# Patient Record
Sex: FEMALE | Race: WHITE
[De-identification: names, ages, dates, MRNs, and addresses within clinical notes are randomized per-mention and may not be internally consistent; named-entity substitution may affect disease eponyms.]

---

## 2018-04-26 ENCOUNTER — HOSPITAL ENCOUNTER (INPATIENT)
Dept: HOSPITAL 92 - ERS | Age: 63
LOS: 76 days | Discharge: HOME | DRG: 4 | End: 2018-07-11
Attending: FAMILY MEDICINE | Admitting: FAMILY MEDICINE
Payer: SELF-PAY

## 2018-04-26 VITALS — BODY MASS INDEX: 57.7 KG/M2

## 2018-04-26 DIAGNOSIS — I27.29: ICD-10-CM

## 2018-04-26 DIAGNOSIS — E87.6: ICD-10-CM

## 2018-04-26 DIAGNOSIS — I50.33: ICD-10-CM

## 2018-04-26 DIAGNOSIS — J96.21: Primary | ICD-10-CM

## 2018-04-26 DIAGNOSIS — I26.99: ICD-10-CM

## 2018-04-26 DIAGNOSIS — E87.0: ICD-10-CM

## 2018-04-26 DIAGNOSIS — R39.2: ICD-10-CM

## 2018-04-26 DIAGNOSIS — I47.2: ICD-10-CM

## 2018-04-26 DIAGNOSIS — N36.8: ICD-10-CM

## 2018-04-26 DIAGNOSIS — B95.5: ICD-10-CM

## 2018-04-26 DIAGNOSIS — N39.0: ICD-10-CM

## 2018-04-26 DIAGNOSIS — I87.2: ICD-10-CM

## 2018-04-26 DIAGNOSIS — I48.0: ICD-10-CM

## 2018-04-26 DIAGNOSIS — B95.2: ICD-10-CM

## 2018-04-26 DIAGNOSIS — I24.8: ICD-10-CM

## 2018-04-26 DIAGNOSIS — I50.813: ICD-10-CM

## 2018-04-26 DIAGNOSIS — N18.3: ICD-10-CM

## 2018-04-26 DIAGNOSIS — M17.11: ICD-10-CM
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
## 2018-04-26 DIAGNOSIS — R23.3: ICD-10-CM

## 2018-04-26 DIAGNOSIS — T45.515A: ICD-10-CM

## 2018-04-26 DIAGNOSIS — D69.3: ICD-10-CM
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
## 2018-04-26 DIAGNOSIS — A41.9: ICD-10-CM

## 2018-04-26 DIAGNOSIS — D68.32: ICD-10-CM

## 2018-04-26 DIAGNOSIS — E66.2: ICD-10-CM

## 2018-04-26 DIAGNOSIS — N17.9: ICD-10-CM

## 2018-04-26 DIAGNOSIS — Z91.19: ICD-10-CM

## 2018-04-26 DIAGNOSIS — F32.9: ICD-10-CM

## 2018-04-26 DIAGNOSIS — Z99.81: ICD-10-CM

## 2018-04-26 DIAGNOSIS — J96.22: ICD-10-CM

## 2018-04-26 LAB
ALBUMIN SERPL BCG-MCNC: 3.8 G/DL (ref 3.4–4.8)
ALP SERPL-CCNC: 50 U/L (ref 40–150)
ALT SERPL W P-5'-P-CCNC: (no result) U/L (ref 8–55)
ANALYZER IN CARDIO: (no result)
ANION GAP SERPL CALC-SCNC: 14 MMOL/L (ref 10–20)
APTT PPP: 242.5 SEC (ref 22.9–36.1)
AST SERPL-CCNC: 22 U/L (ref 5–34)
BACTERIA UR QL AUTO: (no result) HPF
BASE EXCESS STD BLDA CALC-SCNC: 0.6 MEQ/L
BASE EXCESS STD BLDA CALC-SCNC: 1.6 MEQ/L
BASE EXCESS STD BLDA CALC-SCNC: 5.1 MEQ/L
BILIRUB SERPL-MCNC: 0.7 MG/DL (ref 0.2–1.2)
BUN SERPL-MCNC: 26 MG/DL (ref 9.8–20.1)
CA-I BLDA-SCNC: 1.2 MMOL/L (ref 1.12–1.3)
CA-I BLDA-SCNC: 1.2 MMOL/L (ref 1.12–1.3)
CA-I BLDA-SCNC: 1.3 MMOL/L (ref 1.12–1.3)
CALCIUM SERPL-MCNC: 9.3 MG/DL (ref 7.8–10.44)
CASTS #/AREA URNS LPF: (no result) LPF
CHLORIDE SERPL-SCNC: 101 MMOL/L (ref 98–107)
CK MB SERPL-MCNC: 2.9 NG/ML (ref 0–6.6)
CK SERPL-CCNC: 45 U/L (ref 29–168)
CO2 SERPL-SCNC: 31 MMOL/L (ref 23–31)
CREAT CL PREDICTED SERPL C-G-VRATE: 0 ML/MIN (ref 70–130)
CRYSTAL-AUWI FLAG: 0.4 (ref 0–15)
GLOBULIN SER CALC-MCNC: 4 G/DL (ref 2.4–3.5)
GLUCOSE SERPL-MCNC: 130 MG/DL (ref 80–115)
HCO3 BLDA-SCNC: 28 MEQ/L (ref 22–26)
HCO3 BLDA-SCNC: 28.9 MEQ/L (ref 22–26)
HCO3 BLDA-SCNC: 33 MEQ/L (ref 22–26)
HCT VFR BLDA CALC: 46.2 % (ref 36–47)
HCT VFR BLDA CALC: 46.7 % (ref 36–47)
HCT VFR BLDA CALC: 54.3 % (ref 36–47)
HEV IGM SER QL: 1.1 (ref 0–7.99)
HGB BLD-MCNC: 13 G/DL (ref 12–16)
HGB BLD-MCNC: 15 G/DL (ref 12–16)
HGB BLDA-MCNC: 13.1 G/DL (ref 12–16)
HGB BLDA-MCNC: 13.6 G/DL (ref 12–16)
HGB BLDA-MCNC: 14.5 G/DL (ref 12–16)
LIPASE SERPL-CCNC: 37 U/L (ref 8–78)
MCH RBC QN AUTO: 24.3 PG (ref 27–31)
MCV RBC AUTO: 84.8 FL (ref 81–99)
MDIFF COMPLETE?: YES
O2 A-A PPRESDIFF RESPIRATORY: 279.73 MM[HG] (ref 0–20)
O2 A-A PPRESDIFF RESPIRATORY: 439.02 MM[HG] (ref 0–20)
PATHC CAST-AUWI FLAG: 10.61 (ref 0–2.49)
PCO2 BLDA: 113.5 MMHG (ref 35–45)
PCO2 BLDA: 39.6 MMHG (ref 35–45)
PCO2 BLDA: 51.5 MMHG (ref 35–45)
PH BLDA: 7.08 [PH] (ref 7.35–7.45)
PH BLDA: 7.35 [PH] (ref 7.35–7.45)
PH BLDA: 7.48 [PH] (ref 7.35–7.45)
PLATELET # BLD AUTO: 143 THOU/UL (ref 130–400)
PLATELET # BLD AUTO: 194 THOU/UL (ref 130–400)
PO2 BLDA: 123.1 MMHG (ref 80–100)
PO2 BLDA: 70.9 MMHG (ref 80–100)
PO2 BLDA: 80.7 MMHG (ref 80–100)
POTASSIUM SERPL-SCNC: 3.9 MMOL/L (ref 3.5–5.1)
PROT UR STRIP.AUTO-MCNC: 100 MG/DL
RBC # BLD AUTO: 6.16 MILL/UL (ref 4.2–5.4)
RBC UR QL AUTO: (no result) HPF (ref 0–3)
SODIUM SERPL-SCNC: 142 MMOL/L (ref 136–145)
SP GR UR STRIP: 1.02 (ref 1–1.04)
SPECIMEN DRAWN FROM PATIENT: (no result)
SPERM-AUWI FLAG: 0 (ref 0–9.9)
TROPONIN I SERPL DL<=0.01 NG/ML-MCNC: 0.17 NG/ML (ref ?–0.03)
TROPONIN I SERPL DL<=0.01 NG/ML-MCNC: 0.18 NG/ML (ref ?–0.03)
TROPONIN I SERPL DL<=0.01 NG/ML-MCNC: 0.18 NG/ML (ref ?–0.03)
WBC # BLD AUTO: 8.7 THOU/UL (ref 4.8–10.8)
WBC UR QL AUTO: (no result) HPF (ref 0–3)
YEAST-AUWI FLAG: 0 (ref 0–25)

## 2018-04-26 PROCEDURE — 96368 THER/DIAG CONCURRENT INF: CPT

## 2018-04-26 PROCEDURE — 85610 PROTHROMBIN TIME: CPT

## 2018-04-26 PROCEDURE — 80053 COMPREHEN METABOLIC PANEL: CPT

## 2018-04-26 PROCEDURE — 85520 HEPARIN ASSAY: CPT

## 2018-04-26 PROCEDURE — 82542 COL CHROMOTOGRAPHY QUAL/QUAN: CPT

## 2018-04-26 PROCEDURE — A4216 STERILE WATER/SALINE, 10 ML: HCPCS

## 2018-04-26 PROCEDURE — 93970 EXTREMITY STUDY: CPT

## 2018-04-26 PROCEDURE — 0BH17EZ INSERTION OF ENDOTRACHEAL AIRWAY INTO TRACHEA, VIA NATURAL OR ARTIFICIAL OPENING: ICD-10-PCS | Performed by: EMERGENCY MEDICINE

## 2018-04-26 PROCEDURE — 96361 HYDRATE IV INFUSION ADD-ON: CPT

## 2018-04-26 PROCEDURE — 93005 ELECTROCARDIOGRAM TRACING: CPT

## 2018-04-26 PROCEDURE — 94644 CONT INHLJ TX 1ST HOUR: CPT

## 2018-04-26 PROCEDURE — 87186 SC STD MICRODIL/AGAR DIL: CPT

## 2018-04-26 PROCEDURE — 80069 RENAL FUNCTION PANEL: CPT

## 2018-04-26 PROCEDURE — 87205 SMEAR GRAM STAIN: CPT

## 2018-04-26 PROCEDURE — 82565 ASSAY OF CREATININE: CPT

## 2018-04-26 PROCEDURE — 94002 VENT MGMT INPAT INIT DAY: CPT

## 2018-04-26 PROCEDURE — 85018 HEMOGLOBIN: CPT

## 2018-04-26 PROCEDURE — 82805 BLOOD GASES W/O2 SATURATION: CPT

## 2018-04-26 PROCEDURE — 93306 TTE W/DOPPLER COMPLETE: CPT

## 2018-04-26 PROCEDURE — 83605 ASSAY OF LACTIC ACID: CPT

## 2018-04-26 PROCEDURE — 83690 ASSAY OF LIPASE: CPT

## 2018-04-26 PROCEDURE — 84100 ASSAY OF PHOSPHORUS: CPT

## 2018-04-26 PROCEDURE — 84132 ASSAY OF SERUM POTASSIUM: CPT

## 2018-04-26 PROCEDURE — 85014 HEMATOCRIT: CPT

## 2018-04-26 PROCEDURE — 82553 CREATINE MB FRACTION: CPT

## 2018-04-26 PROCEDURE — 5A1955Z RESPIRATORY VENTILATION, GREATER THAN 96 CONSECUTIVE HOURS: ICD-10-PCS | Performed by: INTERNAL MEDICINE

## 2018-04-26 PROCEDURE — 87070 CULTURE OTHR SPECIMN AEROBIC: CPT

## 2018-04-26 PROCEDURE — 85379 FIBRIN DEGRADATION QUANT: CPT

## 2018-04-26 PROCEDURE — 94003 VENT MGMT INPAT SUBQ DAY: CPT

## 2018-04-26 PROCEDURE — 83735 ASSAY OF MAGNESIUM: CPT

## 2018-04-26 PROCEDURE — 96365 THER/PROPH/DIAG IV INF INIT: CPT

## 2018-04-26 PROCEDURE — 36416 COLLJ CAPILLARY BLOOD SPEC: CPT

## 2018-04-26 PROCEDURE — 94660 CPAP INITIATION&MGMT: CPT

## 2018-04-26 PROCEDURE — 83880 ASSAY OF NATRIURETIC PEPTIDE: CPT

## 2018-04-26 PROCEDURE — C9113 INJ PANTOPRAZOLE SODIUM, VIA: HCPCS

## 2018-04-26 PROCEDURE — 80202 ASSAY OF VANCOMYCIN: CPT

## 2018-04-26 PROCEDURE — 93010 ELECTROCARDIOGRAM REPORT: CPT

## 2018-04-26 PROCEDURE — 36415 COLL VENOUS BLD VENIPUNCTURE: CPT

## 2018-04-26 PROCEDURE — 85007 BL SMEAR W/DIFF WBC COUNT: CPT

## 2018-04-26 PROCEDURE — 85027 COMPLETE CBC AUTOMATED: CPT

## 2018-04-26 PROCEDURE — 81015 MICROSCOPIC EXAM OF URINE: CPT

## 2018-04-26 PROCEDURE — 87040 BLOOD CULTURE FOR BACTERIA: CPT

## 2018-04-26 PROCEDURE — 81003 URINALYSIS AUTO W/O SCOPE: CPT

## 2018-04-26 PROCEDURE — 74230 X-RAY XM SWLNG FUNCJ C+: CPT

## 2018-04-26 PROCEDURE — 87086 URINE CULTURE/COLONY COUNT: CPT

## 2018-04-26 PROCEDURE — C1713 ANCHOR/SCREW BN/BN,TIS/BN: HCPCS

## 2018-04-26 PROCEDURE — S0028 INJECTION, FAMOTIDINE, 20 MG: HCPCS

## 2018-04-26 PROCEDURE — 85025 COMPLETE CBC W/AUTO DIFF WBC: CPT

## 2018-04-26 PROCEDURE — 85730 THROMBOPLASTIN TIME PARTIAL: CPT

## 2018-04-26 PROCEDURE — 31500 INSERT EMERGENCY AIRWAY: CPT

## 2018-04-26 PROCEDURE — 87077 CULTURE AEROBIC IDENTIFY: CPT

## 2018-04-26 PROCEDURE — 80048 BASIC METABOLIC PNL TOTAL CA: CPT

## 2018-04-26 PROCEDURE — 51702 INSERT TEMP BLADDER CATH: CPT

## 2018-04-26 PROCEDURE — 85049 AUTOMATED PLATELET COUNT: CPT

## 2018-04-26 PROCEDURE — 96375 TX/PRO/DX INJ NEW DRUG ADDON: CPT

## 2018-04-26 PROCEDURE — 5A09357 ASSISTANCE WITH RESPIRATORY VENTILATION, LESS THAN 24 CONSECUTIVE HOURS, CONTINUOUS POSITIVE AIRWAY PRESSURE: ICD-10-PCS | Performed by: EMERGENCY MEDICINE

## 2018-04-26 PROCEDURE — 94640 AIRWAY INHALATION TREATMENT: CPT

## 2018-04-26 PROCEDURE — 71045 X-RAY EXAM CHEST 1 VIEW: CPT

## 2018-04-26 PROCEDURE — 84484 ASSAY OF TROPONIN QUANT: CPT

## 2018-04-26 RX ADMIN — FAMOTIDINE SCH MG: 10 INJECTION, SOLUTION INTRAVENOUS at 21:20

## 2018-04-26 RX ADMIN — CEFTRIAXONE SCH MLS: 2 INJECTION, POWDER, FOR SOLUTION INTRAMUSCULAR; INTRAVENOUS at 04:47

## 2018-04-26 RX ADMIN — Medication SCH ML: at 21:21

## 2018-04-26 RX ADMIN — Medication SCH ML: at 10:12

## 2018-04-26 RX ADMIN — FAMOTIDINE SCH MG: 10 INJECTION, SOLUTION INTRAVENOUS at 10:13

## 2018-04-26 RX ADMIN — HEPARIN SODIUM SCH UNIT: 1000 INJECTION, SOLUTION INTRAVENOUS; SUBCUTANEOUS at 16:14

## 2018-04-26 NOTE — HP
DATE OF ADMISSION:  04/26/2018

 

PRIMARY CARE PROVIDER:  City call.

 

CHIEF COMPLAINT:  Shortness of breath.

 

HISTORY OF PRESENT ILLNESS:  This is a 62-year-old female who presents to [facility] Emer[facility]
cy Department in transfer from [facility] with severe shortness of breath, requir
ing BiPAP, noninvasive mechanical ventilation.  The history is obtained after discussions with the ER
 attending as well as the patient's 2 sons at the bedside as the patient is currently on mechanical v
entilation and unable to respond.  The patient apparently had been experiencing increasing shortness 
of breath over the last several days; however, the sons report the patient was on home oxygen using a
 face mask, but the sons were unclear on how often and frequent the patient use the oxygen.  The parul
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 
other patients.  No specific history of recent fever, exposure history.  Review of the record shows t
hat EMS personnel arrived to find the patient covered in urine and feces with 70% O2 saturation on 15
 liters of oxygen.  The patient apparently was told she needed to be intubated at that time and was p
laced on CPAP.  The patient received subcutaneous Lovenox, Zofran, and 1 liter of intravenous normal 
saline and underwent evaluation in the [facility].  The patient was speaking shor
t phrases of 1-3 words, but was alert and oriented.  The patient underwent general evaluation with el
evated D-dimer noted on initial exam.  The patient was scheduled for CT of the chest; however, would 
not fit in the CT scanner due to morbid obesity.  The patient was referred to [facility] for
 ventilation perfusion scan as initial concern for pulmonary embolus given the patient's overall body
 habitus and presentation.  The patient received the Lovenox empirically as stated previously.  In th
e emergency room at [facility], the patient was markedly dyspneic with O2 saturations in the
 70s with accessory muscle use.  Initial ABG at [facility] showed a pH of 7.08 and a pCO2 of
 113.5.  Discussions were had with the patient regarding the need for intubation, at which point the 
patient decided to proceed with this procedure.  The patient underwent intubation in the emergency ro
om with a 7.0 endotracheal tube at 22 cm.  The patient was continued on mechanical ventilation and re
ferred to the Hospitalist Service for admission.

 

PAST MEDICAL HISTORY:

1.  Morbid obesity.

2.  Question of obstructive sleep apnea.

3.  Chronic hypoxic respiratory failure.

4.  Question of chronic kidney disease, stage 2-3.

 

PAST SURGICAL HISTORY:  Reviewed and negative.

 

CURRENT MEDICATIONS:  According to the patient's son, she takes supplements, no prescription medicati
on.

 

ALLERGIES:  No known drug allergies.

 

FAMILY HISTORY:  No inheritable diseases per family report.

 

SOCIAL HISTORY:  The patient resides in [place], [place].  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  No current al
cohol, tobacco, or illicit drug use.

 

REVIEW OF SYSTEMS:  Unobtainable as the patient is on current mechanical ventilation and sedated.

 

PHYSICAL EXAMINATION:

VITAL SIGNS:  Currently, blood pressure 109/63, pulse 87, respiratory rate 16, temperature 96.1 degre
es Fahrenheit, O2 saturation 94% on 80% FiO2, SIMV.

GENERAL APPEARANCE:  This is a morbidly obese female, on current mechanical ventilation, sedate, obtu
nded.

HEENT:  Pupils are minimally reactive to light and accommodation.  Extraocular muscles are intact.  N
o scleral icterus, no conjunctival injection.  Nares patent.  OP is clear with increased secretions a
round the endotracheal tube.

NECK:  Supple.  Landmarks are difficult to palpate.  No JVD noted.  No carotid bruits.  No palpable m
ass.

CHEST:  Diminished breath sounds in all lung fields.  Coarse breath sounds with expiratory wheeze not
ed.  Faint crackles bilaterally.

CARDIOVASCULAR:  S1, S2 with distant heart sounds.  No murmur, gallop, or rub.

ABDOMEN:  Morbidly obese with landmarks difficult to palpate.  No gross mass appreciated.  No rebound
 noted.

EXTREMITIES:  Tense edema of bilateral lower extremities to the knees.  Pulses palpable distally at t
he dorsalis pedis, posterior tibial, and popliteal arteries bilaterally.

GENITOURINARY:  Lozano catheter in place with dark cresencio urine.

NEUROLOGIC:  Obtunded, on current sedation and mechanical ventilation.

 

PERTINENT LABORATORY AND X-RAY FINDINGS:  Sodium 142, potassium 3.9, chloride 101, CO2 of 31, BUN 26,
 creatinine 1.26, estimated GFR of 43, glucose 130, lactic acid level 1.3, calcium 9.3, AST 22, ALT l
ess than 7, alkaline phosphatase 50, total CK of 45.  Troponin I 0.177.  .  Albumin 3.8, lipas
e 37.  CBC showed a white blood cell count of 8.7, hemoglobin 15, hematocrit 52, platelet count 194 w
ith normal differential.  D-dimer 4.14.  ABG dated 04/26/2018 at 1:22 a.m. showed pH of 7.08, pCO2 of
 113.5, pO2 of 123, bicarbonate 33, and O2 saturation 97% on 100% FiO2, BiPAP mode.  Urinalysis, posi
tive for protein, moderate blood, moderate bilirubin with small leukocyte esterase.  Urine microscopy
 showed 7-10 rbc's per high-power field, 21-50 wbc's per high-power field, 3+ bacteria noted.  Portab
le chest x-ray dated 04/26/2018 by my interpretation shows poor inspiratory effort with patchy infilt
rates bilaterally concerning for edema.  Rotated film.  Poor quality.  EKG dated 04/26/2018 by my int
erpretation shows sinus mechanism with heart rates in the 90s.  Attenuated R waves noted in the preco
rdial leads.  Right bundle-branch block pattern noted.  Normal axis.  No acute ST-T wave changes appr
eciated.

 

ASSESSMENT AND PLAN:

1.  Acute on chronic hypercapnic respiratory failure.  The patient will be admitted to the Critical C
are Unit.  We will continue mechanical ventilation with SIMV at FiO2 of 80%.  Consult Pulmonary Criti
aleksandra Care Service in the a.m.  Suspect multifactorial given the patient's severe morbid obesity and li
mimi chronic hypercapnia.  Continue aggressive pulmonary supportive measures.  Repeat ABG in the a.m.


2.  Acute congestive heart failure exacerbation.  Suspect multifactorial congestive heart failure giv
en the patient's morbid obesity.  Check 2D transthoracic echocardiogram in the a.m.  Continue Lasix 4
0 mg IV b.i.d.  Consult Cardiology Service in the a.m. for further evaluation.  Continue aspirin 325 
mg daily.

3.  Acute kidney injury.  We will continue to monitor serial creatinines.  Avoid nephrotoxic agents a
nd contrast media.  Watch renal function closely in the context of ongoing diuretic therapy.

4.  Demand ischemia.  Suspect demand state in the context of current presentation and respiratory mica
lure.  Lovenox was given initially in the emergency department.  Continue to trend serial troponin I.
  Consult Cardiology Service in the a.m.

5.  Morbid obesity.  Turning protocol.  Low air loss mattress.  Consider dietitian consult when stabi
lized.

6.  Urinary tract infection.  Suspected given urinalysis findings.  Await final urine culture results
.  Initiate Rocephin 2 grams IV q.24 hours.

7.  Prophylaxis.  Sequential compression devices held due to bilateral lower extremity edema.  Loveno
x 40 mg subcutaneously daily.  Pepcid 20 mg IV q.12 hours.  Critical care unit sedation protocol.  Cr
itical care unit electrolyte replacement protocol.

8.  Code status is FULL.  Surrogate medical decision maker is the patient's son.

 

Total critical care time is 45 minutes.

## 2018-04-26 NOTE — CON
DATE OF CONSULTATION:  04/26/2018 

 

Thirty minutes critical care time.

 

REASON FOR CONSULTATION:  Acute respiratory failure requiring mechanical ventilation.

 

HISTORY OF PRESENT ILLNESS:  This is a 62-year-old female who was transferred to this facility from [facility].  She had been on noninvasive ventilation there.  I believe by the time she got here, she wa
s still profusely hypercapnic and was subsequently intubated by the emergency room physician.

 

What information I have is taken from reading the history and physical and the emergency room physici
an's notes that the patient was intubated.  There is no family available here to give history.

 

From what I can discern, she is morbidly obese.  She has sleep apnea, but does not wear her CPAP at h
ome.  I also got the impression that she has been intubated previously.

 

PAST MEDICAL HISTORY:

1.  Morbid obesity.

2.  Obstructive sleep apnea.  

3.  Chronic kidney disease.

 

PAST SURGICAL HISTORY:  Negative.

 

MEDICATIONS PRIOR TO ADMISSION:  None.

 

ALLERGIES:  None.

 

FAMILY MEDICAL HISTORY:  Unremarkable.

 

SOCIAL HISTORY:  Apparently does not smoke, drink alcohol, does not use illicit drugs.

 

REVIEW OF SYSTEMS:  Unobtainable as patient is currently on mechanical ventilation.

 

PHYSICAL EXAMINATION:

VITAL SIGNS:  Weight looks to be at least 600 pounds, height 5 foot 4, temperature 95.9, pulse 68, bl
ood pressure 124/82, O2 sat 100%.

GENERAL:  She is a morbidly obese female who is intubated.  She is awake and tries to mouth around th
e endotracheal tube.

HEENT:  Pupils react.  Sclerae icteric.  Oropharynx class 4 Mallampati airway.

NECK:  No adenopathy, no JVD.

LUNGS:  Coarse breath sounds bilaterally.

CARDIOVASCULAR:  S1, S2 distant.

ABDOMEN:  Morbidly obese.

EXTREMITIES:  Multiple pannus folds, severe chronic stasis changes.

 

LABORATORY DATA:  White blood cell count 8.7, hematocrit 52, platelet count 194.  PH 7.48, pCO2 of 39
, pO2 70 on SIMV rate 20, tidal volume 5, PEEP 5, FiO2 60%.  Sodium 142, potassium 3.9, chloride 101,
 CO2 31, BUN 26, creatinine 1.2, glucose 130.  .  Troponin 0.18.

 

Chest x-ray shows poor penetration.  She has cardiomegaly, probably a grossly enlarged right ventricl
e.  She has pulmonary edema bilaterally.

 

ASSESSMENT:

1.  Likely biventricular congestive heart failure.

2.  Pulmonary edema.

3.  Morbid obesity.

4.  Sleep apnea.

5.  Question of concurrent pneumonia.

 

PLAN:  

1.  I have reviewed the orders and agree with aggressive diuresis.  

2.  Continue mechanical ventilation at current settings and follow serial ABGs.

3.  Agree with empiric antibiotics.

4.  May ultimately need a tracheostomy since she is noncompliant with noninvasive ventilation.

## 2018-04-26 NOTE — ULT
BILATERAL LOWER EXTREMITY DUPLEX EXAM:

 

History: Bilateral lower extremity pain and edema. 

 

FINDINGS: 

Real-time color doppler evaluation of the right and left lower extremities were performed from groin 
to calf. This includes evaluation of the common femoral, superficial, profunda femoral, saphenous, po
pliteal, and trifurcation veins. 

 

Exam is very limited due to patient's inability to cooperate in moving and body habitus. There are po
rtions of the veins were not visualized including the mid and distal right superficial femoral vein a
nd mid left superficial femoral vein. Augmentation cannot be performed and popliteal regions could no
t be well evaluated due to inability to position the patient. There was flow demonstrated within the 
vessels without obvious DVT. 

 

IMPRESSION: 

Extremely limited examination. No definitive evidence of DVT of either lower extremity. 

 

POS: [facility]

## 2018-04-26 NOTE — RAD
CHEST 1 VIEW:

 

Date:  04/26/18 

 

HISTORY:  

Respiratory failure. 

 

COMPARISON:  

Chest radiograph from same date. 

 

FINDINGS:

Extensive perihilar opacities. Heart size is enlarged. Layering left effusion. Extensive perihilar op
acities. 

 

 

IMPRESSION: 

Cardiomegaly with layering effusion and perihilar opacities may reflect edema, hemorrhage, or ARDS. 

 

 

POS: [facility]

## 2018-04-26 NOTE — CON
DATE OF CONSULTATION:  04/26/2018

 

CARDIOLOGY CONSULTATION

 

REASON FOR CONSULTATION:  Heart failure.

 

HISTORY OF PRESENT ILLNESS:  Ms. Hedrick is a 62-year-old white female who comes to the hospital for r
espiratory insufficiency.  She was at the [place] facility for a few days with noninvasive ventilat
ion, but became even worse with her hypercapnia and subsequently had to be intubated and transferred 
here emergently.  She currently remains sedated and intubated.  Her son is in the room.  He tells me 
he has had this same scenario happened several times in the past and has always been blamed on her mo
rbid obesity.  She has a history of severe obstructive sleep apnea and apparently there are some comp
liance issues with her CPAP.  Son tells me that she has in fact been intubated in the past.

 

PAST MEDICAL HISTORY:

1.  Morbid obesity, BMI of 103.  This is with a calculated weight of about 600 pounds.  She is 5 feet
 and 4 inches.  Son tells me she has been gaining weight in the last 6-8 months.

2.  Severe obstructive sleep apnea.

3.  Chronic kidney disease.

 

PAST SURGICAL HISTORY:  Negative.

 

OUTPATIENT MEDICATIONS:  None.

 

ALLERGIES:  No known drug allergies.

 

FAMILY HISTORY:  Noncontributory.

 

SOCIAL HISTORY:  No alcohol, tobacco or drugs per chart.

 

REVIEW OF SYSTEMS:  Unobtainable as patient is sedated and intubated.

 

PHYSICAL EXAMINATION:

VITAL SIGNS:  Temperature 98.6, pulse 60, respiratory rate 20, satting 92% on 50% FiO2, blood pressur
e 126/74.

GENERAL:  Sedated and intubated, she is morbidly obese.

HEENT:  Normocephalic.

NECK:  Short, unable to evaluate for JVD.

LUNGS:  Coarse anteriorly, but distant heart sounds.

CARDIOVASCULAR:  Distant heart sounds.  S1 and S2.  Cannot appreciate any murmurs.

ABDOMEN:  Prominent.  Cannot hear any bowel sounds, but most likely due to her weight.

EXTREMITIES:  There are skin changes from chronic edema as well as weight.  She has 2-3+ lower extrem
ity edema.

SKIN:  Warm and dry.

 

LABORATORY DATA:  Laboratory work was reviewed.  White count of 8.7, hemoglobin of 15, hematocrit 52,
 and platelet count of 194.  Coags were reviewed.  D-dimer was extremely high at 4.1.  ABGs were revi
ewed.  Chemistry was reviewed.  BUN 26, creatinine 1.2 with GFR 43.  Troponin has been 0.17, 0.17, 0.
18 and BNP of 911.  Albumin was 3.8.  UA showed moderate blood, moderate bilirubin, 21-50 white cells
, 7-10 red cells, and 3+ bacteria.

 

IMAGING DATA:  Echocardiogram was reviewed, EF was hyperdynamic, but was D-shaped left ventricle on b
oth systole and diastole consistent with right ventricular volume and pressure overload.  Her right v
entricle was severely dilated with reduced RV systolic function.  However, the pressures were not ter
ribly high at 45 mmHg.  There was moderate PI as well.

 

Chest x-ray was reviewed.

 

ASSESSMENT:

1.  Acute on chronic diastolic heart failure.

2.  Most likely chronic right ventricular dysfunction.

3.  Cannot exclude acute pulmonary embolism.

4.  Morbid obesity with body mass index of 103.

5.  Acute hypoxic hypercapnic respiratory insufficiency requiring mechanical ventilation.

6.  Severe sleep apnea, noncompliant.

 

PLAN:

1.  Agree with IV diuresis.  She has a lot of edema most likely from RV failure from her severe sleep
 apnea and obesity.  At this time, I cannot exclude an acute pulmonary embolism as the cause of her d
ecompensation and I discussed this with Dr. Walsh and diagnostic yield of CT scan or V/Q scan will 
be extremely low and may not even be doable secondary to her weight, so we would have to just start f
ull anticoagulation in case this were to be the problem.

2.  We will start heparin drip per PE protocol.

3.  Patient is severely ill.  Death would not be unexpected.

 

Thank you for letting us to participate in the care of your patient.  We will follow.

 

Over 45 minutes of critical care were delivered at bedside.

## 2018-04-26 NOTE — RAD
PORTABLE CHEST:

 

Date:  04/26/18 

 

COMPARISON:  

Earlier exam same date. 

 

HISTORY:  

Respiratory failure, on mechanical ventilation. 

 

FINDINGS:

Film technique is suboptimal. Heart size is enlarged. Patient is rotated on this examination. Endotra
cheal tube and NG tubes appear to be in satisfactory position. Parenchymal lung changes are felt to b
e fairly stable as compared to the prior exam. 

 

IMPRESSION: 

Essentially stable exam. 

 

 

POS: [facility]

## 2018-04-26 NOTE — RAD
CHEST 1 VIEW:

 

HISTORY: 

Emergency exam.  Intubation.

 

COMPARISON: 

Radiograph same day.

 

FINDINGS: 

The patient is intubated with endotracheal tube tip below the clavicles.  Enteric tube was placed wit
h tip below the diaphragm but out of field of view.

 

Extensive perihilar airspace opacities and peripheral opacities, somewhat worsened.  Elevation of lef
t hemidiaphragm.

 

Cardiomegaly.

 

IMPRESSION: 

1.  Endotracheal tube tip in good position.

 

2.  Enteric tube tip below the diaphragm but out of the field of view.

 

3.  Cardiomegaly.

 

4.  Extensive hilar opacities and peripheral opacities suggesting edema, infection, or adult respirat
ory distress syndrome.  Followup recommended.

 

5.  Elevated left hemidiaphragm.

 

6.  Left basilar opacity may reflect focal infection.  Malignancy cannot be excluded.

 

POS: [facility]

## 2018-04-27 LAB
ALBUMIN SERPL BCG-MCNC: 2.7 G/DL (ref 3.4–4.8)
ALP SERPL-CCNC: 43 U/L (ref 40–150)
ALT SERPL W P-5'-P-CCNC: (no result) U/L (ref 8–55)
ANALYZER IN CARDIO: (no result)
ANION GAP SERPL CALC-SCNC: 17 MMOL/L (ref 10–20)
ANISOCYTOSIS BLD QL SMEAR: (no result) (100X)
APTT PPP: (no result) SEC (ref 22.9–36.1)
APTT PPP: 241.8 SEC (ref 22.9–36.1)
AST SERPL-CCNC: 18 U/L (ref 5–34)
BASE EXCESS STD BLDA CALC-SCNC: 5.1 MEQ/L
BILIRUB SERPL-MCNC: 1.2 MG/DL (ref 0.2–1.2)
BUN SERPL-MCNC: 27 MG/DL (ref 9.8–20.1)
CA-I BLDA-SCNC: 1.1 MMOL/L (ref 1.12–1.3)
CALCIUM SERPL-MCNC: 8.6 MG/DL (ref 7.8–10.44)
CHLORIDE SERPL-SCNC: 103 MMOL/L (ref 98–107)
CO2 SERPL-SCNC: 26 MMOL/L (ref 23–31)
CREAT CL PREDICTED SERPL C-G-VRATE: 180 ML/MIN (ref 70–130)
GLOBULIN SER CALC-MCNC: 3 G/DL (ref 2.4–3.5)
GLUCOSE SERPL-MCNC: 81 MG/DL (ref 80–115)
HCO3 BLDA-SCNC: 27.5 MEQ/L (ref 22–26)
HCT VFR BLDA CALC: 45.1 % (ref 36–47)
HGB BLD-MCNC: 13.3 G/DL (ref 12–16)
HGB BLDA-MCNC: 13.8 G/DL (ref 12–16)
MCH RBC QN AUTO: 23.7 PG (ref 27–31)
MCV RBC AUTO: 79.8 FL (ref 81–99)
MDIFF COMPLETE?: YES
PCO2 BLDA: 33.6 MMHG (ref 35–45)
PH BLDA: 7.53 [PH] (ref 7.35–7.45)
PLATELET # BLD AUTO: 156 THOU/UL (ref 130–400)
PLATELET BLD QL SMEAR: (no result)
PO2 BLDA: 60.6 MMHG (ref 80–100)
POLYCHROMASIA BLD QL SMEAR: (no result) (100X)
POTASSIUM SERPL-SCNC: 2.9 MMOL/L (ref 3.5–5.1)
POTASSIUM SERPL-SCNC: 3.7 MMOL/L (ref 3.5–5.1)
RBC # BLD AUTO: 5.61 MILL/UL (ref 4.2–5.4)
SODIUM SERPL-SCNC: 143 MMOL/L (ref 136–145)
SPECIMEN DRAWN FROM PATIENT: (no result)
WBC # BLD AUTO: 8 THOU/UL (ref 4.8–10.8)

## 2018-04-27 RX ADMIN — Medication SCH ML: at 09:37

## 2018-04-27 RX ADMIN — POTASSIUM CHLORIDE PRN MLS: 2 INJECTION, SOLUTION, CONCENTRATE INTRAVENOUS at 06:13

## 2018-04-27 RX ADMIN — FAMOTIDINE SCH MG: 10 INJECTION, SOLUTION INTRAVENOUS at 08:45

## 2018-04-27 RX ADMIN — FAMOTIDINE SCH MG: 10 INJECTION, SOLUTION INTRAVENOUS at 20:59

## 2018-04-27 RX ADMIN — HEPARIN SODIUM SCH UNIT: 1000 INJECTION, SOLUTION INTRAVENOUS; SUBCUTANEOUS at 16:35

## 2018-04-27 RX ADMIN — CEFTRIAXONE SCH MLS: 2 INJECTION, POWDER, FOR SOLUTION INTRAMUSCULAR; INTRAVENOUS at 04:15

## 2018-04-27 RX ADMIN — Medication SCH ML: at 21:00

## 2018-04-27 RX ADMIN — HEPARIN SODIUM SCH UNIT: 1000 INJECTION, SOLUTION INTRAVENOUS; SUBCUTANEOUS at 23:23

## 2018-04-27 NOTE — RAD
SEMIUPRIGHT PORTABLE CHEST 1 VIEW:

 

HISTORY: 

A 62-year-old female with a history of respiratory failure on mechanical ventilation.

 

FINDINGS: 

NG tube and endotracheal tube remain in place.  Marked large body habitus lowers the sensitivity of t
his study.  There is cardiomegaly.  There is bilateral vascular congestion and patchy bilateral inter
stitial and alveolar parenchymal changes noted bilaterally, somewhat more marked than on the prior st
udy.

 

IMPRESSION: 

Bilateral vascular congestion and some slightly more prominent interstitial and nodular alveolar opac
ity changes bilaterally, particularly in the perihilar regions.  Stable cardiomegaly.  Stable life giron
pport tubes.  Continued short-term followup.

 

POS: [facility]

## 2018-04-27 NOTE — PDOC.CTH
Cardiology Progress Note





- Subjective





She remains intubated and sedated. 





- Objective


 Vital Signs











  Temp Pulse Resp BP


 


 04/27/18 07:36   58 L   144/75 H


 


 04/27/18 06:00    20 


 


 04/27/18 04:00  97.9 F   20 


 


 04/27/18 02:00    20 


 


 04/27/18 00:00  97.8 F   20 








 











Admit Weight                   600 lb


 


Weight                         600 lb














 











 04/26/18 04/27/18 04/28/18





 06:59 06:59 06:59


 


Intake Total 100 2017.0 


 


Output Total 1825 1335 


 


Balance -1725 682.0 














- Physical Examination


General/Neuro: other: (Sedated intubated.)


Neck: other:


Lungs: other: (distant breath sounds. )


Heart: RRR


Abdomen: NT/ND


Extremities: + edema B (3+)





- Telemetry


Telemetry Rhythm: NSR





- Labs


Result Diagrams: 


 04/27/18 04:11





 04/27/18 04:11


 Troponin/CKMB











CK-MB (CK-2)  2.9 ng/mL (0-6.6)   04/26/18  01:05    


 


Troponin I  0.180 ng/mL (< 0.028)  H  04/26/18  06:57    














- Assessment/Plan





1. Acute hypoxic hypercapnic respiratory insufficiency


2. Acute on chronic RV dysfunction.


3. Acute on chronic diastolic heart failure


4. Morbid obesity, 


5. Pickwickian syndrome





PLAN:


- Cannot exclude acute PE as the cause however her RV seems to be chronically 

dilated and her RV free wall is not hypokinetic as I would expect in an acute 

elevation of right sided pressures from a PE. Most likely this a chronic RV 

dysfunction from her weight and severe MARY.


- Will plan on continuing full anticoagulation for a total of 48 hrs and will 

repeat echo and if unchanged will discontinue Heparin.


- Replace K.


- Continue IV lasix.

## 2018-04-27 NOTE — PRG
DATE OF SERVICE:  04/27/2018

 

Thirty-five minutes critical care time.

 

The patient remains intubated on mechanical ventilation.  She is sedated on propofol.

 

PHYSICAL EXAMINATION:

VITAL SIGNS:  Temperature is 97.9 with no fever overnight, pulse 55, blood pressure 163/89.  24 hour 
intake 2017, output 1335, weight is estimated 600 pounds.

HEENT:  Unremarkable.

NECK:  No JVD.

LUNGS:  Distant breath sounds.

CARDIAC:  S1 and S2 regular, bradycardic.

ABDOMEN:  Soft, morbidly obese.

EXTREMITIES:  Edematous throughout.

 

LABORATORY DATA:  White blood cell count 8, hematocrit 44.8, platelet count 156.  .8.  Sodium 
143, potassium 2.9, chloride 103, CO2 26, BUN 27, creatinine 1.4, glucose 81.  .

 

ASSESSMENT:

1.  Morbid obesity.

2.  Obstructive sleep apnea/obesity hypoventilation syndrome.

3.  Acute on chronic respiratory failure.

4.  Elevated pulmonary artery pressures on echocardiogram - discussed with Dr. Mauro.  He is worried
 about pulmonary embolism.  We all  understand that she cannot be imaged given her size.  Of note, sharon alvarez has negative Doppler scan of the lower extremities.  I feel most likely the dilated right ventricul
ar findings are due to untreated obstructive sleep apnea.

 

RECOMMENDATIONS:  

1.  She is not weanable at this time.  

2.  She is currently on antibiotics, but the indication is not completely clear other than possible p
neumonitis which is difficult to tell on her x-ray given her size.

3.  Speak with family about possible tracheostomy next week.

4.  Continuing anticoagulation per Cardiology.  Withhold any long-acting anticoagulants in anticipati
on of a trach.

## 2018-04-27 NOTE — PDOC.PN
- Subjective


Encounter Start Date: 04/27/18


Encounter Start Time: 07:00


-: non-verbal





Pt seen for followup re: acute on chronic respiratory failure.  Intubated, 

unable to obtain ROS.





- Objective


Resuscitation Status: 


 











Resuscitation Status           FULL:Full Resuscitation














MAR Reviewed: Yes


Vital Signs & Weight: 


 Vital Signs (12 hours)











  Temp Pulse Resp BP


 


 04/27/18 12:44   53 L   112/64


 


 04/27/18 10:45   55 L   135/78


 


 04/27/18 07:36   58 L   144/75 H


 


 04/27/18 06:00    20 


 


 04/27/18 04:00  97.9 F   20 


 


 04/27/18 02:00    20 








 Weight











Admit Weight                   600 lb


 


Weight                         600 lb











 Most Recent Monitor Data











Heart Rate from ECG            58


 


NIBP                           139/81


 


NIBP BP-Mean                   96


 


Respiration from ECG           16


 


SpO2                           97














I&O: 


 











 04/26/18 04/27/18 04/28/18





 06:59 06:59 06:59


 


Intake Total 100 2017.0 


 


Output Total 1825 1335 


 


Balance -1725 682.0 











Result Diagrams: 


 04/27/18 04:11





 04/27/18 04:11


Additional Labs: 


 Accuchecks











  04/27/18





  08:35


 


POC Glucose  77











EKG Reviewed by me: Yes (Tele:  NSR)





Phys Exam





- Physical Examination


Morbid obesity


HEENT: moist MMs


ETT


Neck: no nodes


Respiratory: no wheezing, no rales, no rhonchi, clear to auscultation bilateral


Cardiovascular: RRR, no rub


Gastrointestinal: soft, non-tender, positive bowel sounds


distention


Musculoskeletal: edema present


Neurological: moves all 4 limbs


Deviation from normal: Unable to assess mood, affect or orientation to person, 

place or time


Deviation from normal: lymphedema lower extremities





Dx/Plan


(1) Acute and chronic respiratory failure


Code(s): J96.20 - ACUTE AND CHR RESP FAILURE, UNSP W HYPOXIA OR HYPERCAPNIA   

Status: Acute   


Qualifiers: 


   Respiratory failure complication: hypercapnia   Qualified Code(s): J96.22 - 

Acute and chronic respiratory failure with hypercapnia   


Comment: Intubated, mechanically ventilated.   





(2) Hypokalemia


Code(s): E87.6 - HYPOKALEMIA   Status: Acute   Comment: Replace potassium   





(3) Acute on chronic diastolic (congestive) heart failure


Code(s): I50.33 - ACUTE ON CHRONIC DIASTOLIC (CONGESTIVE) HEART FAILURE   Status

: Acute   Comment: continue furosemide   





(4) Pulmonary embolism


Code(s): I26.99 - OTHER PULMONARY EMBOLISM WITHOUT ACUTE COR PULMONALE   Status

: Suspected   Comment: pt on heparin drip   





(5) Morbid obesity with BMI of 70 and over, adult


Code(s): E66.01 - MORBID (SEVERE) OBESITY DUE TO EXCESS CALORIES; Z68.45 - BODY 

MASS INDEX (BMI) 70 OR GREATER, ADULT   Status: Chronic   





(6) Pickwickian syndrome


Code(s): E66.2 - MORBID (SEVERE) OBESITY WITH ALVEOLAR HYPOVENTILATION   Status

: Chronic   





- Plan





* .








Review of Systems





- Medications/Allergies


Allergies/Adverse Reactions: 


 Allergies











Allergy/AdvReac Type Severity Reaction Status Date / Time


 


No Known Drug Allergies Allergy   Unverified 04/26/18 01:50











Medications: 


 Current Medications





Acetaminophen (Tylenol)  1,000 mg PER TUBE Q6H PRN


   PRN Reason: Headache/Fever or Mild Pain


Aspirin (Aspirin)  325 mg PER TUBE DAILY [facility]


   Last Admin: 04/27/18 12:48 Dose:  325 mg


Clonidine (Catapres)  0.1 mg PO Q4H PRN


   PRN Reason: Systolic BP > 180


Famotidine (Pepcid)  20 mg SLOW IVP Q12HR [facility]


   Last Admin: 04/27/18 08:45 Dose:  20 mg


Furosemide (Lasix)  40 mg SLOW IVP 0600,1400 [facility]


   Last Admin: 04/27/18 13:11 Dose:  40 mg


Heparin Sodium (Porcine) (Heparin 1,000 Units/Ml (10 Ml))  0 units SLOW IVP 

ASDIR K[facility]


   PRN Reason: Protocol


   Last Admin: 04/26/18 16:14 Dose:  10,000 unit


Hydralazine HCl (Apresoline)  10 mg SLOW IVP Q4H PRN


   PRN Reason: Systolic BP > 180


Ceftriaxone Sodium 2 gm/ (Sodium Chloride)  100 mls @ 200 mls/hr IVPB Q24HR [facility]


   Last Admin: 04/27/18 04:15 Dose:  100 mls


Potassium Chloride 40 meq/ (Sodium Chloride)  270 mls @ 135 mls/hr IVPB ASDIR 

PRN


   PRN Reason: FOR SERUM K+ 2.5 - 3.5


   Last Admin: 04/27/18 06:13 Dose:  270 mls


Potassium Chloride 40 meq/ (Device)  100 mls @ 50 mls/hr IVPB ASDIR PRN


   PRN Reason: FOR SERUM K+ 2.5 - 3.5


Magnesium Sulfate 1 gm/ Sodium (Chloride)  102 mls @ 102 mls/hr IV PRN PRN


   PRN Reason: MAG LEVEL 1.4 - 2.0


Magnesium Sulfate 2 gm/ Device  100 mls @ 100 mls/hr IVPB ASDIR PRN


   PRN Reason: MAGNESIUM < 1.4


Potassium Phosphate 9 mmol/ (Sodium Chloride)  103 mls @ 25.75 mls/hr IVPB 

ASDIR PRN


   PRN Reason: Phosphate 1.0-1.8


Potassium Phosphate 12 mmol/ (Sodium Chloride)  254 mls @ 63.5 mls/hr IV ASDIR 

PRN


   PRN Reason: Serum phosphate 0.5-0.9


Potassium Phosphate 15 mmol/ (Sodium Chloride)  255 mls @ 63.75 mls/hr IV ASDIR 

PRN


   PRN Reason: Serum Phos < 0.5


Fentanyl Citrate 2,000 mcg/ (Sodium Chloride)  100 mls @ 0 mls/hr IV INF KEE; 

Per Protocol


   PRN Reason: Protocol


   Stop: 05/26/18 04:00


Fentanyl Citrate (Fentanyl Bolus)  250 mls @ 0 mls/hr IVPB PRN PRN; As Directed


   PRN Reason: Breakthrough pain/agitation


   Stop: 05/26/18 04:00


Vancomycin HCl 2 gm/ Sodium (Chloride)  500 mls @ 250 mls/hr IVPB 0200,1400 [facility]


   Last Admin: 04/27/18 01:47 Dose:  500 mls


Heparin Sodium/Dextrose (Heparin 25,000 Units/D5w 500 Ml)  500 mls @ 0 mls/hr 

IVPB INF KEE; Per Protocol


   PRN Reason: Protocol


   Last Admin: 04/27/18 12:51 Dose:  500 mls


Lorazepam (Ativan)  2 mg SLOW IVP Q1H PRN


   PRN Reason: Breakthrough agitation


   Stop: 05/26/18 04:00


   Last Admin: 04/27/18 13:01 Dose:  2 mg


Magnesium Oxide (Magnesium Oxide)  400 mg PO BIDPRN PRN


   PRN Reason: FOR SERUM MAG 1.4 - 2.0


Magnesium Oxide (Magnesium Oxide)  800 mg PO PRN PRN


   PRN Reason: FOR SERUM MAG < 1.4


Miscellaneous Medication (Phos-Nak)  1 pkt PO TIDPRN PRN


   PRN Reason: FOR PHOS LEVEL 1.0 - 1.8


Miscellaneous Medication (Phos-Nak)  2 pkt PO TIDPRN PRN


   PRN Reason: FOR PHOS LEVEL 0.5 - 1.0


Miscellaneous Medication (Pharmacy To Dose)  0 each IVPB PRN PRN


   PRN Reason: VANC


Morphine Sulfate (Morphine)  2 mg SLOW IVP Q1H PRN


   PRN Reason: breakthrough pain/agitation


   Stop: 05/26/18 04:00


Ondansetron HCl (Zofran Odt)  4 mg PO Q6H PRN


   PRN Reason: Nausea/Vomiting


Ondansetron HCl (Zofran)  4 mg IVP Q6H PRN


   PRN Reason: Nausea/Vomiting


Potassium Chloride (K-Dur)  40 meq PO ASDIR PRN


   PRN Reason: FOR SERUM K+  2.5 - 3.5


Potassium Chloride (Klor-Con)  40 meq PER TUBE ASDIR PRN


   PRN Reason: FOR SERUM K+ 2.5-3.5


Propofol (Diprivan)  1,000 mg IV INF PRN; Protocol


   PRN Reason: TO ACHIEVE GOAL RASS


   Stop: 05/26/18 04:00


   Last Admin: 04/27/18 12:49 Dose:  1,000 mg


Propofol (Diprivan Bolus)  20 mg IV Q5MIN PRN


   PRN Reason: BREAKTHROUGH AGITATION


   Stop: 05/26/18 04:00


Sodium Chloride (Flush - Normal Saline)  10 ml IVF Q12HR [facility]


   Last Admin: 04/27/18 09:37 Dose:  10 ml


Sodium Chloride (Flush - Normal Saline)  10 ml IVF PRN PRN


   PRN Reason: Saline Flush

## 2018-04-28 LAB
ANALYZER IN CARDIO: (no result)
ANION GAP SERPL CALC-SCNC: 12 MMOL/L (ref 10–20)
APTT PPP: (no result) SEC (ref 22.9–36.1)
APTT PPP: 128.3 SEC (ref 22.9–36.1)
BASE EXCESS STD BLDA CALC-SCNC: 4.9 MEQ/L
BUN SERPL-MCNC: 26 MG/DL (ref 9.8–20.1)
CA-I BLDA-SCNC: 1.2 MMOL/L (ref 1.12–1.3)
CALCIUM SERPL-MCNC: 8.2 MG/DL (ref 7.8–10.44)
CHLORIDE SERPL-SCNC: 100 MMOL/L (ref 98–107)
CO2 SERPL-SCNC: 31 MMOL/L (ref 23–31)
CREAT CL PREDICTED SERPL C-G-VRATE: 175 ML/MIN (ref 70–130)
GLUCOSE SERPL-MCNC: 144 MG/DL (ref 80–115)
HCO3 BLDA-SCNC: 30.3 MEQ/L (ref 22–26)
HCT VFR BLDA CALC: 48.1 % (ref 36–47)
HGB BLD-MCNC: 13.4 G/DL (ref 12–16)
HGB BLD-MCNC: 14.7 G/DL (ref 12–16)
HGB BLDA-MCNC: 14.2 G/DL (ref 12–16)
MCH RBC QN AUTO: 24.3 PG (ref 27–31)
MCV RBC AUTO: 80.7 FL (ref 81–99)
MDIFF COMPLETE?: YES
PCO2 BLDA: 47.6 MMHG (ref 35–45)
PH BLDA: 7.42 [PH] (ref 7.35–7.45)
PLATELET # BLD AUTO: 133 THOU/UL (ref 130–400)
PLATELET # BLD AUTO: 137 THOU/UL (ref 130–400)
PO2 BLDA: 74.9 MMHG (ref 80–100)
POTASSIUM SERPL-SCNC: 2.8 MMOL/L (ref 3.5–5.1)
RBC # BLD AUTO: 5.5 MILL/UL (ref 4.2–5.4)
SODIUM SERPL-SCNC: 140 MMOL/L (ref 136–145)
SPECIMEN DRAWN FROM PATIENT: (no result)
VANCOMYCIN SERPL-MCNC: 27.3 UG/ML
VANCOMYCIN TROUGH SERPL-MCNC: 36 UG/ML
WBC # BLD AUTO: 6.3 THOU/UL (ref 4.8–10.8)

## 2018-04-28 RX ADMIN — Medication SCH ML: at 09:30

## 2018-04-28 RX ADMIN — CEFTRIAXONE SCH MLS: 2 INJECTION, POWDER, FOR SOLUTION INTRAMUSCULAR; INTRAVENOUS at 04:23

## 2018-04-28 RX ADMIN — FAMOTIDINE SCH MG: 10 INJECTION, SOLUTION INTRAVENOUS at 09:30

## 2018-04-28 RX ADMIN — HEPARIN SODIUM SCH UNIT: 1000 INJECTION, SOLUTION INTRAVENOUS; SUBCUTANEOUS at 14:35

## 2018-04-28 RX ADMIN — POTASSIUM CHLORIDE PRN MLS: 2 INJECTION, SOLUTION, CONCENTRATE INTRAVENOUS at 06:18

## 2018-04-28 RX ADMIN — FAMOTIDINE SCH MG: 10 INJECTION, SOLUTION INTRAVENOUS at 20:28

## 2018-04-28 RX ADMIN — Medication SCH ML: at 20:29

## 2018-04-28 NOTE — RAD
PORTABLE CHEST:

 

DATE: 4/28/18.

 

PROVIDED CLINICAL HISTORY: 

Respiratory insufficiency.

 

FINDINGS: 

Evaluation is limited by patient body habitus.  Significant interval change with respect to the prior
 examination is not apparent. 

 

IMPRESSION: 

As above.

 

POS: YOLI

## 2018-04-28 NOTE — PDOC.PN
- Subjective


Encounter Start Date: 04/28/18


Encounter Start Time: 08:00





Pt seen for followup re: acute on chronic respiratory failure.  Intubated, 

unable to complete ROS.





- Objective


Resuscitation Status: 


 











Resuscitation Status           FULL:Full Resuscitation














MAR Reviewed: Yes


Vital Signs & Weight: 


 Vital Signs (12 hours)











  Temp Pulse Resp Pulse Ox


 


 04/28/18 10:20   90  


 


 04/28/18 10:00    15 


 


 04/28/18 08:00    15 


 


 04/28/18 07:31  97.5 F L  85  18  95


 


 04/28/18 07:20   85  


 


 04/28/18 07:00  97.5 F L   


 


 04/28/18 06:00    15 


 


 04/28/18 04:00  97.8 F   16 


 


 04/28/18 02:00    16 








 Weight











Admit Weight                   600 lb


 


Weight                         600 lb











 Most Recent Monitor Data











Heart Rate from ECG            83


 


NIBP                           127/79


 


NIBP BP-Mean                   95


 


Respiration from ECG           22


 


SpO2                           97














I&O: 


 











 04/27/18 04/28/18 04/29/18





 06:59 06:59 06:59


 


Intake Total 2017.0 2307 


 


Output Total 1335 2555 920


 


Balance 682.0 -248 -920











Result Diagrams: 


 04/28/18 04:30





 04/28/18 04:30


EKG Reviewed by me: Yes (Tele:  NSR)





Phys Exam





- Physical Examination


Morbid obesity


HEENT: moist MMs


ETT


Neck: no nodes, supple, full ROM


Trachea midline


Respiratory: no wheezing, no rales, no rhonchi, clear to auscultation bilateral


Cardiovascular: RRR, no rub


Gastrointestinal: soft, non-tender, positive bowel sounds


distention


Musculoskeletal: edema present


Neurological: moves all 4 limbs


Deviation from normal: Unable to assess





Dx/Plan


(1) Acute and chronic respiratory failure


Code(s): J96.20 - ACUTE AND CHR RESP FAILURE, UNSP W HYPOXIA OR HYPERCAPNIA   

Status: Acute   


Qualifiers: 


   Respiratory failure complication: hypercapnia   Qualified Code(s): J96.22 - 

Acute and chronic respiratory failure with hypercapnia   


Comment: Intubated, mechanically ventilated. PCCM following   





(2) Hypokalemia


Code(s): E87.6 - HYPOKALEMIA   Status: Acute   Comment: On electrolyte 

replacement protocol   





(3) Acute on chronic diastolic (congestive) heart failure


Code(s): I50.33 - ACUTE ON CHRONIC DIASTOLIC (CONGESTIVE) HEART FAILURE   Status

: Acute   Comment: continue furosemide   





(4) Pulmonary embolism


Code(s): I26.99 - OTHER PULMONARY EMBOLISM WITHOUT ACUTE COR PULMONALE   Status

: Suspected   Comment: pt on heparin drip for suspected PE.     





(5) Morbid obesity with BMI of 70 and over, adult


Code(s): E66.01 - MORBID (SEVERE) OBESITY DUE TO EXCESS CALORIES; Z68.45 - BODY 

MASS INDEX (BMI) 70 OR GREATER, ADULT   Status: Chronic   





(6) Pickwickian syndrome


Code(s): E66.2 - MORBID (SEVERE) OBESITY WITH ALVEOLAR HYPOVENTILATION   Status

: Chronic   





- Plan





* .








Review of Systems





- Medications/Allergies


Allergies/Adverse Reactions: 


 Allergies











Allergy/AdvReac Type Severity Reaction Status Date / Time


 


No Known Drug Allergies Allergy   Unverified 04/26/18 01:50











Medications: 


 Current Medications





Acetaminophen (Tylenol)  1,000 mg PER TUBE Q6H PRN


   PRN Reason: Headache/Fever or Mild Pain


Aspirin (Aspirin)  325 mg [facility]UBE [facility]ILY [facility]


   Last Admin: 04/28/18 09:30 Dose:  325 mg


Clonidine (Catapres)  0.1 mg PO Q4H PRN


   PRN Reason: Systolic BP > 180


Famotidine (Pepcid)  20 mg SLOW IVP Q12HR [facility]


   Last Admin: 04/28/18 09:30 Dose:  20 mg


Furosemide (Lasix)  40 mg SLOW IVP 0600,1400 [facility]


   Last Admin: 04/28/18 05:20 Dose:  40 mg


Heparin Sodium (Porcine) (Heparin 1,000 Units/Ml (10 Ml))  0 units SLOW IVP 

ASDIR [facility]


   PRN Reason: Protocol


   Last Admin: 04/27/18 23:23 Dose:  8,160 unit


Hydralazine HCl (Apresoline)  10 mg SLOW IVP Q4H PRN


   PRN Reason: Systolic BP > 180


Ceftriaxone Sodium 2 gm/ (Sodium Chloride)  100 mls @ 200 mls/hr IVPB [facility]HR [facility]


   Last Admin: 04/28/18 04:23 Dose:  100 mls


Potassium Chloride 40 meq/ (Sodium Chloride)  270 mls @ 135 mls/hr IVPB ASDIR 

PRN


   PRN Reason: FOR SERUM K+ 2.5 - 3.5


   Last Admin: 04/28/18 06:18 Dose:  270 mls


Potassium Chloride 40 meq/ (Device)  100 mls @ 50 mls/hr IVPB ASDIR PRN


   PRN Reason: FOR SERUM K+ 2.5 - 3.5


Magnesium Sulfate 1 gm/ Sodium (Chloride)  102 mls @ 102 mls/hr IV PRN PRN


   PRN Reason: MAG LEVEL 1.4 - 2.0


Magnesium Sulfate 2 gm/ Device  100 mls @ 100 mls/hr IVPB ASDIR PRN


   PRN Reason: MAGNESIUM < 1.4


Potassium Phosphate 9 mmol/ (Sodium Chloride)  103 mls @ 25.75 mls/hr IVPB 

ASDIR PRN


   PRN Reason: Phosphate 1.0-1.8


Potassium Phosphate 12 mmol/ (Sodium Chloride)  254 mls @ 63.5 mls/hr IV ASDIR 

PRN


   PRN Reason: Serum phosphate 0.5-0.9


Potassium Phosphate 15 mmol/ (Sodium Chloride)  255 mls @ 63.75 mls/hr IV ASDIR 

PRN


   PRN Reason: Serum Phos < 0.5


Fentanyl Citrate 2,000 mcg/ (Sodium Chloride)  100 mls @ 0 mls/hr IV INF KEE; 

Per Protocol


   PRN Reason: Protocol


   Stop: 05/26/18 04:00


Fentanyl Citrate (Fentanyl Bolus)  250 mls @ 0 mls/hr IVPB PRN PRN; As Directed


   PRN Reason: Breakthrough pain/agitation


   Stop: 05/26/18 04:00


Heparin Sodium/Dextrose (Heparin 25,000 Units/D5w 500 Ml)  500 mls @ 0 mls/hr 

IVPB INF KEE; Per Protocol


   PRN Reason: Protocol


   Last Admin: 04/28/18 05:22 Dose:  500 mls


Lorazepam (Ativan)  2 mg SLOW IVP Q1H PRN


   PRN Reason: Breakthrough agitation


   Stop: 05/26/18 04:00


   Last Admin: 04/28/18 09:42 Dose:  2 mg


Magnesium Oxide (Magnesium Oxide)  400 mg PO BIDPRN PRN


   PRN Reason: FOR SERUM MAG 1.4 - 2.0


Magnesium Oxide (Magnesium Oxide)  800 mg PO PRN PRN


   PRN Reason: FOR SERUM MAG < 1.4


Miscellaneous Medication (Phos-Nak)  1 pkt PO TIDPRN PRN


   PRN Reason: FOR PHOS LEVEL 1.0 - 1.8


Miscellaneous Medication (Phos-Nak)  2 pkt PO TIDPRN PRN


   PRN Reason: FOR PHOS LEVEL 0.5 - 1.0


Morphine Sulfate (Morphine)  2 mg SLOW IVP Q1H PRN


   PRN Reason: breakthrough pain/agitation


   Stop: 05/26/18 04:00


Ondansetron HCl (Zofran Odt)  4 mg PO Q6H PRN


   PRN Reason: Nausea/Vomiting


Ondansetron HCl (Zofran)  4 mg IVP Q6H PRN


   PRN Reason: Nausea/Vomiting


Potassium Chloride (K-Dur)  40 meq PO ASDIR PRN


   PRN Reason: FOR SERUM K+  2.5 - 3.5


Potassium Chloride (Klor-Con)  40 meq PER TUBE ASDIR PRN


   PRN Reason: FOR SERUM K+ 2.5-3.5


Propofol (Diprivan)  1,000 mg IV INF PRN; Protocol


   PRN Reason: TO ACHIEVE GOAL RASS


   Stop: 05/26/18 04:00


   Last Admin: 04/28/18 09:42 Dose:  1,000 mg


Propofol (Diprivan Bolus)  20 mg IV Q5MIN PRN


   PRN Reason: BREAKTHROUGH AGITATION


   Stop: 05/26/18 04:00


Sodium Chloride (Flush - Normal Saline)  10 ml IVF Q12HR [facility]


   Last Admin: 04/28/18 09:30 Dose:  10 ml


Sodium Chloride (Flush - Normal Saline)  10 ml IVF PRN PRN


   PRN Reason: Saline Flush

## 2018-04-28 NOTE — PRG
DATE OF SERVICE:  04/28/2018

 

SERVICE:  Pulmonary Medicine

 

INTERVAL HISTORY:  The patient is doing fine from a respiratory standpoint.  
She is on mechanical ventilation.  She cannot provide additional elements of 
the history.  There are no significant overnight events.

 

PHYSICAL EXAMINATION:

VITAL SIGNS:  Afebrile, pulse 63, blood pressure 120/75, respirations 16, 
saturation 98% on 40% FiO2 and a PEEP of 5.

GENERAL:  Patient is intubated and sedated.

HEENT:  Normocephalic, atraumatic.  Sclerae are white, conjunctivae pink.  Oral 
mucosa is moist without lesions.

LUNGS:  Decent air entry.  Rhonchi are present.  There is no prolonged 
expiratory phase or wheezing present.

HEART:  Normal rate, regular.

ABDOMEN:  Soft, nontender, nondistended.  Bowel sounds are positive.

MUSCULOSKELETAL:  No cyanosis or clubbing.  There is diffuse 2+ edema 
throughout.

GENITOURINARY:  Lozano catheter in place.

NEUROLOGIC:  Grossly nonfocal.

 

LABORATORY DATA:  WBC 6.3, hemoglobin 13.4, platelets 137,000.  PTT 60.7.  PH 
7.53, pCO2 33, pO2 61.  She was on a rate of 20 at that time.  Potassium 2.8, 
creatinine 1.45.  This is gently up trending.  Bicarbonate is increased to 31.  
Basic metabolic profile is otherwise unremarkable.  Urinalysis is positive for 
a bilirubin, and white blood cells.  Blood cultures x2 and urine culture 
unremarkable.

 

IMAGING:  Chest x-ray demonstrates rotation.  That being said, there appears to 
be enlarged cardiac silhouette.  There is enteric catheter coursing midline 
below the level of the diaphragm.  I cannot see where the tip terminates.  
Endotracheal tube is roughly 5 cm above the level of the rip.  I cannot see 
any acute cardiopulmonary abnormality, though she has the appearance of a 
fluffy infiltrates throughout bilateral lungs.  This could represent soft 
tissue attenuation, however.

 

ASSESSMENT:

1.  Acute on chronic hypoxic and hypercapnic respiratory failure.

2.  Obesity hypoventilation syndrome.

3.  Obstructive sleep apnea.

4.  Right ventricular heart strain.

 

PLAN:  We will continue to gently diurese her as tolerated through time.  She 
is empirically being treated for a PE because we truthfully cannot get any 
diagnostic studies on her to confirm or refute this.  I will decrease her rate 
ever so slightly and increase her pressure support in case she wants to take a 
breath on her own.  We will minimize sedation as tolerated.  I could not 
imagine that this patient will make it through this hospitalization without a 
tracheostomy.  That being said, we will make efforts at weaning her through the 
weekend.  Potassium of 2.8 will be replaced.  We will check a magnesium and 
phosphorus tomorrow morning.

 

Critical care time: 30 minutes.  



JOSE MARTIN

## 2018-04-29 LAB
ANION GAP SERPL CALC-SCNC: 16 MMOL/L (ref 10–20)
APTT PPP: (no result) SEC (ref 22.9–36.1)
APTT PPP: 155.7 SEC (ref 22.9–36.1)
BUN SERPL-MCNC: 26 MG/DL (ref 9.8–20.1)
CALCIUM SERPL-MCNC: 8.3 MG/DL (ref 7.8–10.44)
CHLORIDE SERPL-SCNC: 101 MMOL/L (ref 98–107)
CO2 SERPL-SCNC: 28 MMOL/L (ref 23–31)
CREAT CL PREDICTED SERPL C-G-VRATE: 192 ML/MIN (ref 70–130)
GLUCOSE SERPL-MCNC: 94 MG/DL (ref 80–115)
INR PPP: 1.2
MAGNESIUM SERPL-MCNC: 2.1 MG/DL (ref 1.6–2.6)
POTASSIUM SERPL-SCNC: 4.4 MMOL/L (ref 3.5–5.1)
PROTHROMBIN TIME: 15.6 SEC (ref 12–14.7)
SODIUM SERPL-SCNC: 141 MMOL/L (ref 136–145)

## 2018-04-29 RX ADMIN — FAMOTIDINE SCH MG: 10 INJECTION, SOLUTION INTRAVENOUS at 10:03

## 2018-04-29 RX ADMIN — FAMOTIDINE SCH MG: 10 INJECTION, SOLUTION INTRAVENOUS at 20:05

## 2018-04-29 RX ADMIN — HEPARIN SODIUM SCH UNIT: 1000 INJECTION, SOLUTION INTRAVENOUS; SUBCUTANEOUS at 19:48

## 2018-04-29 RX ADMIN — Medication SCH ML: at 10:03

## 2018-04-29 RX ADMIN — Medication SCH ML: at 20:05

## 2018-04-29 RX ADMIN — CEFTRIAXONE SCH MLS: 2 INJECTION, POWDER, FOR SOLUTION INTRAMUSCULAR; INTRAVENOUS at 04:45

## 2018-04-29 NOTE — PDOC.PN
- Subjective


Encounter Start Date: 04/29/18


Encounter Start Time: 07:00





Pt seen for followup re: acute respiratory failure.  Pt is still intubated, 

unable to complete ROS.





- Objective


Resuscitation Status: 


 











Resuscitation Status           FULL:Full Resuscitation














MAR Reviewed: Yes


Vital Signs & Weight: 


 Vital Signs (12 hours)











  Temp Pulse Resp BP Pulse Ox


 


 04/29/18 10:00    17  


 


 04/29/18 09:24   85   


 


 04/29/18 08:00  97.8 F  85  23 H   92 L


 


 04/29/18 06:54   77   


 


 04/29/18 06:00    15  


 


 04/29/18 04:00  97.9 F    


 


 04/29/18 03:50    15  


 


 04/29/18 03:36   81   111/72 


 


 04/29/18 02:00    15  


 


 04/29/18 00:00  98.3 F   15  








 Weight











Admit Weight                   600 lb


 


Weight                         600 lb











 Most Recent Monitor Data











Heart Rate from ECG            80


 


NIBP                           123/72


 


NIBP BP-Mean                   84


 


Respiration from ECG           22


 


SpO2                           92














I&O: 


 











 04/28/18 04/29/18 04/30/18





 06:59 06:59 06:59


 


Intake Total 2307 2647 30


 


Output Total 2555 4350 440


 


Balance -248 -1703 -410











Result Diagrams: 


 04/28/18 13:19





 04/29/18 02:27


EKG Reviewed by me: Yes (Tele: NSR)





Phys Exam





- Physical Examination


Morbidly obese


HEENT: moist MMs, sclera anicteric


ETT+. OG tube+


Respiratory: no wheezing, no rales, no rhonchi, clear to auscultation bilateral


Cardiovascular: RRR, no rub


Gastrointestinal: soft, non-tender, positive bowel sounds


distention+


Musculoskeletal: edema present


Deviation from normal: Unable to assess mood, affect or orientation to person, 

place or time





Dx/Plan


(1) Acute and chronic respiratory failure


Code(s): J96.20 - ACUTE AND CHR RESP FAILURE, UNSP W HYPOXIA OR HYPERCAPNIA   

Status: Acute   


Qualifiers: 


   Respiratory failure complication: hypercapnia   Qualified Code(s): J96.22 - 

Acute and chronic respiratory failure with hypercapnia   


Comment: Intubated, mechanically ventilated. Will likely need tracheostomy   





(2) Acute on chronic diastolic (congestive) heart failure


Code(s): I50.33 - ACUTE ON CHRONIC DIASTOLIC (CONGESTIVE) HEART FAILURE   Status

: Acute   Comment: continue IV lasix   





(3) Pulmonary embolism


Code(s): I26.99 - OTHER PULMONARY EMBOLISM WITHOUT ACUTE COR PULMONALE   Status

: Suspected   Comment: continue heparin drip    





(4) Morbid obesity with BMI of 70 and over, adult


Code(s): E66.01 - MORBID (SEVERE) OBESITY DUE TO EXCESS CALORIES; Z68.45 - BODY 

MASS INDEX (BMI) 70 OR GREATER, ADULT   Status: Chronic   





(5) Pickwickian syndrome


Code(s): E66.2 - MORBID (SEVERE) OBESITY WITH ALVEOLAR HYPOVENTILATION   Status

: Chronic   





(6) Hypokalemia


Code(s): E87.6 - HYPOKALEMIA   Status: Resolved   Comment: On electrolyte 

replacement protocol   





- Plan





* .








Review of Systems





- Medications/Allergies


Allergies/Adverse Reactions: 


 Allergies











Allergy/AdvReac Type Severity Reaction Status Date / Time


 


No Known Drug Allergies Allergy   Unverified 04/26/18 01:50











Medications: 


 Current Medications





Acetaminophen (Tylenol)  1,000 mg PER TUBE Q6H PRN


   PRN Reason: Headache/Fever or Mild Pain


Aspirin (Aspirin)  325 mg PER TUBE DAILY [facility]


   Last Admin: 04/29/18 09:00 Dose:  325 mg


Clonidine (Catapres)  0.1 mg PO Q4H PRN


   PRN Reason: Systolic BP > 180


Famotidine (Pepcid)  20 mg SLOW IVP Q12HR [facility]


   Last Admin: 04/29/18 10:03 Dose:  20 mg


Furosemide (Lasix)  40 mg SLOW IVP 0600,1400 [facility]


   Last Admin: 04/29/18 05:01 Dose:  40 mg


Heparin Sodium (Porcine) (Heparin 1,000 Units/Ml (10 Ml))  0 units SLOW IVP 

ASDIR KEE


   PRN Reason: Protocol


   Last Admin: 04/28/18 14:35 Dose:  8,160 unit


Hydralazine HCl (Apresoline)  10 mg SLOW IVP Q4H PRN


   PRN Reason: Systolic BP > 180


Ceftriaxone Sodium 2 gm/ (Sodium Chloride)  100 mls @ 200 mls/hr IVPB Q24HR [facility]


   Last Admin: 04/29/18 04:45 Dose:  100 mls


Potassium Chloride 40 meq/ (Sodium Chloride)  270 mls @ 135 mls/hr IVPB ASDIR 

PRN


   PRN Reason: FOR SERUM K+ 2.5 - 3.5


   Last Admin: 04/28/18 06:18 Dose:  270 mls


Potassium Chloride 40 meq/ (Device)  100 mls @ 50 mls/hr IVPB ASDIR PRN


   PRN Reason: FOR SERUM K+ 2.5 - 3.5


Magnesium Sulfate 1 gm/ Sodium (Chloride)  102 mls @ 102 mls/hr IV PRN PRN


   PRN Reason: MAG LEVEL 1.4 - 2.0


Magnesium Sulfate 2 gm/ Device  100 mls @ 100 mls/hr IVPB ASDIR PRN


   PRN Reason: MAGNESIUM < 1.4


Potassium Phosphate 9 mmol/ (Sodium Chloride)  103 mls @ 25.75 mls/hr IVPB 

ASDIR PRN


   PRN Reason: Phosphate 1.0-1.8


Potassium Phosphate 12 mmol/ (Sodium Chloride)  254 mls @ 63.5 mls/hr IV ASDIR 

PRN


   PRN Reason: Serum phosphate 0.5-0.9


Potassium Phosphate 15 mmol/ (Sodium Chloride)  255 mls @ 63.75 mls/hr IV ASDIR 

PRN


   PRN Reason: Serum Phos < 0.5


Fentanyl Citrate 2,000 mcg/ (Sodium Chloride)  100 mls @ 0 mls/hr IV INF KEE; 

Per Protocol


   PRN Reason: Protocol


   Stop: 05/26/18 04:00


Fentanyl Citrate (Fentanyl Bolus)  250 mls @ 0 mls/hr IVPB PRN PRN; As Directed


   PRN Reason: Breakthrough pain/agitation


   Stop: 05/26/18 04:00


Heparin Sodium/Dextrose (Heparin 25,000 Units/D5w 500 Ml)  500 mls @ 0 mls/hr 

IVPB INF KEE; Per Protocol


   PRN Reason: Protocol


   Last Admin: 04/28/18 22:54 Dose:  500 mls


Magnesium Oxide (Magnesium Oxide)  400 mg PO BIDPRN PRN


   PRN Reason: FOR SERUM MAG 1.4 - 2.0


Magnesium Oxide (Magnesium Oxide)  800 mg PO PRN PRN


   PRN Reason: FOR SERUM MAG < 1.4


Miscellaneous Medication (Phos-Nak)  1 pkt PO TIDPRN PRN


   PRN Reason: FOR PHOS LEVEL 1.0 - 1.8


Miscellaneous Medication (Phos-Nak)  2 pkt PO TIDPRN PRN


   PRN Reason: FOR PHOS LEVEL 0.5 - 1.0


Morphine Sulfate (Morphine)  2 mg SLOW IVP Q1H PRN


   PRN Reason: breakthrough pain/agitation


   Stop: 05/26/18 04:00


Ondansetron HCl (Zofran Odt)  4 mg PO Q6H PRN


   PRN Reason: Nausea/Vomiting


Ondansetron HCl (Zofran)  4 mg IVP Q6H PRN


   PRN Reason: Nausea/Vomiting


Potassium Chloride (K-Dur)  40 meq PO ASDIR PRN


   PRN Reason: FOR SERUM K+  2.5 - 3.5


Potassium Chloride (Klor-Con)  40 meq PER TUBE ASDIR PRN


   PRN Reason: FOR SERUM K+ 2.5-3.5


Propofol (Diprivan)  1,000 mg IV INF PRN; Protocol


   PRN Reason: TO ACHIEVE GOAL RASS


   Stop: 05/26/18 04:00


   Last Admin: 04/29/18 03:42 Dose:  1,000 mg


Propofol (Diprivan Bolus)  20 mg IV Q5MIN PRN


   PRN Reason: BREAKTHROUGH AGITATION


   Stop: 05/26/18 04:00


Sodium Chloride (Flush - Normal Saline)  10 ml IVF Q12HR [facility]


   Last Admin: 04/29/18 10:03 Dose:  10 ml


Sodium Chloride (Flush - Normal Saline)  10 ml IVF PRN PRN


   PRN Reason: Saline Flush

## 2018-04-30 LAB
ANALYZER IN CARDIO: (no result)
ANION GAP SERPL CALC-SCNC: 13 MMOL/L (ref 10–20)
APTT PPP: 57.7 SEC (ref 22.9–36.1)
BASE EXCESS STD BLDA CALC-SCNC: 6.8 MEQ/L
BUN SERPL-MCNC: 23 MG/DL (ref 9.8–20.1)
CA-I BLDA-SCNC: 1.1 MMOL/L (ref 1.12–1.3)
CALCIUM SERPL-MCNC: 8.5 MG/DL (ref 7.8–10.44)
CHLORIDE SERPL-SCNC: 99 MMOL/L (ref 98–107)
CO2 SERPL-SCNC: 32 MMOL/L (ref 23–31)
CREAT CL PREDICTED SERPL C-G-VRATE: 173 ML/MIN (ref 70–130)
GLUCOSE SERPL-MCNC: 108 MG/DL (ref 80–115)
HCO3 BLDA-SCNC: 32.9 MEQ/L (ref 22–26)
HCT VFR BLDA CALC: 47.1 % (ref 36–47)
HGB BLD-MCNC: 13.4 G/DL (ref 12–16)
HGB BLDA-MCNC: 13.2 G/DL (ref 12–16)
INR PPP: 1.1
PCO2 BLDA: 53 MMHG (ref 35–45)
PH BLDA: 7.41 [PH] (ref 7.35–7.45)
PLATELET # BLD AUTO: 81 THOU/UL (ref 130–400)
PO2 BLDA: 64.3 MMHG (ref 80–100)
POTASSIUM SERPL-SCNC: 3.2 MMOL/L (ref 3.5–5.1)
PROTHROMBIN TIME: 13.8 SEC (ref 12–14.7)
SODIUM SERPL-SCNC: 141 MMOL/L (ref 136–145)
SPECIMEN DRAWN FROM PATIENT: (no result)

## 2018-04-30 RX ADMIN — HEPARIN SODIUM SCH UNIT: 1000 INJECTION, SOLUTION INTRAVENOUS; SUBCUTANEOUS at 09:05

## 2018-04-30 RX ADMIN — Medication SCH ML: at 09:42

## 2018-04-30 RX ADMIN — Medication SCH ML: at 20:27

## 2018-04-30 RX ADMIN — CEFTRIAXONE SCH MLS: 2 INJECTION, POWDER, FOR SOLUTION INTRAMUSCULAR; INTRAVENOUS at 04:29

## 2018-04-30 NOTE — PDOC.PN
- Subjective


Encounter Start Date: 04/30/18


Encounter Start Time: 09:20





Pt seen for followup re: hypokalemia.  Intubated, unable to complete review of 

systems.





- Objective


Resuscitation Status: 


 











Resuscitation Status           FULL:Full Resuscitation














MAR Reviewed: Yes


Vital Signs & Weight: 


 Vital Signs (12 hours)











  Temp Pulse Resp


 


 04/30/18 14:26   85 


 


 04/30/18 14:00    18


 


 04/30/18 12:47   78 


 


 04/30/18 12:00    15


 


 04/30/18 11:00  98.5 F  


 


 04/30/18 10:26   81 


 


 04/30/18 10:00    19


 


 04/30/18 08:00    23 H


 


 04/30/18 07:57  98.3 F  


 


 04/30/18 07:04   82 


 


 04/30/18 06:00    24 H


 


 04/30/18 05:26   92 


 


 04/30/18 04:00  98.9 F   22 H








 Weight











Admit Weight                   600 lb


 


Weight                         450 lb 6.47 oz











 Most Recent Monitor Data











Heart Rate from ECG            86


 


NIBP                           121/74


 


NIBP BP-Mean                   81


 


Respiration from ECG           18


 


SpO2                           93














I&O: 


 











 04/29/18 04/30/18 05/01/18





 06:59 06:59 06:59


 


Intake Total 2647 2154 120


 


Output Total 7683 7930 0450


 


Balance -1703 -426 -2580











Result Diagrams: 


 04/30/18 14:54





 04/30/18 04:30


EKG Reviewed by me: Yes (Tele:  NSR)





Phys Exam





- Physical Examination


Morbid obesity


ETT


Neck: no nodes


Respiratory: no wheezing, no rales, no rhonchi, clear to auscultation bilateral


S1, S2, reg, normal rate


Gastrointestinal: soft, non-tender, positive bowel sounds


distended


Musculoskeletal: edema present


Neurological: moves all 4 limbs


Deviation from normal: Unable to assess affect or orientation to person, place 

or time





Dx/Plan


(1) Hypokalemia


Code(s): E87.6 - HYPOKALEMIA   Status: Acute   Comment: continue electrolyte 

replacement protocol   





(2) Acute and chronic respiratory failure


Code(s): J96.20 - ACUTE AND CHR RESP FAILURE, UNSP W HYPOXIA OR HYPERCAPNIA   

Status: Acute   


Qualifiers: 


   Respiratory failure complication: hypercapnia   Qualified Code(s): J96.22 - 

Acute and chronic respiratory failure with hypercapnia   


Comment:  Will likely need tracheostomy   





(3) Acute on chronic diastolic (congestive) heart failure


Code(s): I50.33 - ACUTE ON CHRONIC DIASTOLIC (CONGESTIVE) HEART FAILURE   Status

: Acute   Comment: continue IV lasix   





(4) Pulmonary embolism


Code(s): I26.99 - OTHER PULMONARY EMBOLISM WITHOUT ACUTE COR PULMONALE   Status

: Suspected   Comment: continue heparin drip per DVT/PE protocol   





(5) Morbid obesity with BMI of 70 and over, adult


Code(s): E66.01 - MORBID (SEVERE) OBESITY DUE TO EXCESS CALORIES; Z68.45 - BODY 

MASS INDEX (BMI) 70 OR GREATER, ADULT   Status: Chronic   





(6) Pickwickian syndrome


Code(s): E66.2 - MORBID (SEVERE) OBESITY WITH ALVEOLAR HYPOVENTILATION   Status

: Chronic   





- Plan





* .








Review of Systems





- Medications/Allergies


Allergies/Adverse Reactions: 


 Allergies











Allergy/AdvReac Type Severity Reaction Status Date / Time


 


No Known Drug Allergies Allergy   Unverified 04/26/18 01:50











Medications: 


 Current Medications





Acetaminophen (Tylenol)  1,000 mg PER TUBE Q6H PRN


   PRN Reason: Headache/Fever or Mild Pain


Lipase/Protease/Amylase ([place] [address])  1 cap FS .PER PROTOCOL PRN


   PRN Reason: TUBE OCCLUSION PROTOCOL


Aspirin (Aspirin)  325 mg PER TUBE DAILY [facility]


   Last Admin: 04/30/18 09:41 Dose:  325 mg


Clonidine (Catapres)  0.1 mg PO Q4H PRN


   PRN Reason: Systolic BP > 180


Famotidine (Pepcid)  20 mg PER TUBE Q12HR [facility]


   Last Admin: 04/30/18 09:42 Dose:  20 mg


Furosemide (Lasix)  40 mg SLOW IVP 0600,1400 K[facility]


   Last Admin: 04/30/18 14:40 Dose:  40 mg


Heparin Sodium (Porcine) (Heparin 1,000 Units/Ml (10 Ml))  0 units SLOW IVP 

ASDIR KEE


   PRN Reason: Protocol


   Last Admin: 04/30/18 09:05 Dose:  8,160 unit


Hydralazine HCl (Apresoline)  10 mg SLOW IVP Q4H PRN


   PRN Reason: Systolic BP > 180


Ceftriaxone Sodium 2 gm/ (Sodium Chloride)  100 mls @ 200 mls/hr IVPB Q24HR [facility]


   Last Admin: 04/30/18 04:29 Dose:  100 mls


Potassium Chloride 40 meq/ (Sodium Chloride)  270 mls @ 135 mls/hr IVPB ASDIR 

PRN


   PRN Reason: FOR SERUM K+ 2.5 - 3.5


   Last Admin: 04/28/18 06:18 Dose:  270 mls


Potassium Chloride 40 meq/ (Device)  100 mls @ 50 mls/hr IVPB ASDIR PRN


   PRN Reason: FOR SERUM K+ 2.5 - 3.5


Magnesium Sulfate 1 gm/ Sodium (Chloride)  102 mls @ 102 mls/hr IV PRN PRN


   PRN Reason: MAG LEVEL 1.4 - 2.0


Magnesium Sulfate 2 gm/ Device  100 mls @ 100 mls/hr IVPB ASDIR PRN


   PRN Reason: MAGNESIUM < 1.4


Potassium Phosphate 9 mmol/ (Sodium Chloride)  103 mls @ 25.75 mls/hr IVPB 

ASDIR PRN


   PRN Reason: Phosphate 1.0-1.8


Potassium Phosphate 12 mmol/ (Sodium Chloride)  254 mls @ 63.5 mls/hr IV ASDIR 

PRN


   PRN Reason: Serum phosphate 0.5-0.9


Potassium Phosphate 15 mmol/ (Sodium Chloride)  255 mls @ 63.75 mls/hr IV ASDIR 

PRN


   PRN Reason: Serum Phos < 0.5


Fentanyl Citrate 2,000 mcg/ (Sodium Chloride)  100 mls @ 0 mls/hr IV INF KEE; 

Per Protocol


   PRN Reason: Protocol


   Stop: 05/26/18 04:00


Fentanyl Citrate (Fentanyl Bolus)  250 mls @ 0 mls/hr IVPB PRN PRN; As Directed


   PRN Reason: Breakthrough pain/agitation


   Stop: 05/26/18 04:00


Heparin Sodium/Dextrose (Heparin 25,000 Units/D5w 500 Ml)  500 mls @ 0 mls/hr 

IVPB INF KEE; Per Protocol


   PRN Reason: Protocol


   Last Admin: 04/30/18 12:34 Dose:  500 mls


Magnesium Oxide (Magnesium Oxide)  400 mg PO BIDPRN PRN


   PRN Reason: FOR SERUM MAG 1.4 - 2.0


Magnesium Oxide (Magnesium Oxide)  800 mg PO PRN PRN


   PRN Reason: FOR SERUM MAG < 1.4


Miscellaneous Medication (Phos-Nak)  1 pkt PO TIDPRN PRN


   PRN Reason: FOR PHOS LEVEL 1.0 - 1.8


Miscellaneous Medication (Phos-Nak)  2 pkt PO TIDPRN PRN


   PRN Reason: FOR PHOS LEVEL 0.5 - 1.0


Morphine Sulfate (Morphine)  2 mg SLOW IVP Q1H PRN


   PRN Reason: breakthrough pain/agitation


   Stop: 05/26/18 04:00


Ondansetron HCl (Zofran Odt)  4 mg PO Q6H PRN


   PRN Reason: Nausea/Vomiting


Ondansetron HCl (Zofran)  4 mg IVP Q6H PRN


   PRN Reason: Nausea/Vomiting


Potassium Chloride (K-Dur)  40 meq PO ASDIR PRN


   PRN Reason: FOR SERUM K+  2.5 - 3.5


Potassium Chloride (Klor-Con)  40 meq PER TUBE ASDIR PRN


   PRN Reason: FOR SERUM K+ 2.5-3.5


   Last Admin: 04/30/18 05:54 Dose:  40 meq


Propofol (Diprivan)  1,000 mg IV INF PRN; Protocol


   PRN Reason: TO ACHIEVE GOAL RASS


   Stop: 05/26/18 04:00


   Last Admin: 04/30/18 10:29 Dose:  1,000 mg


Propofol (Diprivan Bolus)  20 mg IV Q5MIN PRN


   PRN Reason: BREAKTHROUGH AGITATION


   Stop: 05/26/18 04:00


Sodium Bicarbonate (Bicarbonate, Sodium)  650 mg PER TUBE .PER PROTOCOL PRN


   PRN Reason: ENTERAL TUBE OCCLUSION


Sodium Chloride (Flush - Normal Saline)  10 ml IVF Q12HR KEE


   Last Admin: 04/30/18 09:42 Dose:  10 ml


Sodium Chloride (Flush - Normal Saline)  10 ml IVF PRN PRN


   PRN Reason: Saline Flush

## 2018-04-30 NOTE — PRG
DATE OF SERVICE:  04/30/2018

 

SUBJECTIVE:  The patient is intubated on mechanical ventilation.  There have been no acute changes ov
ernight.

 

PHYSICAL EXAMINATION:

VITAL SIGNS:  Temperature 98.3, pulse 89, blood pressure 118/56, a 24-hour intake 2580.

HEENT:  Unremarkable.

NECK:  No JVD.

CHEST:  Diminished breath sounds.

CARDIAC:  S1 and S2 regular.

ABDOMEN:  Soft and obese.

EXTREMITIES:  Edematous.

 

LABORATORY DATA:  Sodium 141, potassium 3.2, chloride 99, CO2 32, BUN 23, creatinine 1.1, glucose 108
, PTT 57.7.

 

ASSESSMENT:

1.  Obesity hypoventilation syndrome.

2.  Acute on chronic respiratory failure.

3.  Diastolic congestive heart failure.

4.  Morbid obesity.

 

PLAN:

1.  Discussed with son over the phone today, would like to pursue tracheostomy as quickly as possible
.  Of note on this patient, all orders had to be handwritten because the computer is not working, spe
cifically in her case.

2.  Continuing heparin drip for presumed DVT/PE.

## 2018-04-30 NOTE — PDOC.CTH
Cardiology Progress Note





- Subjective





No new issues. Remains sedated intubated. 





- Objective


 Vital Signs











  Temp Pulse Resp


 


 04/30/18 10:26   81 


 


 04/30/18 10:00    19


 


 04/30/18 08:00    23 H


 


 04/30/18 07:57  98.3 F  


 


 04/30/18 07:04   82 


 


 04/30/18 06:00    24 H


 


 04/30/18 05:26   92 


 


 04/30/18 04:00  98.9 F   22 H


 


 04/30/18 02:00    18


 


 04/30/18 01:51   85 


 


 04/30/18 00:00  98.2 F   15








 











Admit Weight                   600 lb


 


Weight                         450 lb 6.47 oz














 











 04/29/18 04/30/18 05/01/18





 06:59 06:59 06:59


 


Intake Total 2647 2154 90


 


Output Total 4350 2580 1750


 


Balance -9316 -713 -1608














- Physical Examination


General/Neuro: alert & oriented x3, NAD


Neck: no JVD present


Lungs: CTA, unlabored respirations


Heart: RRR


Abdomen: NT/ND


Extremities: + edema B (3+)





- Telemetry


Telemetry Rhythm: NSR





- Labs


Result Diagrams: 


 04/28/18 13:19





 04/30/18 04:30


 Troponin/CKMB











CK-MB (CK-2)  2.9 ng/mL (0-6.6)   04/26/18  01:05    


 


Troponin I  0.180 ng/mL (< 0.028)  H  04/26/18  06:57    














- Assessment/Plan





1. Acute hypoxic hypercapnic respiratory insufficiency


2. Acute on chronic RV dysfunction.


3. Acute on chronic diastolic heart failure


4. Morbid obesity, 


5. Pickwickian syndrome


6. Hypokalemia








PLAN:


- Will repeat echo today and depending on how the RV looks will decide on 

stopping heparin drip.


- Replace K.


- Continue IV lasix.

## 2018-05-01 LAB
ANALYZER IN CARDIO: (no result)
ANION GAP SERPL CALC-SCNC: 13 MMOL/L (ref 10–20)
BASE EXCESS STD BLDA CALC-SCNC: 8.2 MEQ/L
BUN SERPL-MCNC: 21 MG/DL (ref 9.8–20.1)
CA-I BLDA-SCNC: 1.2 MMOL/L (ref 1.12–1.3)
CALCIUM SERPL-MCNC: 8.6 MG/DL (ref 7.8–10.44)
CHLORIDE SERPL-SCNC: 99 MMOL/L (ref 98–107)
CO2 SERPL-SCNC: 30 MMOL/L (ref 23–31)
CREAT CL PREDICTED SERPL C-G-VRATE: 217 ML/MIN (ref 70–130)
GLUCOSE SERPL-MCNC: 100 MG/DL (ref 80–115)
HCO3 BLDA-SCNC: 34.9 MEQ/L (ref 22–26)
HCT VFR BLDA CALC: 46 % (ref 36–47)
HGB BLD-MCNC: 12.8 G/DL (ref 12–16)
HGB BLDA-MCNC: 12.6 G/DL (ref 12–16)
MCH RBC QN AUTO: 24 PG (ref 27–31)
MCV RBC AUTO: 81.3 FL (ref 81–99)
MDIFF COMPLETE?: YES
O2 A-A PPRESDIFF RESPIRATORY: 80.7 MM[HG] (ref 0–20)
OVALOCYTES BLD QL SMEAR: (no result) (100X)
PCO2 BLDA: 58.3 MMHG (ref 35–45)
PH BLDA: 7.39 [PH] (ref 7.35–7.45)
PLATELET # BLD AUTO: 81 THOU/UL (ref 130–400)
PLATELET BLD QL SMEAR: (no result)
PO2 BLDA: 65.9 MMHG (ref 80–100)
POTASSIUM SERPL-SCNC: 3.4 MMOL/L (ref 3.5–5.1)
RBC # BLD AUTO: 5.36 MILL/UL (ref 4.2–5.4)
SODIUM SERPL-SCNC: 139 MMOL/L (ref 136–145)
SPECIMEN DRAWN FROM PATIENT: (no result)
WBC # BLD AUTO: 6.9 THOU/UL (ref 4.8–10.8)

## 2018-05-01 RX ADMIN — CEFTRIAXONE SCH MLS: 2 INJECTION, POWDER, FOR SOLUTION INTRAMUSCULAR; INTRAVENOUS at 03:26

## 2018-05-01 RX ADMIN — Medication SCH ML: at 09:11

## 2018-05-01 RX ADMIN — Medication SCH ML: at 21:35

## 2018-05-01 NOTE — PDOC.CTH
Cardiology Progress Note





- Subjective





No new issues. She remains intubated and sedated. 





- Objective


 Vital Signs











  Temp Pulse Resp BP


 


 05/01/18 07:39   77   115/66


 


 05/01/18 06:00    15 


 


 05/01/18 04:00  97.9 F   15 


 


 05/01/18 02:47   84   126/67


 


 05/01/18 02:00    15 


 


 05/01/18 00:00  98.2 F   15 


 


 04/30/18 23:39   84  


 


 04/30/18 22:00    15 








 











Admit Weight                   600 lb


 


Weight                         450 lb 6.47 oz














 











 04/30/18 05/01/18 05/02/18





 06:59 06:59 06:59


 


Intake Total 2157 2469 


 


Output Total 5371 7129 


 


Balance -002 -8645 














- Physical Examination


General/Neuro: other: (Intubated, sedated. )


Neck: other: (short)


Lungs: unlabored respirations


Heart: RRR


Abdomen: NT/ND


Extremities: + edema B (3+)





- Telemetry


Telemetry Rhythm: NSR





- Labs


Result Diagrams: 


 05/01/18 02:44





 05/01/18 03:03


 Troponin/CKMB











CK-MB (CK-2)  2.9 ng/mL (0-6.6)   04/26/18  01:05    


 


Troponin I  0.180 ng/mL (< 0.028)  H  04/26/18  06:57    














- Assessment/Plan





1. Acute hypoxic hypercapnic respiratory insufficiency


2. Acute on chronic RV dysfunction.


3. Acute on chronic diastolic heart failure


4. Morbid obesity, 


5. Pickwickian syndrome


6. Hypokalemia








PLAN:


- Repeat echo seems a little worse with continued increase in Right sided 

pressures and RV seems to have worsened RV function.


- Will continue IV heparin 


- She has a pleural effusion on Echo, continue IV lasix. 


- Replace K.

## 2018-05-01 NOTE — PRG
DATE OF SERVICE:  05/01/2018

 

Thirty-five minutes critical care time.

 

This patient remains intubated on mechanical ventilation.  When sedation is lessened, she will follow
 commands temporarily, but then becomes very agitated, pulling at equipment.  Currently, she is sedat
ed on propofol.

 

PHYSICAL EXAMINATION:  

VITAL SIGNS:  Temperature is 97.9, pulse 76, blood pressure 135/79.  24 hour intake 2469, output 5010
.  Weight currently 450 pounds.

HEENT:  Pupils react.  Sclerae are anicteric.  Oropharynx clear.

NECK:  No JVD.

LUNGS:  Clear but distant breath sounds.

CARDIOVASCULAR:  S1, S2 regular.

ABDOMEN:  Soft, obese, nontender.

EXTREMITIES:  Edematous throughout.

 

LABORATORY DATA:  Sodium 139, potassium 3.4, chloride 99, CO2 30, BUN 21, creatinine 0.8, glucose 100
.  White blood cell count 6.9, hematocrit 43.6, platelet count 81.

 

ASSESSMENT:

1.  Acute on chronic respiratory failure secondary to obesity hypoventilation syndrome.

2.  Diastolic congestive heart failure.

3.  Question of pulmonary embolism given elevated PA pressures at admission - I would favor this more
 being due to sleep apnea. 

4.  Developing thrombocytopenia on a heparin drip.  

 

PLAN:  

1.  I would go ahead and draw heparin-induced antibody profile.  

2.  If the platelets drop further, then consider stopping the heparin drip and initiating alternative
 form of anticoagulation.

3.  Awaiting the family's word on tracheostomy placement.  Given her current high ventilator settings
 with high levels of pressure support,  I do not think that she is weanable otherwise.

4.  Continue with diuresis.

## 2018-05-01 NOTE — PDOC.PN
- Subjective


Encounter Start Date: 05/01/18


Encounter Start Time: 11:00


PATIENT is seen today, remains intubated. Sedated. No family around.





- Objective


Resuscitation Status: 


 











Resuscitation Status           FULL:Full Resuscitation














MAR Reviewed: Yes


Vital Signs & Weight: 


 Vital Signs (12 hours)











  Temp Pulse Resp BP


 


 05/01/18 13:05   71   110/69


 


 05/01/18 09:23   76   138/63


 


 05/01/18 08:00  97.3 F L   


 


 05/01/18 07:39   77   115/66


 


 05/01/18 06:00    15 


 


 05/01/18 04:00  97.9 F   15 


 


 05/01/18 02:47   84   126/67


 


 05/01/18 02:00    15 








 Weight











Admit Weight                   600 lb


 


Weight                         450 lb 6.47 oz











 Most Recent Monitor Data











Heart Rate from ECG            77


 


NIBP                           138/63


 


NIBP BP-Mean                   86


 


Respiration from ECG           18


 


SpO2                           96














I&O: 


 











 04/30/18 05/01/18 05/02/18





 06:59 06:59 06:59


 


Intake Total 2154 2469 60


 


Output Total 6690 5010 1025


 


Balance -426 -2541 -965











Result Diagrams: 


 05/01/18 02:44





 05/01/18 03:03


Radiology Reviewed by me: Yes





Phys Exam





- Physical Examination


Neck: no nodes, no JVD


Respiratory: no wheezing, no rales


Cardiovascular: RRR, no significant murmur


Gastrointestinal: soft, non-tender


Musculoskeletal: edema present


Skin: no rash, normal turgor





Dx/Plan


(1) Acute idiopathic thrombocytopenic purpura


Code(s): D69.3 - IMMUNE THROMBOCYTOPENIC PURPURA   Status: Acute   Comment: 

Paient has Drop in Plaelets >50% since admisison, will need evaalute for HIT. 

Pulmonary is also aware of it. Will order HIT antibodoes. Will need to consult 

hematology if HIT is positive.   





(2) Acute and chronic respiratory failure


Code(s): J96.20 - ACUTE AND CHR RESP FAILURE, UNSP W HYPOXIA OR HYPERCAPNIA   

Status: Acute   


Qualifiers: 


   Respiratory failure complication: hypercapnia   Qualified Code(s): J96.22 - 

Acute and chronic respiratory failure with hypercapnia   


Comment:  Will likely need tracheostomy   





(3) Acute on chronic diastolic (congestive) heart failure


Code(s): I50.33 - ACUTE ON CHRONIC DIASTOLIC (CONGESTIVE) HEART FAILURE   Status

: Acute   Comment: continue IV lasix   





(4) Hypokalemia


Code(s): E87.6 - HYPOKALEMIA   Status: Acute   Comment: continue electrolyte 

replacement protocol   





(5) Morbid obesity with BMI of 70 and over, adult


Code(s): E66.01 - MORBID (SEVERE) OBESITY DUE TO EXCESS CALORIES; Z68.45 - BODY 

MASS INDEX (BMI) 70 OR GREATER, ADULT   Status: Chronic   





(6) Pickwickian syndrome


Code(s): E66.2 - MORBID (SEVERE) OBESITY WITH ALVEOLAR HYPOVENTILATION   Status

: Chronic   





(7) Pulmonary embolism


Code(s): I26.99 - OTHER PULMONARY EMBOLISM WITHOUT ACUTE COR PULMONALE   Status

: Suspected   Comment: continue heparin drip per DVT/PE protocol   





- Plan


cont current plan of care, continue antibiotics, , respiratory 

therapy, DVT proph w/heparin





* .








- Discharge Day


Encounter end time: 11:35





Review of Systems





- Review of Systems


Other: 


NO ROS due to paient being in Sedation.





- Medications/Allergies


Allergies/Adverse Reactions: 


 Allergies











Allergy/AdvReac Type Severity Reaction Status Date / Time


 


No Known Drug Allergies Allergy   Unverified 04/26/18 01:50











Medications: 


 Current Medications





Acetaminophen (Tylenol)  1,000 mg PER TUBE Q6H PRN


   PRN Reason: Headache/Fever or Mild Pain


Lipase/Protease/Amylase ([place] [address])  1 cap FS .PER PROTOCOL PRN


   PRN Reason: TUBE OCCLUSION PROTOCOL


Aspirin (Aspirin)  325 mg PER TUBE DAILY [facility]


   Last Admin: 05/01/18 09:11 Dose:  325 mg


Clonidine (Catapres)  0.1 mg PO Q4H PRN


   PRN Reason: Systolic BP > 180


Famotidine (Pepcid)  20 mg PER TUBE Q12HR [facility]


   Last Admin: 05/01/18 09:10 Dose:  20 mg


Furosemide (Lasix)  40 mg SLOW IVP 0600,1400 [facility]


   Last Admin: 05/01/18 05:29 Dose:  40 mg


Heparin Sodium (Porcine) (Heparin 1,000 Units/Ml (10 Ml))  0 units SLOW IVP 

ASDIR KEE


   PRN Reason: Protocol


   Last Admin: 04/30/18 09:05 Dose:  8,160 unit


Hydralazine HCl (Apresoline)  10 mg SLOW IVP Q4H PRN


   PRN Reason: Systolic BP > 180


Ceftriaxone Sodium 2 gm/ (Sodium Chloride)  100 mls @ 200 mls/hr IVPB Q24HR [facility]


   Last Admin: 05/01/18 03:26 Dose:  100 mls


Potassium Chloride 40 meq/ (Sodium Chloride)  270 mls @ 135 mls/hr IVPB ASDIR 

PRN


   PRN Reason: FOR SERUM K+ 2.5 - 3.5


   Last Admin: 04/28/18 06:18 Dose:  270 mls


Potassium Chloride 40 meq/ (Device)  100 mls @ 50 mls/hr IVPB ASDIR PRN


   PRN Reason: FOR SERUM K+ 2.5 - 3.5


Magnesium Sulfate 1 gm/ Sodium (Chloride)  102 mls @ 102 mls/hr IV PRN PRN


   PRN Reason: MAG LEVEL 1.4 - 2.0


Magnesium Sulfate 2 gm/ Device  100 mls @ 100 mls/hr IVPB ASDIR PRN


   PRN Reason: MAGNESIUM < 1.4


Potassium Phosphate 9 mmol/ (Sodium Chloride)  103 mls @ 25.75 mls/hr IVPB 

ASDIR PRN


   PRN Reason: Phosphate 1.0-1.8


Potassium Phosphate 12 mmol/ (Sodium Chloride)  254 mls @ 63.5 mls/hr IV ASDIR 

PRN


   PRN Reason: Serum phosphate 0.5-0.9


Potassium Phosphate 15 mmol/ (Sodium Chloride)  255 mls @ 63.75 mls/hr IV ASDIR 

PRN


   PRN Reason: Serum Phos < 0.5


Fentanyl Citrate 2,000 mcg/ (Sodium Chloride)  100 mls @ 0 mls/hr IV INF KEE; 

Per Protocol


   PRN Reason: Protocol


   Stop: 05/26/18 04:00


Fentanyl Citrate (Fentanyl Bolus)  250 mls @ 0 mls/hr IVPB PRN PRN; As Directed


   PRN Reason: Breakthrough pain/agitation


   Stop: 05/26/18 04:00


Heparin Sodium/Dextrose (Heparin 25,000 Units/D5w 500 Ml)  500 mls @ 0 mls/hr 

IVPB INF KEE; Per Protocol


   PRN Reason: Protocol


   Last Admin: 05/01/18 03:14 Dose:  500 mls


Magnesium Oxide (Magnesium Oxide)  400 mg PO BIDPRN PRN


   PRN Reason: FOR SERUM MAG 1.4 - 2.0


Magnesium Oxide (Magnesium Oxide)  800 mg PO PRN PRN


   PRN Reason: FOR SERUM MAG < 1.4


Miscellaneous Medication (Phos-Nak)  1 pkt PO TIDPRN PRN


   PRN Reason: FOR PHOS LEVEL 1.0 - 1.8


Miscellaneous Medication (Phos-Nak)  2 pkt PO TIDPRN PRN


   PRN Reason: FOR PHOS LEVEL 0.5 - 1.0


Morphine Sulfate (Morphine)  2 mg SLOW IVP Q1H PRN


   PRN Reason: breakthrough pain/agitation


   Stop: 05/26/18 04:00


Ondansetron HCl (Zofran Odt)  4 mg PO Q6H PRN


   PRN Reason: Nausea/Vomiting


Ondansetron HCl (Zofran)  4 mg IVP Q6H PRN


   PRN Reason: Nausea/Vomiting


Potassium Chloride (K-Dur)  40 meq PO ASDIR PRN


   PRN Reason: FOR SERUM K+  2.5 - 3.5


Potassium Chloride (Klor-Con)  40 meq PER TUBE ASDIR PRN


   PRN Reason: FOR SERUM K+ 2.5-3.5


   Last Admin: 05/01/18 05:29 Dose:  40 meq


Propofol (Diprivan)  1,000 mg IV INF PRN; Protocol


   PRN Reason: TO ACHIEVE GOAL RASS


   Stop: 05/26/18 04:00


   Last Admin: 05/01/18 09:22 Dose:  1,000 mg


Propofol (Diprivan Bolus)  20 mg IV Q5MIN PRN


   PRN Reason: BREAKTHROUGH AGITATION


   Stop: 05/26/18 04:00


Sodium Bicarbonate (Bicarbonate, Sodium)  650 mg PER TUBE .PER PROTOCOL PRN


   PRN Reason: ENTERAL TUBE OCCLUSION


Sodium Chloride (Flush - Normal Saline)  10 ml IVF Q12HR [facility]


   Last Admin: 05/01/18 09:11 Dose:  10 ml


Sodium Chloride (Flush - Normal Saline)  10 ml IVF PRN PRN


   PRN Reason: Saline Flush

## 2018-05-01 NOTE — PRG
DATE OF SERVICE:  04/29/2018

 

SERVICE:  Pulmonary Medicine.

 

INTERVAL HISTORY:  The patient is doing fine from a respiratory standpoint.  
There were no issues overnight.  She is on sedation and cannot provide 
additional elements of the history.  There were no reported events.

 

PHYSICAL EXAMINATION:

VITAL SIGNS:  Afebrile, pulse 77, blood pressure 119/65, respirations 15, 
saturation 97% on 30% FiO2, and a PEEP of 5.

GENERAL:  Patient is intubated and sedated.

HEENT:  Normocephalic, atraumatic.  Sclerae white.  Conjunctiva pink.  Oral and 
nasal mucosa is moist without lesions.

LUNGS:  Decent air entry.  There is no prolonged expiratory phase.  
Adventitious sounds are not apparent though body habitus excludes accurate 
evaluation there.

HEART:  Normal rate, regular.

ABDOMEN:  Soft, nontender, nondistended.  Bowel sounds are positive.

MUSCULOSKELETAL:  No cyanosis or clubbing.  She has got diffuse woody edema 
throughout.

:  Lozano catheter in place.

NEUROLOGIC:  Grossly nonfocal.

 

LABORATORY DATA:  Hemoglobin 14.7.  PTT 59.9.  Creatinine 1.32 and gently down 
trending.  Basic metabolic profile is otherwise unremarkable.  Potassium is 4.4
, magnesium and phosphorus fall within the normal limits.  Blood cultures x2, 
urine culture negative.

 

IMAGING:  Chest x-ray demonstrates soft tissue attenuation.  There is some 
rotation.  The cardiac silhouette if shifted over though I do not see any 
obvious pleural parenchymal disease.  She has diffuse interstitial common 
alveolar infiltrates, which may be accentuated by soft tissue attenuation, low 
lung volumes.  Endotracheal tube remains 5 cm above the rip.  There is an 
enteric catheter below the diaphragm.

 

ASSESSMENT:

1.  Acute on chronic hypoxic and hypercapnic respiratory failure.

2.  Obesity hypoventilation syndrome.

3.  Obstructive sleep apnea, severe.

4.  Right ventricular heart strain.

 

PLAN:  I will continue to diurese the patient as tolerated through time.  
Empiric treatment for pulmonary embolism.  We will continue as the patient 
really can't go into a CT scanner.  We will continue our efforts that 
mobilizing the patient as much as tolerated, but that being said, she will 
likely end up with a tracheostomy.  Laboratory holiday will be provided for 
tomorrow morning.

 

Critical care time: 30 minutes.  



[facility]

## 2018-05-02 LAB
ANION GAP SERPL CALC-SCNC: 13 MMOL/L (ref 10–20)
APTT PPP: (no result) SEC (ref 22.9–36.1)
BUN SERPL-MCNC: 20 MG/DL (ref 9.8–20.1)
CALCIUM SERPL-MCNC: 9.1 MG/DL (ref 7.8–10.44)
CHLORIDE SERPL-SCNC: 95 MMOL/L (ref 98–107)
CO2 SERPL-SCNC: 33 MMOL/L (ref 23–31)
CREAT CL PREDICTED SERPL C-G-VRATE: 230 ML/MIN (ref 70–130)
GLUCOSE SERPL-MCNC: 95 MG/DL (ref 80–115)
HGB BLD-MCNC: 12.7 G/DL (ref 12–16)
HGB BLD-MCNC: 13 G/DL (ref 12–16)
MCH RBC QN AUTO: 24.6 PG (ref 27–31)
MCV RBC AUTO: 81.2 FL (ref 81–99)
MDIFF COMPLETE?: YES
PLATELET # BLD AUTO: 77 THOU/UL (ref 130–400)
PLATELET # BLD AUTO: 81 THOU/UL (ref 130–400)
PLATELET BLD QL SMEAR: (no result)
POTASSIUM SERPL-SCNC: 3.3 MMOL/L (ref 3.5–5.1)
RBC # BLD AUTO: 5.28 MILL/UL (ref 4.2–5.4)
SODIUM SERPL-SCNC: 138 MMOL/L (ref 136–145)
WBC # BLD AUTO: 6.8 THOU/UL (ref 4.8–10.8)

## 2018-05-02 RX ADMIN — CEFTRIAXONE SCH MLS: 2 INJECTION, POWDER, FOR SOLUTION INTRAMUSCULAR; INTRAVENOUS at 04:01

## 2018-05-02 RX ADMIN — Medication SCH ML: at 22:34

## 2018-05-02 RX ADMIN — Medication SCH ML: at 08:32

## 2018-05-02 RX ADMIN — HEPARIN SODIUM SCH UNIT: 1000 INJECTION, SOLUTION INTRAVENOUS; SUBCUTANEOUS at 05:30

## 2018-05-02 NOTE — PRG
DATE OF SERVICE:  05/02/2018

 

Thirty-five minutes critical care time.

 

The patient remains intubated on mechanical ventilation.  She is deeply sedated on propofol.

 

PHYSICAL EXAMINATION:

VITAL SIGNS:  Temperature is 97.9, pulse 94, blood pressure 131/69, 24 intake 2617, output 3465.  Las
t weight check was 450 pounds.

HEENT:  Pupils react.  Oropharynx clear.

NECK:  No JVD.

LUNGS:  Distant breath sounds.

CARDIOVASCULAR:  S1, S2 distant.

ABDOMEN:  Morbidly obese, soft.

EXTREMITIES:  Edematous throughout.

 

LABORATORY DATA:  White blood cell count 6.8, hematocrit 42.9, platelet count 81.  Last PTT measures 
35.4.  ABG pending.  Sodium 138, potassium 3.3, chloride 95, CO2 33, BUN 20, creatinine 0.8, glucose 
95.

 

ASSESSMENT:

1.  Morbid obesity with acute on chronic respiratory failure secondary to obesity hypoventilation syn
drome.

2.  Diastolic congestive heart failure.

3.  Question of pulmonary embolism given elevated PA pressures at admission.

4.  Stable thrombocytopenia on heparin drip.  

 

RECOMMENDATIONS:  Palliative care is discussing the situation with the family.  We are continuing diu
resis.  If the family wants to press on, then I would advocate tracheostomy placement as soon as prac
tical.  If the family does not think she would want a trach, then I would recommend converting to pal
liative measures.

## 2018-05-02 NOTE — PDOC.CTH
Cardiology Progress Note





- Subjective





Remains sedated, intubated. 





- Objective


 Vital Signs











  Temp Pulse Resp


 


 05/02/18 15:38   77 


 


 05/02/18 14:00    18


 


 05/02/18 13:16   86 


 


 05/02/18 12:00  98.0 F   17


 


 05/02/18 10:24   86 


 


 05/02/18 10:00    20


 


 05/02/18 08:00  97.8 F   20


 


 05/02/18 07:05   88 


 


 05/02/18 06:00    23 H








 











Admit Weight                   600 lb


 


Weight                         450 lb 6.47 oz














 











 05/01/18 05/02/18 05/03/18





 06:59 06:59 06:59


 


Intake Total 8043 0295 60


 


Output Total 7992 8040 3909


 


Balance -6158 -074 -3291














- Physical Examination


General/Neuro: other: (sedated)


Neck: other: (short neck)


Lungs: other: (coarse ant.)


Heart: RRR


Abdomen: NT/ND


Extremities: + edema B (2+)





- Telemetry


Telemetry Rhythm: NSR, PVC's





- Labs


Result Diagrams: 


 05/02/18 04:15





 05/02/18 04:15


 Troponin/CKMB











CK-MB (CK-2)  2.9 ng/mL (0-6.6)   04/26/18  01:05    


 


Troponin I  0.180 ng/mL (< 0.028)  H  04/26/18  06:57    














- Assessment/Plan





1. Acute hypoxic hypercapnic respiratory insufficiency


2. Acute on chronic RV dysfunction.


3. Acute on chronic diastolic heart failure


4. Morbid obesity, 


5. Pickwickian syndrome


6. Hypokalemia








PLAN:


- Continue IV heparin 


- Continue IV lasix. 


- Replace K.


- I agree with palliative care. Family to make decision.

## 2018-05-02 NOTE — PDOC.PN
- Subjective


Encounter Start Date: 05/02/18


Encounter Start Time: 12:00


Jong is seen today, Remains intubated on mechanical ventilation,Sedated. Poor 

prognosis





- Objective


Resuscitation Status: 


 











Resuscitation Status           FULL:Full Resuscitation














MAR Reviewed: Yes


Vital Signs & Weight: 


 Vital Signs (12 hours)











  Temp Pulse Resp


 


 05/02/18 13:16   86 


 


 05/02/18 12:00  98.0 F   17


 


 05/02/18 10:24   86 


 


 05/02/18 10:00    20


 


 05/02/18 08:00  97.8 F   20


 


 05/02/18 07:05   88 


 


 05/02/18 06:00    23 H


 


 05/02/18 04:00  97.9 F   22 H








 Weight











Admit Weight                   600 lb


 


Weight                         450 lb 6.47 oz











 Most Recent Monitor Data











Heart Rate from ECG            77


 


NIBP                           98/58


 


NIBP BP-Mean                   67


 


Respiration from ECG           18


 


SpO2                           91














I&O: 


 











 05/01/18 05/02/18 05/03/18





 06:59 06:59 06:59


 


Intake Total 2469 2617 60


 


Output Total 5010 3465 1630


 


Balance -2541 -848 -1570











Result Diagrams: 


 05/02/18 04:15





 05/02/18 04:15


Radiology Reviewed by me: Yes





Phys Exam





- Physical Examination


Neck: no nodes


JVD  noted


Respiratory: wheezing present


Cardiovascular: RRR, no significant murmur


Gastrointestinal: soft


Reduced BM, Distention. 


Musculoskeletal: edema present


Unable to perform.


Skin: no rash





Dx/Plan


(1) Acute idiopathic thrombocytopenic purpura


Code(s): D69.3 - IMMUNE THROMBOCYTOPENIC PURPURA   Status: Acute   Comment: 

Jong has Drop in Plaelets >50% since admisison,remained stbale Now.    





(2) Acute and chronic respiratory failure


Code(s): J96.20 - ACUTE AND CHR RESP FAILURE, UNSP W HYPOXIA OR HYPERCAPNIA   

Status: Acute   


Qualifiers: 


   Respiratory failure complication: hypercapnia   Qualified Code(s): J96.22 - 

Acute and chronic respiratory failure with hypercapnia   


Comment:  Will likely need tracheostomy per pulmonary if needed long term 

ventilation. High risk for pneumonia.   





(3) Acute on chronic diastolic (congestive) heart failure


Code(s): I50.33 - ACUTE ON CHRONIC DIASTOLIC (CONGESTIVE) HEART FAILURE   Status

: Acute   Comment: continue IV lasix   





(4) Hypokalemia


Code(s): E87.6 - HYPOKALEMIA   Status: Acute   Comment: continue electrolyte 

replacement protocol   





(5) Morbid obesity with BMI of 70 and over, adult


Code(s): E66.01 - MORBID (SEVERE) OBESITY DUE TO EXCESS CALORIES; Z68.45 - BODY 

MASS INDEX (BMI) 70 OR GREATER, ADULT   Status: Chronic   





(6) Pickwickian syndrome


Code(s): E66.2 - MORBID (SEVERE) OBESITY WITH ALVEOLAR HYPOVENTILATION   Status

: Chronic   





(7) Pulmonary embolism


Code(s): I26.99 - OTHER PULMONARY EMBOLISM WITHOUT ACUTE COR PULMONALE   Status

: Suspected   Comment: continue heparin drip per DVT/PE protocol   





- Plan


cont current plan of care, continue antibiotics, respiratory therapy, DVT proph 

w/lovenox





* .








- Discharge Day


Encounter end time: 12:35





Review of Systems





- Review of Systems


Other: 





P is sedated and intubated.





- Medications/Allergies


Allergies/Adverse Reactions: 


 Allergies











Allergy/AdvReac Type Severity Reaction Status Date / Time


 


No Known Drug Allergies Allergy   Unverified 04/26/18 01:50











Medications: 


 Current Medications





Acetaminophen (Tylenol)  1,000 mg PER TUBE Q6H PRN


   PRN Reason: Headache/Fever or Mild Pain


Lipase/Protease/Amylase ([place] [address])  1 cap FS .PER PROTOCOL PRN


   PRN Reason: TUBE OCCLUSION PROTOCOL


Aspirin (Aspirin)  325 mg PER TUBE DAILY [facility]


   Last Admin: 05/02/18 08:32 Dose:  325 mg


Clonidine (Catapres)  0.1 mg PO Q4H PRN


   PRN Reason: Systolic BP > 180


Famotidine (Pepcid)  20 mg PER TUBE Q12HR [facility]


   Last Admin: 05/02/18 08:31 Dose:  20 mg


Furosemide (Lasix)  40 mg SLOW IVP 0600,1400 [facility]


   Last Admin: 05/02/18 13:39 Dose:  40 mg


Heparin Sodium (Porcine) (Heparin 1,000 Units/Ml (10 Ml))  0 units SLOW IVP 

ASDIR KEE


   PRN Reason: Protocol


   Last Admin: 05/02/18 05:30 Dose:  4,000 unit


Hydralazine HCl (Apresoline)  10 mg SLOW IVP Q4H PRN


   PRN Reason: Systolic BP > 180


Ceftriaxone Sodium 2 gm/ (Sodium Chloride)  100 mls @ 200 mls/hr IVPB Q24HR [facility]


   Last Admin: 05/02/18 04:01 Dose:  100 mls


Potassium Chloride 40 meq/ (Sodium Chloride)  270 mls @ 135 mls/hr IVPB ASDIR 

PRN


   PRN Reason: FOR SERUM K+ 2.5 - 3.5


   Last Admin: 04/28/18 06:18 Dose:  270 mls


Potassium Chloride 40 meq/ (Device)  100 mls @ 50 mls/hr IVPB ASDIR PRN


   PRN Reason: FOR SERUM K+ 2.5 - 3.5


Magnesium Sulfate 1 gm/ Sodium (Chloride)  102 mls @ 102 mls/hr IV PRN PRN


   PRN Reason: MAG LEVEL 1.4 - 2.0


Magnesium Sulfate 2 gm/ Device  100 mls @ 100 mls/hr IVPB ASDIR PRN


   PRN Reason: MAGNESIUM < 1.4


Potassium Phosphate 9 mmol/ (Sodium Chloride)  103 mls @ 25.75 mls/hr IVPB 

ASDIR PRN


   PRN Reason: Phosphate 1.0-1.8


Potassium Phosphate 12 mmol/ (Sodium Chloride)  254 mls @ 63.5 mls/hr IV ASDIR 

PRN


   PRN Reason: Serum phosphate 0.5-0.9


Potassium Phosphate 15 mmol/ (Sodium Chloride)  255 mls @ 63.75 mls/hr IV ASDIR 

PRN


   PRN Reason: Serum Phos < 0.5


Fentanyl Citrate 2,000 mcg/ (Sodium Chloride)  100 mls @ 0 mls/hr IV INF KEE; 

Per Protocol


   PRN Reason: Protocol


   Stop: 05/26/18 04:00


Fentanyl Citrate (Fentanyl Bolus)  250 mls @ 0 mls/hr IVPB PRN PRN; As Directed


   PRN Reason: Breakthrough pain/agitation


   Stop: 05/26/18 04:00


Heparin Sodium/Dextrose (Heparin 25,000 Units/D5w 500 Ml)  500 mls @ 0 mls/hr 

IVPB INF KEE; Per Protocol


   PRN Reason: Protocol


   Last Admin: 05/02/18 12:48 Dose:  500 mls


Magnesium Oxide (Magnesium Oxide)  400 mg PO BIDPRN PRN


   PRN Reason: FOR SERUM MAG 1.4 - 2.0


Magnesium Oxide (Magnesium Oxide)  800 mg PO PRN PRN


   PRN Reason: FOR SERUM MAG < 1.4


Miscellaneous Medication (Phos-Nak)  1 pkt PO TIDPRN PRN


   PRN Reason: FOR PHOS LEVEL 1.0 - 1.8


Miscellaneous Medication (Phos-Nak)  2 pkt PO TIDPRN PRN


   PRN Reason: FOR PHOS LEVEL 0.5 - 1.0


Morphine Sulfate (Morphine)  2 mg SLOW IVP Q1H PRN


   PRN Reason: breakthrough pain/agitation


   Stop: 05/26/18 04:00


Ondansetron HCl (Zofran Odt)  4 mg PO Q6H PRN


   PRN Reason: Nausea/Vomiting


Ondansetron HCl (Zofran)  4 mg IVP Q6H PRN


   PRN Reason: Nausea/Vomiting


Potassium Chloride (K-Dur)  40 meq PO ASDIR PRN


   PRN Reason: FOR SERUM K+  2.5 - 3.5


   Last Admin: 05/02/18 06:43 Dose:  40 meq


Potassium Chloride (Klor-Con)  40 meq PER TUBE ASDIR PRN


   PRN Reason: FOR SERUM K+ 2.5-3.5


   Last Admin: 05/01/18 05:29 Dose:  40 meq


Propofol (Diprivan)  1,000 mg IV INF PRN; Protocol


   PRN Reason: TO ACHIEVE GOAL RASS


   Stop: 05/26/18 04:00


   Last Admin: 05/02/18 12:51 Dose:  1,000 mg


Propofol (Diprivan Bolus)  20 mg IV Q5MIN PRN


   PRN Reason: BREAKTHROUGH AGITATION


   Stop: 05/26/18 04:00


Sodium Bicarbonate (Bicarbonate, Sodium)  650 mg PER TUBE .PER PROTOCOL PRN


   PRN Reason: ENTERAL TUBE OCCLUSION


Sodium Chloride (Flush - Normal Saline)  10 ml IVF Q12HR KEE


   Last Admin: 05/02/18 08:32 Dose:  10 ml


Sodium Chloride (Flush - Normal Saline)  10 ml IVF PRN PRN


   PRN Reason: Saline Flush

## 2018-05-03 LAB
ANION GAP SERPL CALC-SCNC: 13 MMOL/L (ref 10–20)
APTT PPP: 127.6 SEC (ref 22.9–36.1)
BUN SERPL-MCNC: 19 MG/DL (ref 9.8–20.1)
CALCIUM SERPL-MCNC: 9.2 MG/DL (ref 7.8–10.44)
CHLORIDE SERPL-SCNC: 94 MMOL/L (ref 98–107)
CO2 SERPL-SCNC: 34 MMOL/L (ref 23–31)
CREAT CL PREDICTED SERPL C-G-VRATE: 244 ML/MIN (ref 70–130)
GLUCOSE SERPL-MCNC: 88 MG/DL (ref 80–115)
POTASSIUM SERPL-SCNC: 3.4 MMOL/L (ref 3.5–5.1)
SODIUM SERPL-SCNC: 138 MMOL/L (ref 136–145)

## 2018-05-03 RX ADMIN — Medication SCH ML: at 21:08

## 2018-05-03 RX ADMIN — CEFTRIAXONE SCH MLS: 2 INJECTION, POWDER, FOR SOLUTION INTRAMUSCULAR; INTRAVENOUS at 03:06

## 2018-05-03 RX ADMIN — Medication SCH ML: at 08:47

## 2018-05-03 NOTE — PDOC.CTH
Cardiology Progress Note





- Subjective


Remains sedated intubated. 





- Objective


 Vital Signs











  Temp Pulse Resp BP Pulse Ox


 


 05/03/18 12:00  97.6 F   19  


 


 05/03/18 10:00    15  


 


 05/03/18 09:44   87   


 


 05/03/18 08:00  97 F L  80  20   90 L


 


 05/03/18 07:00  97 F L    


 


 05/03/18 06:48   89   


 


 05/03/18 06:00    21 H  


 


 05/03/18 04:00  97.8 F   17  


 


 05/03/18 02:15   86   125/62 


 


 05/03/18 02:00    17  








 











Admit Weight                   600 lb


 


Weight                         450 lb 6.47 oz














 











 05/02/18 05/03/18 05/04/18





 06:59 06:59 06:59


 


Intake Total 2617 2732 30


 


Output Total 4628 7492 851


 


Balance -353 -347 -928














- Physical Examination


General/Neuro: other: (sedated, intubated. )


Neck: other: (Short neck)


Lungs: CTA


Heart: RRR


Abdomen: NT/ND


Extremities: + edema B (3+)





- Telemetry


Telemetry Rhythm: NSR, PVC's





- Labs


Result Diagrams: 


 05/02/18 21:23





 05/03/18 04:05


 Troponin/CKMB











CK-MB (CK-2)  2.9 ng/mL (0-6.6)   04/26/18  01:05    


 


Troponin I  0.180 ng/mL (< 0.028)  H  04/26/18  06:57    














- Assessment/Plan





1. Acute hypoxic hypercapnic respiratory insufficiency


2. Acute on chronic RV dysfunction.


3. Acute on chronic diastolic heart failure


4. Morbid obesity, 


5. Pickwickian syndrome


6. Hypokalemia








PLAN:


- Continue IV lasix. 


- Continue heparin gtt.


- Replace K.


- Continue supportive care. 


- Awaiting family decision as to wether to do Palliative care or Trach and Peg.

## 2018-05-03 NOTE — PDOC.PULCC
CCU Progress Note: Subj/Obj





- Subjective


Date: 05/03/18


Time: 07:19


Subjective: 





Intubated, on vent





- Objective


Allergies/Adverse Reactions: 


 Allergies











Allergy/AdvReac Type Severity Reaction Status Date / Time


 


No Known Drug Allergies Allergy   Unverified 04/26/18 01:50











MAR Reviewed: Yes


Vital Signs and I&O: 


 Vital Signs











Temp  97.8 F   05/03/18 04:00


 


Pulse  89   05/03/18 06:48


 


Resp  21 H  05/03/18 06:00


 


BP  125/62   05/03/18 02:15


 


Pulse Ox  92 L  05/02/18 20:00








 Intake & Output











 05/02/18 05/03/18 05/03/18





 18:59 06:59 18:59


 


Intake Total 1108 1624 


 


Output Total 2260 625 


 


Balance -1152 999 


 


Weight 450 lb 6.47 oz  


 


Intake:   


 


  Intake, IV Amount 865 1023 


 


    Heparin 25,000 units/D5W 455 535 





    500 ml @ Per Protocol   





    IVPB INF KEE Rx#:[id number]   


 


    Propofol 1000 mg (See 278 271 





    Protocol) IV INF PRN Rx#:   





    [id number]   


 


    Sodium Chloride 0.9% 10 132 217 





    ml IVF PRN PRN Rx#:   





    [id number]   


 


  Tube Feeding 153 361 


 


  Tube Irrigant 90 240 


 


Output:   


 


  Output, [place] 2260 625 


 


Other:   


 


  Voiding Method Indwelling Catheter Indwelling Catheter 


 


  # Bowel Movements 0  














CCU Progress Note: Exam





- Physical Exam


Deviation from normal: very somnalent, on low dose propofol


HEENT: PERRLA


Neck: no nodes, no JVD


Cardiovascular: RRR


Respiratory: rales


Deviation from normal: obese, soft, nt


Musculoskeletal: edema present


Deviation from normal: pt not following


Deviation from normal: chronic stasis changes on legs





CCU Progress Note: Data





- Labs


Result Diagrams: 


 05/02/18 21:23





 05/03/18 04:05





CCU Progress Note: A/P





- Problems


(1) Acute and chronic respiratory failure


Current Visit: Yes   Status: Acute   Code(s): J96.20 - ACUTE AND CHR RESP 

FAILURE, UNSP W HYPOXIA OR HYPERCAPNIA   


Qualifiers: 


   Respiratory failure complication: hypercapnia   Qualified Code(s): J96.22 - 

Acute and chronic respiratory failure with hypercapnia   





(2) Acute on chronic diastolic (congestive) heart failure


Current Visit: Yes   Status: Acute   Code(s): I50.33 - ACUTE ON CHRONIC 

DIASTOLIC (CONGESTIVE) HEART FAILURE   





(3) Morbid obesity with BMI of 70 and over, adult


Current Visit: Yes   Status: Chronic   Code(s): E66.01 - MORBID (SEVERE) 

OBESITY DUE TO EXCESS CALORIES; Z68.45 - BODY MASS INDEX (BMI) 70 OR GREATER, 

ADULT   





(4) Pickwickian syndrome


Current Visit: Yes   Status: Chronic   Code(s): E66.2 - MORBID (SEVERE) OBESITY 

WITH ALVEOLAR HYPOVENTILATION   





- Time Spent with Patient


Time (minutes): 35 (cc time)





- Plan


Plan: 





Appreciate PC meeting with pts sons over phone.  Basically we are waiting for a 

decision regarding tracheostomy placement vs conversion to palliative care.  

The patient is overly sedated, so we will try her off all sedation and see what 

happens.  I do not think she would do well extubated.  She continues 

anticoagulation for the slight possibility of PE, given that we can't image.

## 2018-05-04 LAB
ANION GAP SERPL CALC-SCNC: 13 MMOL/L (ref 10–20)
BUN SERPL-MCNC: 18 MG/DL (ref 9.8–20.1)
CALCIUM SERPL-MCNC: 9.3 MG/DL (ref 7.8–10.44)
CHLORIDE SERPL-SCNC: 94 MMOL/L (ref 98–107)
CO2 SERPL-SCNC: 35 MMOL/L (ref 23–31)
CREAT CL PREDICTED SERPL C-G-VRATE: 244 ML/MIN (ref 70–130)
GLUCOSE SERPL-MCNC: 99 MG/DL (ref 80–115)
POTASSIUM SERPL-SCNC: 3.4 MMOL/L (ref 3.5–5.1)
SODIUM SERPL-SCNC: 139 MMOL/L (ref 136–145)

## 2018-05-04 RX ADMIN — CEFTRIAXONE SCH MLS: 2 INJECTION, POWDER, FOR SOLUTION INTRAMUSCULAR; INTRAVENOUS at 04:41

## 2018-05-04 RX ADMIN — Medication SCH ML: at 21:27

## 2018-05-04 RX ADMIN — Medication SCH ML: at 09:36

## 2018-05-04 NOTE — PRG
DATE OF SERVICE:  05/04/2018

 

Thirty-five minutes critical care time.

 

SUBJECTIVE:  The patient remains intubated on mechanical ventilation.  There have been no acute capone
es overnight.

 

PHYSICAL EXAMINATION:

VITAL SIGNS:  Temperature is 97.5, pulse 95, blood pressure 126/65.  A 24-hour intake 1057, output 20
71.

HEENT:  Unremarkable.

NECK:  No JVD.

CHEST:  Clear, but distant.

CARDIAC:  S1 and S2 regular.

ABDOMEN:  Soft, obese.

EXTREMITIES:  Edematous.

 

LABORATORY DATA:  Sodium 139, potassium 3.4, chloride 94, CO2 35, BUN 18, creatinine 0.7, glucose 99.
  PTT 84.3.  Hemoglobin 12.7, hematocrit 42.2, platelet count 77.

 

ASSESSMENT:

1.  Morbid obesity.

2.  Obstructive sleep apnea/obesity hypoventilation syndrome.

3.  Acute on chronic respiratory failure requiring mechanical ventilation.

4.  Elevated pulmonary pressures, which I think is probably indicative of sleep apnea.

4.  Thrombocytopenia with declining platelet count on heparin drip, but with negative heparin induced
 thrombocytopenia profile.

 

RECOMMENDATIONS:

1.  I would go ahead and stop the heparin drip and put her on Lovenox at a prophylactic dose rather t
rogers a therapeutic dose.

2.  I tried to communicate with the patient this morning.  She is off sedation.  She indicated to me 
that she would want a tracheostomy placed.  I never cannot really tell for sure whether the patient i
s making good decisions while there on the ventilator.  The patient's children are supposed to come i
n this weekend and sit down with palliative care and help make this decision, but I think if at all p
ossible, the patient should be allowed to make the decision for herself.

3.  Check ABG tomorrow.

4.  If the patient wants to proceed with trach, then Surgery would need to be consulted this weekend 
with plans to do the trach early next week.

## 2018-05-05 LAB
ANALYZER IN CARDIO: (no result)
ANION GAP SERPL CALC-SCNC: 14 MMOL/L (ref 10–20)
BASE EXCESS STD BLDA CALC-SCNC: 11.5 MEQ/L
BUN SERPL-MCNC: 18 MG/DL (ref 9.8–20.1)
CA-I BLDA-SCNC: 1.2 MMOL/L (ref 1.12–1.3)
CALCIUM SERPL-MCNC: 9.4 MG/DL (ref 7.8–10.44)
CHLORIDE SERPL-SCNC: 94 MMOL/L (ref 98–107)
CO2 SERPL-SCNC: 34 MMOL/L (ref 23–31)
CREAT CL PREDICTED SERPL C-G-VRATE: 244 ML/MIN (ref 70–130)
GLUCOSE SERPL-MCNC: 100 MG/DL (ref 80–115)
HCO3 BLDA-SCNC: 37.7 MEQ/L (ref 22–26)
HCT VFR BLDA CALC: 44.6 % (ref 36–47)
HGB BLD-MCNC: 12.2 G/DL (ref 12–16)
HGB BLDA-MCNC: 11.9 G/DL (ref 12–16)
MCH RBC QN AUTO: 24 PG (ref 27–31)
MCV RBC AUTO: 80.6 FL (ref 81–99)
MDIFF COMPLETE?: YES
PCO2 BLDA: 57.2 MMHG (ref 35–45)
PH BLDA: 7.44 [PH] (ref 7.35–7.45)
PLATELET # BLD AUTO: 72 THOU/UL (ref 130–400)
PLATELET BLD QL SMEAR: (no result)
PO2 BLDA: 62 MMHG (ref 80–100)
POTASSIUM SERPL-SCNC: 3.8 MMOL/L (ref 3.5–5.1)
RBC # BLD AUTO: 5.06 MILL/UL (ref 4.2–5.4)
SODIUM SERPL-SCNC: 138 MMOL/L (ref 136–145)
SPECIMEN DRAWN FROM PATIENT: (no result)
WBC # BLD AUTO: 4 THOU/UL (ref 4.8–10.8)

## 2018-05-05 RX ADMIN — Medication SCH ML: at 21:14

## 2018-05-05 RX ADMIN — Medication SCH ML: at 12:43

## 2018-05-05 NOTE — PDOC.CTH
Cardiology Progress Note





- Subjective





She is much more awake today. Her family is at bedside and had several 

questions about her Trach which I deffered mostly to pulmonary as they feel 

threy have been told several different things in the past about the trach. 





- Objective


 Vital Signs











  Temp Pulse Resp BP


 


 05/05/18 10:16   97   123/68


 


 05/05/18 08:00  98.6 F   19 


 


 05/05/18 07:02   94   126/76


 


 05/05/18 06:00    17 


 


 05/05/18 04:00  98.2 F   20 


 


 05/05/18 02:41   97  


 


 05/05/18 02:00    16 


 


 05/05/18 00:22   92  








 











Admit Weight                   600 lb


 


Weight                         450 lb 6.47 oz














 











 05/04/18 05/05/18 05/06/18





 06:59 06:59 06:59


 


Intake Total 1980 1377 30


 


Output Total 2095 2960 250


 


Balance -053 -0045 -220














- Physical Examination


General/Neuro: NAD, other: (awake , following commands. )


Neck: no JVD present


Lungs: CTA, unlabored respirations


Heart: RRR


Abdomen: NT/ND


Extremities: + edema B (2+)





- Telemetry


Telemetry Rhythm: NSR, Non sustined VT





- Labs


Result Diagrams: 


 05/05/18 05:05





 05/05/18 04:26


 Troponin/CKMB











CK-MB (CK-2)  2.9 ng/mL (0-6.6)   04/26/18  01:05    


 


Troponin I  0.180 ng/mL (< 0.028)  H  04/26/18  06:57    














- Assessment/Plan





1. Acute hypoxic hypercapnic respiratory insufficiency


2. Acute on chronic RV dysfunction.


3. Acute on chronic diastolic heart failure


4. Morbid obesity, 


5. Pickwickian syndrome


6. Hypokalemia


7. Non sustained VT.








PLAN:


- Continue IV lasix. 


- Continue heparin gtt, would recommend at least 3 months of anticoagulation, 

likely will transition to PO. May stop for Trach and peg when time comes. 


- Replace K.


- Continue supportive care. 


- Spoke with family about possible trach and peg which is what she will most 

likely need for long term weaning.

## 2018-05-05 NOTE — PRG
DATE OF SERVICE:  05/05/2018

 

SUBJECTIVE:  The patient is 450 pounds, BMI 77.  Intubated on the vent.  Awake, responsive.  Several 
family members and kids are here.  They are yet to make a decision regarding a trach.  She is clearly
 not weanable at this stage.

 

OBJECTIVE:

VITAL SIGNS:  Pulse is 100, blood pressure 121/78, sats are 93%, respiration 26. 

CHEST:  Chest reveals decreased breath sounds without any wheezing.

CARDIAC:  Normal S1, S2, no gallops.

ABDOMEN:  Massive.

 

LABORATORY DATA:  White count 4000, H and H is 12 and 40, platelet count is 72,000.  PO2 is 62, pCO2 
of 57%.  Electrolytes are normal.

 

IMPRESSION:  Morbid obesity, respiratory failure, diastolic dysfunction, elevated PA pressures.

 

PLAN:  She is on Lovenox, supportive care, and PT.

 

Await input from family regarding ongoing care.

 

One-half-hour critical care time.

## 2018-05-06 LAB
ANION GAP SERPL CALC-SCNC: 8 MMOL/L (ref 10–20)
BASOPHILS # BLD AUTO: 0 THOU/UL (ref 0–0.2)
BASOPHILS NFR BLD AUTO: 0.4 % (ref 0–1)
BUN SERPL-MCNC: 18 MG/DL (ref 9.8–20.1)
CALCIUM SERPL-MCNC: 9.4 MG/DL (ref 7.8–10.44)
CHLORIDE SERPL-SCNC: 95 MMOL/L (ref 98–107)
CO2 SERPL-SCNC: 37 MMOL/L (ref 23–31)
CREAT CL PREDICTED SERPL C-G-VRATE: 247 ML/MIN (ref 70–130)
EOSINOPHIL # BLD AUTO: 0.1 THOU/UL (ref 0–0.7)
EOSINOPHIL NFR BLD AUTO: 3.5 % (ref 0–10)
GLUCOSE SERPL-MCNC: 92 MG/DL (ref 80–115)
HGB BLD-MCNC: 11.9 G/DL (ref 12–16)
LYMPHOCYTES # BLD: 0.7 THOU/UL (ref 1.2–3.4)
LYMPHOCYTES NFR BLD AUTO: 19.7 % (ref 21–51)
MCH RBC QN AUTO: 24.9 PG (ref 27–31)
MCV RBC AUTO: 80.2 FL (ref 81–99)
MONOCYTES # BLD AUTO: 0.5 THOU/UL (ref 0.11–0.59)
MONOCYTES NFR BLD AUTO: 12.9 % (ref 0–10)
NEUTROPHILS # BLD AUTO: 2.3 THOU/UL (ref 1.4–6.5)
NEUTROPHILS NFR BLD AUTO: 63.5 % (ref 42–75)
PLATELET # BLD AUTO: 68 THOU/UL (ref 130–400)
POTASSIUM SERPL-SCNC: 3 MMOL/L (ref 3.5–5.1)
RBC # BLD AUTO: 4.78 MILL/UL (ref 4.2–5.4)
SODIUM SERPL-SCNC: 137 MMOL/L (ref 136–145)
WBC # BLD AUTO: 3.7 THOU/UL (ref 4.8–10.8)

## 2018-05-06 RX ADMIN — AMIODARONE HYDROCHLORIDE SCH MLS: 50 INJECTION, SOLUTION INTRAVENOUS at 04:56

## 2018-05-06 RX ADMIN — Medication SCH ML: at 20:56

## 2018-05-06 RX ADMIN — AMIODARONE HYDROCHLORIDE SCH MLS: 50 INJECTION, SOLUTION INTRAVENOUS at 14:17

## 2018-05-06 RX ADMIN — Medication SCH ML: at 10:12

## 2018-05-06 NOTE — PDOC.PN
- Subjective


Encounter Start Date: 05/06/18


Encounter Start Time: 10:00





Patient is non verbal due to intubation, but she is responding to questions. 

Alert and off of Sedation.





- Objective


Resuscitation Status: 


 











Resuscitation Status           FULL:Full Resuscitation














MAR Reviewed: Yes


Vital Signs & Weight: 


 Vital Signs (12 hours)











  Temp Pulse Resp BP


 


 05/06/18 11:38   95   119/46 L


 


 05/06/18 10:00    20 


 


 05/06/18 08:26   136 H   147/56 H


 


 05/06/18 08:00  99.3 F   23 H 


 


 05/06/18 06:00    20 


 


 05/06/18 05:00  99.8 F H   


 


 05/06/18 04:00    20 


 


 05/06/18 03:01   98  


 


 05/06/18 02:00    20 








 Weight











Admit Weight                   600 lb


 


Weight                         450 lb 6.47 oz











 Most Recent Monitor Data











Heart Rate from ECG            96


 


NIBP                           93/44


 


NIBP BP-Mean                   63


 


Respiration from ECG           25


 


SpO2                           89














I&O: 


 











 05/05/18 05/06/18 05/07/18





 06:59 06:59 06:59


 


Intake Total 1377 1038.4 40


 


Output Total 2960 2180 180


 


Balance -1583 -1141.6 -140











Result Diagrams: 


 05/06/18 04:48





 05/06/18 04:48


Radiology Reviewed by me: Yes





Phys Exam





- Physical Examination


HEENT: PERRLA, moist MMs


Neck: no nodes, no JVD


Respiratory: no wheezing, no rales


Cardiovascular: no significant murmur, irregular


Gastrointestinal: soft, non-tender


Musculoskeletal: pulses present, edema present


Neurological: non-focal, normal sensation


Lymphatic: no nodes





Dx/Plan


(1) Acute idiopathic thrombocytopenic purpura


Code(s): D69.3 - IMMUNE THROMBOCYTOPENIC PURPURA   Status: Acute   Comment: 

Paient has Drop in Plaelets >50% since admisison,remained stbale Now. HIT was 

negative.Stbale at 73 now. COnsulted Oncology for Thrombocytopenia.   





(2) Acute and chronic respiratory failure


Code(s): J96.20 - ACUTE AND CHR RESP FAILURE, UNSP W HYPOXIA OR HYPERCAPNIA   

Status: Acute   


Qualifiers: 


   Respiratory failure complication: hypercapnia   Qualified Code(s): J96.22 - 

Acute and chronic respiratory failure with hypercapnia   


Comment: Planned for Tracheostomy on monday or Tueday if Platelefts are 

normalized. Will continue on Vent per pulmonary..   





(3) Acute on chronic diastolic (congestive) heart failure


Code(s): I50.33 - ACUTE ON CHRONIC DIASTOLIC (CONGESTIVE) HEART FAILURE   Status

: Acute   Comment: continue IV lasix   





(4) Hypokalemia


Code(s): E87.6 - HYPOKALEMIA   Status: Acute   Comment: continue electrolyte 

replacement protocol   





(5) Morbid obesity with BMI of 70 and over, adult


Code(s): E66.01 - MORBID (SEVERE) OBESITY DUE TO EXCESS CALORIES; Z68.45 - BODY 

MASS INDEX (BMI) 70 OR GREATER, ADULT   Status: Chronic   





(6) Pickwickian syndrome


Code(s): E66.2 - MORBID (SEVERE) OBESITY WITH ALVEOLAR HYPOVENTILATION   Status

: Chronic   





(7) Pulmonary embolism


Code(s): I26.99 - OTHER PULMONARY EMBOLISM WITHOUT ACUTE COR PULMONALE   Status

: Suspected   Comment: continue heparin drip per DVT/PE protocol   





- Plan


cont current plan of care, paiz catheter, continue antibiotics, 

, respiratory therapy, incentive spirometry, DVT proph w/lovenox


Plan to Consult Surgery tomorrow as platelets are low. 





* .








- Discharge Day


Encounter end time: 10:35





Review of Systems





- Review of Systems


Other: 





Unable to perform ROS as pt is Non verbal.





- Medications/Allergies


Allergies/Adverse Reactions: 


 Allergies











Allergy/AdvReac Type Severity Reaction Status Date / Time


 


No Known Drug Allergies Allergy   Unverified 04/26/18 01:50











Medications: 


 Current Medications





Acetaminophen (Tylenol)  1,000 mg PER TUBE Q6H PRN


   PRN Reason: Headache/Fever or Mild Pain


Lipase/Protease/Amylase ([place] [address])  1 cap FS .PER PROTOCOL PRN


   PRN Reason: TUBE OCCLUSION PROTOCOL


Aspirin (Aspirin)  325 mg PER TUBE DAILY [facility]


   Last Admin: 05/06/18 10:11 Dose:  325 mg


Clonidine (Catapres)  0.1 mg PO Q4H PRN


   PRN Reason: Systolic BP > 180


Enoxaparin Sodium (Lovenox)  40 mg SC 0900 [facility]


   Last Admin: 05/06/18 10:12 Dose:  40 mg


Famotidine (Pepcid)  20 mg PER TUBE Q12HR [facility]


   Last Admin: 05/06/18 10:11 Dose:  20 mg


Furosemide (Lasix)  40 mg SLOW IVP 0600,1400 [facility]


   Last Admin: 05/06/18 06:03 Dose:  40 mg


Hydralazine HCl (Apresoline)  10 mg SLOW IVP Q4H PRN


   PRN Reason: Systolic BP > 180


Potassium Chloride 40 meq/ (Sodium Chloride)  270 mls @ 135 mls/hr IVPB ASDIR 

PRN


   PRN Reason: FOR SERUM K+ 2.5 - 3.5


   Last Admin: 04/28/18 06:18 Dose:  270 mls


Potassium Chloride 40 meq/ (Device)  100 mls @ 50 mls/hr IVPB ASDIR PRN


   PRN Reason: FOR SERUM K+ 2.5 - 3.5


Magnesium Sulfate 1 gm/ Sodium (Chloride)  102 mls @ 102 mls/hr IV PRN PRN


   PRN Reason: MAG LEVEL 1.4 - 2.0


Magnesium Sulfate 2 gm/ Device  100 mls @ 100 mls/hr IVPB ASDIR PRN


   PRN Reason: MAGNESIUM < 1.4


Potassium Phosphate 9 mmol/ (Sodium Chloride)  103 mls @ 25.75 mls/hr IVPB 

ASDIR PRN


   PRN Reason: Phosphate 1.0-1.8


Potassium Phosphate 12 mmol/ (Sodium Chloride)  254 mls @ 63.5 mls/hr IV ASDIR 

PRN


   PRN Reason: Serum phosphate 0.5-0.9


Potassium Phosphate 15 mmol/ (Sodium Chloride)  255 mls @ 63.75 mls/hr IV ASDIR 

PRN


   PRN Reason: Serum Phos < 0.5


Amiodarone HCl 450 mg/Miscellaneous Medication 1 each/ Dextrose/Water  259 mls 

@ 0 mls/hr IVPB INF KEE; As Directed


   PRN Reason: Protocol


   Last Admin: 05/06/18 04:56 Dose:  259 mls


Magnesium Oxide (Magnesium Oxide)  400 mg PO BIDPRN PRN


   PRN Reason: FOR SERUM MAG 1.4 - 2.0


Magnesium Oxide (Magnesium Oxide)  800 mg PO PRN PRN


   PRN Reason: FOR SERUM MAG < 1.4


Miscellaneous Medication (Phos-Nak)  1 pkt PO TIDPRN PRN


   PRN Reason: FOR PHOS LEVEL 1.0 - 1.8


Miscellaneous Medication (Phos-Nak)  2 pkt PO TIDPRN PRN


   PRN Reason: FOR PHOS LEVEL 0.5 - 1.0


Ondansetron HCl (Zofran Odt)  4 mg PO Q6H PRN


   PRN Reason: Nausea/Vomiting


Ondansetron HCl (Zofran)  4 mg IVP Q6H PRN


   PRN Reason: Nausea/Vomiting


Potassium Chloride (K-Dur)  40 meq PO ASDIR PRN


   PRN Reason: FOR SERUM K+  2.5 - 3.5


   Last Admin: 05/02/18 06:43 Dose:  40 meq


Potassium Chloride (Klor-Con)  40 meq PER TUBE ASDIR PRN


   PRN Reason: FOR SERUM K+ 2.5-3.5


   Last Admin: 05/06/18 06:14 Dose:  40 meq


Potassium Chloride (K-Dur)  40 meq PO ONE KEE


   Stop: 05/06/18 14:00


Propofol (Diprivan)  1,000 mg IV INF PRN; Protocol


   PRN Reason: TO ACHIEVE GOAL RASS


   Stop: 05/26/18 04:00


   Last Admin: 05/03/18 03:06 Dose:  1,000 mg


Propofol (Diprivan Bolus)  20 mg IV Q5MIN PRN


   PRN Reason: BREAKTHROUGH AGITATION


   Stop: 05/26/18 04:00


Sodium Bicarbonate (Bicarbonate, Sodium)  650 mg PER TUBE .PER PROTOCOL PRN


   PRN Reason: ENTERAL TUBE OCCLUSION


Sodium Chloride (Flush - Normal Saline)  10 ml IVF Q12HR [facility]


   Last Admin: 05/06/18 10:12 Dose:  10 ml


Sodium Chloride (Flush - Normal Saline)  10 ml IVF PRN PRN


   PRN Reason: Saline Flush

## 2018-05-06 NOTE — PRG
DATE OF SERVICE:  05/06/2018

 

SUBJECTIVE:  Awake, alert, responsive, on the vent.  Denies any pain or discomfort.

 

OBJECTIVE:

VITAL SIGNS:  Blood pressure is 147/56, pulse 110, sats are 95%.

CHEST:  Decreased breath sounds, no wheezing.

CARDIAC:  Normal S1, S2, no gallops.

ABDOMEN:  Soft, no masses.

 

LABORATORY DATA:  Potassium 3.  Electrolytes are normal.  BUN and creatinine are normal.  White count
 3000, H&H is 11 and 38.

 

IMPRESSION:  Morbid obesity, sleep apnea, respiratory failure.

 

PLAN:  The patient wants to be trached.  She is still on amiodarone for ventricular arrhythmias.

 

Continue supportive care.

 

Trach in the next 24-48 hours.

 

One-half hour critical care time.

## 2018-05-06 NOTE — PDOC.PN
- Subjective


Encounter Start Date: 05/04/18


Encounter Start Time: 12:00


-: non-verbal





Pt Remains intubated but off of sedation, She is Able to respond to commands, 

Plan to discuss with Pts family tomrow.





- Objective


Resuscitation Status: 


 











Resuscitation Status           FULL:Full Resuscitation














Vital Signs & Weight: 


 Vital Signs (12 hours)











  Temp Pulse Resp Pulse Ox


 


 05/06/18 06:00    20 


 


 05/06/18 05:00  99.8 F H   


 


 05/06/18 04:00    20 


 


 05/06/18 03:01   98  


 


 05/06/18 02:00    20 


 


 05/06/18 00:00     93 L


 


 05/05/18 23:48    19 


 


 05/05/18 23:44  100.1 F H   


 


 05/05/18 22:00    16 


 


 05/05/18 21:56   94  


 


 05/05/18 20:00  98.3 F  87  16  91 L








 Weight











Admit Weight                   600 lb


 


Weight                         450 lb 6.47 oz











 Most Recent Monitor Data











Heart Rate from ECG            134


 


NIBP                           100/60


 


NIBP BP-Mean                   70


 


Respiration from ECG           23


 


SpO2                           90














I&O: 


 











 05/05/18 05/06/18 05/07/18





 06:59 06:59 06:59


 


Intake Total 1377 1038.4 


 


Output Total 2960 2180 


 


Balance -1583 -1141.6 











Result Diagrams: 


 05/06/18 04:48





 05/06/18 04:48


Radiology Reviewed by me: Yes





Phys Exam





- Physical Examination


HEENT: PERRLA, moist MMs


Neck: no nodes, no JVD


Respiratory: no wheezing, no rales


Cardiovascular: RRR, no significant murmur


Gastrointestinal: soft, non-tender


Musculoskeletal: edema present


Neurological: non-focal, normal sensation


Skin: no rash, normal turgor





Dx/Plan


(1) Acute idiopathic thrombocytopenic purpura


Code(s): D69.3 - IMMUNE THROMBOCYTOPENIC PURPURA   Status: Acute   Comment: 

Paient has Drop in Plaelets >50% since admisison,remained stbale Now. HIT was 

negative.   





(2) Acute and chronic respiratory failure


Code(s): J96.20 - ACUTE AND CHR RESP FAILURE, UNSP W HYPOXIA OR HYPERCAPNIA   

Status: Acute   


Qualifiers: 


   Respiratory failure complication: hypercapnia   Qualified Code(s): J96.22 - 

Acute and chronic respiratory failure with hypercapnia   


Comment:  Will likely need tracheostomy per pulmonary if needed long term 

ventilation. High risk for pneumonia.Plan to Discuss with Family tomorow and 

consult Surgery.   





(3) Acute on chronic diastolic (congestive) heart failure


Code(s): I50.33 - ACUTE ON CHRONIC DIASTOLIC (CONGESTIVE) HEART FAILURE   Status

: Acute   Comment: continue IV lasix   





(4) Hypokalemia


Code(s): E87.6 - HYPOKALEMIA   Status: Acute   Comment: continue electrolyte 

replacement protocol   





(5) Morbid obesity with BMI of 70 and over, adult


Code(s): E66.01 - MORBID (SEVERE) OBESITY DUE TO EXCESS CALORIES; Z68.45 - BODY 

MASS INDEX (BMI) 70 OR GREATER, ADULT   Status: Chronic   





(6) Pickwickian syndrome


Code(s): E66.2 - MORBID (SEVERE) OBESITY WITH ALVEOLAR HYPOVENTILATION   Status

: Chronic   





(7) Pulmonary embolism


Code(s): I26.99 - OTHER PULMONARY EMBOLISM WITHOUT ACUTE COR PULMONALE   Status

: Suspected   Comment: continue heparin drip per DVT/PE protocol   





- Plan


cont current plan of care, continue antibiotics, , respiratory 

therapy, incentive spirometry, DVT proph w/heparin





* .








- Discharge Day


Encounter end time: 12:30





Review of Systems





- Review of Systems


Other: 





Unable to do ROS due to Intubation





- Medications/Allergies


Allergies/Adverse Reactions: 


 Allergies











Allergy/AdvReac Type Severity Reaction Status Date / Time


 


No Known Drug Allergies Allergy   Unverified 04/26/18 01:50











Medications: 


 Current Medications





Acetaminophen (Tylenol)  1,000 mg PER TUBE Q6H PRN


   PRN Reason: Headache/Fever or Mild Pain


Lipase/Protease/Amylase ([place] [address])  1 cap FS .PER PROTOCOL PRN


   PRN Reason: TUBE OCCLUSION PROTOCOL


Aspirin (Aspirin)  325 mg PER TUBE DAILY [facility]


   Last Admin: 05/05/18 12:36 Dose:  325 mg


Clonidine (Catapres)  0.1 mg PO Q4H PRN


   PRN Reason: Systolic BP > 180


Enoxaparin Sodium (Lovenox)  40 mg SC 0900 [facility]


   Last Admin: 05/05/18 12:35 Dose:  40 mg


Famotidine (Pepcid)  20 mg PER TUBE Q12HR [facility]


   Last Admin: 05/05/18 21:14 Dose:  20 mg


Furosemide (Lasix)  40 mg SLOW IVP 0600,1400 [facility]


   Last Admin: 05/06/18 06:03 Dose:  40 mg


Hydralazine HCl (Apresoline)  10 mg SLOW IVP Q4H PRN


   PRN Reason: Systolic BP > 180


Potassium Chloride 40 meq/ (Sodium Chloride)  270 mls @ 135 mls/hr IVPB ASDIR 

PRN


   PRN Reason: FOR SERUM K+ 2.5 - 3.5


   Last Admin: 04/28/18 06:18 Dose:  270 mls


Potassium Chloride 40 meq/ (Device)  100 mls @ 50 mls/hr IVPB ASDIR PRN


   PRN Reason: FOR SERUM K+ 2.5 - 3.5


Magnesium Sulfate 1 gm/ Sodium (Chloride)  102 mls @ 102 mls/hr IV PRN PRN


   PRN Reason: MAG LEVEL 1.4 - 2.0


Magnesium Sulfate 2 gm/ Device  100 mls @ 100 mls/hr IVPB ASDIR PRN


   PRN Reason: MAGNESIUM < 1.4


Potassium Phosphate 9 mmol/ (Sodium Chloride)  103 mls @ 25.75 mls/hr IVPB 

ASDIR PRN


   PRN Reason: Phosphate 1.0-1.8


Potassium Phosphate 12 mmol/ (Sodium Chloride)  254 mls @ 63.5 mls/hr IV ASDIR 

PRN


   PRN Reason: Serum phosphate 0.5-0.9


Potassium Phosphate 15 mmol/ (Sodium Chloride)  255 mls @ 63.75 mls/hr IV ASDIR 

PRN


   PRN Reason: Serum Phos < 0.5


Amiodarone HCl 450 mg/Miscellaneous Medication 1 each/ Dextrose/Water  259 mls 

@ 0 mls/hr IVPB INF KEE; As Directed


   PRN Reason: Protocol


   Last Admin: 05/06/18 04:56 Dose:  259 mls


Magnesium Oxide (Magnesium Oxide)  400 mg PO BIDPRN PRN


   PRN Reason: FOR SERUM MAG 1.4 - 2.0


Magnesium Oxide (Magnesium Oxide)  800 mg PO PRN PRN


   PRN Reason: FOR SERUM MAG < 1.4


Miscellaneous Medication (Phos-Nak)  1 pkt PO TIDPRN PRN


   PRN Reason: FOR PHOS LEVEL 1.0 - 1.8


Miscellaneous Medication (Phos-Nak)  2 pkt PO TIDPRN PRN


   PRN Reason: FOR PHOS LEVEL 0.5 - 1.0


Ondansetron HCl (Zofran Odt)  4 mg PO Q6H PRN


   PRN Reason: Nausea/Vomiting


Ondansetron HCl (Zofran)  4 mg IVP Q6H PRN


   PRN Reason: Nausea/Vomiting


Potassium Chloride (K-Dur)  40 meq PO ASDIR PRN


   PRN Reason: FOR SERUM K+  2.5 - 3.5


   Last Admin: 05/02/18 06:43 Dose:  40 meq


Potassium Chloride (Klor-Con)  40 meq PER TUBE ASDIR PRN


   PRN Reason: FOR SERUM K+ 2.5-3.5


   Last Admin: 05/06/18 06:14 Dose:  40 meq


Propofol (Diprivan)  1,000 mg IV INF PRN; Protocol


   PRN Reason: TO ACHIEVE GOAL RASS


   Stop: 05/26/18 04:00


   Last Admin: 05/03/18 03:06 Dose:  1,000 mg


Propofol (Diprivan Bolus)  20 mg IV Q5MIN PRN


   PRN Reason: BREAKTHROUGH AGITATION


   Stop: 05/26/18 04:00


Sodium Bicarbonate (Bicarbonate, Sodium)  650 mg PER TUBE .PER PROTOCOL PRN


   PRN Reason: ENTERAL TUBE OCCLUSION


Sodium Chloride (Flush - Normal Saline)  10 ml IVF Q12HR [facility]


   Last Admin: 05/05/18 21:14 Dose:  10 ml


Sodium Chloride (Flush - Normal Saline)  10 ml IVF PRN PRN


   PRN Reason: Saline Flush

## 2018-05-06 NOTE — CON
DATE OF CONSULTATION:  2018

 

REASON FOR CONSULTATION:  Thrombocytopenia.

 

HISTORY OF PRESENT ILLNESS:  Ms. Hedrick is a 62-year-old female, who was admitted with several diagno
ses including morbid obesity, pulmonary embolism, Pickwickian syndrome and acute on chronic diastolic
 congestive heart failure.  She now has had respiratory failure for several days and that my understa
nding as the plan is tracheostomy in the next 24 hours.  She is otherwise stable on the ventilator an
d is able to converse by pointing at pictures, although she cannot talk and she is very weak.  We are
 consulted because the patient has become increasingly thrombocytopenic on the hospitalization.  She 
denies any bleeding or blood loss.  She does have some ecchymoses, but otherwise has had a stable hem
oglobin without any evidence of bleeding.  On admission, her platelets were 194, several days later t
hey dropped to 81 and today are stable at 68,000.

 

PAST MEDICAL HISTORY:

1.  Morbid obesity.

2.  Pickwickian syndrome.

3.  Congestive heart failure.

4.  Chronic hypoxic failure, on home oxygen.

5.  Obstructive sleep apnea.

6.  Chronic renal failure.

 

CURRENT MEDICATIONS:

1.  Amiodarone drip.

2.  Tylenol p.r.n.

3.  Creon.

4.  Aspirin 325 mg p.o. daily.

5.  Clonidine 0.1 mg p.o. q.4 hours p.r.n.

6.  Lovenox 40 mg subcutaneously daily.

7.  Pepcid 20 mg per tube q.12 hours.

8.  Lasix 40 mg IV b.i.d.

9.  Hydralazine p.r.n.

10.  Magnesium oxide 400 mg p.o. b.i.d.

11.  Magnesium sulfate intravenously.

12.  Zofran 4 mg p.o. q.6 hours p.r.n.

13.  K-Dur 40 mEq p.o. daily.

14.  Propofol drip, although I think this has been turned down.

15.  Bicarbonate.

 

ALLERGIES:  No known drug allergies.

 

SOCIAL HISTORY:  She has family around and are quite supportive.  She has a history of smoking, but n
ot currently.

 

FAMILY HISTORY:  Noncontributory.

 

REVIEW OF SYSTEMS:  Otherwise, 10-point review of systems is unobtainable since she is on the ventila
tor.

 

PHYSICAL EXAMINATION:

VITAL SIGNS:  Pulse 120s to 130s, blood pressure 84 to 147 over 50s to 70s, O2 sat 92% on the ventila
tor.

GENERAL:  The patient is morbidly obese, in no acute distress on the ventilator.

HEENT:  Extraocular muscles appear to be intact.  There are no scleral hemorrhages.

NECK:  Supple without lymphadenopathy.

CARDIOVASCULAR:  Tachy, but regular rhythm currently.  She has had some nonsustained ventricular tach
ycardia, which is the reason for the amiodarone drip.

LUNGS:  Clear to auscultation anteriorly.

ABDOMEN:  Morbid obesity.  No ecchymoses or petechia.

EXTREMITIES:  Again, morbid obesity with some ecchymoses, but no petechia.

 

LABORATORY DATA: White blood cell count 3.7, hemoglobin 11.9, platelets 68,000.  Sodium 137, potassiu
m 3.0, chloride 95, CO2 of 37, BUN 18, creatinine 0.7, and calcium 9.4.

 

ASSESSMENT:  Ms. Hedrick is a 62-year-old female with multiple medical problems includin.  Acute on chronic respiratory failure.

2.  Congestive heart failure.

3.  Nonsustained ventricular tachycardia.

4.  Morbid obesity.

5.  Pickwickian syndrome.

6.  Chronic renal failure.

 

PLAN:

1.  I think the thrombocytopenia is multifactorial and is likely to be somewhat consumptive in nature
 given that she has been on the ventilator for several days.  It also could be contributed by multipl
e medications including Pepcid and amiodarone.  Ideally, these would be stopped, but I think they are
 necessary at this time and I would recommend just following the platelet count.  If the platelets co
ntinue to drop and get to be less than 40,000, I would recommend changing these medications if possib
le.

2.  I would recommend a daily CBC while she is on the above medications.

3.  If platelets get less than 40,000, I would recommend stopping the Lovenox and/or any other blood 
thinners.

4.  We will continue to follow preferably with you.

## 2018-05-06 NOTE — PDOC.PN
- Subjective


Encounter Start Date: 05/05/18


Encounter Start Time: 12:00





Mahesh is seen today, alert and oriented, remains intubated off of sedation on 

Mechanical ventilation, discussed with Two sons who came from [place], Explaiend 

about the need for tracheaostomy. had Questions abou the Tracheostomy and care 

with tracheostomy tube, advised to discuss with Pulmonary regarding that, they 

will have family meeting with Dr. Tipton today.





- Objective


Resuscitation Status: 


 











Resuscitation Status           FULL:Full Resuscitation














Vital Signs & Weight: 


 Vital Signs (12 hours)











  Temp Pulse Resp Pulse Ox


 


 05/06/18 06:00    20 


 


 05/06/18 05:00  99.8 F H   


 


 05/06/18 04:00    20 


 


 05/06/18 03:01   98  


 


 05/06/18 02:00    20 


 


 05/06/18 00:00     93 L


 


 05/05/18 23:48    19 


 


 05/05/18 23:44  100.1 F H   


 


 05/05/18 22:00    16 


 


 05/05/18 21:56   94  


 


 05/05/18 20:00  98.3 F  87  16  91 L








 Weight











Admit Weight                   600 lb


 


Weight                         450 lb 6.47 oz











 Most Recent Monitor Data











Heart Rate from ECG            134


 


NIBP                           100/60


 


NIBP BP-Mean                   70


 


Respiration from ECG           23


 


SpO2                           90














I&O: 


 











 05/05/18 05/06/18 05/07/18





 06:59 06:59 06:59


 


Intake Total 1377 1038.4 


 


Output Total 2960 2180 


 


Balance -1583 -1141.6 











Result Diagrams: 


 05/06/18 04:48





 05/06/18 04:48


Radiology Reviewed by me: Yes





Phys Exam





- Physical Examination


HEENT: PERRLA, moist MMs


Neck: no nodes, no JVD


Respiratory: no wheezing, no rales


Cardiovascular: RRR, no significant murmur


Gastrointestinal: soft, non-tender


Musculoskeletal: no edema, pulses present


Neurological: non-focal, normal sensation


Lymphatic: no nodes


Psychiatric: normal affect, A&O x 3


Skin: no rash, normal turgor





Dx/Plan


(1) Acute idiopathic thrombocytopenic purpura


Code(s): D69.3 - IMMUNE THROMBOCYTOPENIC PURPURA   Status: Acute   Comment: 

Paient has Drop in Plaelets >50% since admisison,remained stbale Now. HIT was 

negative.Stbale at 73 now.   





(2) Acute and chronic respiratory failure


Code(s): J96.20 - ACUTE AND CHR RESP FAILURE, UNSP W HYPOXIA OR HYPERCAPNIA   

Status: Acute   


Qualifiers: 


   Respiratory failure complication: hypercapnia   Qualified Code(s): J96.22 - 

Acute and chronic respiratory failure with hypercapnia   


Comment:  Will likely need tracheostomy per pulmonary if needed long term 

ventilation. High risk for pneumonia.Discussed with Family. family meeting with 

Dr. Tipton, Pt and Family ddi agree for tracheostomy but they have still some 

specific questions about the care of tracheostomy as they want to decide on 

what care her mother would be needed.   





(3) Acute on chronic diastolic (congestive) heart failure


Code(s): I50.33 - ACUTE ON CHRONIC DIASTOLIC (CONGESTIVE) HEART FAILURE   Status

: Acute   Comment: continue IV lasix   





(4) Hypokalemia


Code(s): E87.6 - HYPOKALEMIA   Status: Acute   Comment: continue electrolyte 

replacement protocol   





(5) Morbid obesity with BMI of 70 and over, adult


Code(s): E66.01 - MORBID (SEVERE) OBESITY DUE TO EXCESS CALORIES; Z68.45 - BODY 

MASS INDEX (BMI) 70 OR GREATER, ADULT   Status: Chronic   





(6) Pickwickian syndrome


Code(s): E66.2 - MORBID (SEVERE) OBESITY WITH ALVEOLAR HYPOVENTILATION   Status

: Chronic   





(7) Pulmonary embolism


Code(s): I26.99 - OTHER PULMONARY EMBOLISM WITHOUT ACUTE COR PULMONALE   Status

: Suspected   Comment: continue heparin drip per DVT/PE protocol   





- Plan


cont current plan of care, PT/OT, , incentive spirometry, DVT 

proph w/lovenox





* .








- Discharge Day


Encounter end time: 12:35





Review of Systems





- Review of Systems


Other: 


Unable to ge ROS as p is intubated.





- Medications/Allergies


Allergies/Adverse Reactions: 


 Allergies











Allergy/AdvReac Type Severity Reaction Status Date / Time


 


No Known Drug Allergies Allergy   Unverified 04/26/18 01:50











Medications: 


 Current Medications





Acetaminophen (Tylenol)  1,000 mg PER TUBE Q6H PRN


   PRN Reason: Headache/Fever or Mild Pain


Lipase/Protease/Amylase ([place] [address])  1 cap FS .PER PROTOCOL PRN


   PRN Reason: TUBE OCCLUSION PROTOCOL


Aspirin (Aspirin)  325 mg PER TUBE DAILY KEE


   Last Admin: 05/05/18 12:36 Dose:  325 mg


Clonidine (Catapres)  0.1 mg PO Q4H PRN


   PRN Reason: Systolic BP > 180


Enoxaparin Sodium (Lovenox)  40 mg SC 0900 K[facility]


   Last Admin: 05/05/18 12:35 Dose:  40 mg


Famotidine (Pepcid)  20 mg PER TUBE Q12HR [facility]


   Last Admin: 05/05/18 21:14 Dose:  20 mg


Furosemide (Lasix)  40 mg SLOW IVP 0600,1400 [facility]


   Last Admin: 05/06/18 06:03 Dose:  40 mg


Hydralazine HCl (Apresoline)  10 mg SLOW IVP Q4H PRN


   PRN Reason: Systolic BP > 180


Potassium Chloride 40 meq/ (Sodium Chloride)  270 mls @ 135 mls/hr IVPB ASDIR 

PRN


   PRN Reason: FOR SERUM K+ 2.5 - 3.5


   Last Admin: 04/28/18 06:18 Dose:  270 mls


Potassium Chloride 40 meq/ (Device)  100 mls @ 50 mls/hr IVPB ASDIR PRN


   PRN Reason: FOR SERUM K+ 2.5 - 3.5


Magnesium Sulfate 1 gm/ Sodium (Chloride)  102 mls @ 102 mls/hr IV PRN PRN


   PRN Reason: MAG LEVEL 1.4 - 2.0


Magnesium Sulfate 2 gm/ Device  100 mls @ 100 mls/hr IVPB ASDIR PRN


   PRN Reason: MAGNESIUM < 1.4


Potassium Phosphate 9 mmol/ (Sodium Chloride)  103 mls @ 25.75 mls/hr IVPB 

ASDIR PRN


   PRN Reason: Phosphate 1.0-1.8


Potassium Phosphate 12 mmol/ (Sodium Chloride)  254 mls @ 63.5 mls/hr IV ASDIR 

PRN


   PRN Reason: Serum phosphate 0.5-0.9


Potassium Phosphate 15 mmol/ (Sodium Chloride)  255 mls @ 63.75 mls/hr IV ASDIR 

PRN


   PRN Reason: Serum Phos < 0.5


Amiodarone HCl 450 mg/Miscellaneous Medication 1 each/ Dextrose/Water  259 mls 

@ 0 mls/hr IVPB INF KEE; As Directed


   PRN Reason: Protocol


   Last Admin: 05/06/18 04:56 Dose:  259 mls


Magnesium Oxide (Magnesium Oxide)  400 mg PO BIDPRN PRN


   PRN Reason: FOR SERUM MAG 1.4 - 2.0


Magnesium Oxide (Magnesium Oxide)  800 mg PO PRN PRN


   PRN Reason: FOR SERUM MAG < 1.4


Miscellaneous Medication (Phos-Nak)  1 pkt PO TIDPRN PRN


   PRN Reason: FOR PHOS LEVEL 1.0 - 1.8


Miscellaneous Medication (Phos-Nak)  2 pkt PO TIDPRN PRN


   PRN Reason: FOR PHOS LEVEL 0.5 - 1.0


Ondansetron HCl (Zofran Odt)  4 mg PO Q6H PRN


   PRN Reason: Nausea/Vomiting


Ondansetron HCl (Zofran)  4 mg IVP Q6H PRN


   PRN Reason: Nausea/Vomiting


Potassium Chloride (K-Dur)  40 meq PO ASDIR PRN


   PRN Reason: FOR SERUM K+  2.5 - 3.5


   Last Admin: 05/02/18 06:43 Dose:  40 meq


Potassium Chloride (Klor-Con)  40 meq PER TUBE ASDIR PRN


   PRN Reason: FOR SERUM K+ 2.5-3.5


   Last Admin: 05/06/18 06:14 Dose:  40 meq


Propofol (Diprivan)  1,000 mg IV INF PRN; Protocol


   PRN Reason: TO ACHIEVE GOAL RASS


   Stop: 05/26/18 04:00


   Last Admin: 05/03/18 03:06 Dose:  1,000 mg


Propofol (Diprivan Bolus)  20 mg IV Q5MIN PRN


   PRN Reason: BREAKTHROUGH AGITATION


   Stop: 05/26/18 04:00


Sodium Bicarbonate (Bicarbonate, Sodium)  650 mg PER TUBE .PER PROTOCOL PRN


   PRN Reason: ENTERAL TUBE OCCLUSION


Sodium Chloride (Flush - Normal Saline)  10 ml IVF Q12HR [facility]


   Last Admin: 05/05/18 21:14 Dose:  10 ml


Sodium Chloride (Flush - Normal Saline)  10 ml IVF PRN PRN


   PRN Reason: Saline Flush

## 2018-05-06 NOTE — PDOC.CTH
Cardiology Progress Note





- Subjective


She went into Afib overnight, well controlled on amio drip. She is awake and 

following commands. 





- Objective


 Vital Signs











  Temp Pulse Resp BP Pulse Ox


 


 05/06/18 11:38   95   119/46 L 


 


 05/06/18 10:00    20  


 


 05/06/18 08:26   136 H   147/56 H 


 


 05/06/18 08:00  99.3 F   23 H  


 


 05/06/18 06:00    20  


 


 05/06/18 05:00  99.8 F H    


 


 05/06/18 04:00    20  


 


 05/06/18 03:01   98   


 


 05/06/18 02:00    20  


 


 05/06/18 00:00      93 L


 


 05/05/18 23:48    19  


 


 05/05/18 23:44  100.1 F H    








 











Admit Weight                   600 lb


 


Weight                         450 lb 6.47 oz














 











 05/05/18 05/06/18 05/07/18





 06:59 06:59 06:59


 


Intake Total 1377 1038.4 40


 


Output Total 2960 2180 180


 


Balance -1583 -1141.6 -140














- Physical Examination


General/Neuro: NAD


Neck: no JVD present


Lungs: unlabored respirations


Heart: other: (Irreg HR 90's. )


Abdomen: NT/ND


Extremities: + edema B (1+)





- Telemetry


Telemetry Rhythm: Afib.





- Labs


Result Diagrams: 


 05/06/18 04:48





 05/06/18 04:48


 Troponin/CKMB











CK-MB (CK-2)  2.9 ng/mL (0-6.6)   04/26/18  01:05    


 


Troponin I  0.180 ng/mL (< 0.028)  H  04/26/18  06:57    














- Assessment/Plan





1. Acute hypoxic hypercapnic respiratory insufficiency


2. Acute on chronic RV dysfunction.


3. Acute on chronic diastolic heart failure


4. Morbid obesity, 


5. Pickwickian syndrome


6. Hypokalemia


7. Afib RVR, rate controlled, now. 








PLAN:


- Continue IV lasix. 


- Continue heparin gtt.


- Continue amiodarone drip, likely afib due to hypokalemia. 


- Replace K aggressively. 


- Continue supportive care.

## 2018-05-07 LAB
ANALYZER IN CARDIO: (no result)
ANION GAP SERPL CALC-SCNC: 11 MMOL/L (ref 10–20)
BASE EXCESS STD BLDA CALC-SCNC: 11.9 MEQ/L
BUN SERPL-MCNC: 24 MG/DL (ref 9.8–20.1)
CA-I BLDA-SCNC: 1.2 MMOL/L (ref 1.12–1.3)
CALCIUM SERPL-MCNC: 9.4 MG/DL (ref 7.8–10.44)
CHLORIDE SERPL-SCNC: 95 MMOL/L (ref 98–107)
CO2 SERPL-SCNC: 36 MMOL/L (ref 23–31)
CREAT CL PREDICTED SERPL C-G-VRATE: 183 ML/MIN (ref 70–130)
GLUCOSE SERPL-MCNC: 102 MG/DL (ref 80–115)
HCO3 BLDA-SCNC: 37.9 MEQ/L (ref 22–26)
HCT VFR BLDA CALC: 40 % (ref 36–47)
HGB BLD-MCNC: 11 G/DL (ref 12–16)
HGB BLDA-MCNC: 10.9 G/DL (ref 12–16)
MCH RBC QN AUTO: 23.7 PG (ref 27–31)
MCV RBC AUTO: 81.1 FL (ref 81–99)
MDIFF COMPLETE?: YES
O2 A-A PPRESDIFF RESPIRATORY: 130.93 MM[HG] (ref 0–20)
PCO2 BLDA: 57.1 MMHG (ref 35–45)
PH BLDA: 7.44 [PH] (ref 7.35–7.45)
PLATELET # BLD AUTO: 81 THOU/UL (ref 130–400)
PLATELET BLD QL SMEAR: (no result)
PO2 BLDA: 80.9 MMHG (ref 80–100)
POTASSIUM SERPL-SCNC: 3.3 MMOL/L (ref 3.5–5.1)
RBC # BLD AUTO: 4.64 MILL/UL (ref 4.2–5.4)
SODIUM SERPL-SCNC: 139 MMOL/L (ref 136–145)
SPECIMEN DRAWN FROM PATIENT: (no result)
WBC # BLD AUTO: 4.1 THOU/UL (ref 4.8–10.8)

## 2018-05-07 PROCEDURE — 0DH63UZ INSERTION OF FEEDING DEVICE INTO STOMACH, PERCUTANEOUS APPROACH: ICD-10-PCS | Performed by: SURGERY

## 2018-05-07 PROCEDURE — 3E0G76Z INTRODUCTION OF NUTRITIONAL SUBSTANCE INTO UPPER GI, VIA NATURAL OR ARTIFICIAL OPENING: ICD-10-PCS | Performed by: INTERNAL MEDICINE

## 2018-05-07 PROCEDURE — 0B113F4 BYPASS TRACHEA TO CUTANEOUS WITH TRACHEOSTOMY DEVICE, PERCUTANEOUS APPROACH: ICD-10-PCS | Performed by: SURGERY

## 2018-05-07 RX ADMIN — Medication SCH ML: at 09:00

## 2018-05-07 RX ADMIN — POTASSIUM CHLORIDE SCH MLS: 14.9 INJECTION, SOLUTION INTRAVENOUS at 21:38

## 2018-05-07 RX ADMIN — Medication SCH ML: at 22:25

## 2018-05-07 RX ADMIN — POTASSIUM CHLORIDE SCH MLS: 14.9 INJECTION, SOLUTION INTRAVENOUS at 17:05

## 2018-05-07 NOTE — PDOC.PN
- Subjective


Encounter Start Date: 05/07/18


Encounter Start Time: 10:00


Mahesh is seen today, Alert and oriented. She is aslked to sign the Consent 

for her Tracheostomy and Peg Tube, pt was able to grasp the pen and sign. 





- Objective


Resuscitation Status: 


 











Resuscitation Status           FULL:Full Resuscitation














MAR Reviewed: Yes


Vital Signs & Weight: 


 Vital Signs (12 hours)











  Temp Pulse Resp BP


 


 05/07/18 14:00    15 


 


 05/07/18 12:00  98.8 F   20 


 


 05/07/18 11:25   88   195/88 H


 


 05/07/18 10:00    18 


 


 05/07/18 08:27   82   109/60


 


 05/07/18 08:00  98.8 F   17 


 


 05/07/18 06:00    18 








 Weight











Admit Weight                   600 lb


 


Weight                         450 lb 6.47 oz











 Most Recent Monitor Data











Heart Rate from ECG            95


 


NIBP                           156/78


 


NIBP BP-Mean                   89


 


Respiration from ECG           19


 


SpO2                           94














I&O: 


 











 05/06/18 05/07/18 05/08/18





 06:59 06:59 06:59


 


Intake Total 1038.4 1224 10


 


Output Total 2180 1180 410


 


Balance -1141.6 44 -400











Result Diagrams: 


 05/07/18 05:55





 05/07/18 05:55


Radiology Reviewed by me: Yes





Phys Exam





- Physical Examination


HEENT: PERRLA, moist MMs


Neck: no nodes, no JVD


Respiratory: no wheezing, no rales


Cardiovascular: no significant murmur, irregular


Gastrointestinal: soft, non-tender


Musculoskeletal: pulses present, edema present


Neurological: non-focal, normal sensation





Dx/Plan


(1) Acute idiopathic thrombocytopenic purpura


Code(s): D69.3 - IMMUNE THROMBOCYTOPENIC PURPURA   Status: Acute   Comment: 

Jong has Drop in Plaelets >50% since admisison,remained stbale Now. HIT was 

negative.Stbale at 72 now. COnsulted Oncology for Thrombocytopenia.Improving 

now to 80, Oncology felt it could be from Famotidine and Amiodarone, if 

dropping then will need to change Amiodarone, Will change Famotdine to protonix 

now.   





(2) Acute and chronic respiratory failure


Code(s): J96.20 - ACUTE AND CHR RESP FAILURE, UNSP W HYPOXIA OR HYPERCAPNIA   

Status: Acute   


Qualifiers: 


   Respiratory failure complication: hypercapnia   Qualified Code(s): J96.22 - 

Acute and chronic respiratory failure with hypercapnia   


Comment: Planned for Tracheostomy today. Will continue on Vent per pulmonary.. 

  





(3) Acute on chronic diastolic (congestive) heart failure


Code(s): I50.33 - ACUTE ON CHRONIC DIASTOLIC (CONGESTIVE) HEART FAILURE   Status

: Acute   Comment: continue IV lasix   





(4) Hypokalemia


Code(s): E87.6 - HYPOKALEMIA   Status: Acute   Comment: continue electrolyte 

replacement protocol   





(5) Morbid obesity with BMI of 70 and over, adult


Code(s): E66.01 - MORBID (SEVERE) OBESITY DUE TO EXCESS CALORIES; Z68.45 - BODY 

MASS INDEX (BMI) 70 OR GREATER, ADULT   Status: Chronic   





(6) Pickwickian syndrome


Code(s): E66.2 - MORBID (SEVERE) OBESITY WITH ALVEOLAR HYPOVENTILATION   Status

: Chronic   





(7) Pulmonary embolism


Code(s): I26.99 - OTHER PULMONARY EMBOLISM WITHOUT ACUTE COR PULMONALE   Status

: Suspected   Comment: continue heparin drip per DVT/PE protocol   





- Plan


cont current plan of care, continue antibiotics, respiratory therapy, DVT proph 

w/lovenox





* .








- Discharge Day


Encounter end time: 10:35





Review of Systems





- Review of Systems


Cardiovascular: negative: chest pain, palpitations, orthopnea, paroxysmal 

nocturnal dyspnea, edema, light headedness, other


Gastrointestinal: negative: Nausea, Vomiting, Abdominal Pain, Diarrhea, 

Constipation, Melena, Hematochezia, Other


Other: 





Pt is non verbal from intubation,





- Medications/Allergies


Allergies/Adverse Reactions: 


 Allergies











Allergy/AdvReac Type Severity Reaction Status Date / Time


 


No Known Drug Allergies Allergy   Verified 05/06/18 14:16











Medications: 


 Current Medications





Acetaminophen (Tylenol Elixir)  1,000 mg PER TUBE Q6H PRN


   PRN Reason: Headache/Fever or Pain


Albuterol/Ipratropium (Duoneb)  3 ml NEB [id number] [facility]


   Last Admin: 05/07/18 13:31 Dose:  3 ml


Amiodarone HCl (Cordarone)  400 mg PO BID [facility]


   Last Admin: 05/07/18 10:20 Dose:  400 mg


Lipase/Protease/Amylase ([place] [address])  1 cap FS .PER PROTOCOL PRN


   PRN Reason: TUBE OCCLUSION PROTOCOL


Aspirin (Aspirin)  325 mg PER TUBE DAILY [facility]


   Last Admin: 05/07/18 10:20 Dose:  325 mg


Cefazolin Sodium (Ancef)  2 gm SLOW IVP WILLCALL [facility]


   Stop: 05/07/18 18:00


Clonidine (Catapres)  0.1 mg PO Q4H PRN


   PRN Reason: Systolic BP > 180


Enoxaparin Sodium (Lovenox)  40 mg SC 0900 [facility]


   Last Admin: 05/07/18 10:20 Dose:  40 mg


Furosemide (Lasix)  40 mg SLOW IVP 0600,1400 [facility]


   Last Admin: 05/07/18 14:54 Dose:  40 mg


Hydralazine HCl (Apresoline)  10 mg SLOW IVP Q4H PRN


   PRN Reason: Systolic BP > 180


Potassium Chloride 40 meq/ (Sodium Chloride)  270 mls @ 135 mls/hr IVPB ASDIR 

PRN


   PRN Reason: FOR SERUM K+ 2.5 - 3.5


   Last Admin: 04/28/18 06:18 Dose:  270 mls


Potassium Chloride 40 meq/ (Device)  100 mls @ 50 mls/hr IVPB ASDIR PRN


   PRN Reason: FOR SERUM K+ 2.5 - 3.5


Magnesium Sulfate 1 gm/ Sodium (Chloride)  102 mls @ 102 mls/hr IV PRN PRN


   PRN Reason: MAG LEVEL 1.4 - 2.0


Magnesium Sulfate 2 gm/ Device  100 mls @ 100 mls/hr IVPB ASDIR PRN


   PRN Reason: MAGNESIUM < 1.4


Potassium Phosphate 9 mmol/ (Sodium Chloride)  103 mls @ 25.75 mls/hr IVPB 

ASDIR PRN


   PRN Reason: Phosphate 1.0-1.8


Potassium Phosphate 12 mmol/ (Sodium Chloride)  254 mls @ 63.5 mls/hr IV ASDIR 

PRN


   PRN Reason: Serum phosphate 0.5-0.9


Potassium Phosphate 15 mmol/ (Sodium Chloride)  255 mls @ 63.75 mls/hr IV ASDIR 

PRN


   PRN Reason: Serum Phos < 0.5


Potassium Chloride 20 meq/ (Device)  100 mls @ 50 mls/hr IVPB Q2H [facility]


   Stop: 05/07/18 18:59


Magnesium Oxide (Magnesium Oxide)  400 mg PO BIDPRN PRN


   PRN Reason: FOR SERUM MAG 1.4 - 2.0


Magnesium Oxide (Magnesium Oxide)  800 mg PO PRN PRN


   PRN Reason: FOR SERUM MAG < 1.4


Miscellaneous Medication (Phos-Nak)  1 pkt PO TIDPRN PRN


   PRN Reason: FOR PHOS LEVEL 1.0 - 1.8


Miscellaneous Medication (Phos-Nak)  2 pkt PO TIDPRN PRN


   PRN Reason: FOR PHOS LEVEL 0.5 - 1.0


Morphine Sulfate (Morphine)  4 mg SLOW IVP Q30MIN PRN


   PRN Reason: Pain


Ondansetron HCl (Zofran Odt)  4 mg PO Q6H PRN


   PRN Reason: Nausea/Vomiting


Ondansetron HCl (Zofran)  4 mg IVP Q6H PRN


   PRN Reason: Nausea/Vomiting


Pantoprazole Sodium (Protonix)  40 mg PER TUBE DAILY KEE


Potassium Chloride (K-Dur)  40 meq PO ASDIR PRN


   PRN Reason: FOR SERUM K+  2.5 - 3.5


   Last Admin: 05/02/18 06:43 Dose:  40 meq


Potassium Chloride (Klor-Con)  40 meq PER TUBE ASDIR PRN


   PRN Reason: FOR SERUM K+ 2.5-3.5


   Last Admin: 05/07/18 06:22 Dose:  40 meq


Propofol (Diprivan)  1,000 mg IV INF PRN; Protocol


   PRN Reason: TO ACHIEVE GOAL RASS


   Stop: 05/26/18 04:00


   Last Admin: 05/03/18 03:06 Dose:  1,000 mg


Propofol (Diprivan Bolus)  20 mg IV Q5MIN PRN


   PRN Reason: BREAKTHROUGH AGITATION


   Stop: 05/26/18 04:00


Sodium Bicarbonate (Bicarbonate, Sodium)  650 mg PER TUBE .PER PROTOCOL PRN


   PRN Reason: ENTERAL TUBE OCCLUSION


Sodium Chloride (Flush - Normal Saline)  10 ml IVF Q12HR [facility]


   Last Admin: 05/07/18 09:00 Dose:  10 ml


Sodium Chloride (Flush - Normal Saline)  10 ml IVF PRN PRN


   PRN Reason: Saline Flush

## 2018-05-07 NOTE — OP
DATE OF OPERATION:  05/07/2018

 

PREOPERATIVE DIAGNOSES:

1.  Acute pulmonary failure.

2.  Morbid obesity.

3.  Acute on chronic congestive heart failure.

 

POSTOPERATIVE DIAGNOSES:

1.  Acute pulmonary failure.

2.  Morbid obesity.

3.  Acute on chronic congestive heart failure.

 

PROCEDURES PERFORMED:  Percutaneous tracheostomy tube placement.

 

SURGEON:  Reza Arana D.O.

 

INDICATIONS FOR PROCEDURE:  This is a 62-year-old morbidly obese woman with a BMI 77, who is in pulmo
nary failure.  I was asked to place a percutaneous tracheostomy tube to facilitate ventilatory wean o
n this patient, who has been on mechanical ventilator support for almost 2 weeks now.

 

DESCRIPTION OF PROCEDURE:  Informed consent obtained from the patient, who was placed in supine posit
ion.  The patient was given aliquots of midazolam and fentanyl to achieve deep sedation.  This was fo
llowed by 10 mg of vecuronium.  The patient was placed on full mechanical ventilator support, FiO2 se
t at 100%.  Fiberoptic bronchoscope was introduced through the previous endotracheal tube to visualiz
e the rip.  The scope was then withdrawn noting the tip of the endotracheal tube, which resides ap
proximately 2 cm above the rip.  The endotracheal tube was withdrawn to 4 cm above the rip.  Fo
llowing this, the anterior neck was then sterilely prepped and draped in usual fashion.  The skin 2 f
ingerbreadths above the suprasternal notch was anesthetized with 1% lidocaine with epinephrine.  A 1 
cm vertical incision was made using 15 scalpel.  An introducer needle was inserted through this incis
ion and advanced through the anterior tracheal wall.  Through this needle, a guidewire was passed int
o the distal tracheal lumen.  The placement of the guidewire is confirmed by bronchoscopy.  Needle wa
s withdrawn over the guidewire.  Anterior tracheal wall was then sterilely dilated over the guidewire
.  Finally, size #8 extra-long tracheostomy tube was advanced over the guidewire using dilator a styl
et.  The dilator and a plastic stylet as well as the guidewire were withdrawn as a unit leaving the t
racheostomy tube in place.  Inner cannula was then inserted.  The patient is connected mechanical dionisio
tilator support via the newly placed tracheostomy tube.  The tracheostomy tube is secured to anterior
 neck using old silk suture at 2 points.  Sterile dressings were applied.  Bronchoscope was withdrawn
 with the previous endotracheal tube as a unit, visualizing this tracheostomy site from above with go
od hemostasis.  Once the endotracheal tube was removed, the bronchoscope was reintroduced through the
 newly placed tracheostomy tube and advanced to visualize the rip.  The scope was advanced first t
o the left main stem and then into the right main stem bronchi.  No active bleeding is noted as the b
ronchoscope was withdrawn, visualizing and intact tracheobronchial mucosa.  The patient tolerated thi
s operation without any apparent complication and remains hemodynamically stable following completion
 of the procedure.

## 2018-05-07 NOTE — OP
DATE OF OPERATION:  05/07/2018

 

PREOPERATIVE DIAGNOSES:

1.  Acute on chronic congestive heart failure.

2.  Acute respiratory failure.

 

POSTOPERATIVE DIAGNOSES:

1.  Acute on chronic congestive heart failure.

2.  Acute respiratory failure.

 

PROCEDURES PERFORMED:  Percutaneous endoscopic gastrostomy tube placement.

 

SURGEON:  Reza Arana D.O.

 

ANESTHESIA:  Deep sedation and local.

 

INDICATIONS FOR PROCEDURE:  A 62-year-old morbidly obese woman, BMI 77, admitted with acute on chroni
c congestive heart failure as well as acute respiratory failure.

 

The patient has been on mechanical ventilator support now for almost 2 weeks.

 

I was asked to place the percutaneous endoscopic gastrostomy tube for potential prolonged enteral nut
ritional supplementation.

 

DESCRIPTION OF PROCEDURE:  Informed consent obtained from the patient.  She was placed in the supine 
position.  Following adequate sedation, the left upper abdominal region was widely sterilely prepped 
and draped in the usual fashion.  A fibrotic endoscope was introduced per oral after a mouthguard was
 put in place.  The tube was advanced intubating the esophagus.  With gentle insufflation, the gastri
c lumen was entered.  The scope was advanced into the proximal duodenum finding no evidence of ulcers
 or any lesions.  The scope was then withdrawn into the gastric lumen with retroflexion.  I did not s
ee any evidence of hiatal hernia.  No gastritis present.  The left upper quadrant abdominal wall was 
transilluminated and area chosen for placement of the PEG tube.  The skin is anesthetized with 1% lid
ocaine.  A stab incision is made using an 11 scalpel.  Introducer needle was inserted through this in
cision and advanced into the gastric lumen, visualized by endoscopy.  A guidewire was then advanced t
hrough this needle and placed in the gastric lumen, captured with an endosnare.  Guidewire was pulled
 out with the endoscope per oral.  The needle and the introducer catheter sheath were removed as an u
nit.  The distal end of the guidewire was then connected to a 20-[place] gastrostomy tube.  The other 
end of the guidewire was pulled out through the skin incision leaving the mushroom end of the gastros
toni tube visualized within the gastric lumen as this abuts the gastric wall.  The gastrostomy tube w
as fashioned to length and bolstered to the skin at 9 cm.  Sterile dressing was applied.  The endosco
pe was then used to visualize the final resting place of the gastrostomy tube within the gastric lume
n.  The stomach was desufflated.  The endoscope was withdrawn, visualizing intact esophageal mucosa.

 

The patient tolerated the operation without any apparent complication and remains hemodynamically sta
ble following completion of the procedure.

## 2018-05-07 NOTE — PDOC.CTH
Cardiology Progress Note





- Subjective





No new issues. She remains awake and following commands. 





- Objective


 Vital Signs











  Temp Pulse Resp BP


 


 05/07/18 11:25   88   195/88 H


 


 05/07/18 10:00    18 


 


 05/07/18 08:27   82   109/60


 


 05/07/18 08:00  98.8 F   17 


 


 05/07/18 06:00    18 


 


 05/07/18 04:00    21 H 


 


 05/07/18 03:00  98.6 F   


 


 05/07/18 02:54   82  








 











Admit Weight                   600 lb


 


Weight                         450 lb 6.47 oz














 











 05/06/18 05/07/18 05/08/18





 06:59 06:59 06:59


 


Intake Total 1038.4 1224 


 


Output Total 2180 1180 235


 


Balance -1141.6 44 -235














- Physical Examination


General/Neuro: NAD


Neck: no JVD present


Lungs: CTA, unlabored respirations


Heart: RRR


Abdomen: NT/ND


Extremities: + edema B (2+)





- Telemetry


Telemetry Rhythm: NSR, PAC's





- Labs


Result Diagrams: 


 05/07/18 05:55





 05/07/18 05:55


 Troponin/CKMB











CK-MB (CK-2)  2.9 ng/mL (0-6.6)   04/26/18  01:05    


 


Troponin I  0.180 ng/mL (< 0.028)  H  04/26/18  06:57    














- Assessment/Plan





1. Acute hypoxic hypercapnic respiratory insufficiency


2. Acute on chronic RV dysfunction.


3. Acute on chronic diastolic heart failure


4. Morbid obesity, 


5. Pickwickian syndrome


6. Hypokalemia


7. Afib RVR, rate controlled, now. 








PLAN:


- Continue IV lasix. 


- Continue heparin gtt.


- Continue amiodarone drip, likely afib due to hypokalemia. 


- Replace K aggressively. One extra dose given today. 


- Agree with Trach and PEG.

## 2018-05-07 NOTE — PRG
DATE OF SERVICE:  05/07/2018

 

The patient is awake, alert, is able to nod and shake her head.

 

PHYSICAL EXAMINATION:  

VITAL SIGNS:  Temperature 98.6, pulse 85, blood pressure 133/57, 24 hour intake 1224, output 1180, we
ight last measured 450 pounds.

HEENT:  Unremarkable.

NECK:  No adenopathy or JVD.

LUNGS:  Fairly clear anteriorly.

CARDIOVASCULAR:  S1 and S2 regular.

ABDOMEN:  Obese, soft.

EXTREMITIES:  Less edema.

 

LABORATORY DATA:  Sodium 139, potassium 3.3, chloride 95, CO2 36, BUN 24, creatinine 1.0, glucose 102
.  ABG; pH 7.44, pCO2 57, pO2 80 on SIMV rate 15, tidal volume 390, PEEP 5, pressure support 25, FiO2
 40%.  White blood cell count 4.1, hematocrit 37.6, platelet count of 281.

 

ASSESSMENT:

1.  Morbid obesity.  

2.  Obesity hypoventilation syndrome.

3.  Acute on chronic respiratory failure.

4.  Improving thrombocytopenia.

5.  Question of pulmonary embolus based on elevated PA pressures.  I think it is more likely due to s
leep apnea than anything else.

 

PLAN:

1.  Continue Lovenox.  

2.  General Surgery has been consulted for tracheostomy and perhaps feeding tube.

3.  Atrial fibrillation management per Cardiology.

## 2018-05-08 LAB
ANALYZER IN CARDIO: (no result)
ANION GAP SERPL CALC-SCNC: 13 MMOL/L (ref 10–20)
BASE EXCESS STD BLDA CALC-SCNC: 12.3 MEQ/L
BUN SERPL-MCNC: 25 MG/DL (ref 9.8–20.1)
CALCIUM SERPL-MCNC: 9.5 MG/DL (ref 7.8–10.44)
CHLORIDE SERPL-SCNC: 95 MMOL/L (ref 98–107)
CO2 SERPL-SCNC: 36 MMOL/L (ref 23–31)
CREAT CL PREDICTED SERPL C-G-VRATE: 183 ML/MIN (ref 70–130)
GLUCOSE SERPL-MCNC: 88 MG/DL (ref 80–115)
HCO3 BLDA-SCNC: 38.9 MEQ/L (ref 22–26)
HCT VFR BLDA CALC: 41.6 % (ref 36–47)
HGB BLD-MCNC: 11.7 G/DL (ref 12–16)
HGB BLDA-MCNC: 11.4 G/DL (ref 12–16)
MCH RBC QN AUTO: 24.7 PG (ref 27–31)
MCV RBC AUTO: 81.6 FL (ref 81–99)
MDIFF COMPLETE?: YES
O2 A-A PPRESDIFF RESPIRATORY: 135.12 MM[HG] (ref 0–20)
PCO2 BLDA: 61.5 MMHG (ref 35–45)
PH BLDA: 7.42 [PH] (ref 7.35–7.45)
PLATELET # BLD AUTO: 87 THOU/UL (ref 130–400)
PLATELET BLD QL SMEAR: (no result)
PO2 BLDA: 71.2 MMHG (ref 80–100)
POTASSIUM SERPL-SCNC: 4.1 MMOL/L (ref 3.5–5.1)
RBC # BLD AUTO: 4.76 MILL/UL (ref 4.2–5.4)
SODIUM SERPL-SCNC: 140 MMOL/L (ref 136–145)
SPECIMEN DRAWN FROM PATIENT: (no result)
WBC # BLD AUTO: 4.8 THOU/UL (ref 4.8–10.8)

## 2018-05-08 RX ADMIN — Medication SCH ML: at 21:38

## 2018-05-08 RX ADMIN — Medication SCH ML: at 10:24

## 2018-05-08 RX ADMIN — PANTOPRAZOLE SODIUM SCH MG: 40 GRANULE, DELAYED RELEASE ORAL at 10:24

## 2018-05-08 NOTE — PDOC.CTH
Cardiology Progress Note





- Subjective





No new issues. She had a PEG and trach yesterday without issues. 





- Objective


 Vital Signs











  Temp Pulse Resp BP Pulse Ox


 


 05/08/18 15:25   94   149/71 H 


 


 05/08/18 14:00    15  


 


 05/08/18 13:51   90   128/57 L 


 


 05/08/18 12:00  98.5 F   16  


 


 05/08/18 11:48   86   161/79 H 


 


 05/08/18 11:45   90   


 


 05/08/18 10:00    16  


 


 05/08/18 08:00  98.3 F  86  19   94 L


 


 05/08/18 07:37   96   


 


 05/08/18 07:25   89   168/85 H 


 


 05/08/18 07:00  98.3 F    








 











Admit Weight                   600 lb


 


Weight                         450 lb 6.47 oz














 











 05/07/18 05/08/18 05/09/18





 06:59 06:59 06:59


 


Intake Total 1224 920 60


 


Output Total 1180 1325 837


 


Balance 32 -204 -021














- Physical Examination


General/Neuro: alert & oriented x3, NAD


Neck: no JVD present


Lungs: CTA, unlabored respirations


Heart: RRR


Abdomen: NT/ND


Extremities: + edema B (2+)





- Telemetry


Telemetry Rhythm: NSR, PAC's.





- Labs


Result Diagrams: 


 05/08/18 03:55





 05/08/18 03:55


 Troponin/CKMB











CK-MB (CK-2)  2.9 ng/mL (0-6.6)   04/26/18  01:05    


 


Troponin I  0.180 ng/mL (< 0.028)  H  04/26/18  06:57    














- Assessment/Plan





1. Acute hypoxic hypercapnic respiratory insufficiency


2. Acute on chronic RV dysfunction.


3. Acute on chronic diastolic heart failure


4. Morbid obesity, 


5. Pickwickian syndrome


6. Hypokalemia


7. Afib RVR, rate controlled, now. 








PLAN:


- Switch to PO lasix, creatinine creeping up some. 


- Continue heparin gtt.


- Continue amiodarone drip. Switch to po in next 24 to 48 hrs.

## 2018-05-08 NOTE — PDOC.PN
- Subjective


Encounter Start Date: 05/08/18


Encounter Start Time: 07:30


Subjective: on vent, awake, not in distress





- Objective


Resuscitation Status: 


 











Resuscitation Status           FULL:Full Resuscitation














MAR Reviewed: Yes


Vital Signs & Weight: 


 Vital Signs (12 hours)











  Temp Pulse Resp BP Pulse Ox


 


 05/08/18 14:00    15  


 


 05/08/18 13:51   90   128/57 L 


 


 05/08/18 12:00  98.5 F   16  


 


 05/08/18 11:48   86   161/79 H 


 


 05/08/18 11:45   90   


 


 05/08/18 10:00    16  


 


 05/08/18 08:00  98.3 F  86  19   94 L


 


 05/08/18 07:37   96   


 


 05/08/18 07:25   89   168/85 H 


 


 05/08/18 07:00  98.3 F    


 


 05/08/18 06:00    18  


 


 05/08/18 04:00  98.3 F   18  


 


 05/08/18 03:53   86   208/92 H 


 


 05/08/18 03:00    20  








 Weight











Admit Weight                   600 lb


 


Weight                         450 lb 6.47 oz











 Most Recent Monitor Data











Heart Rate from ECG            93


 


NIBP                           131/59


 


NIBP BP-Mean                   76


 


Respiration from ECG           18


 


SpO2                           95














I&O: 


 











 05/07/18 05/08/18 05/09/18





 06:59 06:59 06:59


 


Intake Total 1224 920 60


 


Output Total 1180 1325 805


 


Balance 44 -909 -772











Result Diagrams: 


 05/08/18 03:55





 05/08/18 03:55





Phys Exam





- Physical Examination


HEENT: PERRLA, moist MMs


Neck: no JVD


trach+


Respiratory: no wheezing, no rales


Cardiovascular: RRR, no significant murmur


Gastrointestinal: soft, no distention, positive bowel sounds


peg+


Musculoskeletal: pulses present, edema present


Neurological: non-focal, moves all 4 limbs


Psychiatric: A&O x 3





Dx/Plan


(1) Acute and chronic respiratory failure


Code(s): J96.20 - ACUTE AND CHR RESP FAILURE, UNSP W HYPOXIA OR HYPERCAPNIA   

Status: Chronic   


Qualifiers: 


   Respiratory failure complication: hypercapnia   Qualified Code(s): J96.22 - 

Acute and chronic respiratory failure with hypercapnia   





(2) MARY (obstructive sleep apnea)


Code(s): G47.33 - OBSTRUCTIVE SLEEP APNEA (ADULT) (PEDIATRIC)   Status: Chronic

   





(3) Afib


Code(s): I48.91 - UNSPECIFIED ATRIAL FIBRILLATION   Status: Chronic   


Qualifiers: 


   Atrial fibrillation type: paroxysmal   Qualified Code(s): I48.0 - Paroxysmal 

atrial fibrillation   





(4) Acute on chronic diastolic (congestive) heart failure


Code(s): I50.33 - ACUTE ON CHRONIC DIASTOLIC (CONGESTIVE) HEART FAILURE   Status

: Chronic   





(5) Morbid obesity with BMI of 70 and over, adult


Code(s): E66.01 - MORBID (SEVERE) OBESITY DUE TO EXCESS CALORIES; Z68.45 - BODY 

MASS INDEX (BMI) 70 OR GREATER, ADULT   Status: Chronic   





(6) Pickwickian syndrome


Code(s): E66.2 - MORBID (SEVERE) OBESITY WITH ALVEOLAR HYPOVENTILATION   Status

: Chronic   





- Plan


got both trach and peg yesterday


-: weaning per pulm advice


-: on oral amio, lasix iv 


-: platelets are holding up


-: will need ltac placement per pulm advice





* .








Review of Systems





- Medications/Allergies


Allergies/Adverse Reactions: 


 Allergies











Allergy/AdvReac Type Severity Reaction Status Date / Time


 


No Known Drug Allergies Allergy   Verified 05/06/18 14:16











Medications: 


 Current Medications





Acetaminophen (Tylenol Elixir)  1,000 mg PER TUBE Q6H PRN


   PRN Reason: Headache/Fever or Pain


Albuterol/Ipratropium (Duoneb)  3 ml NEB [id number] [facility]


   Last Admin: 05/08/18 13:50 Dose:  3 ml


Amiodarone HCl (Cordarone)  400 mg PO BID [facility]


   Last Admin: 05/08/18 10:22 Dose:  400 mg


Lipase/Protease/Amylase ([place] [id number])  1 cap FS .PER PROTOCOL PRN


   PRN Reason: TUBE OCCLUSION PROTOCOL


Aspirin (Aspirin)  325 mg PER TUBE DAILY [facility]


   Last Admin: 05/08/18 10:22 Dose:  325 mg


Clonidine (Catapres)  0.1 mg PO Q4H PRN


   PRN Reason: Systolic BP > 180


Enoxaparin Sodium (Lovenox)  40 mg SC 0900 [facility]


   Last Admin: 05/08/18 10:23 Dose:  40 mg


Furosemide (Lasix)  40 mg SLOW IVP 0600,1400 [facility]


   Last Admin: 05/08/18 05:44 Dose:  40 mg


Hydralazine HCl (Apresoline)  10 mg SLOW IVP Q4H PRN


   PRN Reason: Systolic BP > 180


   Last Admin: 05/08/18 03:53 Dose:  10 mg


Potassium Chloride 40 meq/ (Sodium Chloride)  270 mls @ 135 mls/hr IVPB ASDIR 

PRN


   PRN Reason: FOR SERUM K+ 2.5 - 3.5


   Last Admin: 04/28/18 06:18 Dose:  270 mls


Potassium Chloride 40 meq/ (Device)  100 mls @ 50 mls/hr IVPB ASDIR PRN


   PRN Reason: FOR SERUM K+ 2.5 - 3.5


Magnesium Sulfate 1 gm/ Sodium (Chloride)  102 mls @ 102 mls/hr IV PRN PRN


   PRN Reason: MAG LEVEL 1.4 - 2.0


Magnesium Sulfate 2 gm/ Device  100 mls @ 100 mls/hr IVPB ASDIR PRN


   PRN Reason: MAGNESIUM < 1.4


Potassium Phosphate 9 mmol/ (Sodium Chloride)  103 mls @ 25.75 mls/hr IVPB 

ASDIR PRN


   PRN Reason: Phosphate 1.0-1.8


Potassium Phosphate 12 mmol/ (Sodium Chloride)  254 mls @ 63.5 mls/hr IV ASDIR 

PRN


   PRN Reason: Serum phosphate 0.5-0.9


Potassium Phosphate 15 mmol/ (Sodium Chloride)  255 mls @ 63.75 mls/hr IV ASDIR 

PRN


   PRN Reason: Serum Phos < 0.5


Magnesium Oxide (Magnesium Oxide)  400 mg PO BIDPRN PRN


   PRN Reason: FOR SERUM MAG 1.4 - 2.0


Magnesium Oxide (Magnesium Oxide)  800 mg PO PRN PRN


   PRN Reason: FOR SERUM MAG < 1.4


Miscellaneous Medication (Phos-Nak)  1 pkt PO TIDPRN PRN


   PRN Reason: FOR PHOS LEVEL 1.0 - 1.8


Miscellaneous Medication (Phos-Nak)  2 pkt PO TIDPRN PRN


   PRN Reason: FOR PHOS LEVEL 0.5 - 1.0


Morphine Sulfate (Morphine)  4 mg SLOW IVP Q30MIN PRN


   PRN Reason: Pain


   Last Admin: 05/08/18 04:02 Dose:  4 mg


Ondansetron HCl (Zofran Odt)  4 mg PO Q6H PRN


   PRN Reason: Nausea/Vomiting


Ondansetron HCl (Zofran)  4 mg IVP Q6H PRN


   PRN Reason: Nausea/Vomiting


Pantoprazole Sodium (Protonix)  40 mg PER TUBE DAILY [facility]


   Last Admin: 05/08/18 10:24 Dose:  40 mg


Potassium Chloride (K-Dur)  40 meq PO ASDIR PRN


   PRN Reason: FOR SERUM K+  2.5 - 3.5


   Last Admin: 05/02/18 06:43 Dose:  40 meq


Potassium Chloride (Klor-Con)  40 meq PER TUBE ASDIR PRN


   PRN Reason: FOR SERUM K+ 2.5-3.5


   Last Admin: 05/07/18 06:22 Dose:  40 meq


Sodium Bicarbonate (Bicarbonate, Sodium)  650 mg PER TUBE .PER PROTOCOL PRN


   PRN Reason: ENTERAL TUBE OCCLUSION


Sodium Chloride (Flush - Normal Saline)  10 ml IVF Q12HR [facility]


   Last Admin: 05/08/18 10:24 Dose:  10 ml


Sodium Chloride (Flush - Normal Saline)  10 ml IVF PRN PRN


   PRN Reason: Saline Flush

## 2018-05-08 NOTE — PDOC.PULCC
CCU Progress Note: Subj/Obj





- Subjective


Date: 05/08/18


Time: 07:40


Narrative: Awake.  No pain





- Objective


Allergies/Adverse Reactions: 


 Allergies











Allergy/AdvReac Type Severity Reaction Status Date / Time


 


No Known Drug Allergies Allergy   Verified 05/06/18 14:16











MAR Reviewed: Yes


Vital Signs and I&O: 


 Vital Signs











Temp  98.3 F   05/08/18 04:00


 


Pulse  96   05/08/18 07:37


 


Resp  18   05/08/18 06:00


 


BP  168/85 H  05/08/18 07:25


 


Pulse Ox  95   05/08/18 00:15








 Intake & Output











 05/07/18 05/08/18 05/08/18





 18:59 06:59 18:59


 


Intake Total 10 910 


 


Output Total 860 465 


 


Balance -850 445 


 


Weight 450 lb 6.47 oz  


 


Intake:   


 


  Intake, IV Amount 10 910 


 


    CEFAZOLIN/Water 2 G/20 ML  20 





    2 gm SLOW IVP WILLCALL   





    [facility] Rx#:[id number]   


 


    Potassium Chloride 20 meq  330 





    In Premix Bag 1 bag @ 50   





    mls/hr IVPB Q2H [facility] Rx#:   





    [id number]   


 


    Sodium Chloride 0.9% 10 10 560 





    ml IVF PRN PRN Rx#:   





    [id number]   


 


Output:   


 


  Gastric Drainage 50  


 


  Output, [place] [phone] 


 


Other:   


 


  Voiding Method Indwelling Catheter Indwelling Catheter 


 


  # Bowel Movements 0 0 











Vent Setting: 


SIMV 15, PS 17


Spontaneous Breathing Test: done (in progress)





CCU Progress Note: Exam





- Physical Exam


Constitutional: NAD


HEENT: PERRLA


Neck: no JVD


Deviation from normal: episodic afib


Focused Respiratory Location: decreased breath sounds: Right, Left


Gastrointestinal: soft


Deviation from normal: peg site ok


Musculoskeletal: edema present


Psychiatric: normal affect





CCU Progress Note: Data





- Labs


Result Diagrams: 


 05/08/18 03:55





 05/08/18 03:55





- ABG Interpretation


Attestation: I reviewed and interpreted this ABG.


ABG Results: 


7.41/62/71


Interpretation: abnormal





CCU Progress Note: A/P





- Problems


(1) Acute and chronic respiratory failure


Current Visit: Yes   Status: Acute   Code(s): J96.20 - ACUTE AND CHR RESP 

FAILURE, UNSP W HYPOXIA OR HYPERCAPNIA   


Qualifiers: 


   Respiratory failure complication: hypercapnia   Qualified Code(s): J96.22 - 

Acute and chronic respiratory failure with hypercapnia   





(2) Acute on chronic diastolic (congestive) heart failure


Current Visit: Yes   Status: Acute   Code(s): I50.33 - ACUTE ON CHRONIC 

DIASTOLIC (CONGESTIVE) HEART FAILURE   





(3) Morbid obesity with BMI of 70 and over, adult


Current Visit: Yes   Status: Chronic   Code(s): E66.01 - MORBID (SEVERE) 

OBESITY DUE TO EXCESS CALORIES; Z68.45 - BODY MASS INDEX (BMI) 70 OR GREATER, 

ADULT   





(4) Pickwickian syndrome


Current Visit: Yes   Status: Chronic   Code(s): E66.2 - MORBID (SEVERE) OBESITY 

WITH ALVEOLAR HYPOVENTILATION   





- Time Spent with Patient


Time (minutes): 30





- Plan


Plan: 





Wean to PS ventilation


Up in chair with assist


D/w Family and  yesterday re: placement

## 2018-05-09 LAB
ANION GAP SERPL CALC-SCNC: 12 MMOL/L (ref 10–20)
BASOPHILS # BLD AUTO: 0 THOU/UL (ref 0–0.2)
BASOPHILS NFR BLD AUTO: 0.4 % (ref 0–1)
BUN SERPL-MCNC: 28 MG/DL (ref 9.8–20.1)
CALCIUM SERPL-MCNC: 9.6 MG/DL (ref 7.8–10.44)
CHLORIDE SERPL-SCNC: 96 MMOL/L (ref 98–107)
CO2 SERPL-SCNC: 37 MMOL/L (ref 23–31)
CREAT CL PREDICTED SERPL C-G-VRATE: 209 ML/MIN (ref 70–130)
EOSINOPHIL # BLD AUTO: 0.2 THOU/UL (ref 0–0.7)
EOSINOPHIL NFR BLD AUTO: 4.3 % (ref 0–10)
GLUCOSE SERPL-MCNC: 97 MG/DL (ref 80–115)
HGB BLD-MCNC: 11.4 G/DL (ref 12–16)
LYMPHOCYTES # BLD: 0.6 THOU/UL (ref 1.2–3.4)
LYMPHOCYTES NFR BLD AUTO: 12.7 % (ref 21–51)
MCH RBC QN AUTO: 24.3 PG (ref 27–31)
MCV RBC AUTO: 83.3 FL (ref 81–99)
MONOCYTES # BLD AUTO: 0.5 THOU/UL (ref 0.11–0.59)
MONOCYTES NFR BLD AUTO: 11.3 % (ref 0–10)
NEUTROPHILS # BLD AUTO: 3.4 THOU/UL (ref 1.4–6.5)
NEUTROPHILS NFR BLD AUTO: 71.4 % (ref 42–75)
PLATELET # BLD AUTO: 102 THOU/UL (ref 130–400)
POTASSIUM SERPL-SCNC: 3.8 MMOL/L (ref 3.5–5.1)
RBC # BLD AUTO: 4.68 MILL/UL (ref 4.2–5.4)
SODIUM SERPL-SCNC: 141 MMOL/L (ref 136–145)
WBC # BLD AUTO: 4.7 THOU/UL (ref 4.8–10.8)

## 2018-05-09 RX ADMIN — Medication SCH ML: at 08:34

## 2018-05-09 RX ADMIN — Medication SCH ML: at 20:47

## 2018-05-09 RX ADMIN — PANTOPRAZOLE SODIUM SCH MG: 40 GRANULE, DELAYED RELEASE ORAL at 08:34

## 2018-05-09 NOTE — PDOC.PN
- Subjective


Encounter Start Date: 05/09/18


Encounter Start Time: 11:10


Subjective: awake on vent


-: responds well to verbal stimuli





- Objective


Resuscitation Status: 


 











Resuscitation Status           FULL:Full Resuscitation














MAR Reviewed: Yes


Vital Signs & Weight: 


 Vital Signs (12 hours)











  Temp Pulse Resp BP Pulse Ox


 


 05/09/18 09:38      91 L


 


 05/09/18 07:09   94   146/67 H 


 


 05/09/18 07:08   92  21 H   95


 


 05/09/18 07:00  98.3 F    


 


 05/09/18 06:00    22 H  


 


 05/09/18 04:00    22 H  


 


 05/09/18 02:16   93   166/78 H 


 


 05/09/18 02:00    19  


 


 05/09/18 01:31   89  19   93 L








 Weight











Admit Weight                   600 lb


 


Weight                         450 lb 6.47 oz











 Most Recent Monitor Data











Heart Rate from ECG            95


 


NIBP                           125/63


 


NIBP BP-Mean                   89


 


Respiration from ECG           28


 


SpO2                           89














I&O: 


 











 05/08/18 05/09/18 05/10/18





 06:59 06:59 06:59


 


Intake Total 920 663 60


 


Output Total 1325 2307 190


 


Balance -405 -1644 -130











Result Diagrams: 


 05/09/18 05:45





 05/09/18 03:30





Phys Exam





- Physical Examination


HEENT: PERRLA, sclera anicteric


trach+


Respiratory: no wheezing, no rales


Cardiovascular: RRR, no significant murmur


Gastrointestinal: soft, positive bowel sounds


peg+, abd wall edema


Musculoskeletal: pulses present, edema present


Neurological: non-focal, moves all 4 limbs


Psychiatric: A&O x 3





Dx/Plan


(1) Acute and chronic respiratory failure


Code(s): J96.20 - ACUTE AND CHR RESP FAILURE, UNSP W HYPOXIA OR HYPERCAPNIA   

Status: Chronic   


Qualifiers: 


   Respiratory failure complication: hypercapnia   Qualified Code(s): J96.22 - 

Acute and chronic respiratory failure with hypercapnia   





(2) MARY (obstructive sleep apnea)


Code(s): G47.33 - OBSTRUCTIVE SLEEP APNEA (ADULT) (PEDIATRIC)   Status: Chronic

   





(3) Afib


Code(s): I48.91 - UNSPECIFIED ATRIAL FIBRILLATION   Status: Chronic   


Qualifiers: 


   Atrial fibrillation type: paroxysmal   Qualified Code(s): I48.0 - Paroxysmal 

atrial fibrillation   





(4) Acute on chronic diastolic (congestive) heart failure


Code(s): I50.33 - ACUTE ON CHRONIC DIASTOLIC (CONGESTIVE) HEART FAILURE   Status

: Chronic   





(5) Morbid obesity with BMI of 70 and over, adult


Code(s): E66.01 - MORBID (SEVERE) OBESITY DUE TO EXCESS CALORIES; Z68.45 - BODY 

MASS INDEX (BMI) 70 OR GREATER, ADULT   Status: Chronic   





(6) Pickwickian syndrome


Code(s): E66.2 - MORBID (SEVERE) OBESITY WITH ALVEOLAR HYPOVENTILATION   Status

: Chronic   





(7) Status post tracheostomy


Code(s): Z93.0 - TRACHEOSTOMY STATUS   Status: Acute   Comment: done on 5/7/18 

  





(8) S/P percutaneous endoscopic gastrostomy (PEG) tube placement


Code(s): Z93.1 - GASTROSTOMY STATUS   Status: Acute   Comment: done on 5/7/18   





- Plan


weaning on trach


-: peg feeding


-: oral amio 400mg bid, asp, oral lasix


-: placement, anticoagulation per cardio advice


-: PT to mobilize pt on bed/oob to chair as tolerated





* .








Review of Systems





- Medications/Allergies


Allergies/Adverse Reactions: 


 Allergies











Allergy/AdvReac Type Severity Reaction Status Date / Time


 


No Known Drug Allergies Allergy   Verified 05/06/18 14:16











Medications: 


 Current Medications





Acetaminophen (Tylenol Elixir)  1,000 mg PER TUBE Q6H PRN


   PRN Reason: Headache/Fever or Pain


   Last Admin: 05/09/18 08:33 Dose:  1,000 mg


Acetazolamide Sodium (Diamox)  250 mg IVP Q12HR [facility]


   Last Admin: 05/09/18 10:01 Dose:  250 mg


Albuterol/Ipratropium (Duoneb)  3 ml NEB [id number] [facility]


   Last Admin: 05/09/18 07:08 Dose:  3 ml


Amiodarone HCl (Cordarone)  400 mg PO BID [facility]


   Last Admin: 05/09/18 08:34 Dose:  400 mg


Lipase/Protease/Amylase ([place] [id number])  1 cap FS .PER PROTOCOL PRN


   PRN Reason: TUBE OCCLUSION PROTOCOL


Aspirin (Aspirin)  325 mg PER TUBE DAILY [facility]


   Last Admin: 05/09/18 08:33 Dose:  325 mg


Clonidine (Catapres)  0.1 mg PO Q4H PRN


   PRN Reason: Systolic BP > 180


   Last Admin: 05/08/18 18:11 Dose:  0.1 mg


Enoxaparin Sodium (Lovenox)  40 mg SC 0900 [facility]


   Last Admin: 05/09/18 08:34 Dose:  40 mg


Hydralazine HCl (Apresoline)  10 mg SLOW IVP Q4H PRN


   PRN Reason: Systolic BP > 180


   Last Admin: 05/08/18 23:14 Dose:  10 mg


Potassium Chloride 40 meq/ (Sodium Chloride)  270 mls @ 135 mls/hr IVPB ASDIR 

PRN


   PRN Reason: FOR SERUM K+ 2.5 - 3.5


   Last Admin: 04/28/18 06:18 Dose:  270 mls


Potassium Chloride 40 meq/ (Device)  100 mls @ 50 mls/hr IVPB ASDIR PRN


   PRN Reason: FOR SERUM K+ 2.5 - 3.5


Magnesium Sulfate 1 gm/ Sodium (Chloride)  102 mls @ 102 mls/hr IV PRN PRN


   PRN Reason: MAG LEVEL 1.4 - 2.0


Magnesium Sulfate 2 gm/ Device  100 mls @ 100 mls/hr IVPB ASDIR PRN


   PRN Reason: MAGNESIUM < 1.4


Potassium Phosphate 9 mmol/ (Sodium Chloride)  103 mls @ 25.75 mls/hr IVPB 

ASDIR PRN


   PRN Reason: Phosphate 1.0-1.8


Potassium Phosphate 12 mmol/ (Sodium Chloride)  254 mls @ 63.5 mls/hr IV ASDIR 

PRN


   PRN Reason: Serum phosphate 0.5-0.9


Potassium Phosphate 15 mmol/ (Sodium Chloride)  255 mls @ 63.75 mls/hr IV ASDIR 

PRN


   PRN Reason: Serum Phos < 0.5


Magnesium Oxide (Magnesium Oxide)  400 mg PO BIDPRN PRN


   PRN Reason: FOR SERUM MAG 1.4 - 2.0


Magnesium Oxide (Magnesium Oxide)  800 mg PO PRN PRN


   PRN Reason: FOR SERUM MAG < 1.4


Miscellaneous Medication (Phos-Nak)  1 pkt PO TIDPRN PRN


   PRN Reason: FOR PHOS LEVEL 1.0 - 1.8


Miscellaneous Medication (Phos-Nak)  2 pkt PO TIDPRN PRN


   PRN Reason: FOR PHOS LEVEL 0.5 - 1.0


Morphine Sulfate (Morphine)  4 mg SLOW IVP Q30MIN PRN


   PRN Reason: Pain


   Last Admin: 05/08/18 04:02 Dose:  4 mg


Ondansetron HCl (Zofran Odt)  4 mg PO Q6H PRN


   PRN Reason: Nausea/Vomiting


Ondansetron HCl (Zofran)  4 mg IVP Q6H PRN


   PRN Reason: Nausea/Vomiting


Pantoprazole Sodium (Protonix)  40 mg PER TUBE DAILY [facility]


   Last Admin: 05/09/18 08:34 Dose:  40 mg


Potassium Chloride (K-Dur)  40 meq PO ASDIR PRN


   PRN Reason: FOR SERUM K+  2.5 - 3.5


   Last Admin: 05/02/18 06:43 Dose:  40 meq


Potassium Chloride (Klor-Con)  40 meq PER TUBE ASDIR PRN


   PRN Reason: FOR SERUM K+ 2.5-3.5


   Last Admin: 05/07/18 06:22 Dose:  40 meq


Sodium Bicarbonate (Bicarbonate, Sodium)  650 mg PER TUBE .PER PROTOCOL PRN


   PRN Reason: ENTERAL TUBE OCCLUSION


Sodium Chloride (Flush - Normal Saline)  10 ml IVF Q12HR [facility]


   Last Admin: 05/09/18 08:34 Dose:  10 ml


Sodium Chloride (Flush - Normal Saline)  10 ml IVF PRN PRN


   PRN Reason: Saline Flush

## 2018-05-09 NOTE — PDOC.CTH
Cardiology Progress Note





- Subjective





She is doing better every day. Fully awake and writing to communicate. 





- Objective


 Vital Signs











  Temp Pulse Resp BP Pulse Ox


 


 05/09/18 07:09   94   146/67 H 


 


 05/09/18 07:08   92  21 H   95


 


 05/09/18 07:00  98.3 F    


 


 05/09/18 06:00    22 H  


 


 05/09/18 04:00    22 H  


 


 05/09/18 02:16   93   166/78 H 


 


 05/09/18 02:00    19  


 


 05/09/18 01:31   89  19   93 L


 


 05/09/18 00:00  98.5 F   12  


 


 05/08/18 23:14   96   201/111 H 


 


 05/08/18 22:25   96   201/111 H 


 


 05/08/18 22:00    12  








 











Admit Weight                   600 lb


 


Weight                         450 lb 6.47 oz














 











 05/08/18 05/09/18 05/10/18





 06:59 06:59 06:59


 


Intake Total 920 663 


 


Output Total 1325 2307 40


 


Balance -405 -5084 -40














- Physical Examination


General/Neuro: NAD


Neck: no JVD present


Lungs: CTA, unlabored respirations


Heart: RRR


Abdomen: NT/ND


Extremities: + edema B (2+)





- Telemetry


Telemetry Rhythm: NSR, PAC's





- Labs


Result Diagrams: 


 05/09/18 05:45





 05/09/18 03:30


 Troponin/CKMB











CK-MB (CK-2)  2.9 ng/mL (0-6.6)   04/26/18  01:05    


 


Troponin I  0.180 ng/mL (< 0.028)  H  04/26/18  06:57    














- Assessment/Plan





1. Acute hypoxic hypercapnic respiratory insufficiency


2. Acute on chronic RV dysfunction.


3. Acute on chronic diastolic heart failure


4. Morbid obesity, 


5. Pickwickian syndrome


6. Hypokalemia


7. Afib RVR, in sinus now. 











PLAN:


- Will plan on switching heparin drip to Eliquis 5 mg BID. 


- Continue PO amiodarone drip.


- Replace K.

## 2018-05-09 NOTE — PRG
DATE OF SERVICE:  05/09/2018

 

The patient is awake, alert, able to communicate by writing. 

 

PHYSICAL EXAMINATION: 

VITAL SIGNS:  Temperature 98.3, pulse 89, blood pressure 146/67.  24 hour intake 663, output 2307.

HEENT:  Unremarkable.

NECK:  Trach in good position.

LUNGS:  Clear.

CARDIAC:  S1 and S2 regular.

ABDOMEN:  Obese.

EXTREMITIES:  Edematous.

 

LABORATORY DATA:  Sodium 141, potassium 3.8, chloride 96, CO2 37, BUN 20, creatinine 0.9, glucose 97,
 white blood cell count 4.7, hematocrit 39, platelet count 102.

 

ASSESSMENT:

1.  Obesity hypoventilation syndrome.

2.  Obstructive sleep apnea. 

3.  Status post tracheostomy placement.

4.  Acute on chronic respiratory failure.

5.  Improved thrombocytopenia.

 

PLAN:

1.  Trach collar trials as tolerated.

2.  Up in chair.

3.  Begin working on placement.

4.  Change Lasix to Diamox given the development of a contraction alkalosis.

## 2018-05-10 LAB
ANION GAP SERPL CALC-SCNC: 14 MMOL/L (ref 10–20)
BASOPHILS # BLD AUTO: 0 THOU/UL (ref 0–0.2)
BASOPHILS NFR BLD AUTO: 0.6 % (ref 0–1)
BUN SERPL-MCNC: 29 MG/DL (ref 9.8–20.1)
CALCIUM SERPL-MCNC: 9.6 MG/DL (ref 7.8–10.44)
CHLORIDE SERPL-SCNC: 96 MMOL/L (ref 98–107)
CO2 SERPL-SCNC: 35 MMOL/L (ref 23–31)
CREAT CL PREDICTED SERPL C-G-VRATE: 203 ML/MIN (ref 70–130)
EOSINOPHIL # BLD AUTO: 0.2 THOU/UL (ref 0–0.7)
EOSINOPHIL NFR BLD AUTO: 5.1 % (ref 0–10)
GLUCOSE SERPL-MCNC: 93 MG/DL (ref 80–115)
HGB BLD-MCNC: 11 G/DL (ref 12–16)
LYMPHOCYTES # BLD: 0.7 THOU/UL (ref 1.2–3.4)
LYMPHOCYTES NFR BLD AUTO: 14.4 % (ref 21–51)
MCH RBC QN AUTO: 24.4 PG (ref 27–31)
MCV RBC AUTO: 84.2 FL (ref 81–99)
MONOCYTES # BLD AUTO: 0.5 THOU/UL (ref 0.11–0.59)
MONOCYTES NFR BLD AUTO: 10.5 % (ref 0–10)
NEUTROPHILS # BLD AUTO: 3.3 THOU/UL (ref 1.4–6.5)
NEUTROPHILS NFR BLD AUTO: 69.4 % (ref 42–75)
PLATELET # BLD AUTO: 111 THOU/UL (ref 130–400)
POTASSIUM SERPL-SCNC: 3.7 MMOL/L (ref 3.5–5.1)
RBC # BLD AUTO: 4.5 MILL/UL (ref 4.2–5.4)
SODIUM SERPL-SCNC: 141 MMOL/L (ref 136–145)
WBC # BLD AUTO: 4.8 THOU/UL (ref 4.8–10.8)

## 2018-05-10 RX ADMIN — Medication SCH ML: at 09:40

## 2018-05-10 RX ADMIN — PANTOPRAZOLE SODIUM SCH MG: 40 GRANULE, DELAYED RELEASE ORAL at 09:39

## 2018-05-10 RX ADMIN — Medication SCH ML: at 20:24

## 2018-05-10 NOTE — PDOC.PN
- Subjective


Encounter Start Date: 05/10/18


Encounter Start Time: 11:00


Subjective: is on trach and weaning off vent


-: no chest pain or sob


-: awake and oriented 





- Objective


Resuscitation Status: 


 











Resuscitation Status           FULL:Full Resuscitation














MAR Reviewed: Yes


Vital Signs & Weight: 


 Vital Signs (12 hours)











  Temp Pulse Resp Pulse Ox


 


 05/10/18 07:40     92 L


 


 05/10/18 07:00  98.1 F   


 


 05/10/18 06:42   88  24 H  91 L


 


 05/10/18 06:00    18 


 


 05/10/18 04:00  98.8 F   24 H 


 


 05/10/18 03:58   96  


 


 05/10/18 02:00    22 H 


 


 05/10/18 00:49   92  25 H  92 L


 


 05/10/18 00:47   90  


 


 05/10/18 00:00    20 








 Weight











Admit Weight                   600 lb


 


Weight                         450 lb 6.47 oz











 Most Recent Monitor Data











Heart Rate from ECG            89


 


NIBP                           158/65


 


NIBP BP-Mean                   99


 


Respiration from ECG           23


 


SpO2                           91














I&O: 


 











 05/09/18 05/10/18 05/11/18





 06:59 06:59 06:59


 


Intake Total 663 1988 


 


Output Total 2307 1190 165


 


Balance -1644 798 -165











Result Diagrams: 


 05/10/18 04:20





 05/10/18 04:20





Phys Exam





- Physical Examination


HEENT: PERRLA, moist MMs


Neck: full ROM


trach+


Respiratory: no wheezing, no rales


Cardiovascular: RRR, no significant murmur


Gastrointestinal: soft, no distention, positive bowel sounds


peg+


Musculoskeletal: pulses present, edema present


Neurological: non-focal, moves all 4 limbs


Psychiatric: normal affect, A&O x 3





Dx/Plan


(1) Acute and chronic respiratory failure


Code(s): J96.20 - ACUTE AND CHR RESP FAILURE, UNSP W HYPOXIA OR HYPERCAPNIA   

Status: Acute   


Qualifiers: 


   Respiratory failure complication: hypercapnia   Qualified Code(s): J96.22 - 

Acute and chronic respiratory failure with hypercapnia   





(2) MARY (obstructive sleep apnea)


Code(s): G47.33 - OBSTRUCTIVE SLEEP APNEA (ADULT) (PEDIATRIC)   Status: Chronic

   





(3) Afib


Code(s): I48.91 - UNSPECIFIED ATRIAL FIBRILLATION   Status: Chronic   


Qualifiers: 


   Atrial fibrillation type: paroxysmal   Qualified Code(s): I48.0 - Paroxysmal 

atrial fibrillation   





(4) Acute on chronic diastolic (congestive) heart failure


Code(s): I50.33 - ACUTE ON CHRONIC DIASTOLIC (CONGESTIVE) HEART FAILURE   Status

: Chronic   





(5) Morbid obesity with BMI of 70 and over, adult


Code(s): E66.01 - MORBID (SEVERE) OBESITY DUE TO EXCESS CALORIES; Z68.45 - BODY 

MASS INDEX (BMI) 70 OR GREATER, ADULT   Status: Chronic   





(6) Pickwickian syndrome


Code(s): E66.2 - MORBID (SEVERE) OBESITY WITH ALVEOLAR HYPOVENTILATION   Status

: Chronic   





(7) Status post tracheostomy


Code(s): Z93.0 - TRACHEOSTOMY STATUS   Status: Acute   Comment: done on 5/7/18 

  





(8) S/P percutaneous endoscopic gastrostomy (PEG) tube placement


Code(s): Z93.1 - GASTROSTOMY STATUS   Status: Acute   Comment: done on 5/7/18   





- Plan


is weaning from vent, on trach collar prn


-: peg feeding


-: to mobilize as tolerated


-: on amiodarone 400mg bid, asp and diamox


-: is slowly recovering, likely will be weaned off vent per pulm 





* .








Review of Systems





- Medications/Allergies


Allergies/Adverse Reactions: 


 Allergies











Allergy/AdvReac Type Severity Reaction Status Date / Time


 


No Known Drug Allergies Allergy   Verified 05/06/18 14:16











Medications: 


 Current Medications





Acetaminophen (Tylenol Elixir)  1,000 mg PER TUBE Q6H PRN


   PRN Reason: Headache/Fever or Pain


   Last Admin: 05/09/18 08:33 Dose:  1,000 mg


Acetazolamide Sodium (Diamox)  250 mg IVP Q12HR [facility]


   Last Admin: 05/10/18 09:39 Dose:  250 mg


Albuterol/Ipratropium (Duoneb)  3 ml NEB [id number] KEE


   Last Admin: 05/10/18 06:42 Dose:  3 ml


Amiodarone HCl (Cordarone)  400 mg PO BID [facility]


   Last Admin: 05/10/18 09:39 Dose:  400 mg


Lipase/Protease/Amylase ([place] [address])  1 cap FS .PER PROTOCOL PRN


   PRN Reason: TUBE OCCLUSION PROTOCOL


Aspirin (Aspirin)  325 mg PER TUBE DAILY [facility]


   Last Admin: 05/10/18 09:39 Dose:  325 mg


Clonidine (Catapres)  0.1 mg PO Q4H PRN


   PRN Reason: Systolic BP > 180


   Last Admin: 05/08/18 18:11 Dose:  0.1 mg


Enoxaparin Sodium (Lovenox)  40 mg SC 0900 KEE


   Last Admin: 05/10/18 09:39 Dose:  40 mg


Hydralazine HCl (Apresoline)  10 mg SLOW IVP Q4H PRN


   PRN Reason: Systolic BP > 180


   Last Admin: 05/08/18 23:14 Dose:  10 mg


Potassium Chloride 40 meq/ (Sodium Chloride)  270 mls @ 135 mls/hr IVPB ASDIR 

PRN


   PRN Reason: FOR SERUM K+ 2.5 - 3.5


   Last Admin: 04/28/18 06:18 Dose:  270 mls


Potassium Chloride 40 meq/ (Device)  100 mls @ 50 mls/hr IVPB ASDIR PRN


   PRN Reason: FOR SERUM K+ 2.5 - 3.5


Magnesium Sulfate 1 gm/ Sodium (Chloride)  102 mls @ 102 mls/hr IV PRN PRN


   PRN Reason: MAG LEVEL 1.4 - 2.0


Magnesium Sulfate 2 gm/ Device  100 mls @ 100 mls/hr IVPB ASDIR PRN


   PRN Reason: MAGNESIUM < 1.4


Potassium Phosphate 9 mmol/ (Sodium Chloride)  103 mls @ 25.75 mls/hr IVPB 

ASDIR PRN


   PRN Reason: Phosphate 1.0-1.8


Potassium Phosphate 12 mmol/ (Sodium Chloride)  254 mls @ 63.5 mls/hr IV ASDIR 

PRN


   PRN Reason: Serum phosphate 0.5-0.9


Potassium Phosphate 15 mmol/ (Sodium Chloride)  255 mls @ 63.75 mls/hr IV ASDIR 

PRN


   PRN Reason: Serum Phos < 0.5


Magnesium Oxide (Magnesium Oxide)  400 mg PO BIDPRN PRN


   PRN Reason: FOR SERUM MAG 1.4 - 2.0


Magnesium Oxide (Magnesium Oxide)  800 mg PO PRN PRN


   PRN Reason: FOR SERUM MAG < 1.4


Miscellaneous Medication (Phos-Nak)  1 pkt PO TIDPRN PRN


   PRN Reason: FOR PHOS LEVEL 1.0 - 1.8


Miscellaneous Medication (Phos-Nak)  2 pkt PO TIDPRN PRN


   PRN Reason: FOR PHOS LEVEL 0.5 - 1.0


Morphine Sulfate (Morphine)  4 mg SLOW IVP Q30MIN PRN


   PRN Reason: Pain


   Last Admin: 05/08/18 04:02 Dose:  4 mg


Ondansetron HCl (Zofran Odt)  4 mg PO Q6H PRN


   PRN Reason: Nausea/Vomiting


Ondansetron HCl (Zofran)  4 mg IVP Q6H PRN


   PRN Reason: Nausea/Vomiting


Pantoprazole Sodium (Protonix)  40 mg PER TUBE DAILY [facility]


   Last Admin: 05/10/18 09:39 Dose:  40 mg


Potassium Chloride (K-Dur)  40 meq PO ASDIR PRN


   PRN Reason: FOR SERUM K+  2.5 - 3.5


   Last Admin: 05/02/18 06:43 Dose:  40 meq


Potassium Chloride (Klor-Con)  40 meq PER TUBE ASDIR PRN


   PRN Reason: FOR SERUM K+ 2.5-3.5


   Last Admin: 05/07/18 06:22 Dose:  40 meq


Sodium Bicarbonate (Bicarbonate, Sodium)  650 mg PER TUBE .PER PROTOCOL PRN


   PRN Reason: ENTERAL TUBE OCCLUSION


Sodium Chloride (Flush - Normal Saline)  10 ml IVF Q12HR [facility]


   Last Admin: 05/10/18 09:40 Dose:  10 ml


Sodium Chloride (Flush - Normal Saline)  10 ml IVF PRN PRN


   PRN Reason: Saline Flush

## 2018-05-10 NOTE — PDOC.CTH
Cardiology Progress Note





- Subjective





No new issues. Remains in sinus. 





- Objective


 Vital Signs











  Temp Pulse Pulse Pulse Resp BP BP


 


 05/10/18 13:51   85    20  


 


 05/10/18 12:00  98.4 F      


 


 05/10/18 11:20    95  94   159/85 H  188/73 H


 


 05/10/18 08:00  98.1 F  89    18  


 


 05/10/18 07:40       


 


 05/10/18 07:00  98.1 F      


 


 05/10/18 06:42   88    24 H  














  Pulse Ox Pulse Ox Pulse Ox


 


 05/10/18 13:51  93 L  


 


 05/10/18 12:00   


 


 05/10/18 11:20   93 L  91 L


 


 05/10/18 08:00  93 L  


 


 05/10/18 07:40  92 L  


 


 05/10/18 07:00   


 


 05/10/18 06:42  91 L  








 











Admit Weight                   600 lb


 


Weight                         450 lb 6.47 oz














 











 05/09/18 05/10/18 05/11/18





 06:59 06:59 06:59


 


Intake Total 663 1988 


 


Output Total 2307 1190 290


 


Balance -1644 798 -290














- Physical Examination


General/Neuro: NAD


Neck: no JVD present


Lungs: CTA, unlabored respirations


Heart: RRR


Abdomen: NT/ND


Extremities: + edema B (2+)





- Telemetry


Telemetry Rhythm: NSR, PAC's





- Labs


Result Diagrams: 


 05/10/18 04:20





 05/10/18 04:20


 Troponin/CKMB











CK-MB (CK-2)  2.9 ng/mL (0-6.6)   04/26/18  01:05    


 


Troponin I  0.180 ng/mL (< 0.028)  H  04/26/18  06:57    














- Assessment/Plan





1. Acute hypoxic hypercapnic respiratory insufficiency


2. Acute on chronic RV dysfunction.


3. Acute on chronic diastolic heart failure


4. Morbid obesity, 


5. Pickwickian syndrome


6. Afib RVR, in sinus now. 











PLAN:


- Eliquis 5 mg BID. 


- Amiodarone load.


- Supportive care.

## 2018-05-10 NOTE — PRG
DATE OF SERVICE:  05/10/2018

 

SUBJECTIVE:  The patient is doing extremely well and has been off the ventilator for most of the day.


 

PHYSICAL EXAMINATION:  

VITAL SIGNS:  Temperature is 98.8, pulse 90, blood pressure 98/50, O2 sat 92%, 24 intake 1980, output
 90, weight not quantitated.

HEENT:  Unremarkable.

NECK:  Trach in good position.

LUNGS:  Clear.

CARDIAC:  S1 and S2 regular.

ABDOMEN:  Soft, nontender.

EXTREMITIES:  Edematous.

 

LABORATORY DATA:  White blood cell count 4.8, hematocrit 37.9, platelet count 111.  Sodium 141, potas
sium 3.7, chloride 96, CO2 35, BUN 29, creatinine 0.9, glucose 93.

 

ASSESSMENT:

1.  Acute on chronic respiratory failure.

2.  Obesity ventilation syndrome/obstructive sleep apnea.

3.  Status post tracheostomy placement.

4.  Improved thrombocytopenia.

 

PLAN:  

1.  Up in chair as tolerated.  

2.  Continue trach collar trials.

3.  Begin working on placement.

4.  Can likely go to [facility] tomorrow if stays off the ventilator today.

## 2018-05-11 LAB
ANION GAP SERPL CALC-SCNC: 12 MMOL/L (ref 10–20)
BUN SERPL-MCNC: 33 MG/DL (ref 9.8–20.1)
CALCIUM SERPL-MCNC: 9.6 MG/DL (ref 7.8–10.44)
CHLORIDE SERPL-SCNC: 96 MMOL/L (ref 98–107)
CO2 SERPL-SCNC: 37 MMOL/L (ref 23–31)
CREAT CL PREDICTED SERPL C-G-VRATE: 203 ML/MIN (ref 70–130)
GLUCOSE SERPL-MCNC: 103 MG/DL (ref 80–115)
HGB BLD-MCNC: 11.1 G/DL (ref 12–16)
MCH RBC QN AUTO: 24.3 PG (ref 27–31)
MCV RBC AUTO: 84.4 FL (ref 81–99)
MDIFF COMPLETE?: YES
PLATELET # BLD AUTO: 119 THOU/UL (ref 130–400)
PLATELET BLD QL SMEAR: (no result)
POTASSIUM SERPL-SCNC: 3.8 MMOL/L (ref 3.5–5.1)
RBC # BLD AUTO: 4.57 MILL/UL (ref 4.2–5.4)
SODIUM SERPL-SCNC: 141 MMOL/L (ref 136–145)
WBC # BLD AUTO: 5.4 THOU/UL (ref 4.8–10.8)

## 2018-05-11 RX ADMIN — Medication SCH ML: at 21:00

## 2018-05-11 RX ADMIN — PANTOPRAZOLE SODIUM SCH MG: 40 GRANULE, DELAYED RELEASE ORAL at 08:43

## 2018-05-11 RX ADMIN — Medication SCH ML: at 08:43

## 2018-05-11 RX ADMIN — Medication PRN ML: at 08:43

## 2018-05-11 NOTE — PDOC.CTH
Cardiology Progress Note





- Subjective





She is doing well. On T collar. 





- Objective


 Vital Signs











  Temp Pulse Pulse Pulse Resp BP BP


 


 05/11/18 12:00  98.4 F      


 


 05/11/18 10:23    128 H  105 H    177/85 H


 


 05/11/18 08:00  99.4 F  85    22 H  


 


 05/11/18 06:53   86     114/51 L 


 


 05/11/18 06:00      21 H  


 


 05/11/18 04:00  98.2 F     23 H  


 


 05/11/18 02:54   89     


 


 05/11/18 02:00      22 H  














  BP Pulse Ox


 


 05/11/18 12:00  


 


 05/11/18 10:23  175/83 H 


 


 05/11/18 08:00   89 L


 


 05/11/18 06:53  


 


 05/11/18 06:00  


 


 05/11/18 04:00  


 


 05/11/18 02:54  


 


 05/11/18 02:00  








 











Admit Weight                   600 lb


 


Weight                         450 lb 6.47 oz














 











 05/10/18 05/11/18 05/12/18





 06:59 06:59 06:59


 


Intake Total 1988 1880 20


 


Output Total 1190 1200 365


 


Balance 798 680 -345














- Physical Examination


General/Neuro: NAD


Neck: no JVD present


Lungs: CTA, unlabored respirations


Heart: RRR


Abdomen: NT/ND


Extremities: + edema B (2+)





- Telemetry


Telemetry Rhythm: NSR





- Labs


Result Diagrams: 


 05/11/18 05:00





 05/11/18 05:00


 Troponin/CKMB











CK-MB (CK-2)  2.9 ng/mL (0-6.6)   04/26/18  01:05    


 


Troponin I  0.180 ng/mL (< 0.028)  H  04/26/18  06:57    














- Assessment/Plan





1. Acute hypoxic hypercapnic respiratory insufficiency


2. Acute on chronic RV dysfunction.


3. Acute on chronic diastolic heart failure


4. Morbid obesity, 


5. Pickwickian syndrome


6. Afib RVR, in sinus now. 








PLAN:


- Eliquis 5 mg BID. 


- Amiodarone load.


- Supportive care.

## 2018-05-11 NOTE — PDOC.PN
- Subjective


Encounter Start Date: 05/11/18


Encounter Start Time: 12:00


Subjective: on trach collar, awake, responds to verbal stimuli


-: is a bit emotional





- Objective


Resuscitation Status: 


 











Resuscitation Status           FULL:Full Resuscitation














MAR Reviewed: Yes


Vital Signs & Weight: 


 Vital Signs (12 hours)











  Temp Pulse Pulse Pulse Resp BP BP


 


 05/11/18 14:13   81    32 H  


 


 05/11/18 12:00  98.4 F      


 


 05/11/18 10:23    128 H  105 H    177/85 H


 


 05/11/18 08:00  99.4 F  85    22 H  


 


 05/11/18 06:53   86     114/51 L 


 


 05/11/18 06:00      21 H  


 


 05/11/18 04:00  98.2 F     23 H  














  BP Pulse Ox


 


 05/11/18 14:13   91 L


 


 05/11/18 12:00  


 


 05/11/18 10:23  175/83 H 


 


 05/11/18 08:00   89 L


 


 05/11/18 06:53  


 


 05/11/18 06:00  


 


 05/11/18 04:00  








 Weight











Admit Weight                   600 lb


 


Weight                         450 lb 6.47 oz











 Most Recent Monitor Data











Heart Rate from ECG            85


 


NIBP                           145/71


 


NIBP BP-Mean                   86


 


Respiration from ECG           32


 


SpO2                           91














I&O: 


 











 05/10/18 05/11/18 05/12/18





 06:59 06:59 06:59


 


Intake Total 1988 1880 120


 


Output Total 1190 1200 395


 


Balance 798 680 -275











Result Diagrams: 


 05/11/18 05:00





 05/11/18 05:00





Phys Exam





- Physical Examination


HEENT: PERRLA, moist MMs


Neck: no JVD, supple


Respiratory: no wheezing, no rales


rhonchi+, distant breath sounds


Cardiovascular: RRR, no significant murmur


Gastrointestinal: soft, non-tender, positive bowel sounds


peg+


Musculoskeletal: pulses present, edema present


Neurological: non-focal, moves all 4 limbs


Psychiatric: A&O x 3





Dx/Plan


(1) Acute and chronic respiratory failure


Code(s): J96.20 - ACUTE AND CHR RESP FAILURE, UNSP W HYPOXIA OR HYPERCAPNIA   

Status: Acute   


Qualifiers: 


   Respiratory failure complication: hypercapnia   Qualified Code(s): J96.22 - 

Acute and chronic respiratory failure with hypercapnia   





(2) MARY (obstructive sleep apnea)


Code(s): G47.33 - OBSTRUCTIVE SLEEP APNEA (ADULT) (PEDIATRIC)   Status: Chronic

   





(3) Afib


Code(s): I48.91 - UNSPECIFIED ATRIAL FIBRILLATION   Status: Chronic   


Qualifiers: 


   Atrial fibrillation type: paroxysmal   Qualified Code(s): I48.0 - Paroxysmal 

atrial fibrillation   





(4) Acute on chronic diastolic (congestive) heart failure


Code(s): I50.33 - ACUTE ON CHRONIC DIASTOLIC (CONGESTIVE) HEART FAILURE   Status

: Chronic   





(5) Morbid obesity with BMI of 70 and over, adult


Code(s): E66.01 - MORBID (SEVERE) OBESITY DUE TO EXCESS CALORIES; Z68.45 - BODY 

MASS INDEX (BMI) 70 OR GREATER, ADULT   Status: Chronic   





(6) Pickwickian syndrome


Code(s): E66.2 - MORBID (SEVERE) OBESITY WITH ALVEOLAR HYPOVENTILATION   Status

: Chronic   





(7) Status post tracheostomy


Code(s): Z93.0 - TRACHEOSTOMY STATUS   Status: Acute   Comment: done on 5/7/18 

  





(8) S/P percutaneous endoscopic gastrostomy (PEG) tube placement


Code(s): Z93.1 - GASTROSTOMY STATUS   Status: Acute   Comment: done on 5/7/18   





- Plan


has been off vent, is on trach collar


-: may allow oral diet if she clears swallow 


-: speaking valve if ok with pulm


-: is on amiodarone, asp and started on eliquis from today


-: PT to mobilize pt as tolerated, will need placement





* .








Review of Systems





- Medications/Allergies


Allergies/Adverse Reactions: 


 Allergies











Allergy/AdvReac Type Severity Reaction Status Date / Time


 


No Known Drug Allergies Allergy   Verified 05/06/18 14:16











Medications: 


 Current Medications





Acetaminophen (Tylenol Elixir)  1,000 mg PER TUBE Q6H PRN


   PRN Reason: Headache/Fever or Pain


   Last Admin: 05/09/18 08:33 Dose:  1,000 mg


Acetazolamide Sodium (Diamox)  250 mg IVP Q12HR [facility]


   Last Admin: 05/11/18 08:42 Dose:  250 mg


Albuterol/Ipratropium (Duoneb)  3 ml NEB [id number] KEE


   Last Admin: 05/11/18 14:13 Dose:  3 ml


Amiodarone HCl (Cordarone)  400 mg PO BID K[facility]


   Last Admin: 05/11/18 08:41 Dose:  400 mg


Lipase/Protease/Amylase ([place] Dr [address])  1 cap FS .PER PROTOCOL PRN


   PRN Reason: TUBE OCCLUSION PROTOCOL


Apixaban (Eliquis)  5 mg PO BID [facility]


   Last Admin: 05/11/18 08:41 Dose:  5 mg


Aspirin (Aspirin)  325 mg PER TUBE DAILY [facility]


   Last Admin: 05/11/18 08:41 Dose:  325 mg


Clonidine (Catapres)  0.1 mg PO Q4H PRN


   PRN Reason: Systolic BP > 180


   Last Admin: 05/08/18 18:11 Dose:  0.1 mg


Hydralazine HCl (Apresoline)  10 mg SLOW IVP Q4H PRN


   PRN Reason: Systolic BP > 180


   Last Admin: 05/08/18 23:14 Dose:  10 mg


Potassium Chloride 40 meq/ (Sodium Chloride)  270 mls @ 135 mls/hr IVPB ASDIR 

PRN


   PRN Reason: FOR SERUM K+ 2.5 - 3.5


   Last Admin: 04/28/18 06:18 Dose:  270 mls


Potassium Chloride 40 meq/ (Device)  100 mls @ 50 mls/hr IVPB ASDIR PRN


   PRN Reason: FOR SERUM K+ 2.5 - 3.5


Magnesium Sulfate 1 gm/ Sodium (Chloride)  102 mls @ 102 mls/hr IV PRN PRN


   PRN Reason: MAG LEVEL 1.4 - 2.0


Magnesium Sulfate 2 gm/ Device  100 mls @ 100 mls/hr IVPB ASDIR PRN


   PRN Reason: MAGNESIUM < 1.4


Potassium Phosphate 9 mmol/ (Sodium Chloride)  103 mls @ 25.75 mls/hr IVPB 

ASDIR PRN


   PRN Reason: Phosphate 1.0-1.8


Potassium Phosphate 12 mmol/ (Sodium Chloride)  254 mls @ 63.5 mls/hr IV ASDIR 

PRN


   PRN Reason: Serum phosphate 0.5-0.9


Potassium Phosphate 15 mmol/ (Sodium Chloride)  255 mls @ 63.75 mls/hr IV ASDIR 

PRN


   PRN Reason: Serum Phos < 0.5


Magnesium Oxide (Magnesium Oxide)  400 mg PO BIDPRN PRN


   PRN Reason: FOR SERUM MAG 1.4 - 2.0


Magnesium Oxide (Magnesium Oxide)  800 mg PO PRN PRN


   PRN Reason: FOR SERUM MAG < 1.4


Miscellaneous Medication (Phos-Nak)  1 pkt PO TIDPRN PRN


   PRN Reason: FOR PHOS LEVEL 1.0 - 1.8


Miscellaneous Medication (Phos-Nak)  2 pkt PO TIDPRN PRN


   PRN Reason: FOR PHOS LEVEL 0.5 - 1.0


Morphine Sulfate (Morphine)  4 mg SLOW IVP Q30MIN PRN


   PRN Reason: Pain


   Last Admin: 05/08/18 04:02 Dose:  4 mg


Ondansetron HCl (Zofran Odt)  4 mg PO Q6H PRN


   PRN Reason: Nausea/Vomiting


Ondansetron HCl (Zofran)  4 mg IVP Q6H PRN


   PRN Reason: Nausea/Vomiting


Pantoprazole Sodium (Protonix)  40 mg PER TUBE DAILY [facility]


   Last Admin: 05/11/18 08:43 Dose:  40 mg


Potassium Chloride (K-Dur)  40 meq PO ASDIR PRN


   PRN Reason: FOR SERUM K+  2.5 - 3.5


   Last Admin: 05/02/18 06:43 Dose:  40 meq


Potassium Chloride (Klor-Con)  40 meq PER TUBE ASDIR PRN


   PRN Reason: FOR SERUM K+ 2.5-3.5


   Last Admin: 05/07/18 06:22 Dose:  40 meq


Sodium Bicarbonate (Bicarbonate, Sodium)  650 mg PER TUBE .PER PROTOCOL PRN


   PRN Reason: ENTERAL TUBE OCCLUSION


Sodium Chloride (Flush - Normal Saline)  10 ml IVF Q12HR [facility]


   Last Admin: 05/11/18 08:43 Dose:  10 ml


Sodium Chloride (Flush - Normal Saline)  10 ml IVF PRN PRN


   PRN Reason: Saline Flush


   Last Admin: 05/11/18 08:43 Dose:  10 ml

## 2018-05-11 NOTE — PRG
DATE OF SERVICE:  05/11/2018

 

SUBJECTIVE:  The patient was on the ventilator last night.  She is on trach collar this morning.  Robert
ears to be doing well.

 

OBJECTIVE:

VITAL SIGNS:  Temperature 98.2, pulse 81, blood pressure 114/51.  A 24-hour intake 1880, output 1200.


HEENT:  Unremarkable.

NECK:  No JVD.  Trach in good position.

LUNGS:  Clear, but distant breath sounds.

CARDIAC:  S1 and S2 regular.

ABDOMEN:  Soft.

EXTREMITIES:  No edema.

 

LABORATORY DATA:  White blood cell count 5.4, hematocrit 38.5, platelet count 119.  Sodium 141, potas
sium 3.8, chloride 96, CO2 37, BUN 33, creatinine 0.9, glucose 103.

 

ASSESSMENT:

1.  Acute on chronic respiratory failure.

2.  Obesity hypoventilation syndrome.

3.  Status post trach.

 

PLAN:

1.  Extend time off ventilator - hopefully she can go all night without the ventilator.  She could be
 moved to [facility] once she is no longer requiring nocturnal ventilation.

2.  She is currently on Eliquis for paroxysmal atrial fibrillation and stroke prophylaxis.

## 2018-05-12 LAB
ANALYZER IN CARDIO: (no result)
ANION GAP SERPL CALC-SCNC: 12 MMOL/L (ref 10–20)
BASE EXCESS STD BLDA CALC-SCNC: 11.5 MEQ/L
BASOPHILS # BLD AUTO: 0 THOU/UL (ref 0–0.2)
BASOPHILS NFR BLD AUTO: 0.5 % (ref 0–1)
BUN SERPL-MCNC: 38 MG/DL (ref 9.8–20.1)
CA-I BLDA-SCNC: 1.3 MMOL/L (ref 1.12–1.3)
CALCIUM SERPL-MCNC: 9.8 MG/DL (ref 7.8–10.44)
CHLORIDE SERPL-SCNC: 96 MMOL/L (ref 98–107)
CO2 SERPL-SCNC: 37 MMOL/L (ref 23–31)
CREAT CL PREDICTED SERPL C-G-VRATE: 205 ML/MIN (ref 70–130)
EOSINOPHIL # BLD AUTO: 0.2 THOU/UL (ref 0–0.7)
EOSINOPHIL NFR BLD AUTO: 3.3 % (ref 0–10)
GLUCOSE SERPL-MCNC: 112 MG/DL (ref 80–115)
HCO3 BLDA-SCNC: 41.1 MEQ/L (ref 22–26)
HCT VFR BLDA CALC: 42.1 % (ref 36–47)
HGB BLD-MCNC: 11.6 G/DL (ref 12–16)
HGB BLDA-MCNC: 11.1 G/DL (ref 12–16)
LYMPHOCYTES # BLD: 0.7 THOU/UL (ref 1.2–3.4)
LYMPHOCYTES NFR BLD AUTO: 10 % (ref 21–51)
MCH RBC QN AUTO: 25.1 PG (ref 27–31)
MCV RBC AUTO: 86.2 FL (ref 81–99)
MONOCYTES # BLD AUTO: 1 THOU/UL (ref 0.11–0.59)
MONOCYTES NFR BLD AUTO: 13.3 % (ref 0–10)
NEUTROPHILS # BLD AUTO: 5.4 THOU/UL (ref 1.4–6.5)
NEUTROPHILS NFR BLD AUTO: 73 % (ref 42–75)
O2 A-A PPRESDIFF RESPIRATORY: 169.97 MM[HG] (ref 0–20)
PCO2 BLDA: 87.7 MMHG (ref 35–45)
PH BLDA: 7.29 [PH] (ref 7.35–7.45)
PLATELET # BLD AUTO: 123 THOU/UL (ref 130–400)
PO2 BLDA: 76.9 MMHG (ref 80–100)
POTASSIUM SERPL-SCNC: 3.8 MMOL/L (ref 3.5–5.1)
RBC # BLD AUTO: 4.63 MILL/UL (ref 4.2–5.4)
SODIUM SERPL-SCNC: 141 MMOL/L (ref 136–145)
SPECIMEN DRAWN FROM PATIENT: (no result)
WBC # BLD AUTO: 7.4 THOU/UL (ref 4.8–10.8)

## 2018-05-12 RX ADMIN — Medication SCH ML: at 21:31

## 2018-05-12 RX ADMIN — PANTOPRAZOLE SODIUM SCH MG: 40 GRANULE, DELAYED RELEASE ORAL at 08:27

## 2018-05-12 RX ADMIN — Medication SCH ML: at 08:27

## 2018-05-12 RX ADMIN — Medication PRN ML: at 08:28

## 2018-05-12 NOTE — PRG
DATE OF SERVICE:  05/12/2018

 

SUBJECTIVE:  Ms. Hedrick is doing well.  She remains intubated on the ventilator.  She is maintaining 
sinus rhythm.

 

OBJECTIVE:

VITAL SIGNS:  Blood pressure is 169/70 and pulse is 70, it is sinus.

LUNGS:  Clear.

CARDIAC:  Normal S1, normal S2.

 

ASSESSMENT:

1.  Paroxysmal atrial fibrillation, maintaining sinus rhythm.

2.  Obesity hypoventilation, on the ventilator.

3.  Morbid obesity.

4.  Chronic diastolic heart failure.

 

PLAN:  Continue current medical regimen.  She is on Eliquis and amiodarone.

## 2018-05-12 NOTE — PDOC.PN
- Subjective


Encounter Start Date: 05/12/18


Encounter Start Time: 07:15


Subjective: lethargic this am, is on vent 





- Objective


Resuscitation Status: 


 











Resuscitation Status           FULL:Full Resuscitation














MAR Reviewed: Yes


Vital Signs & Weight: 


 Vital Signs (12 hours)











  Temp Pulse Resp BP Pulse Ox


 


 05/12/18 12:35   65   149/72 H 


 


 05/12/18 12:00    17  


 


 05/12/18 10:00    16  


 


 05/12/18 09:49   69   149/68 H 


 


 05/12/18 08:00    16  


 


 05/12/18 07:00  98.0 F    


 


 05/12/18 06:22   86   131/63 


 


 05/12/18 06:16   87  27 H   91 L


 


 05/12/18 04:00  99.2 F    


 


 05/12/18 02:48   95   








 Weight











Admit Weight                   600 lb


 


Weight                         450 lb 6.47 oz











 Most Recent Monitor Data











Heart Rate from ECG            68


 


NIBP                           140/67


 


NIBP BP-Mean                   113


 


Respiration from ECG           31


 


SpO2                           97














I&O: 


 











 05/11/18 05/12/18 05/13/18





 06:59 06:59 06:59


 


Intake Total 1880 775 120


 


Output Total 1200 1095 200


 


Balance 680 -320 -80











Result Diagrams: 


 05/12/18 04:03





 05/12/18 04:03





Phys Exam





- Physical Examination


HEENT: PERRLA, sclera anicteric


Neck: no JVD, supple


trach+


Respiratory: no wheezing


rhonchi+, distant breath sounds


Cardiovascular: RRR, no significant murmur


Gastrointestinal: soft, non-tender, positive bowel sounds


peg+


Musculoskeletal: pulses present, edema present


Neurological: non-focal, moves all 4 limbs





Dx/Plan


(1) Acute and chronic respiratory failure


Code(s): J96.20 - ACUTE AND CHR RESP FAILURE, UNSP W HYPOXIA OR HYPERCAPNIA   

Status: Acute   


Qualifiers: 


   Respiratory failure complication: hypercapnia   Qualified Code(s): J96.22 - 

Acute and chronic respiratory failure with hypercapnia   





(2) MARY (obstructive sleep apnea)


Code(s): G47.33 - OBSTRUCTIVE SLEEP APNEA (ADULT) (PEDIATRIC)   Status: Chronic

   





(3) Afib


Code(s): I48.91 - UNSPECIFIED ATRIAL FIBRILLATION   Status: Chronic   


Qualifiers: 


   Atrial fibrillation type: paroxysmal   Qualified Code(s): I48.0 - Paroxysmal 

atrial fibrillation   





(4) Acute on chronic diastolic (congestive) heart failure


Code(s): I50.33 - ACUTE ON CHRONIC DIASTOLIC (CONGESTIVE) HEART FAILURE   Status

: Chronic   





(5) Morbid obesity with BMI of 70 and over, adult


Code(s): E66.01 - MORBID (SEVERE) OBESITY DUE TO EXCESS CALORIES; Z68.45 - BODY 

MASS INDEX (BMI) 70 OR GREATER, ADULT   Status: Chronic   





(6) Pickwickian syndrome


Code(s): E66.2 - MORBID (SEVERE) OBESITY WITH ALVEOLAR HYPOVENTILATION   Status

: Chronic   





(7) Status post tracheostomy


Code(s): Z93.0 - TRACHEOSTOMY STATUS   Status: Acute   Comment: done on 5/7/18 

  





(8) S/P percutaneous endoscopic gastrostomy (PEG) tube placement


Code(s): Z93.1 - GASTROSTOMY STATUS   Status: Acute   Comment: done on 5/7/18   





- Plan


is back on vent for co2 retention


-: weaning when stable


-: on amiodarone 400mg bid, eliquis and asp 81mg daily


-: prognosis guarded with life threatening obesity





* .








Review of Systems





- Medications/Allergies


Allergies/Adverse Reactions: 


 Allergies











Allergy/AdvReac Type Severity Reaction Status Date / Time


 


No Known Drug Allergies Allergy   Verified 05/06/18 14:16











Medications: 


 Current Medications





Acetaminophen (Tylenol Elixir)  1,000 mg PER TUBE Q6H PRN


   PRN Reason: Headache/Fever or Pain


   Last Admin: 05/09/18 08:33 Dose:  1,000 mg


Acetazolamide Sodium (Diamox)  250 mg IVP Q12HR [facility]


   Last Admin: 05/12/18 08:27 Dose:  250 mg


Albuterol/Ipratropium (Duoneb)  3 ml NEB [id number] KEE


   Last Admin: 05/12/18 12:34 Dose:  3 ml


Amiodarone HCl (Cordarone)  400 mg PO BID [facility]


   Last Admin: 05/12/18 08:27 Dose:  400 mg


Lipase/Protease/[place]ylase ([place] [id number])  1 cap FS .PER PROTOCOL PRN


   PRN Reason: TUBE OCCLUSION PROTOCOL


Apixaban (Eliquis)  5 mg PO BID [facility]


   Last Admin: 05/12/18 08:27 Dose:  5 mg


Aspirin (Aspirin Chewable)  81 mg PO DAILY [facility]


   Last Admin: 05/12/18 08:27 Dose:  81 mg


Clonidine (Catapres)  0.1 mg PO Q4H PRN


   PRN Reason: Systolic BP > 180


   Last Admin: 05/08/18 18:11 Dose:  0.1 mg


Hydralazine HCl (Apresoline)  10 mg SLOW IVP Q4H PRN


   PRN Reason: Systolic BP > 180


   Last Admin: 05/12/18 00:25 Dose:  10 mg


Potassium Chloride 40 meq/ (Sodium Chloride)  270 mls @ 135 mls/hr IVPB ASDIR 

PRN


   PRN Reason: FOR SERUM K+ 2.5 - 3.5


   Last Admin: 04/28/18 06:18 Dose:  270 mls


Potassium Chloride 40 meq/ (Device)  100 mls @ 50 mls/hr IVPB ASDIR PRN


   PRN Reason: FOR SERUM K+ 2.5 - 3.5


Magnesium Sulfate 1 gm/ Sodium (Chloride)  102 mls @ 102 mls/hr IV PRN PRN


   PRN Reason: MAG LEVEL 1.4 - 2.0


Magnesium Sulfate 2 gm/ Device  100 mls @ 100 mls/hr IVPB ASDIR PRN


   PRN Reason: MAGNESIUM < 1.4


Potassium Phosphate 9 mmol/ (Sodium Chloride)  103 mls @ 25.75 mls/hr IVPB 

ASDIR PRN


   PRN Reason: Phosphate 1.0-1.8


Potassium Phosphate 12 mmol/ (Sodium Chloride)  254 mls @ 63.5 mls/hr IV ASDIR 

PRN


   PRN Reason: Serum phosphate 0.5-0.9


Potassium Phosphate 15 mmol/ (Sodium Chloride)  255 mls @ 63.75 mls/hr IV ASDIR 

PRN


   PRN Reason: Serum Phos < 0.5


Magnesium Oxide (Magnesium Oxide)  400 mg PO BIDPRN PRN


   PRN Reason: FOR SERUM MAG 1.4 - 2.0


Magnesium Oxide (Magnesium Oxide)  800 mg PO PRN PRN


   PRN Reason: FOR SERUM MAG < 1.4


Miscellaneous Medication (Phos-Nak)  1 pkt PO TIDPRN PRN


   PRN Reason: FOR PHOS LEVEL 1.0 - 1.8


Miscellaneous Medication (Phos-Nak)  2 pkt PO TIDPRN PRN


   PRN Reason: FOR PHOS LEVEL 0.5 - 1.0


Morphine Sulfate (Morphine)  4 mg SLOW IVP Q30MIN PRN


   PRN Reason: Pain


   Last Admin: 05/12/18 03:35 Dose:  4 mg


Ondansetron HCl (Zofran Odt)  4 mg PO Q6H PRN


   PRN Reason: Nausea/Vomiting


Ondansetron HCl (Zofran)  4 mg IVP Q6H PRN


   PRN Reason: Nausea/Vomiting


Pantoprazole Sodium (Protonix)  40 mg PER TUBE DAILY [facility]


   Last Admin: 05/12/18 08:27 Dose:  40 mg


Potassium Chloride (K-Dur)  40 meq PO ASDIR PRN


   PRN Reason: FOR SERUM K+  2.5 - 3.5


   Last Admin: 05/02/18 06:43 Dose:  40 meq


Potassium Chloride (Klor-Con)  40 meq PER TUBE ASDIR PRN


   PRN Reason: FOR SERUM K+ 2.5-3.5


   Last Admin: 05/07/18 06:22 Dose:  40 meq


Sodium Bicarbonate (Bicarbonate, Sodium)  650 mg PER TUBE .PER PROTOCOL PRN


   PRN Reason: ENTERAL TUBE OCCLUSION


Sodium Chloride (Flush - Normal Saline)  10 ml IVF Q12HR [facility]


   Last Admin: 05/12/18 08:27 Dose:  10 ml


Sodium Chloride (Flush - Normal Saline)  10 ml IVF PRN PRN


   PRN Reason: Saline Flush


   Last Admin: 05/12/18 08:28 Dose:  10 ml

## 2018-05-12 NOTE — PRG
DATE OF SERVICE:  05/12/2018

 

SUBJECTIVE:  Ms. Hedrick is clinically unchanged.

 

OBJECTIVE:

VITAL SIGNS:  Heart rate 72, respiratory 22, oximetry is 91, blood pressure 154/71.  Intake and outpu
t is Negative 320 mL.

LUNGS:  Remarkable for coarse equal breath sounds anteriorly.

HEART:  Regular rhythm.

ABDOMEN:  Soft.

EXTREMITIES:  Without asymmetry.

NEUROLOGIC:  Nonfocal.

 

LABORATORY DATA:  White count 7.4, hemoglobin 11.6, platelets 123.  Sodium 141, potassium 3.8, chlori
de 96, bicarbonate 37, BUN 38, creatinine 0.93.  pH 7.29, CO2 87, pO2 76.

 

She is on a trach collar for many hours yesterday and last night, had to go back on mechanical ventil
ation because of hypercarbia and somnolence this morning.

 

IMPRESSION:

1.  Obesity hypoventilation syndrome.

2.  Respiratory failure.

3.  Atrial fibrillation, now in sinus rhythm.

4.  Diastolic heart failure.

 

PLAN:  Continue current ventilatory support, weaning trial slowly.  Her prognosis is quite guarded.

 

Critical care time was 30 minutes.

## 2018-05-13 LAB
ANION GAP SERPL CALC-SCNC: 11 MMOL/L (ref 10–20)
BASOPHILS # BLD AUTO: 0 THOU/UL (ref 0–0.2)
BASOPHILS NFR BLD AUTO: 0.6 % (ref 0–1)
BUN SERPL-MCNC: 46 MG/DL (ref 9.8–20.1)
CALCIUM SERPL-MCNC: 9.5 MG/DL (ref 7.8–10.44)
CHLORIDE SERPL-SCNC: 98 MMOL/L (ref 98–107)
CO2 SERPL-SCNC: 37 MMOL/L (ref 23–31)
CREAT CL PREDICTED SERPL C-G-VRATE: 203 ML/MIN (ref 70–130)
EOSINOPHIL # BLD AUTO: 0.3 THOU/UL (ref 0–0.7)
EOSINOPHIL NFR BLD AUTO: 5.4 % (ref 0–10)
GLUCOSE SERPL-MCNC: 107 MG/DL (ref 80–115)
HGB BLD-MCNC: 10.9 G/DL (ref 12–16)
LYMPHOCYTES # BLD: 0.9 THOU/UL (ref 1.2–3.4)
LYMPHOCYTES NFR BLD AUTO: 16.3 % (ref 21–51)
MCH RBC QN AUTO: 24.8 PG (ref 27–31)
MCV RBC AUTO: 84.8 FL (ref 81–99)
MONOCYTES # BLD AUTO: 0.7 THOU/UL (ref 0.11–0.59)
MONOCYTES NFR BLD AUTO: 13.3 % (ref 0–10)
NEUTROPHILS # BLD AUTO: 3.5 THOU/UL (ref 1.4–6.5)
NEUTROPHILS NFR BLD AUTO: 64.3 % (ref 42–75)
PLATELET # BLD AUTO: 126 THOU/UL (ref 130–400)
POTASSIUM SERPL-SCNC: 3.8 MMOL/L (ref 3.5–5.1)
RBC # BLD AUTO: 4.41 MILL/UL (ref 4.2–5.4)
SODIUM SERPL-SCNC: 142 MMOL/L (ref 136–145)
WBC # BLD AUTO: 5.4 THOU/UL (ref 4.8–10.8)

## 2018-05-13 RX ADMIN — PANTOPRAZOLE SODIUM SCH MG: 40 GRANULE, DELAYED RELEASE ORAL at 08:33

## 2018-05-13 RX ADMIN — Medication SCH ML: at 21:33

## 2018-05-13 RX ADMIN — Medication SCH ML: at 08:34

## 2018-05-13 NOTE — PRG
DATE OF SERVICE:  05/13/2018

 

SUBJECTIVE:  Ms. Hedrick is unchanged.  She is awake and alert this morning.  Her ventilatory support 
was decreased dramatically.  She had no complaints.

 

OBJECTIVE:

VITAL SIGNS:  Heart rate is in the 80s, blood pressure 103/50, respiratory rate 17, oximetry is 93.

LUNGS:  Clear.

HEART:  Regular rhythm.

ABDOMEN:  Soft.

EXTREMITIES:  With stasis changes.

 

Cultures have been reviewed again and are negative.

 

IMPRESSION:

1.  Acute on chronic respiratory failure, back on mechanical ventilation.

2.  History of atrial fibrillation, paroxysmal, now in sinus rhythm.

3.  Deconditioning and weakness.

 

PLAN:  Continue ventilatory support, decreasing support.  Prognosis is guarded for long-term survival
 given that she is 5 feet, 4, 450 pounds with a BMI of 77.

## 2018-05-13 NOTE — PRG
DATE OF SERVICE:  05/13/2018

 

SUBJECTIVE:  Ms. Hedrick is maintaining sinus rhythm.  She has remained on the ventilator.

 

OBJECTIVE:

LUNGS:  Clear.

CARDIAC:  Normal S1 and S2, paced on aspirin and apixaban.

 

ASSESSMENT:  Paroxysmal atrial fibrillation, maintaining sinus rhythm, no change.

## 2018-05-13 NOTE — PDOC.PN
- Subjective


Encounter Start Date: 05/13/18


Encounter Start Time: 13:00


Subjective: awake on vent





- Objective


Resuscitation Status: 


 











Resuscitation Status           FULL:Full Resuscitation














MAR Reviewed: Yes


Vital Signs & Weight: 


 Vital Signs (12 hours)











  Temp Pulse Resp BP Pulse Ox


 


 05/13/18 14:07   80  17   93 L


 


 05/13/18 10:46   84   103/50 L 


 


 05/13/18 07:25   83   101/50 L 


 


 05/13/18 06:00    15  


 


 05/13/18 04:00  97.2 F L   16  








 Weight











Admit Weight                   600 lb


 


Weight                         450 lb 6.47 oz











 Most Recent Monitor Data











Heart Rate from ECG            81


 


NIBP                           120/62


 


NIBP BP-Mean                   73


 


Respiration from ECG           23


 


SpO2                           95














I&O: 


 











 05/12/18 05/13/18 05/14/18





 06:59 06:59 06:59


 


Intake Total 775 1426 


 


Output Total 1095 1170 


 


Balance -320 256 











Result Diagrams: 


 05/13/18 04:00





 05/13/18 04:00





Phys Exam





- Physical Examination


HEENT: PERRLA, moist MMs


Neck: no JVD, supple


Respiratory: no wheezing, no rales


Cardiovascular: RRR, no significant murmur


Gastrointestinal: soft, non-tender, positive bowel sounds


abd wall edema+


Musculoskeletal: pulses present, edema present


Neurological: non-focal, moves all 4 limbs





Dx/Plan


(1) Acute and chronic respiratory failure


Code(s): J96.20 - ACUTE AND CHR RESP FAILURE, UNSP W HYPOXIA OR HYPERCAPNIA   

Status: Acute   


Qualifiers: 


   Respiratory failure complication: hypercapnia   Qualified Code(s): J96.22 - 

Acute and chronic respiratory failure with hypercapnia   





(2) MARY (obstructive sleep apnea)


Code(s): G47.33 - OBSTRUCTIVE SLEEP APNEA (ADULT) (PEDIATRIC)   Status: Chronic

   





(3) Afib


Code(s): I48.91 - UNSPECIFIED ATRIAL FIBRILLATION   Status: Chronic   


Qualifiers: 


   Atrial fibrillation type: paroxysmal   Qualified Code(s): I48.0 - Paroxysmal 

atrial fibrillation   





(4) Acute on chronic diastolic (congestive) heart failure


Code(s): I50.33 - ACUTE ON CHRONIC DIASTOLIC (CONGESTIVE) HEART FAILURE   Status

: Chronic   





(5) Morbid obesity with BMI of 70 and over, adult


Code(s): E66.01 - MORBID (SEVERE) OBESITY DUE TO EXCESS CALORIES; Z68.45 - BODY 

MASS INDEX (BMI) 70 OR GREATER, ADULT   Status: Chronic   





(6) Pickwickian syndrome


Code(s): E66.2 - MORBID (SEVERE) OBESITY WITH ALVEOLAR HYPOVENTILATION   Status

: Chronic   





(7) Status post tracheostomy


Code(s): Z93.0 - TRACHEOSTOMY STATUS   Status: Acute   Comment: done on 5/7/18 

  





(8) S/P percutaneous endoscopic gastrostomy (PEG) tube placement


Code(s): Z93.1 - GASTROSTOMY STATUS   Status: Acute   Comment: done on 5/7/18   





- Plan


will start lasix drip at low dose, d/w , has slowly growing anasarca


-: is on diamox iv as well, closely monitor for severe alkalosis


-: off and on bipap (mostly has been on it)


-: on amiodaron, eliquis, asp.


-: Prognosis guarded. Pt has no insurance or ssn, dc will be to home





* .








Review of Systems





- Medications/Allergies


Allergies/Adverse Reactions: 


 Allergies











Allergy/AdvReac Type Severity Reaction Status Date / Time


 


No Known Drug Allergies Allergy   Verified 05/06/18 14:16











Medications: 


 Current Medications





Acetaminophen (Tylenol Elixir)  1,000 mg PER TUBE Q6H PRN


   PRN Reason: Headache/Fever or Pain


   Last Admin: 05/09/18 08:33 Dose:  1,000 mg


Acetazolamide Sodium (Diamox)  250 mg IVP Q12HR [facility]


   Last Admin: 05/13/18 08:33 Dose:  250 mg


Albuterol/Ipratropium (Duoneb)  3 ml NEB [id number] KEE


   Last Admin: 05/13/18 14:07 Dose:  3 ml


Amiodarone HCl (Cordarone)  400 mg PO BID [facility]


   Last Admin: 05/13/18 08:33 Dose:  400 mg


Lipase/Protease/Amylase ([place] Dr [address])  1 cap FS .PER PROTOCOL PRN


   PRN Reason: TUBE OCCLUSION PROTOCOL


Apixaban (Eliquis)  5 mg PO BID [facility]


   Last Admin: 05/13/18 08:33 Dose:  5 mg


Aspirin (Aspirin Chewable)  81 mg PO DAILY [facility]


   Last Admin: 05/13/18 08:33 Dose:  81 mg


Clonidine (Catapres)  0.1 mg PO Q4H PRN


   PRN Reason: Systolic BP > 180


   Last Admin: 05/08/18 18:11 Dose:  0.1 mg


Hydralazine HCl (Apresoline)  10 mg SLOW IVP Q4H PRN


   PRN Reason: Systolic BP > 180


   Last Admin: 05/12/18 17:06 Dose:  10 mg


Potassium Chloride 40 meq/ (Sodium Chloride)  270 mls @ 135 mls/hr IVPB ASDIR 

PRN


   PRN Reason: FOR SERUM K+ 2.5 - 3.5


   Last Admin: 04/28/18 06:18 Dose:  270 mls


Potassium Chloride 40 meq/ (Device)  100 mls @ 50 mls/hr IVPB ASDIR PRN


   PRN Reason: FOR SERUM K+ 2.5 - 3.5


Magnesium Sulfate 1 gm/ Sodium (Chloride)  102 mls @ 102 mls/hr IV PRN PRN


   PRN Reason: MAG LEVEL 1.4 - 2.0


Magnesium Sulfate 2 gm/ Device  100 mls @ 100 mls/hr IVPB ASDIR PRN


   PRN Reason: MAGNESIUM < 1.4


Potassium Phosphate 9 mmol/ (Sodium Chloride)  103 mls @ 25.75 mls/hr IVPB 

ASDIR PRN


   PRN Reason: Phosphate 1.0-1.8


Potassium Phosphate 12 mmol/ (Sodium Chloride)  254 mls @ 63.5 mls/hr IV ASDIR 

PRN


   PRN Reason: Serum phosphate 0.5-0.9


Potassium Phosphate 15 mmol/ (Sodium Chloride)  255 mls @ 63.75 mls/hr IV ASDIR 

PRN


   PRN Reason: Serum Phos < 0.5


Losartan Potassium (Cozaar)  25 mg PO DAILY KEE


Magnesium Oxide (Magnesium Oxide)  400 mg PO BIDPRN PRN


   PRN Reason: FOR SERUM MAG 1.4 - 2.0


Magnesium Oxide (Magnesium Oxide)  800 mg PO PRN PRN


   PRN Reason: FOR SERUM MAG < 1.4


Miscellaneous Medication (Phos-Nak)  1 pkt PO TIDPRN PRN


   PRN Reason: FOR PHOS LEVEL 1.0 - 1.8


Miscellaneous Medication (Phos-Nak)  2 pkt PO TIDPRN PRN


   PRN Reason: FOR PHOS LEVEL 0.5 - 1.0


Morphine Sulfate (Morphine)  4 mg SLOW IVP Q30MIN PRN


   PRN Reason: Pain


   Last Admin: 05/12/18 03:35 Dose:  4 mg


Ondansetron HCl (Zofran Odt)  4 mg PO Q6H PRN


   PRN Reason: Nausea/Vomiting


Ondansetron HCl (Zofran)  4 mg IVP Q6H PRN


   PRN Reason: Nausea/Vomiting


Pantoprazole Sodium (Protonix)  40 mg PER TUBE DAILY [facility]


   Last Admin: 05/13/18 08:33 Dose:  40 mg


Potassium Chloride (K-Dur)  40 meq PO ASDIR PRN


   PRN Reason: FOR SERUM K+  2.5 - 3.5


   Last Admin: 05/02/18 06:43 Dose:  40 meq


Potassium Chloride (Klor-Con)  40 meq PER TUBE ASDIR PRN


   PRN Reason: FOR SERUM K+ 2.5-3.5


   Last Admin: 05/07/18 06:22 Dose:  40 meq


Sodium Bicarbonate (Bicarbonate, Sodium)  650 mg PER TUBE .PER PROTOCOL PRN


   PRN Reason: ENTERAL TUBE OCCLUSION


Sodium Chloride (Flush - Normal Saline)  10 ml IVF Q12HR [facility]


   Last Admin: 05/13/18 08:34 Dose:  10 ml


Sodium Chloride (Flush - Normal Saline)  10 ml IVF PRN PRN


   PRN Reason: Saline Flush


   Last Admin: 05/12/18 08:28 Dose:  10 ml

## 2018-05-14 LAB
ANION GAP SERPL CALC-SCNC: 9 MMOL/L (ref 10–20)
BASOPHILS # BLD AUTO: 0 THOU/UL (ref 0–0.2)
BASOPHILS NFR BLD AUTO: 0.6 % (ref 0–1)
BUN SERPL-MCNC: 47 MG/DL (ref 9.8–20.1)
CALCIUM SERPL-MCNC: 9.1 MG/DL (ref 7.8–10.44)
CHLORIDE SERPL-SCNC: 99 MMOL/L (ref 98–107)
CO2 SERPL-SCNC: 36 MMOL/L (ref 23–31)
CREAT CL PREDICTED SERPL C-G-VRATE: 209 ML/MIN (ref 70–130)
EOSINOPHIL # BLD AUTO: 0.3 THOU/UL (ref 0–0.7)
EOSINOPHIL NFR BLD AUTO: 5.6 % (ref 0–10)
GLUCOSE SERPL-MCNC: 90 MG/DL (ref 80–115)
HGB BLD-MCNC: 10.7 G/DL (ref 12–16)
LYMPHOCYTES # BLD: 0.8 THOU/UL (ref 1.2–3.4)
LYMPHOCYTES NFR BLD AUTO: 17.2 % (ref 21–51)
MCH RBC QN AUTO: 24.6 PG (ref 27–31)
MCV RBC AUTO: 84.1 FL (ref 81–99)
MONOCYTES # BLD AUTO: 0.6 THOU/UL (ref 0.11–0.59)
MONOCYTES NFR BLD AUTO: 12.1 % (ref 0–10)
NEUTROPHILS # BLD AUTO: 3.1 THOU/UL (ref 1.4–6.5)
NEUTROPHILS NFR BLD AUTO: 64.5 % (ref 42–75)
PLATELET # BLD AUTO: 130 THOU/UL (ref 130–400)
POTASSIUM SERPL-SCNC: 4.1 MMOL/L (ref 3.5–5.1)
RBC # BLD AUTO: 4.36 MILL/UL (ref 4.2–5.4)
SODIUM SERPL-SCNC: 140 MMOL/L (ref 136–145)
WBC # BLD AUTO: 4.9 THOU/UL (ref 4.8–10.8)

## 2018-05-14 RX ADMIN — Medication SCH ML: at 08:51

## 2018-05-14 RX ADMIN — Medication SCH ML: at 21:44

## 2018-05-14 RX ADMIN — PANTOPRAZOLE SODIUM SCH MG: 40 GRANULE, DELAYED RELEASE ORAL at 08:51

## 2018-05-14 NOTE — PDOC.CTH
Cardiology Progress Note





- Subjective





No new issues. Remains in sinus rhythm.





- Objective


 Vital Signs











  Temp Pulse Pulse Pulse Resp BP BP


 


 05/14/18 14:42   69     


 


 05/14/18 14:00      19  


 


 05/14/18 13:13   68     134/58 L 


 


 05/14/18 12:20    77  80    117/63


 


 05/14/18 12:00  97.9 F     28 H  


 


 05/14/18 10:47   79     


 


 05/14/18 10:00      26 H  


 


 05/14/18 08:00      19  


 


 05/14/18 07:25  98.6 F  85    20  


 


 05/14/18 07:00  98.6 F      


 


 05/14/18 06:42   80     131/54 L 


 


 05/14/18 06:39   86    19  


 


 05/14/18 06:00      23 H  


 


 05/14/18 04:03   80     














  BP Pulse Ox Pulse Ox Pulse Ox


 


 05/14/18 14:42    


 


 05/14/18 14:00    


 


 05/14/18 13:13    


 


 05/14/18 12:20  125/60   90 L  91 L


 


 05/14/18 12:00    


 


 05/14/18 10:47    


 


 05/14/18 10:00    


 


 05/14/18 08:00    


 


 05/14/18 07:25   93 L  


 


 05/14/18 07:00    


 


 05/14/18 06:42    


 


 05/14/18 06:39   90 L  


 


 05/14/18 06:00    


 


 05/14/18 04:03    








 











Admit Weight                   600 lb


 


Weight                         450 lb 6.47 oz














 











 05/13/18 05/14/18 05/15/18





 06:59 06:59 06:59


 


Intake Total 1426 1820.1 60


 


Output Total 1170 2040 560


 


Balance 256 -219.9 -500














- Physical Examination


General/Neuro: NAD


Neck: no JVD present


Lungs: unlabored respirations


Heart: RRR


Abdomen: NT/ND


Extremities: + edema B (2+)





- Telemetry


Telemetry Rhythm: NSR





- Labs


Result Diagrams: 


 05/14/18 04:44





 05/14/18 04:44


 Troponin/CKMB











CK-MB (CK-2)  2.9 ng/mL (0-6.6)   04/26/18  01:05    


 


Troponin I  0.180 ng/mL (< 0.028)  H  04/26/18  06:57    














- Assessment/Plan





1. Acute hypoxic hypercapnic respiratory insufficiency


2. Acute on chronic RV dysfunction.


3. Acute on chronic diastolic heart failure, improved. 


4. Morbid obesity, 


5. Pickwickian syndrome


6. Afib RVR, in sinus now. 








PLAN:


- Eliquis 5 mg BID. 


- Amiodarone to 200 mg daily, maintenance dose. 


- Placement.

## 2018-05-14 NOTE — PDOC.PN
- Subjective


Encounter Start Date: 05/14/18


Encounter Start Time: 12:15


Subjective: awake, not fully oriented


-: is severely deconditioned, unable to lift her LE/bend her knees well


-: on vent





- Objective


Resuscitation Status: 


 











Resuscitation Status           FULL:Full Resuscitation














MAR Reviewed: Yes


Vital Signs & Weight: 


 Vital Signs (12 hours)











  Temp Pulse Pulse Pulse Resp BP BP


 


 05/14/18 16:00  98.8 F     18  


 


 05/14/18 14:42   69     


 


 05/14/18 14:00      19  


 


 05/14/18 13:13   68     134/58 L 


 


 05/14/18 12:20    77  80    117/63


 


 05/14/18 12:00  97.9 F     28 H  


 


 05/14/18 10:47   79     


 


 05/14/18 10:00      26 H  


 


 05/14/18 08:00      19  


 


 05/14/18 07:25  98.6 F  85    20  


 


 05/14/18 07:00  98.6 F      


 


 05/14/18 06:42   80     131/54 L 


 


 05/14/18 06:39   86    19  


 


 05/14/18 06:00      23 H  














  BP Pulse Ox Pulse Ox Pulse Ox


 


 05/14/18 16:00    


 


 05/14/18 14:42    


 


 05/14/18 14:00    


 


 05/14/18 13:13    


 


 05/14/18 12:20  125/60   90 L  91 L


 


 05/14/18 12:00    


 


 05/14/18 10:47    


 


 05/14/18 10:00    


 


 05/14/18 08:00    


 


 05/14/18 07:25   93 L  


 


 05/14/18 07:00    


 


 05/14/18 06:42    


 


 05/14/18 06:39   90 L  


 


 05/14/18 06:00    








 Weight











Admit Weight                   600 lb


 


Weight                         450 lb 6.47 oz











 Most Recent Monitor Data











Heart Rate from ECG            64


 


NIBP                           144/77


 


NIBP BP-Mean                   95


 


Respiration from ECG           21


 


SpO2                           94














I&O: 


 











 05/13/18 05/14/18 05/15/18





 06:59 06:59 06:59


 


Intake Total 1426 1820.1 90


 


Output Total 1170 2040 610


 


Balance 256 -219.9 -520











Result Diagrams: 


 05/14/18 04:44





 05/14/18 04:44





Phys Exam





- Physical Examination


HEENT: PERRLA, moist MMs


Neck: no JVD


trach+


Respiratory: no wheezing, no rales


Cardiovascular: RRR, no significant murmur


Gastrointestinal: soft, non-tender, positive bowel sounds


Musculoskeletal: pulses present, edema present


Neurological: non-focal, moves all 4 limbs





Dx/Plan


(1) Acute and chronic respiratory failure


Code(s): J96.20 - ACUTE AND CHR RESP FAILURE, UNSP W HYPOXIA OR HYPERCAPNIA   

Status: Acute   


Qualifiers: 


   Respiratory failure complication: hypercapnia   Qualified Code(s): J96.22 - 

Acute and chronic respiratory failure with hypercapnia   





(2) MARY (obstructive sleep apnea)


Code(s): G47.33 - OBSTRUCTIVE SLEEP APNEA (ADULT) (PEDIATRIC)   Status: Chronic

   





(3) Afib


Code(s): I48.91 - UNSPECIFIED ATRIAL FIBRILLATION   Status: Chronic   


Qualifiers: 


   Atrial fibrillation type: paroxysmal   Qualified Code(s): I48.0 - Paroxysmal 

atrial fibrillation   





(4) Acute on chronic diastolic (congestive) heart failure


Code(s): I50.33 - ACUTE ON CHRONIC DIASTOLIC (CONGESTIVE) HEART FAILURE   Status

: Chronic   





(5) Morbid obesity with BMI of 70 and over, adult


Code(s): E66.01 - MORBID (SEVERE) OBESITY DUE TO EXCESS CALORIES; Z68.45 - BODY 

MASS INDEX (BMI) 70 OR GREATER, ADULT   Status: Chronic   





(6) Pickwickian syndrome


Code(s): E66.2 - MORBID (SEVERE) OBESITY WITH ALVEOLAR HYPOVENTILATION   Status

: Chronic   





(7) Status post tracheostomy


Code(s): Z93.0 - TRACHEOSTOMY STATUS   Status: Acute   Comment: done on 5/7/18 

  





(8) S/P percutaneous endoscopic gastrostomy (PEG) tube placement


Code(s): Z93.1 - GASTROSTOMY STATUS   Status: Acute   Comment: done on 5/7/18   





- Plan


is unable to come off vent 


-: on trach, weaning as tolerated


-: PT to work on bed


-: is off lasix drip due to increasing bun, edema has decreased a bit


-: prognosis guarded with life threatening obesity and current deconditioning 





* .


Continue amiodarone, eliquis for afib, asp, cozaar, nebs.


Serum hco3 around 36 this am, bun 47.





Review of Systems





- Medications/Allergies


Allergies/Adverse Reactions: 


 Allergies











Allergy/AdvReac Type Severity Reaction Status Date / Time


 


No Known Drug Allergies Allergy   Verified 05/06/18 14:16











Medications: 


 Current Medications





Acetaminophen (Tylenol Elixir)  1,000 mg PER TUBE Q6H PRN


   PRN Reason: Headache/Fever or Pain


   Last Admin: 05/09/18 08:33 Dose:  1,000 mg


Albuterol/Ipratropium (Duoneb)  3 ml NEB [id number] [facility]


   Last Admin: 05/14/18 13:12 Dose:  3 ml


Amiodarone HCl (Cordarone)  200 mg PO DAILY [facility]


Lipase/Protease/[place]ylase ([place] [address])  1 cap FS .PER PROTOCOL PRN


   PRN Reason: TUBE OCCLUSION PROTOCOL


Apixaban (Eliquis)  5 mg PO BID [facility]


   Last Admin: 05/14/18 08:53 Dose:  5 mg


Aspirin (Aspirin Chewable)  81 mg PO DAILY [facility]


   Last Admin: 05/14/18 08:50 Dose:  81 mg


Clonidine (Catapres)  0.1 mg PO Q4H PRN


   PRN Reason: Systolic BP > 180


   Last Admin: 05/08/18 18:11 Dose:  0.1 mg


Hydralazine HCl (Apresoline)  10 mg SLOW IVP Q4H PRN


   PRN Reason: Systolic BP > 180


   Last Admin: 05/12/18 17:06 Dose:  10 mg


Potassium Chloride 40 meq/ (Sodium Chloride)  270 mls @ 135 mls/hr IVPB ASDIR 

PRN


   PRN Reason: FOR SERUM K+ 2.5 - 3.5


   Last Admin: 04/28/18 06:18 Dose:  270 mls


Potassium Chloride 40 meq/ (Device)  100 mls @ 50 mls/hr IVPB ASDIR PRN


   PRN Reason: FOR SERUM K+ 2.5 - 3.5


Magnesium Sulfate 1 gm/ Sodium (Chloride)  102 mls @ 102 mls/hr IV PRN PRN


   PRN Reason: MAG LEVEL 1.4 - 2.0


Magnesium Sulfate 2 gm/ Device  100 mls @ 100 mls/hr IVPB ASDIR PRN


   PRN Reason: MAGNESIUM < 1.4


Potassium Phosphate 9 mmol/ (Sodium Chloride)  103 mls @ 25.75 mls/hr IVPB 

ASDIR PRN


   PRN Reason: Phosphate 1.0-1.8


Potassium Phosphate 12 mmol/ (Sodium Chloride)  254 mls @ 63.5 mls/hr IV ASDIR 

PRN


   PRN Reason: Serum phosphate 0.5-0.9


Potassium Phosphate 15 mmol/ (Sodium Chloride)  255 mls @ 63.75 mls/hr IV ASDIR 

PRN


   PRN Reason: Serum Phos < 0.5


Losartan Potassium (Cozaar)  25 mg PO DAILY [facility]


   Last Admin: 05/14/18 08:50 Dose:  25 mg


Magnesium Oxide (Magnesium Oxide)  400 mg PO BIDPRN PRN


   PRN Reason: FOR SERUM MAG 1.4 - 2.0


Magnesium Oxide (Magnesium Oxide)  800 mg PO PRN PRN


   PRN Reason: FOR SERUM MAG < 1.4


Miscellaneous Medication (Phos-Nak)  1 pkt PO TIDPRN PRN


   PRN Reason: FOR PHOS LEVEL 1.0 - 1.8


Miscellaneous Medication (Phos-Nak)  2 pkt PO TIDPRN PRN


   PRN Reason: FOR PHOS LEVEL 0.5 - 1.0


Morphine Sulfate (Morphine)  4 mg SLOW IVP Q30MIN PRN


   PRN Reason: Pain


   Last Admin: 05/12/18 03:35 Dose:  4 mg


Ondansetron HCl (Zofran Odt)  4 mg PO Q6H PRN


   PRN Reason: Nausea/Vomiting


Ondansetron HCl (Zofran)  4 mg IVP Q6H PRN


   PRN Reason: Nausea/Vomiting


Pantoprazole Sodium (Protonix)  40 mg PER TUBE DAILY [facility]


   Last Admin: 05/14/18 08:51 Dose:  40 mg


Potassium Chloride (K-Dur)  40 meq PO ASDIR PRN


   PRN Reason: FOR SERUM K+  2.5 - 3.5


   Last Admin: 05/02/18 06:43 Dose:  40 meq


Potassium Chloride (Klor-Con)  40 meq PER TUBE ASDIR PRN


   PRN Reason: FOR SERUM K+ 2.5-3.5


   Last Admin: 05/07/18 06:22 Dose:  40 meq


Sodium Bicarbonate (Bicarbonate, Sodium)  650 mg PER TUBE .PER PROTOCOL PRN


   PRN Reason: ENTERAL TUBE OCCLUSION


Sodium Chloride (Flush - Normal Saline)  10 ml IVF Q12HR [facility]


   Last Admin: 05/14/18 08:51 Dose:  10 ml


Sodium Chloride (Flush - Normal Saline)  10 ml IVF PRN PRN


   PRN Reason: Saline Flush


   Last Admin: 05/12/18 08:28 Dose:  10 ml

## 2018-05-14 NOTE — PRG
DATE OF SERVICE:  05/14/2018

 

A 35 minutes critical care time.

 

SUBJECTIVE:  The patient remains on mechanical ventilation through tracheostomy.  Apparently, she mica
led weaning on Saturday and has been kept on the ventilator since and noticed that she is on a Lasix 
drip.

 

PHYSICAL EXAMINATION:

VITAL SIGNS:  Her temperature is 98.6, pulse 80, blood pressure 131/55.  A 24-hour intake 1820, outpu
t 204.

HEENT:  Unremarkable.

NECK:  Trach in good position.

LUNGS:  Coarse breath sounds bilaterally.

CARDIOVASCULAR:  S1, S2 regular.

ABDOMEN:  Soft.

EXTREMITIES:  Brawny edema.

 

LABORATORY DATA:  Sodium 140, potassium 4.1, chloride 99, CO2 36, BUN 47, creatinine 0.9, glucose 90.
  White blood cell count 4.9, hematocrit 36.6, platelet count 130.

 

ASSESSMENT:

1.  Obesity hypoventilation syndrome.

2.  Status post tracheostomy.

3.  Morbid obesity.

4.  Paroxysmal atrial fibrillation.

5.  Prerenal azotemia.

 

PLAN:

1.  I will go ahead and hold her Lasix and Diamox for the next day or two as her BUN is becoming laura
sly elevated.

2.  Continue mechanical ventilation with further attempts at weaning probably by tomorrow.

3.  Enteral tube feeds.

## 2018-05-14 NOTE — CON
DATE OF CONSULTATION:  05/14/2018

 

CONSULTING PHYSICIAN:  Dr. Gallagher.

 

REASON FOR CONSULTATION:  Electrolyte management with Lasix drip.

 

REASON FOR ADMISSION:  Shortness of breath.

 

HISTORY OF PRESENT ILLNESS:  This is a 62-year-old white female with history of morbid obesity, obstr
uctive sleep apnea, heart failure, who came to the hospital with shortness of breath.  The patient ha
s been in the hospital for a long time since 04/26/2018, almost for 3 weeks and the patient was havin
g edema and plan to start on Lasix drip and Nephrology is consulted for electrolyte management and mo
nitoring of renal function while on Lasix drip, but apparently her Lasix was stopped by critical care
 physician this morning and the patient is feeling better.  She is not able to give good history, bec
ause of the trach.  The patient is not having an insurance to have placement done.

 

PAST MEDICAL HISTORY:  Positive for morbid obesity, obstructive sleep apnea, respiratory failure, chr
onic kidney disease.

 

PAST SURGICAL HISTORY:  Tracheostomy.

 

SOCIAL HISTORY:  No smoking, alcohol or illicit drug abuse.

 

FAMILY HISTORY:  No history of kidney disease.

 

HOME MEDICATIONS:  Not known.

 

ALLERGIES:  No known drug allergies.

 

REVIEW OF SYSTEMS:  The following complete review of systems was negative, unless otherwise mentioned
 in the HPI or below:

Constitutional:  Weight loss or gain, ability to conduct usual activities.

Skin:  Rash, itching.

Eyes:  Double vision, pain.

ENT/Mouth:  Nose bleeding, neck stiffness, pain, tenderness.

Cardiovascular:  Palpitations, dyspnea on exertion, orthopnea.

Respiratory:  Shortness of breath, wheezing, cough, hemoptysis, fever or night sweats.

Gastrointestinal:  Poor appetite, abdominal pain, heartburn, nausea, vomiting, constipation, or diarr
hea.

Genitourinary:  Urgency, frequency, dysuria, nocturia.

Musculoskeletal:  Pain, swelling.

Neurologic/Psychiatric:  Anxiety, depression.

Allergy/Immunologic:  Skin rash, bleeding tendency.

 

PHYSICAL EXAMINATION:

GENERAL:  This is a morbidly obese female in no apparent distress.

VITAL SIGNS:  Temperature 98.6, pulse 79, respiratory 19, blood pressure 118/55.

HEENT:  Atraumatic, normocephalic.

NECK:  Trach present.

CHEST:  Clear.

GASTROINTESTINAL:  Abdomen is soft.  Obese.

MUSCULOSKELETAL:  2+ edema.

DERMATOLOGIC:  Chronic skin lesions with blisters in the lower extremities.

NEUROLOGIC:  Alert, awake.  Moving all the extremities.

PSYCHIATRIC:  Depressed.

 

LABORATORY DATA:  Potassium 4.1, BUN 47, creatinine 0.9, BUN on admission was 18.

 

ASSESSMENT AND PLAN:

1.  Volume overload, was on Lasix drip, currently on hold.  We will monitor.

2.  Electrolytes.  Electrolytes are balanced now, potassium.  Monitor magnesium.  Renal function is s
table.  BUN is going up.

3.  Morbid obesity.

4.  Hypertension, stable.

5.  Edema as above.

6.  We will monitor renal function.

 

Thank you for the consult.

## 2018-05-15 LAB
ANION GAP SERPL CALC-SCNC: 11 MMOL/L (ref 10–20)
BASOPHILS # BLD AUTO: 0 THOU/UL (ref 0–0.2)
BASOPHILS NFR BLD AUTO: 0.6 % (ref 0–1)
BUN SERPL-MCNC: 46 MG/DL (ref 9.8–20.1)
CALCIUM SERPL-MCNC: 9.4 MG/DL (ref 7.8–10.44)
CHLORIDE SERPL-SCNC: 100 MMOL/L (ref 98–107)
CO2 SERPL-SCNC: 35 MMOL/L (ref 23–31)
CREAT CL PREDICTED SERPL C-G-VRATE: 227 ML/MIN (ref 70–130)
EOSINOPHIL # BLD AUTO: 0.3 THOU/UL (ref 0–0.7)
EOSINOPHIL NFR BLD AUTO: 6.7 % (ref 0–10)
GLUCOSE SERPL-MCNC: 90 MG/DL (ref 80–115)
HGB BLD-MCNC: 11 G/DL (ref 12–16)
LYMPHOCYTES # BLD: 0.6 THOU/UL (ref 1.2–3.4)
LYMPHOCYTES NFR BLD AUTO: 13.8 % (ref 21–51)
MCH RBC QN AUTO: 24.5 PG (ref 27–31)
MCV RBC AUTO: 83.8 FL (ref 81–99)
MONOCYTES # BLD AUTO: 0.6 THOU/UL (ref 0.11–0.59)
MONOCYTES NFR BLD AUTO: 13.4 % (ref 0–10)
NEUTROPHILS # BLD AUTO: 2.8 THOU/UL (ref 1.4–6.5)
NEUTROPHILS NFR BLD AUTO: 65.6 % (ref 42–75)
PLATELET # BLD AUTO: 141 THOU/UL (ref 130–400)
POTASSIUM SERPL-SCNC: 4.2 MMOL/L (ref 3.5–5.1)
RBC # BLD AUTO: 4.49 MILL/UL (ref 4.2–5.4)
SODIUM SERPL-SCNC: 142 MMOL/L (ref 136–145)
WBC # BLD AUTO: 4.3 THOU/UL (ref 4.8–10.8)

## 2018-05-15 RX ADMIN — PANTOPRAZOLE SODIUM SCH MG: 40 GRANULE, DELAYED RELEASE ORAL at 09:53

## 2018-05-15 RX ADMIN — Medication SCH ML: at 20:03

## 2018-05-15 RX ADMIN — Medication SCH ML: at 09:52

## 2018-05-15 NOTE — PRG
Patient Name:   JUANA ZAMORA

Date of service:  05/15/2018

 

Subjective:

Patient was seen and examined at bedside and overnight events noted. 

Patient denies any shortness of breath or chest pain or palpitation. 

No history of nausea or vomiting or diarrhea or fever or chills or cramps. 

 

Objective:

General:  This is a morbidly obese female seen in ICU.  

Vital signs:  Temperature 97.2, pulse 80, respiratory rate 18, blood pressure 96
/45.

HEENT:  Atraumatic, normocephalic.  Oral mucosa is moist.

Neck:  Trach present.  

Cardiovascular:  S1 S2 heard.  Rate and rhythm regular.

Respiratory:  Clear to auscultation.

Gastrointestinal:  Abdomen is soft. 

Musculoskeletal:  No tenderness.  No edema. 

Dermatologic:  No skin rash.

Neurologic:  Alert and awake and oriented X3.  No focal neurologic deficits.  
Moving all the extremities.

Psychiatric:  Mood and affect normal. 

 

LABORATORY DATA:  Potassium is 4.2, BUN 46, creatinine 0.8.

 

ASSESSMENT AND PLAN:

1.  Volume overload.  Lasix and diuretics on hold, monitor electrolytes.

2.  Morbid obesity.

3.  Hypertension.

4.  Edema, as above.  

 

We will sign off.  Currently Lasix is on hold.  Renal function is stable.  
Please call back with any questions.  

 

 

MTDD

## 2018-05-15 NOTE — PDOC.CTH
Cardiology Progress Note





- Subjective





No new issues or complaints. 





- Objective


 Vital Signs











  Temp Pulse Resp BP Pulse Ox


 


 05/15/18 09:32      91 L


 


 05/15/18 08:00  97.2 F L  80  25 H   95


 


 05/15/18 07:35   81   106/59 L 


 


 05/15/18 07:33   83  20   91 L


 


 05/15/18 07:00  97.2 F L    


 


 05/15/18 06:00    21 H  


 


 05/15/18 04:00  98.5 F   23 H  


 


 05/15/18 02:39   78   


 


 05/15/18 02:00    15  


 


 05/15/18 01:44   71   120/58 L 


 


 05/15/18 00:00  98.8 F   23 H  








 











Admit Weight                   600 lb


 


Weight                         450 lb 6.47 oz














 











 05/14/18 05/15/18 05/16/18





 06:59 06:59 06:59


 


Intake Total 1820.1 1044.8 60


 


Output Total 2040 1835 240


 


Balance -219.9 -790.2 -180














- Physical Examination


General/Neuro: NAD


Neck: no JVD present


Lungs: unlabored respirations


Heart: RRR


Abdomen: NT/ND


Extremities: + edema B (2+ chronic)





- Telemetry


Telemetry Rhythm: NSR





- Labs


Result Diagrams: 


 05/15/18 04:10





 05/15/18 04:10


 Troponin/CKMB











CK-MB (CK-2)  2.9 ng/mL (0-6.6)   04/26/18  01:05    


 


Troponin I  0.180 ng/mL (< 0.028)  H  04/26/18  06:57    














- Assessment/Plan





1. Acute hypoxic hypercapnic respiratory insufficiency


2. Acute on chronic RV dysfunction.


3. Acute on chronic diastolic heart failure, improved. 


4. Morbid obesity, 


5. Pickwickian syndrome


6. Afib RVR, in sinus now. 








PLAN:


- Eliquis 5 mg BID. 


- Amiodarone to 200 mg daily, maintenance dose. 


- Placement.

## 2018-05-15 NOTE — PRG
DATE OF SERVICE:  05/15/2018

 

The patient is doing well, had no complaints.

 

PHYSICAL EXAMINATION:  

VITAL SIGNS:  Temperature is 97.2, pulse 81, blood pressure 106/59, 24 intake 1044, output 1835.

HEENT:  Unremarkable.  Trach in good position.

LUNGS:  Coarse breath sounds.

CARDIAC:  S1 and S2 regular.

ABDOMEN:  Obese, soft, nontender.

EXTREMITIES:  Brawny edema.

 

LABORATORY DATA:  White blood cell count 4.3, hematocrit 37.6, platelet count 141.  Sodium 142, potas
sium 4.2, chloride 100, CO2 35, BUN 46, creatinine 0.8, glucose 90.

 

ASSESSMENT:

1.  Obesity hypoventilation syndrome.

2.  Status post tracheostomy.

3.  Morbid obesity.

4.  Paroxysmal atrial fibrillation.

5.  Prerenal azotemia.

 

PLAN:

1.  Continue to hold her diuretics for the time being.  

2.  Go back to trach collar trials.  

3.  Discontinue daily CBCs, but continue monitoring of her electrolytes.

## 2018-05-16 LAB
ANION GAP SERPL CALC-SCNC: 11 MMOL/L (ref 10–20)
BUN SERPL-MCNC: 45 MG/DL (ref 9.8–20.1)
CALCIUM SERPL-MCNC: 9.4 MG/DL (ref 7.8–10.44)
CHLORIDE SERPL-SCNC: 101 MMOL/L (ref 98–107)
CO2 SERPL-SCNC: 35 MMOL/L (ref 23–31)
CREAT CL PREDICTED SERPL C-G-VRATE: 238 ML/MIN (ref 70–130)
GLUCOSE SERPL-MCNC: 98 MG/DL (ref 80–115)
POTASSIUM SERPL-SCNC: 3.9 MMOL/L (ref 3.5–5.1)
SODIUM SERPL-SCNC: 143 MMOL/L (ref 136–145)

## 2018-05-16 RX ADMIN — Medication SCH ML: at 08:37

## 2018-05-16 RX ADMIN — Medication SCH ML: at 20:20

## 2018-05-16 RX ADMIN — PANTOPRAZOLE SODIUM SCH MG: 40 GRANULE, DELAYED RELEASE ORAL at 08:38

## 2018-05-16 NOTE — PDOC.PN
- Subjective


Encounter Start Date: 05/15/18


Encounter Start Time: 15:00


-: non-verbal





Mahesh is seen today, alert  and on Trach with mecahnical ventilation. 

Persistent edema is noted.





- Objective


Resuscitation Status: 


 











Resuscitation Status           FULL:Full Resuscitation














MAR Reviewed: Yes


Vital Signs & Weight: 


 Vital Signs (12 hours)











  Temp Pulse Resp Pulse Ox


 


 05/16/18 07:47     90 L


 


 05/16/18 07:44   84  23 H  90 L


 


 05/16/18 07:24  98.9 F  73  24 H  91 L


 


 05/16/18 07:00  98.9 F   


 


 05/16/18 06:00    21 H 


 


 05/16/18 04:00  98.0 F   21 H 


 


 05/16/18 02:00    19 


 


 05/16/18 00:00  98.0 F   21 H 


 


 05/15/18 23:18   79  26 H  93 L


 


 05/15/18 22:00    21 H 








 Weight











Admit Weight                   600 lb


 


Weight                         450 lb 6.47 oz











 Most Recent Monitor Data











Heart Rate from ECG            79


 


NIBP                           124/77


 


NIBP BP-Mean                   91


 


Respiration from ECG           31


 


SpO2                           89














I&O: 


 











 05/15/18 05/16/18 05/17/18





 06:59 06:59 06:59


 


Intake Total 1044.8 852 30


 


Output Total 1835 1815 175


 


Balance -790.2 -963 -145











Result Diagrams: 


 05/15/18 04:10





 05/16/18 05:32


Radiology Reviewed by me: Yes





Phys Exam





- Physical Examination


HEENT: PERRLA


Neck: no nodes, supple


Respiratory: no wheezing, no rales


Cardiovascular: RRR, no significant murmur


Gastrointestinal: soft, non-tender


Musculoskeletal: edema present


Neurological: moves all 4 limbs


Lymphatic: no nodes





Dx/Plan


(1) Acute idiopathic thrombocytopenic purpura


Code(s): D69.3 - IMMUNE THROMBOCYTOPENIC PURPURA   Status: Resolved   Comment: 

resolved.   





(2) Acute and chronic respiratory failure


Code(s): J96.20 - ACUTE AND CHR RESP FAILURE, UNSP W HYPOXIA OR HYPERCAPNIA   

Status: Acute   


Qualifiers: 


   Respiratory failure complication: hypercapnia   Qualified Code(s): J96.22 - 

Acute and chronic respiratory failure with hypercapnia   


Comment: Pt on Trach with mecanical ventilation per pulmonary.   





(3) Acute on chronic diastolic (congestive) heart failure


Code(s): I50.33 - ACUTE ON CHRONIC DIASTOLIC (CONGESTIVE) HEART FAILURE   Status

: Chronic   Comment: Continue with lasix Diuresis as needed.    





(4) Hypokalemia


Code(s): E87.6 - HYPOKALEMIA   Status: Acute   Comment: continue electrolyte 

replacement protocol   





(5) Morbid obesity with BMI of 70 and over, adult


Code(s): E66.01 - MORBID (SEVERE) OBESITY DUE TO EXCESS CALORIES; Z68.45 - BODY 

MASS INDEX (BMI) 70 OR GREATER, ADULT   Status: Chronic   





(6) Pickwickian syndrome


Code(s): E66.2 - MORBID (SEVERE) OBESITY WITH ALVEOLAR HYPOVENTILATION   Status

: Chronic   





(7) Pulmonary embolism


Code(s): I26.99 - OTHER PULMONARY EMBOLISM WITHOUT ACUTE COR PULMONALE   Status

: Suspected   Comment: continue heparin drip per DVT/PE protocol   





(8) Afib


Code(s): I48.91 - UNSPECIFIED ATRIAL FIBRILLATION   Status: Chronic   


Qualifiers: 


   Atrial fibrillation type: paroxysmal   Qualified Code(s): I48.0 - Paroxysmal 

atrial fibrillation   


Comment: On Amiodarone per cardiology. Will closley Monitor. Platelets stabel 

count.   





- Plan


cont current plan of care, PT/OT, , respiratory therapy, DVT 

proph w/heparin





* .








- Discharge Day


Encounter end time: 15:35





Review of Systems





- Review of Systems


Other: 





Pt is Non verbal.





- Medications/Allergies


Allergies/Adverse Reactions: 


 Allergies











Allergy/AdvReac Type Severity Reaction Status Date / Time


 


No Known Drug Allergies Allergy   Verified 05/06/18 14:16











Medications: 


 Current Medications





Acetaminophen (Tylenol Elixir)  1,000 mg PER TUBE Q6H PRN


   PRN Reason: Headache/Fever or Pain


   Last Admin: 05/09/18 08:33 Dose:  1,000 mg


Albuterol/Ipratropium (Duoneb)  3 ml NEB [id number]


   Last Admin: 05/16/18 07:44 Dose:  3 ml


Amiodarone HCl (Cordarone)  200 mg PO DAILY [facility]


   Last Admin: 05/16/18 08:37 Dose:  200 mg


Lipase/Protease/Amylase ([place] [address])  1 cap FS .PER PROTOCOL PRN


   PRN Reason: TUBE OCCLUSION PROTOCOL


Apixaban (Eliquis)  5 mg PO BID [facility]


   Last Admin: 05/16/18 08:37 Dose:  5 mg


Aspirin (Aspirin Chewable)  81 mg PO DAILY [facility]


   Last Admin: 05/16/18 08:37 Dose:  81 mg


Clonidine (Catapres)  0.1 mg PO Q4H PRN


   PRN Reason: Systolic BP > 180


   Last Admin: 05/08/18 18:11 Dose:  0.1 mg


Furosemide (Lasix)  40 mg SLOW IVP NOW [facility]


   Stop: 05/16/18 11:00


   Last Admin: 05/16/18 09:12 Dose:  40 mg


Hydralazine HCl (Apresoline)  10 mg SLOW IVP Q4H PRN


   PRN Reason: Systolic BP > 180


   Last Admin: 05/12/18 17:06 Dose:  10 mg


Potassium Chloride 40 meq/ (Sodium Chloride)  270 mls @ 135 mls/hr IVPB ASDIR 

PRN


   PRN Reason: FOR SERUM K+ 2.5 - 3.5


   Last Admin: 04/28/18 06:18 Dose:  270 mls


Potassium Chloride 40 meq/ (Device)  100 mls @ 50 mls/hr IVPB ASDIR PRN


   PRN Reason: FOR SERUM K+ 2.5 - 3.5


Magnesium Sulfate 1 gm/ Sodium (Chloride)  102 mls @ 102 mls/hr IV PRN PRN


   PRN Reason: MAG LEVEL 1.4 - 2.0


Magnesium Sulfate 2 gm/ Device  100 mls @ 100 mls/hr IVPB ASDIR PRN


   PRN Reason: MAGNESIUM < 1.4


Potassium Phosphate 9 mmol/ (Sodium Chloride)  103 mls @ 25.75 mls/hr IVPB 

ASDIR PRN


   PRN Reason: Phosphate 1.0-1.8


Potassium Phosphate 12 mmol/ (Sodium Chloride)  254 mls @ 63.5 mls/hr IV ASDIR 

PRN


   PRN Reason: Serum phosphate 0.5-0.9


Potassium Phosphate 15 mmol/ (Sodium Chloride)  255 mls @ 63.75 mls/hr IV ASDIR 

PRN


   PRN Reason: Serum Phos < 0.5


Losartan Potassium (Cozaar)  25 mg PO DAILY [facility]


   Last Admin: 05/16/18 08:37 Dose:  25 mg


Magnesium Oxide (Magnesium Oxide)  400 mg PO BIDPRN PRN


   PRN Reason: FOR SERUM MAG 1.4 - 2.0


Magnesium Oxide (Magnesium Oxide)  800 mg PO PRN PRN


   PRN Reason: FOR SERUM MAG < 1.4


Miscellaneous Medication (Phos-Nak)  1 pkt PO TIDPRN PRN


   PRN Reason: FOR PHOS LEVEL 1.0 - 1.8


Miscellaneous Medication (Phos-Nak)  2 pkt PO TIDPRN PRN


   PRN Reason: FOR PHOS LEVEL 0.5 - 1.0


Morphine Sulfate (Morphine)  4 mg SLOW IVP Q30MIN PRN


   PRN Reason: Pain


   Last Admin: 05/16/18 08:12 Dose:  4 mg


Ondansetron HCl (Zofran Odt)  4 mg PO Q6H PRN


   PRN Reason: Nausea/Vomiting


Ondansetron HCl (Zofran)  4 mg IVP Q6H PRN


   PRN Reason: Nausea/Vomiting


Pantoprazole Sodium (Protonix)  40 mg PER TUBE DAILY [facility]


   Last Admin: 05/16/18 08:38 Dose:  40 mg


Potassium Chloride (K-Dur)  40 meq PO ASDIR PRN


   PRN Reason: FOR SERUM K+  2.5 - 3.5


   Last Admin: 05/02/18 06:43 Dose:  40 meq


Potassium Chloride (Klor-Con)  40 meq PER TUBE ASDIR PRN


   PRN Reason: FOR SERUM K+ 2.5-3.5


   Last Admin: 05/07/18 06:22 Dose:  40 meq


Sodium Bicarbonate (Bicarbonate, Sodium)  650 mg PER TUBE .PER PROTOCOL PRN


   PRN Reason: ENTERAL TUBE OCCLUSION


Sodium Chloride (Flush - Normal Saline)  10 ml IVF Q12HR [facility]


   Last Admin: 05/16/18 08:37 Dose:  10 ml


Sodium Chloride (Flush - Normal Saline)  10 ml IVF PRN PRN


   PRN Reason: Saline Flush


   Last Admin: 05/12/18 08:28 Dose:  10 ml

## 2018-05-16 NOTE — PRG
DATE OF SERVICE"  05/16/2018

 

The patient is currently sleeping on mechanical ventilation.

 

PHYSICAL EXAMINATION: 

VITAL SIGNS:  Temperature 98.9, pulse 84, blood pressure 122/56.  24 hour intake 825, output 1815.

HEENT:  Unremarkable.

NECK:  Trach in good position.

LUNGS:  Distant, but clear breath sounds.

CARDIAC:  S1 and S2 regular.

ABDOMEN:  Soft, nontender.

EXTREMITIES:  Edematous.

 

LABORATORY:  Sodium 143, potassium 3.9, chloride 101, CO2 35, BUN 45, creatinine 0.8, glucose 98.

 

ASSESSMENT:  Status unchanged.  She has severe obesity hypoventilation syndrome which has required tr
acheostomy and at the very least nocturnal mechanical ventilation.  I think long-term she is probably
 going to need some type of nighttime ventilatory support.  

 

PLAN:  

1.  Funding is an issue with this patient.  I am not sure we can make any inroads until she has some 
type of insurance so that we can place her and perhaps get nighttime mechanical ventilation.  

2.  She is continuing amiodarone for paroxysmal atrial fibrillation and Eliquis for prophylaxis again
st stroke.  We will continue to try her off the ventilator during the daytime.

3.  Will try to discuss the situation with case management.

## 2018-05-16 NOTE — PDOC.CTH
Cardiology Progress Note





- Subjective





No new issues.





- Objective


 Vital Signs











  Temp Pulse Pulse Pulse Resp BP BP


 


 05/16/18 16:00  98.3 F      


 


 05/16/18 14:02   78    32 H  


 


 05/16/18 12:00  98.4 F      


 


 05/16/18 09:13    85  79   137/72  124/77


 


 05/16/18 07:47       


 


 05/16/18 07:44   84    23 H  


 


 05/16/18 07:24  98.9 F  73    24 H  


 


 05/16/18 07:00  98.9 F      


 


 05/16/18 06:00      21 H  














  Pulse Ox Pulse Ox Pulse Ox


 


 05/16/18 16:00   


 


 05/16/18 14:02  91 L  


 


 05/16/18 12:00   


 


 05/16/18 09:13   89 L  89 L


 


 05/16/18 07:47  90 L  


 


 05/16/18 07:44  90 L  


 


 05/16/18 07:24  91 L  


 


 05/16/18 07:00   


 


 05/16/18 06:00   








 











Admit Weight                   600 lb


 


Weight                         450 lb 6.47 oz














 











 05/15/18 05/16/18 05/17/18





 06:59 06:59 06:59


 


Intake Total 1044.8 852 120


 


Output Total 1835 1815 785


 


Balance -790.2 -963 -165














- Physical Examination


General/Neuro: NAD


Neck: no JVD present


Lungs: CTA, unlabored respirations


Heart: RRR


Abdomen: NT/ND


Extremities: + edema B (2+)





- Telemetry


Telemetry Rhythm: NSR





- Labs


Result Diagrams: 


 05/15/18 04:10





 05/16/18 05:32


 Troponin/CKMB











CK-MB (CK-2)  2.9 ng/mL (0-6.6)   04/26/18  01:05    


 


Troponin I  0.180 ng/mL (< 0.028)  H  04/26/18  06:57    














- Assessment/Plan





1. Acute hypoxic hypercapnic respiratory insufficiency


2. Acute on chronic RV dysfunction.


3. Acute on chronic diastolic heart failure, improved. 


4. Morbid obesity, 


5. Pickwickian syndrome


6. Afib RVR, in sinus now. 








PLAN:


- Eliquis 5 mg BID. 


- Amiodarone to 200 mg daily, maintenance dose. 


- A little more volume up today. Will give one IV lasix dose today. 


- Placement.

## 2018-05-16 NOTE — PDOC.PN
- Subjective


Encounter Start Date: 05/16/18


Encounter Start Time: 11:00


-: non-verbal





Pt remains on trac, still waiitng on placmeent with neding Mechanival 

ventilation at Nights per pulmonary.





- Objective


Resuscitation Status: 


 











Resuscitation Status           FULL:Full Resuscitation














MAR Reviewed: Yes


Vital Signs & Weight: 


 Vital Signs (12 hours)











  Temp Pulse Pulse Pulse Resp BP BP


 


 05/16/18 12:00  98.4 F      


 


 05/16/18 09:13    85  79   137/72  124/77


 


 05/16/18 07:47       


 


 05/16/18 07:44   84    23 H  


 


 05/16/18 07:24  98.9 F  73    24 H  


 


 05/16/18 07:00  98.9 F      


 


 05/16/18 06:00      21 H  


 


 05/16/18 04:00  98.0 F     21 H  


 


 05/16/18 02:00      19  














  Pulse Ox Pulse Ox Pulse Ox


 


 05/16/18 12:00   


 


 05/16/18 09:13   89 L  89 L


 


 05/16/18 07:47  90 L  


 


 05/16/18 07:44  90 L  


 


 05/16/18 07:24  91 L  


 


 05/16/18 07:00   


 


 05/16/18 06:00   


 


 05/16/18 04:00   


 


 05/16/18 02:00   








 Weight











Admit Weight                   600 lb


 


Weight                         450 lb 6.47 oz











 Most Recent Monitor Data











Heart Rate from ECG            73


 


NIBP                           121/62


 


NIBP BP-Mean                   87


 


Respiration from ECG           22


 


SpO2                           91














I&O: 


 











 05/15/18 05/16/18 05/17/18





 06:59 06:59 06:59


 


Intake Total 1044.8 852 90


 


Output Total 1835 1815 605


 


Balance -790.2 -963 -515











Result Diagrams: 


 05/15/18 04:10





 05/16/18 05:32


Radiology Reviewed by me: Yes





Phys Exam





- Physical Examination


HEENT: PERRLA


Neck: no nodes


Respiratory: no wheezing, no rales


Cardiovascular: RRR, no significant murmur


Gastrointestinal: soft, non-tender


Musculoskeletal: edema present


Neurological: non-focal, moves all 4 limbs


Psychiatric: normal affect





Dx/Plan


(1) Acute idiopathic thrombocytopenic purpura


Code(s): D69.3 - IMMUNE THROMBOCYTOPENIC PURPURA   Status: Resolved   Comment: 

resolved.   





(2) Acute and chronic respiratory failure


Code(s): J96.20 - ACUTE AND CHR RESP FAILURE, UNSP W HYPOXIA OR HYPERCAPNIA   

Status: Acute   


Qualifiers: 


   Respiratory failure complication: hypercapnia   Qualified Code(s): J96.22 - 

Acute and chronic respiratory failure with hypercapnia   


Comment: Pt on Trach with mecanical ventilation per pulmonary.   





(3) Acute on chronic diastolic (congestive) heart failure


Code(s): I50.33 - ACUTE ON CHRONIC DIASTOLIC (CONGESTIVE) HEART FAILURE   Status

: Chronic   Comment: Continue with lasix Diuresis as needed.    





(4) Hypokalemia


Code(s): E87.6 - HYPOKALEMIA   Status: Acute   Comment: continue electrolyte 

replacement protocol   





(5) Morbid obesity with BMI of 70 and over, adult


Code(s): E66.01 - MORBID (SEVERE) OBESITY DUE TO EXCESS CALORIES; Z68.45 - BODY 

MASS INDEX (BMI) 70 OR GREATER, ADULT   Status: Chronic   





(6) Pickwickian syndrome


Code(s): E66.2 - MORBID (SEVERE) OBESITY WITH ALVEOLAR HYPOVENTILATION   Status

: Chronic   





(7) Pulmonary embolism


Code(s): I26.99 - OTHER PULMONARY EMBOLISM WITHOUT ACUTE COR PULMONALE   Status

: Suspected   Comment: continue heparin drip per DVT/PE protocol   





(8) Afib


Code(s): I48.91 - UNSPECIFIED ATRIAL FIBRILLATION   Status: Chronic   


Qualifiers: 


   Atrial fibrillation type: paroxysmal   Qualified Code(s): I48.0 - Paroxysmal 

atrial fibrillation   


Comment: On Amiodarone per cardiology. Will osmanyley Monitor. Platelets stabel 

count.   





- Plan


cont current plan of care,  (Waiting on Case management to find 

the Facility which suits her medical condition and requirements.), respiratory 

therapy





* .








- Discharge Day


Encounter end time: 11:35





Review of Systems





- Review of Systems


Other: 





Pt is non verbal. 





- Medications/Allergies


Allergies/Adverse Reactions: 


 Allergies











Allergy/AdvReac Type Severity Reaction Status Date / Time


 


No Known Drug Allergies Allergy   Verified 05/06/18 14:16











Medications: 


 Current Medications





Acetaminophen (Tylenol Elixir)  1,000 mg PER TUBE Q6H PRN


   PRN Reason: Headache/Fever or Pain


   Last Admin: 05/09/18 08:33 Dose:  1,000 mg


Albuterol/Ipratropium (Duoneb)  3 ml NEB [id number] KEE


   Last Admin: 05/16/18 07:44 Dose:  3 ml


Amiodarone HCl (Cordarone)  200 mg PO DAILY [facility]


   Last Admin: 05/16/18 08:37 Dose:  200 mg


Lipase/Protease/[place]ylase ([place] [id number])  1 cap FS .PER PROTOCOL PRN


   PRN Reason: TUBE OCCLUSION PROTOCOL


Apixaban (Eliquis)  5 mg PO BID [facility]


   Last Admin: 05/16/18 08:37 Dose:  5 mg


Aspirin (Aspirin Chewable)  81 mg PO DAILY [facility]


   Last Admin: 05/16/18 08:37 Dose:  81 mg


Clonidine (Catapres)  0.1 mg PO Q4H PRN


   PRN Reason: Systolic BP > 180


   Last Admin: 05/08/18 18:11 Dose:  0.1 mg


Hydralazine HCl (Apresoline)  10 mg SLOW IVP Q4H PRN


   PRN Reason: Systolic BP > 180


   Last Admin: 05/12/18 17:06 Dose:  10 mg


Potassium Chloride 40 meq/ (Sodium Chloride)  270 mls @ 135 mls/hr IVPB ASDIR 

PRN


   PRN Reason: FOR SERUM K+ 2.5 - 3.5


   Last Admin: 04/28/18 06:18 Dose:  270 mls


Potassium Chloride 40 meq/ (Device)  100 mls @ 50 mls/hr IVPB ASDIR PRN


   PRN Reason: FOR SERUM K+ 2.5 - 3.5


Magnesium Sulfate 1 gm/ Sodium (Chloride)  102 mls @ 102 mls/hr IV PRN PRN


   PRN Reason: MAG LEVEL 1.4 - 2.0


Magnesium Sulfate 2 gm/ Device  100 mls @ 100 mls/hr IVPB ASDIR PRN


   PRN Reason: MAGNESIUM < 1.4


Potassium Phosphate 9 mmol/ (Sodium Chloride)  103 mls @ 25.75 mls/hr IVPB 

ASDIR PRN


   PRN Reason: Phosphate 1.0-1.8


Potassium Phosphate 12 mmol/ (Sodium Chloride)  254 mls @ 63.5 mls/hr IV ASDIR 

PRN


   PRN Reason: Serum phosphate 0.5-0.9


Potassium Phosphate 15 mmol/ (Sodium Chloride)  255 mls @ 63.75 mls/hr IV ASDIR 

PRN


   PRN Reason: Serum Phos < 0.5


Losartan Potassium (Cozaar)  25 mg PO DAILY [facility]


   Last Admin: 05/16/18 08:37 Dose:  25 mg


Magnesium Oxide (Magnesium Oxide)  400 mg PO BIDPRN PRN


   PRN Reason: FOR SERUM MAG 1.4 - 2.0


Magnesium Oxide (Magnesium Oxide)  800 mg PO PRN PRN


   PRN Reason: FOR SERUM MAG < 1.4


Miscellaneous Medication (Phos-Nak)  1 pkt PO TIDPRN PRN


   PRN Reason: FOR PHOS LEVEL 1.0 - 1.8


Miscellaneous Medication (Phos-Nak)  2 pkt PO TIDPRN PRN


   PRN Reason: FOR PHOS LEVEL 0.5 - 1.0


Morphine Sulfate (Morphine)  4 mg SLOW IVP Q30MIN PRN


   PRN Reason: Pain


   Last Admin: 05/16/18 08:12 Dose:  4 mg


Ondansetron HCl (Zofran Odt)  4 mg PO Q6H PRN


   PRN Reason: Nausea/Vomiting


Ondansetron HCl (Zofran)  4 mg IVP Q6H PRN


   PRN Reason: Nausea/Vomiting


Pantoprazole Sodium (Protonix)  40 mg PER TUBE DAILY [facility]


   Last Admin: 05/16/18 08:38 Dose:  40 mg


Potassium Chloride (K-Dur)  40 meq PO ASDIR PRN


   PRN Reason: FOR SERUM K+  2.5 - 3.5


   Last Admin: 05/02/18 06:43 Dose:  40 meq


Potassium Chloride (Klor-Con)  40 meq PER TUBE ASDIR PRN


   PRN Reason: FOR SERUM K+ 2.5-3.5


   Last Admin: 05/07/18 06:22 Dose:  40 meq


Sodium Bicarbonate (Bicarbonate, Sodium)  650 mg PER TUBE .PER PROTOCOL PRN


   PRN Reason: ENTERAL TUBE OCCLUSION


Sodium Chloride (Flush - Normal Saline)  10 ml IVF Q12HR KEE


   Last Admin: 05/16/18 08:37 Dose:  10 ml


Sodium Chloride (Flush - Normal Saline)  10 ml IVF PRN PRN


   PRN Reason: Saline Flush


   Last Admin: 05/12/18 08:28 Dose:  10 ml

## 2018-05-17 LAB
ANION GAP SERPL CALC-SCNC: 14 MMOL/L (ref 10–20)
BUN SERPL-MCNC: 45 MG/DL (ref 9.8–20.1)
CALCIUM SERPL-MCNC: 9.2 MG/DL (ref 7.8–10.44)
CHLORIDE SERPL-SCNC: 101 MMOL/L (ref 98–107)
CO2 SERPL-SCNC: 33 MMOL/L (ref 23–31)
CREAT CL PREDICTED SERPL C-G-VRATE: 238 ML/MIN (ref 70–130)
GLUCOSE SERPL-MCNC: 100 MG/DL (ref 80–115)
POTASSIUM SERPL-SCNC: 4.1 MMOL/L (ref 3.5–5.1)
SODIUM SERPL-SCNC: 144 MMOL/L (ref 136–145)

## 2018-05-17 RX ADMIN — PANTOPRAZOLE SODIUM SCH MG: 40 GRANULE, DELAYED RELEASE ORAL at 08:36

## 2018-05-17 RX ADMIN — Medication SCH ML: at 08:36

## 2018-05-17 RX ADMIN — Medication SCH ML: at 21:33

## 2018-05-17 NOTE — PRG
DATE OF SERVICE: 05/16/2018

SUBJECTIVE:  She is doing better.  She is awake and alert.  She has been wearing the vent at night, b
ut using trach collar all day long without difficulty.

PHYSICAL EXAMINATION:

VITAL SIGNS:  Temperature is 98.7, pulse 77, blood pressure 128/57, 24-hour intake 1996, out put 1465
.

HEENT:  Unremarkable.

NECK:  No JVD.  Trach clear.

CARDIAC:  S1 and S2 regular.

LUNGS:  Diminished breath sounds.

ABDOMEN:  Soft, obese, nontender.

EXTREMITIES:  Edematous.

LABORATORY DATA:  Sodium 144, potassium 4.1, chloride 101, CO2 of 33, BUN 45, creatinine 0.8, glucose
 100.

ASSESSMENT:

1.  Obesity hypoventilation syndrome.

2.  Status post tracheostomy.

3.  Prerenal azotemia.

4.  Paroxysmal atrial fibrillation.

PLAN:  We will try to go without using the ventilator at all to night.  I would continue to hold diur
etics.

## 2018-05-17 NOTE — PDOC.CTH
Cardiology Progress Note





- Subjective





No new issues. 





- Objective


 Vital Signs











  Temp Pulse Pulse Pulse Resp BP BP


 


 05/17/18 18:37   82    22 H  


 


 05/17/18 16:00  98.7 F      


 


 05/17/18 14:30   81    23 H  


 


 05/17/18 12:00  98.9 F      


 


 05/17/18 10:30    84  82   116/49 L  132/69


 


 05/17/18 08:00  98.6 F  73    18  














  Pulse Ox Pulse Ox Pulse Ox


 


 05/17/18 18:37  90 L  


 


 05/17/18 16:00   


 


 05/17/18 14:30  90 L  


 


 05/17/18 12:00   


 


 05/17/18 10:30   89 L  95


 


 05/17/18 08:00  94 L  








 











Admit Weight                   600 lb


 


Weight                         450 lb 6.47 oz














 











 05/16/18 05/17/18 05/18/18





 06:59 06:59 06:59


 


Intake Total 852 1996 627


 


Output Total 1815 1465 900


 


Balance -963 531 -273














- Physical Examination


General/Neuro: alert & oriented x3, NAD


Neck: no JVD present


Lungs: CTA, unlabored respirations


Heart: RRR


Abdomen: NT/ND


Extremities: + edema B (2+)





- Telemetry


Telemetry Rhythm: NSR





- Labs


Result Diagrams: 


 05/15/18 04:10





 05/17/18 04:54


 Troponin/CKMB











CK-MB (CK-2)  2.9 ng/mL (0-6.6)   04/26/18  01:05    


 


Troponin I  0.180 ng/mL (< 0.028)  H  04/26/18  06:57    














- Assessment/Plan





1. Acute hypoxic hypercapnic respiratory insufficiency


2. Acute on chronic RV dysfunction.


3. Acute on chronic diastolic heart failure, improved. 


4. Morbid obesity, 


5. Pickwickian syndrome


6. Afib RVR, in sinus now. 








PLAN:


- Eliquis 5 mg BID. 


- Amiodarone to 200 mg daily, maintenance dose. 


- Lasix PO at 40 mg daily likely needs it all the time. 


- Monitor K


- Placement.


- Will sign out. Please call with any questions.

## 2018-05-18 LAB
ANALYZER IN CARDIO: (no result)
ANION GAP SERPL CALC-SCNC: 10 MMOL/L (ref 10–20)
BASE EXCESS STD BLDA CALC-SCNC: 8.9 MEQ/L
BUN SERPL-MCNC: 40 MG/DL (ref 9.8–20.1)
CA-I BLDA-SCNC: 1.3 MMOL/L (ref 1.12–1.3)
CALCIUM SERPL-MCNC: 8.4 MG/DL (ref 7.8–10.44)
CHLORIDE SERPL-SCNC: 100 MMOL/L (ref 98–107)
CO2 SERPL-SCNC: 30 MMOL/L (ref 23–31)
CREAT CL PREDICTED SERPL C-G-VRATE: 130 ML/MIN (ref 70–130)
GLUCOSE SERPL-MCNC: 108 MG/DL (ref 80–115)
HCO3 BLDA-SCNC: 36.3 MEQ/L (ref 22–26)
HCT VFR BLDA CALC: 39.8 % (ref 36–47)
HGB BLD-MCNC: 8.8 G/DL (ref 12–16)
HGB BLDA-MCNC: 11.2 G/DL (ref 12–16)
O2 A-A PPRESDIFF RESPIRATORY: 152.88 MM[HG] (ref 0–20)
PCO2 BLDA: 65.3 MMHG (ref 35–45)
PH BLDA: 7.36 [PH] (ref 7.35–7.45)
PLATELET # BLD AUTO: 92 THOU/UL (ref 130–400)
PO2 BLDA: 48.7 MMHG (ref 80–100)
POTASSIUM SERPL-SCNC: 3.1 MMOL/L (ref 3.5–5.1)
SODIUM SERPL-SCNC: 137 MMOL/L (ref 136–145)
SPECIMEN DRAWN FROM PATIENT: (no result)

## 2018-05-18 RX ADMIN — PANTOPRAZOLE SODIUM SCH MG: 40 GRANULE, DELAYED RELEASE ORAL at 08:43

## 2018-05-18 RX ADMIN — Medication SCH ML: at 08:43

## 2018-05-18 RX ADMIN — Medication SCH ML: at 21:02

## 2018-05-18 NOTE — PRG
DATE OF SERVICE:  05/18/2018

 

SUBJECTIVE:  She stayed off the ventilator last night.  She is somewhat more somnolent this morning t
rogers she has been.

 

PHYSICAL EXAMINATION:

VITAL SIGNS:  O2 sats running from the high 80s to low 90s on trach collar.  Heart rate 85, blood pre
ssure 111/44.

HEENT:  Unremarkable.

NECK:  Trach in good position.

LUNGS:  Distant breath sounds.

CARDIAC:  S1 and S2 regular.

ABDOMEN:  Soft, obese.

EXTREMITIES:  Edematous.

 

LABORATORY DATA:  Sodium 137, potassium 3.1, chloride 100, CO2 30, BUN 40, creatinine 1.5, glucose 10
8, hematocrit 26.4, and platelet count 92.

 

ASSESSMENT:

1.  Obesity hypoventilation syndrome.

2.  Status post trach.

3.  Mild thrombocytopenia.

 

PLAN:

1.  Check ABG to make sure that her CO2 level was not extraordinarily high.  If it is high, then we w
ill have to consider continuing nocturnal ventilation.

2.  Funding is a major issue with this patient.  I do think she will ever be in a situation where she
 can go home and take care of herself.

3.  Cautious anticoagulation given her thrombocytopenia and need for Eliquis for the paroxysmal atria
l fibrillation.

## 2018-05-19 LAB
ANALYZER IN CARDIO: (no result)
ANION GAP SERPL CALC-SCNC: 9 MMOL/L (ref 10–20)
BASE EXCESS STD BLDA CALC-SCNC: 9.4 MEQ/L
BUN SERPL-MCNC: 37 MG/DL (ref 9.8–20.1)
CA-I BLDA-SCNC: 1.3 MMOL/L (ref 1.12–1.3)
CALCIUM SERPL-MCNC: 9.4 MG/DL (ref 7.8–10.44)
CHLORIDE SERPL-SCNC: 105 MMOL/L (ref 98–107)
CO2 SERPL-SCNC: 35 MMOL/L (ref 23–31)
CREAT CL PREDICTED SERPL C-G-VRATE: 247 ML/MIN (ref 70–130)
GLUCOSE SERPL-MCNC: 101 MG/DL (ref 80–115)
HCO3 BLDA-SCNC: 37.1 MEQ/L (ref 22–26)
HCT VFR BLDA CALC: 40.6 % (ref 36–47)
HGB BLDA-MCNC: 11.4 G/DL (ref 12–16)
O2 A-A PPRESDIFF RESPIRATORY: 142.62 MM[HG] (ref 0–20)
PCO2 BLDA: 68.3 MMHG (ref 35–45)
PH BLDA: 7.35 [PH] (ref 7.35–7.45)
PO2 BLDA: 55.2 MMHG (ref 80–100)
POTASSIUM SERPL-SCNC: 4.7 MMOL/L (ref 3.5–5.1)
SODIUM SERPL-SCNC: 144 MMOL/L (ref 136–145)
SPECIMEN DRAWN FROM PATIENT: (no result)
TROPONIN I SERPL DL<=0.01 NG/ML-MCNC: 0.04 NG/ML (ref ?–0.03)

## 2018-05-19 RX ADMIN — Medication SCH ML: at 20:38

## 2018-05-19 RX ADMIN — Medication SCH ML: at 08:07

## 2018-05-19 RX ADMIN — PANTOPRAZOLE SODIUM SCH MG: 40 GRANULE, DELAYED RELEASE ORAL at 08:07

## 2018-05-19 NOTE — PDOC.CTH
Cardiology Progress Note





- Objective


 Vital Signs











  Temp Pulse Resp BP Pulse Ox


 


 05/19/18 10:48   112 H   98/58 L 


 


 05/19/18 10:00    29 H  


 


 05/19/18 08:21   152 H   93/64 


 


 05/19/18 08:00  98.9 F  134 H  30 H   89 L


 


 05/19/18 07:53   131 H   106/72 


 


 05/19/18 07:15    30 H  


 


 05/19/18 04:00  98.4 F    


 


 05/19/18 01:00  98.6 F    


 


 05/19/18 00:44   83  33 H   88 L








 











Admit Weight                   600 lb


 


Weight                         450 lb 6.47 oz














 











 05/18/18 05/19/18 05/20/18





 06:59 06:59 06:59


 


Intake Total 1505 1504 90


 


Output Total 1505 1910 405


 


Balance 0 -406 -315














- Physical Examination


General/Neuro: other: (still on ventilator.)


Lungs: CTA


Heart: other: (irreg/irreg.)


Extremities: other: (morbid obesity , chronic edema.)





- Labs


Result Diagrams: 


 05/18/18 04:12





 05/19/18 03:30


 Troponin/CKMB











CK-MB (CK-2)  2.9 ng/mL (0-6.6)   04/26/18  01:05    


 


Troponin I  0.038 ng/mL (< 0.028)  H  05/19/18  08:29    














- Assessment/Plan





1. Acute hypoxic hypercapnic respiratory insufficiency


2. Acute on chronic RV dysfunction.


3. Acute on chronic diastolic heart failure, improved. 


4. Morbid obesity, 


5. Pickwickian syndrome


6. Afib RVR, was in sinus, now back in Afb. with RVR. She was given a trial 

yesterday and last night with being off the ventilator and just the trach 

collar. Likely this was too stress ful and the O2 sats. may have decreased to 

provoke the a-fib. again. Continue Amiodarone, Diltiazem, add digoxin..

## 2018-05-19 NOTE — PRG
DATE OF SERVICE:  05/19/2018

 

Thirty-five minutes critical care time.

 

SUBJECTIVE:  The patient was worse this morning.  She was experiencing tachypnea, hypoxemia, and atri
al fibrillation with a rapid ventricular response.  She was placed back on the ventilator despite jenn
t her atrial fibrillation has persisted.  She also has ST segment depression in lead V1 on the teleme
try monitor.

 

OBJECTIVE:

VITAL SIGNS:  Temperature is 98.4, pulse 141, blood pressure 120/68.  A 24-hour intake 1504, output 1
910.

HEENT:  Unremarkable.

NECK:  No JVD.  Trach in good position.

LUNGS:  Clear to auscultation.

CARDIAC:  S1, S2, irregularly irregular, tachycardic.

ABDOMEN:  Soft, obese, nontender, nondistended.

EXTREMITIES:  Brawny edema throughout.

 

LABORATORY DATA:  Sodium 144, potassium 4.7, chloride 105, CO2 35, BUN 37, creatinine 0.7, glucose 10
1.  ABG, pH 7.35, pCO2 68, pO2 of 55 on SIMV rate 8, tidal volume 390, PEEP 5, pressure support 10, F
IO2 40%.

 

ASSESSMENT:

1.  Atrial fibrillation with rapid ventricular response - she has had this previously, but it has bee
n controlled on amiodarone.  I suspect she could be experiencing cardiac ischemia based on the appear
ance of her telemetry strips.

2.  Acute respiratory failure requiring mechanical ventilation and tracheostomy placement.

3.  Obstructive sleep apnea/obesity hypoventilation syndrome.

4.  Morbid obesity.

 

PLAN:

1.  I have put her back on the ventilator with full support.

2.  Reconsult Cardiology.

3.  Start Cardizem drip to see if we can slow the rate.

4.  Check troponin and check EKG.

5.  Prognosis remains extremely poor given her underlying comorbid illnesses.

## 2018-05-20 LAB
ANALYZER IN CARDIO: (no result)
ANION GAP SERPL CALC-SCNC: 6 MMOL/L (ref 10–20)
BASE EXCESS STD BLDA CALC-SCNC: 9 MEQ/L
BUN SERPL-MCNC: 41 MG/DL (ref 9.8–20.1)
CA-I BLDA-SCNC: 1.3 MMOL/L (ref 1.12–1.3)
CALCIUM SERPL-MCNC: 9 MG/DL (ref 7.8–10.44)
CHLORIDE SERPL-SCNC: 105 MMOL/L (ref 98–107)
CO2 SERPL-SCNC: 37 MMOL/L (ref 23–31)
CREAT CL PREDICTED SERPL C-G-VRATE: 241 ML/MIN (ref 70–130)
GLUCOSE SERPL-MCNC: 91 MG/DL (ref 80–115)
HCO3 BLDA-SCNC: 36.1 MEQ/L (ref 22–26)
HCT VFR BLDA CALC: 36.3 % (ref 36–47)
HGB BLD-MCNC: 10.8 G/DL (ref 12–16)
HGB BLDA-MCNC: 10.5 G/DL (ref 12–16)
PCO2 BLDA: 63.8 MMHG (ref 35–45)
PH BLDA: 7.37 [PH] (ref 7.35–7.45)
PLATELET # BLD AUTO: 142 THOU/UL (ref 130–400)
PO2 BLDA: 77.2 MMHG (ref 80–100)
POTASSIUM SERPL-SCNC: 4.8 MMOL/L (ref 3.5–5.1)
SODIUM SERPL-SCNC: 143 MMOL/L (ref 136–145)
SPECIMEN DRAWN FROM PATIENT: (no result)

## 2018-05-20 RX ADMIN — PANTOPRAZOLE SODIUM SCH MG: 40 GRANULE, DELAYED RELEASE ORAL at 08:53

## 2018-05-20 RX ADMIN — Medication SCH ML: at 08:54

## 2018-05-20 RX ADMIN — Medication SCH ML: at 20:03

## 2018-05-20 NOTE — PDOC.PN
- Subjective


Encounter Start Date: 05/18/18


Encounter Start Time: 09:00





Patient is seen today, remains off of Mechanical ventilation,. No family 

around. pt trying to say something unable to understand, but she says no to 

chest pain.





- Objective


Resuscitation Status: 


 











Resuscitation Status           FULL:Full Resuscitation














MAR Reviewed: Yes


Vital Signs & Weight: 


 Vital Signs (12 hours)











  Temp Pulse Resp BP Pulse Ox


 


 05/20/18 13:25   71   139/71 


 


 05/20/18 12:00  98.7 F   21 H  


 


 05/20/18 10:50   67   98/46 L 


 


 05/20/18 10:00    21 H  


 


 05/20/18 08:53   70   


 


 05/20/18 08:00  98.6 F  70  21 H   90 L


 


 05/20/18 07:28   70   115/46 L 


 


 05/20/18 06:00    25 H  


 


 05/20/18 04:00  99.8 F H   24 H  


 


 05/20/18 02:19   72   








 Weight











Admit Weight                   600 lb


 


Weight                         450 lb 6.47 oz











 Most Recent Monitor Data











Heart Rate from ECG            70


 


NIBP                           102/44


 


NIBP BP-Mean                   62


 


Respiration from ECG           22


 


SpO2                           92














I&O: 


 











 05/19/18 05/20/18 05/21/18





 06:59 06:59 06:59


 


Intake Total 1504 1590.1 120


 


Output Total 1910 1320 365


 


Balance -406 270.1 -245











Result Diagrams: 


 05/20/18 03:35





 05/20/18 03:35


Radiology Reviewed by me: Yes





Phys Exam





- Physical Examination


HEENT: PERRLA, moist MMs


Neck: no nodes, no JVD


Respiratory: no wheezing, no rales


Cardiovascular: RRR, no significant murmur


Gastrointestinal: soft, non-tender


Musculoskeletal: edema present





Dx/Plan


(1) Acute idiopathic thrombocytopenic purpura


Code(s): D69.3 - IMMUNE THROMBOCYTOPENIC PURPURA   Status: Resolved   Comment: 

resolved.   





(2) Acute and chronic respiratory failure


Code(s): J96.20 - ACUTE AND CHR RESP FAILURE, UNSP W HYPOXIA OR HYPERCAPNIA   

Status: Acute   


Qualifiers: 


   Respiratory failure complication: hypercapnia   Qualified Code(s): J96.22 - 

Acute and chronic respiratory failure with hypercapnia   


Comment: Pt on Trach off mecanical ventilation per pulmonary.   





(3) Acute on chronic diastolic (congestive) heart failure


Code(s): I50.33 - ACUTE ON CHRONIC DIASTOLIC (CONGESTIVE) HEART FAILURE   Status

: Chronic   Comment: Continue with lasix Diuresis as needed.    





(4) Hypokalemia


Code(s): E87.6 - HYPOKALEMIA   Status: Acute   Comment: continue electrolyte 

replacement protocol   





(5) Morbid obesity with BMI of 70 and over, adult


Code(s): E66.01 - MORBID (SEVERE) OBESITY DUE TO EXCESS CALORIES; Z68.45 - BODY 

MASS INDEX (BMI) 70 OR GREATER, ADULT   Status: Chronic   





(6) Pickwickian syndrome


Code(s): E66.2 - MORBID (SEVERE) OBESITY WITH ALVEOLAR HYPOVENTILATION   Status

: Chronic   





(7) Pulmonary embolism


Code(s): I26.99 - OTHER PULMONARY EMBOLISM WITHOUT ACUTE COR PULMONALE   Status

: Suspected   Comment: continue heparin drip per DVT/PE protocol   





(8) Afib


Code(s): I48.91 - UNSPECIFIED ATRIAL FIBRILLATION   Status: Chronic   


Qualifiers: 


   Atrial fibrillation type: paroxysmal   Qualified Code(s): I48.0 - Paroxysmal 

atrial fibrillation   


Comment: On Amiodarone per cardiology. Will closley Monitor. Platelets stabel 

count.   





- Plan


cont current plan of care, plan discussed w/ family, PT/OT, , 

DVT proph w/lovenox





* .








Review of Systems





- Review of Systems


Other: 





Unab;le to do ROS due to pt being non verbal.





- Medications/Allergies


Allergies/Adverse Reactions: 


 Allergies











Allergy/AdvReac Type Severity Reaction Status Date / Time


 


No Known Drug Allergies Allergy   Verified 05/06/18 14:16











Medications: 


 Current Medications





Acetaminophen (Tylenol Elixir)  1,000 mg PER TUBE Q6H PRN


   PRN Reason: Headache/Fever or Pain


   Last Admin: 05/20/18 08:53 Dose:  1,000 mg


Albuterol/Ipratropium (Duoneb)  3 ml NEB [id number] KEE


   Last Admin: 05/20/18 13:25 Dose:  3 ml


Amiodarone HCl (Cordarone)  200 mg PO DAILY [facility]


   Last Admin: 05/20/18 08:53 Dose:  200 mg


Lipase/Protease/[place]ylase ([place] [id number])  1 cap FS .PER PROTOCOL PRN


   PRN Reason: TUBE OCCLUSION PROTOCOL


Apixaban (Eliquis)  5 mg PO BID [facility]


   Last Admin: 05/20/18 08:53 Dose:  5 mg


Aspirin (Aspirin Chewable)  81 mg PO DAILY [facility]


   Last Admin: 05/20/18 08:53 Dose:  81 mg


Clonidine (Catapres)  0.1 mg PO Q4H PRN


   PRN Reason: Systolic BP > 180


   Last Admin: 05/18/18 21:05 Dose:  0.1 mg


Digoxin (Lanoxin)  0.125 mg PO [facility]


   Last Admin: 05/20/18 08:53 Dose:  0.125 mg


Hydralazine HCl (Apresoline)  10 mg SLOW IVP Q4H PRN


   PRN Reason: Systolic BP > 180


   Last Admin: 05/12/18 17:06 Dose:  10 mg


Potassium Chloride 40 meq/ (Sodium Chloride)  270 mls @ 135 mls/hr IVPB ASDIR 

PRN


   PRN Reason: FOR SERUM K+ 2.5 - 3.5


   Last Admin: 04/28/18 06:18 Dose:  270 mls


Potassium Chloride 40 meq/ (Device)  100 mls @ 50 mls/hr IVPB ASDIR PRN


   PRN Reason: FOR SERUM K+ 2.5 - 3.5


Magnesium Sulfate 1 gm/ Sodium (Chloride)  102 mls @ 102 mls/hr IV PRN PRN


   PRN Reason: MAG LEVEL 1.4 - 2.0


Magnesium Sulfate 2 gm/ Device  100 mls @ 100 mls/hr IVPB ASDIR PRN


   PRN Reason: MAGNESIUM < 1.4


Potassium Phosphate 9 mmol/ (Sodium Chloride)  103 mls @ 25.75 mls/hr IVPB 

ASDIR PRN


   PRN Reason: Phosphate 1.0-1.8


Potassium Phosphate 12 mmol/ (Sodium Chloride)  254 mls @ 63.5 mls/hr IV ASDIR 

PRN


   PRN Reason: Serum phosphate 0.5-0.9


Potassium Phosphate 15 mmol/ (Sodium Chloride)  255 mls @ 63.75 mls/hr IV ASDIR 

PRN


   PRN Reason: Serum Phos < 0.5


Diltiazem HCl 125 mg/ Sodium (Chloride)  125 mls @ 0 mls/hr IVPB INF [facility]; 

Titrate


   PRN Reason: Protocol


   Last Admin: 05/19/18 08:45 Dose:  125 mls


Losartan Potassium (Cozaar)  25 mg PO DAILY [facility]


   Last Admin: 05/20/18 08:53 Dose:  25 mg


Magnesium Oxide (Magnesium Oxide)  400 mg PO BIDPRN PRN


   PRN Reason: FOR SERUM MAG 1.4 - 2.0


Magnesium Oxide (Magnesium Oxide)  800 mg PO PRN PRN


   PRN Reason: FOR SERUM MAG < 1.4


Miscellaneous Medication (Phos-Nak)  1 pkt PO TIDPRN PRN


   PRN Reason: FOR PHOS LEVEL 1.0 - 1.8


Miscellaneous Medication (Phos-Nak)  2 pkt PO TIDPRN PRN


   PRN Reason: FOR PHOS LEVEL 0.5 - 1.0


Ondansetron HCl (Zofran Odt)  4 mg PO Q6H PRN


   PRN Reason: Nausea/Vomiting


Ondansetron HCl (Zofran)  4 mg IVP Q6H PRN


   PRN Reason: Nausea/Vomiting


Pantoprazole Sodium (Protonix)  40 mg PER TUBE DAILY [facility]


   Last Admin: 05/20/18 08:53 Dose:  40 mg


Potassium Chloride (K-Dur)  40 meq PO [facility] PRN


   PRN Reason: FOR SERUM K+  2.5 - 3.5


   Last Admin: 05/02/18 06:43 Dose:  40 meq


Potassium Chloride (Klor-Con)  40 meq PER TUBE [facility] PRN


   PRN Reason: FOR SERUM K+ 2.5-3.5


   Last Admin: 05/18/18 08:42 Dose:  40 meq


Sodium Bicarbonate (Bicarbonate, Sodium)  650 mg PER TUBE .PER PROTOCOL PRN


   PRN Reason: ENTERAL TUBE OCCLUSION


Sodium Chloride (Flush - Normal Saline)  10 ml IVF Q12HR KEE


   Last Admin: 05/20/18 08:54 Dose:  10 ml


Sodium Chloride (Flush - Normal Saline)  10 ml IVF PRN PRN


   PRN Reason: Saline Flush


   Last Admin: 05/12/18 08:28 Dose:  10 ml

## 2018-05-20 NOTE — PDOC.PN
- Subjective


Encounter Start Date: 05/17/18


Encounter Start Time: 08:00





Patient is seen today, remains off of ventilator on Trach 5 liters. waiting on 

placement.





- Objective


Resuscitation Status: 


 











Resuscitation Status           FULL:Full Resuscitation














MAR Reviewed: Yes


Vital Signs & Weight: 


 Vital Signs (12 hours)











  Temp Pulse Resp BP Pulse Ox


 


 05/20/18 13:25   71   139/71 


 


 05/20/18 12:00  98.7 F   21 H  


 


 05/20/18 10:50   67   98/46 L 


 


 05/20/18 10:00    21 H  


 


 05/20/18 08:53   70   


 


 05/20/18 08:00  98.6 F  70  21 H   90 L


 


 05/20/18 07:28   70   115/46 L 


 


 05/20/18 06:00    25 H  


 


 05/20/18 04:00  99.8 F H   24 H  


 


 05/20/18 02:19   72   








 Weight











Admit Weight                   600 lb


 


Weight                         450 lb 6.47 oz











 Most Recent Monitor Data











Heart Rate from ECG            70


 


NIBP                           102/44


 


NIBP BP-Mean                   62


 


Respiration from ECG           22


 


SpO2                           92














I&O: 


 











 05/19/18 05/20/18 05/21/18





 06:59 06:59 06:59


 


Intake Total 1504 1590.1 120


 


Output Total 1910 1320 365


 


Balance -406 270.1 -245











Result Diagrams: 


 05/20/18 03:35





 05/20/18 03:35


Radiology Reviewed by me: Yes





Phys Exam





- Physical Examination


HEENT: PERRLA, moist MMs


Neck: no nodes, no JVD


Respiratory: no wheezing, no rales


Cardiovascular: RRR, no significant murmur


Gastrointestinal: soft, non-tender


Musculoskeletal: no edema, pulses present


Neurological: non-focal, normal sensation


Lymphatic: no nodes


Psychiatric: normal affect





Dx/Plan


(1) Acute idiopathic thrombocytopenic purpura


Code(s): D69.3 - IMMUNE THROMBOCYTOPENIC PURPURA   Status: Resolved   Comment: 

resolved.   





(2) Acute and chronic respiratory failure


Code(s): J96.20 - ACUTE AND CHR RESP FAILURE, UNSP W HYPOXIA OR HYPERCAPNIA   

Status: Acute   


Qualifiers: 


   Respiratory failure complication: hypercapnia   Qualified Code(s): J96.22 - 

Acute and chronic respiratory failure with hypercapnia   


Comment: Pt on Trach off mecanical ventilation per pulmonary.   





(3) Acute on chronic diastolic (congestive) heart failure


Code(s): I50.33 - ACUTE ON CHRONIC DIASTOLIC (CONGESTIVE) HEART FAILURE   Status

: Chronic   Comment: Continue with lasix Diuresis as needed.    





(4) Hypokalemia


Code(s): E87.6 - HYPOKALEMIA   Status: Acute   Comment: continue electrolyte 

replacement protocol   





(5) Morbid obesity with BMI of 70 and over, adult


Code(s): E66.01 - MORBID (SEVERE) OBESITY DUE TO EXCESS CALORIES; Z68.45 - BODY 

MASS INDEX (BMI) 70 OR GREATER, ADULT   Status: Chronic   





(6) Pickwickian syndrome


Code(s): E66.2 - MORBID (SEVERE) OBESITY WITH ALVEOLAR HYPOVENTILATION   Status

: Chronic   





(7) Pulmonary embolism


Code(s): I26.99 - OTHER PULMONARY EMBOLISM WITHOUT ACUTE COR PULMONALE   Status

: Suspected   Comment: continue heparin drip per DVT/PE protocol   





(8) Afib


Code(s): I48.91 - UNSPECIFIED ATRIAL FIBRILLATION   Status: Chronic   


Qualifiers: 


   Atrial fibrillation type: paroxysmal   Qualified Code(s): I48.0 - Paroxysmal 

atrial fibrillation   


Comment: On Amiodarone per cardiology. Will closley Monitor. Platelets stabel 

count.   





- Plan


cont current plan of care, , respiratory therapy, DVT proph w/

lovenox, DVT proph w/SCDs





* .








Review of Systems





- Review of Systems


Other: 





Pt remains Non verbal.





- Medications/Allergies


Allergies/Adverse Reactions: 


 Allergies











Allergy/AdvReac Type Severity Reaction Status Date / Time


 


No Known Drug Allergies Allergy   Verified 05/06/18 14:16











Medications: 


 Current Medications





Acetaminophen (Tylenol Elixir)  1,000 mg PER TUBE Q6H PRN


   PRN Reason: Headache/Fever or Pain


   Last Admin: 05/20/18 08:53 Dose:  1,000 mg


Albuterol/Ipratropium (Duoneb)  3 ml NEB [id number] KEE


   Last Admin: 05/20/18 13:25 Dose:  3 ml


Amiodarone HCl (Cordarone)  200 mg PO DAILY [facility]


   Last Admin: 05/20/18 08:53 Dose:  200 mg


Lipase/Protease/Amylase ([place] [id number])  1 cap FS .PER PROTOCOL PRN


   PRN Reason: TUBE OCCLUSION PROTOCOL


Apixaban (Eliquis)  5 mg PO BID [facility]


   Last Admin: 05/20/18 08:53 Dose:  5 mg


Aspirin (Aspirin Chewable)  81 mg PO DAILY [facility]


   Last Admin: 05/20/18 08:53 Dose:  81 mg


Clonidine (Catapres)  0.1 mg PO Q4H PRN


   PRN Reason: Systolic BP > 180


   Last Admin: 05/18/18 21:05 Dose:  0.1 mg


[place] (Lan[place])  [place] [facility] [place]


   Last Admin: 05/20/18 08:53 Dose:  0.125 mg


Hydralazine HCl (Apresoline)  10 mg SLOW IVP Q4H PRN


   PRN Reason: Systolic BP > 180


   Last Admin: 05/12/18 17:06 Dose:  10 mg


Potassium Chloride 40 meq/ (Sodium Chloride)  270 mls @ 135 mls/hr IVPB ASDIR 

PRN


   PRN Reason: FOR SERUM K+ 2.5 - 3.5


   Last Admin: 04/28/18 06:18 Dose:  270 mls


Potassium Chloride 40 meq/ (Device)  100 mls @ 50 mls/hr IVPB ASDIR PRN


   PRN Reason: FOR SERUM K+ 2.5 - 3.5


Magnesium Sulfate 1 gm/ Sodium (Chloride)  102 mls @ 102 mls/hr IV PRN PRN


   PRN Reason: MAG LEVEL 1.4 - 2.0


Magnesium Sulfate 2 gm/ Device  100 mls @ 100 mls/hr IVPB ASDIR PRN


   PRN Reason: MAGNESIUM < 1.4


Potassium Phosphate 9 mmol/ (Sodium Chloride)  103 mls @ 25.75 mls/hr IVPB 

ASDIR PRN


   PRN Reason: Phosphate 1.0-1.8


Potassium Phosphate 12 mmol/ (Sodium Chloride)  254 mls @ 63.5 mls/hr IV ASDIR 

PRN


   PRN Reason: Serum phosphate 0.5-0.9


Potassium Phosphate 15 mmol/ (Sodium Chloride)  255 mls @ 63.75 mls/hr IV ASDIR 

PRN


   PRN Reason: Serum Phos < 0.5


Diltiazem HCl 125 mg/ Sodium (Chloride)  125 mls @ 0 mls/hr IVPB INF [facility]; 

Titrate


   PRN Reason: Protocol


   Last Admin: 05/19/18 08:45 Dose:  125 mls


Losartan Potassium (Cozaar)  25 mg PO DAILY [facility]


   Last Admin: 05/20/18 08:53 Dose:  25 mg


Magnesium Oxide (Magnesium Oxide)  400 mg PO BIDPRN PRN


   PRN Reason: FOR SERUM MAG 1.4 - 2.0


Magnesium Oxide (Magnesium Oxide)  800 mg PO PRN PRN


   PRN Reason: FOR SERUM MAG < 1.4


Miscellaneous Medication (Phos-Nak)  1 pkt PO TIDPRN PRN


   PRN Reason: FOR PHOS LEVEL 1.0 - 1.8


Miscellaneous Medication (Phos-Nak)  2 pkt PO TIDPRN PRN


   PRN Reason: FOR PHOS LEVEL 0.5 - 1.0


Ondansetron HCl (Zofran Odt)  4 mg PO Q6H PRN


   PRN Reason: Nausea/Vomiting


Ondansetron HCl (Zofran)  4 mg IVP Q6H PRN


   PRN Reason: Nausea/Vomiting


Pantoprazole Sodium (Protonix)  40 mg PER TUBE DAILY [facility]


   Last Admin: 05/20/18 08:53 Dose:  40 mg


Potassium Chloride (K-Dur)  40 meq PO ASDIR PRN


   PRN Reason: FOR SERUM K+  2.5 - 3.5


   Last Admin: 05/02/18 06:43 Dose:  40 meq


Potassium Chloride (Klor-Con)  40 meq PER TUBE ASDIR PRN


   PRN Reason: FOR SERUM K+ 2.5-3.5


   Last Admin: 05/18/18 08:42 Dose:  40 meq


Sodium Bicarbonate (Bicarbonate, Sodium)  650 mg PER TUBE .PER PROTOCOL PRN


   PRN Reason: ENTERAL TUBE OCCLUSION


Sodium Chloride (Flush - Normal Saline)  10 ml IVF Q12HR [facility]


   Last Admin: 05/20/18 08:54 Dose:  10 ml


Sodium Chloride (Flush - Normal Saline)  10 ml IVF PRN PRN


   PRN Reason: Saline Flush


   Last Admin: 05/12/18 08:28 Dose:  10 ml

## 2018-05-20 NOTE — PRG
DATE OF SERVICE:  05/20/2018

 

Thirty-five minutes critical care time.

 

SUBJECTIVE:  Ms. Hedrick is doing better today from a cardiovascular standpoint.  Her heart rate is no
w under control and it looks like she is probably back in a sinus rhythm.

 

PHYSICAL EXAMINATION:

VITAL SIGNS:  T-max yesterday is 100.8, currently 99.8; pulse 74; blood pressure 112/52.  She is off 
the Cardizem drip.

HEENT:  Unremarkable.

NECK:  Trach in good position.

LUNGS:  Coarse breath sounds anteriorly.

CARDIOVASCULAR:  S1, S2 regular.

ABDOMEN:  Soft.

EXTREMITIES:  Edematous.

 

LABORATORY DATA:  Sodium 143, potassium 4.8, chloride 105, CO2 of 37, BUN 41, creatinine 0.7, glucose
 91.

 

ASSESSMENT:

1.  Acute respiratory failure, requiring mechanical ventilation and tracheostomy placement.

2.  Obstructive sleep apnea/obesity hypoventilation syndrome.

3.  Atrial fibrillation.

 

PLAN:  I do not think she is weanable at the current time.  I am leaving her on SIMV rate, we will se
e how she does over the next several days.  She is currently off diuretics because of development of 
prerenal azotemia.  She does not appear to be overtly fluid overload at this time.  Placement is deonte
g to be an issue because she is neither citizen nor permanent resident of the country and is not elig
ible for nursing home benefits.  She has no financial aid and home ventilation will not be possible.

## 2018-05-20 NOTE — PDOC.PN
- Subjective


Encounter Start Date: 05/19/18


Encounter Start Time: 10:00





Patient is seen today, Is back on mechanical ventilation as her ABg was getting 

worse with Co2 retention. Pt is Alos in AFib with RVR. Waiting on cardioogy to 

recommed. 





- Objective


Resuscitation Status: 


 











Resuscitation Status           FULL:Full Resuscitation














MAR Reviewed: Yes


Vital Signs & Weight: 


 Vital Signs (12 hours)











  Temp Pulse Resp BP Pulse Ox


 


 05/20/18 13:25   71   139/71 


 


 05/20/18 12:00  98.7 F   21 H  


 


 05/20/18 10:50   67   98/46 L 


 


 05/20/18 10:00    21 H  


 


 05/20/18 08:53   70   


 


 05/20/18 08:00  98.6 F  70  21 H   90 L


 


 05/20/18 07:28   70   115/46 L 


 


 05/20/18 06:00    25 H  


 


 05/20/18 04:00  99.8 F H   24 H  


 


 05/20/18 02:19   72   








 Weight











Admit Weight                   600 lb


 


Weight                         450 lb 6.47 oz











 Most Recent Monitor Data











Heart Rate from ECG            70


 


NIBP                           102/44


 


NIBP BP-Mean                   62


 


Respiration from ECG           22


 


SpO2                           92














I&O: 


 











 05/19/18 05/20/18 05/21/18





 06:59 06:59 06:59


 


Intake Total 1504 1590.1 120


 


Output Total 1910 1320 365


 


Balance -406 270.1 -245











Result Diagrams: 


 05/20/18 03:35





 05/20/18 03:35


Radiology Reviewed by me: Yes





Phys Exam





- Physical Examination


HEENT: PERRLA, moist MMs


Neck: no nodes, no JVD


Respiratory: no wheezing, no rales


Cardiovascular: RRR, no significant murmur


Gastrointestinal: soft, non-tender


Musculoskeletal: pulses present, edema present


Neurological: non-focal


Lymphatic: no nodes


Psychiatric: normal affect, A&O x 3


Skin: no rash





Dx/Plan


(1) Acute idiopathic thrombocytopenic purpura


Code(s): D69.3 - IMMUNE THROMBOCYTOPENIC PURPURA   Status: Resolved   Comment: 

resolved.   





(2) Acute and chronic respiratory failure


Code(s): J96.20 - ACUTE AND CHR RESP FAILURE, UNSP W HYPOXIA OR HYPERCAPNIA   

Status: Acute   


Qualifiers: 


   Respiratory failure complication: hypercapnia   Qualified Code(s): J96.22 - 

Acute and chronic respiratory failure with hypercapnia   


Comment: Pt on Trach back on mecanical ventilation per pulmonary.   





(3) Acute on chronic diastolic (congestive) heart failure


Code(s): I50.33 - ACUTE ON CHRONIC DIASTOLIC (CONGESTIVE) HEART FAILURE   Status

: Chronic   Comment: Continue with lasix Diuresis as needed.    





(4) Hypokalemia


Code(s): E87.6 - HYPOKALEMIA   Status: Acute   Comment: continue electrolyte 

replacement protocol   





(5) Morbid obesity with BMI of 70 and over, adult


Code(s): E66.01 - MORBID (SEVERE) OBESITY DUE TO EXCESS CALORIES; Z68.45 - BODY 

MASS INDEX (BMI) 70 OR GREATER, ADULT   Status: Chronic   





(6) Pickwickian syndrome


Code(s): E66.2 - MORBID (SEVERE) OBESITY WITH ALVEOLAR HYPOVENTILATION   Status

: Chronic   





(7) Pulmonary embolism


Code(s): I26.99 - OTHER PULMONARY EMBOLISM WITHOUT ACUTE COR PULMONALE   Status

: Suspected   Comment: continue heparin drip per DVT/PE protocol   





(8) Afib


Code(s): I48.91 - UNSPECIFIED ATRIAL FIBRILLATION   Status: Chronic   


Qualifiers: 


   Atrial fibrillation type: paroxysmal   Qualified Code(s): I48.0 - Paroxysmal 

atrial fibrillation   


Comment: On Amiodarone per cardiology, recommeded Digoxin and Cardizem drip. 

Will elke Monitor. Platelets stabel count.   





- Plan


cont current plan of care, , respiratory therapy, DVT proph w/

lovenox





* .








Review of Systems





- Review of Systems


Other: 





NO ROS as pt is nonverbal.





- Medications/Allergies


Allergies/Adverse Reactions: 


 Allergies











Allergy/AdvReac Type Severity Reaction Status Date / Time


 


No Known Drug Allergies Allergy   Verified 05/06/18 14:16











Medications: 


 Current Medications





Acetaminophen (Tylenol Elixir)  1,000 mg PER TUBE Q6H PRN


   PRN Reason: Headache/Fever or Pain


   Last Admin: 05/20/18 08:53 Dose:  1,000 mg


Albuterol/Ipratropium (Duoneb)  3 ml NEB [id number] [facility]


   Last Admin: 05/20/18 13:25 Dose:  3 ml


Amiodarone HCl (Cordarone)  200 mg PO DAILY [facility]


   Last Admin: 05/20/18 08:53 Dose:  200 mg


Lipase/Protease/Amylase ([place] [id number])  1 cap FS .PER PROTOCOL PRN


   PRN Reason: TUBE OCCLUSION PROTOCOL


Apixaban (Eliquis)  5 mg PO BID [facility]


   Last Admin: 05/20/18 08:53 Dose:  5 mg


Aspirin (Aspirin Chewable)  81 mg PO DAILY [facility]


   Last Admin: 05/20/18 08:53 Dose:  81 mg


Clonidine (Catapres)  0.1 mg PO Q4H PRN


   PRN Reason: Systolic BP > 180


   Last Admin: 05/18/18 21:05 Dose:  0.1 mg


Digoxin (Lanoxin)  [facility] [place]


   Last Admin: 05/20/18 08:53 Dose:  0.125 mg


Hydralazine HCl (Apresoline)  10 mg SLOW IVP Q4H PRN


   PRN Reason: Systolic BP > 180


   Last Admin: 05/12/18 17:06 Dose:  10 mg


Potassium Chloride 40 meq/ (Sodium Chloride)  270 mls @ 135 mls/hr IVPB ASDIR 

PRN


   PRN Reason: FOR SERUM K+ 2.5 - 3.5


   Last Admin: 04/28/18 06:18 Dose:  270 mls


Potassium Chloride 40 meq/ (Device)  100 mls @ 50 mls/hr IVPB ASDIR PRN


   PRN Reason: FOR SERUM K+ 2.5 - 3.5


Magnesium Sulfate 1 gm/ Sodium (Chloride)  102 mls @ 102 mls/hr IV PRN PRN


   PRN Reason: MAG LEVEL 1.4 - 2.0


Magnesium Sulfate 2 gm/ Device  100 mls @ 100 mls/hr IVPB ASDIR PRN


   PRN Reason: MAGNESIUM < 1.4


Potassium Phosphate 9 mmol/ (Sodium Chloride)  103 mls @ 25.75 mls/hr IVPB 

ASDIR PRN


   PRN Reason: Phosphate 1.0-1.8


Potassium Phosphate 12 mmol/ (Sodium Chloride)  254 mls @ 63.5 mls/hr IV ASDIR 

PRN


   PRN Reason: Serum phosphate 0.5-0.9


Potassium Phosphate 15 mmol/ (Sodium Chloride)  255 mls @ 63.75 mls/hr IV ASDIR 

PRN


   PRN Reason: Serum Phos < 0.5


Diltiazem HCl 125 mg/ Sodium (Chloride)  125 mls @ 0 mls/hr IVPB INF [facility]; 

Titrate


   PRN Reason: Protocol


   Last Admin: 05/19/18 08:45 Dose:  125 mls


Losartan Potassium (Cozaar)  25 mg PO DAILY [facility]


   Last Admin: 05/20/18 08:53 Dose:  25 mg


Magnesium Oxide (Magnesium Oxide)  400 mg PO BIDPRN PRN


   PRN Reason: FOR SERUM MAG 1.4 - 2.0


Magnesium Oxide (Magnesium Oxide)  800 mg PO PRN PRN


   PRN Reason: FOR SERUM MAG < 1.4


Miscellaneous Medication (Phos-Nak)  1 pkt PO TIDPRN PRN


   PRN Reason: FOR PHOS LEVEL 1.0 - 1.8


Miscellaneous Medication (Phos-Nak)  2 pkt PO TIDPRN PRN


   PRN Reason: FOR PHOS LEVEL 0.5 - 1.0


Ondansetron HCl (Zofran Odt)  4 mg PO Q6H PRN


   PRN Reason: Nausea/Vomiting


Ondansetron HCl (Zofran)  4 mg IVP Q6H PRN


   PRN Reason: Nausea/Vomiting


Pantoprazole Sodium (Protonix)  40 mg PER TUBE DAILY [facility]


   Last Admin: 05/20/18 08:53 Dose:  40 mg


Potassium Chloride (K-Dur)  40 meq PO ASDIR PRN


   PRN Reason: FOR SERUM K+  2.5 - 3.5


   Last Admin: 05/02/18 06:43 Dose:  40 meq


Potassium Chloride (Klor-Con)  40 meq PER TUBE ASDIR PRN


   PRN Reason: FOR SERUM K+ 2.5-3.5


   Last Admin: 05/18/18 08:42 Dose:  40 meq


Sodium Bicarbonate (Bicarbonate, Sodium)  650 mg PER TUBE .PER PROTOCOL PRN


   PRN Reason: ENTERAL TUBE OCCLUSION


Sodium Chloride (Flush - Normal Saline)  10 ml IVF Q12HR [facility]


   Last Admin: 05/20/18 08:54 Dose:  10 ml


Sodium Chloride (Flush - Normal Saline)  10 ml IVF PRN PRN


   PRN Reason: Saline Flush


   Last Admin: 05/12/18 08:28 Dose:  10 ml

## 2018-05-20 NOTE — PDOC.PN
- Subjective


Encounter Start Date: 05/20/18


Encounter Start Time: 11:15


Patient Is seen today, alert and still on Mech ventilation, on Cardizem Drip.





- Objective


Resuscitation Status: 


 











Resuscitation Status           FULL:Full Resuscitation














MAR Reviewed: Yes


Vital Signs & Weight: 


 Vital Signs (12 hours)











  Temp Pulse Resp BP Pulse Ox


 


 05/20/18 13:25   71   139/71 


 


 05/20/18 12:00  98.7 F   21 H  


 


 05/20/18 10:50   67   98/46 L 


 


 05/20/18 10:00    21 H  


 


 05/20/18 08:53   70   


 


 05/20/18 08:00  98.6 F  70  21 H   90 L


 


 05/20/18 07:28   70   115/46 L 


 


 05/20/18 06:00    25 H  


 


 05/20/18 04:00  99.8 F H   24 H  








 Weight











Admit Weight                   600 lb


 


Weight                         450 lb 6.47 oz











 Most Recent Monitor Data











Heart Rate from ECG            70


 


NIBP                           102/44


 


NIBP BP-Mean                   62


 


Respiration from ECG           22


 


SpO2                           92














I&O: 


 











 05/19/18 05/20/18 05/21/18





 06:59 06:59 06:59


 


Intake Total 1504 1590.1 120


 


Output Total 1910 1320 365


 


Balance -406 270.1 -245











Result Diagrams: 


 05/20/18 03:35





 05/20/18 03:35


Radiology Reviewed by me: Yes





Phys Exam





- Physical Examination


Neck: no nodes, no JVD


Respiratory: no wheezing, no rales


Cardiovascular: RRR, no significant murmur


Gastrointestinal: soft, non-tender


Musculoskeletal: pulses present, edema present


Neurological: non-focal, normal sensation


Lymphatic: no nodes


Skin: no rash





Dx/Plan


(1) Acute idiopathic thrombocytopenic purpura


Code(s): D69.3 - IMMUNE THROMBOCYTOPENIC PURPURA   Status: Resolved   Comment: 

resolved.   





(2) Acute and chronic respiratory failure


Code(s): J96.20 - ACUTE AND CHR RESP FAILURE, UNSP W HYPOXIA OR HYPERCAPNIA   

Status: Acute   


Qualifiers: 


   Respiratory failure complication: hypercapnia   Qualified Code(s): J96.22 - 

Acute and chronic respiratory failure with hypercapnia   


Comment: Pt on Trach back on mecanical ventilation per pulmonary.   





(3) Acute on chronic diastolic (congestive) heart failure


Code(s): I50.33 - ACUTE ON CHRONIC DIASTOLIC (CONGESTIVE) HEART FAILURE   Status

: Chronic   Comment:  lasix Diuresis as needed. Currently on Hold due to pre 

renal azotemia.   





(4) Hypokalemia


Code(s): E87.6 - HYPOKALEMIA   Status: Acute   Comment: continue electrolyte 

replacement protocol   





(5) Morbid obesity with BMI of 70 and over, adult


Code(s): E66.01 - MORBID (SEVERE) OBESITY DUE TO EXCESS CALORIES; Z68.45 - BODY 

MASS INDEX (BMI) 70 OR GREATER, ADULT   Status: Chronic   





(6) Pickwickian syndrome


Code(s): E66.2 - MORBID (SEVERE) OBESITY WITH ALVEOLAR HYPOVENTILATION   Status

: Chronic   





(7) Pulmonary embolism


Code(s): I26.99 - OTHER PULMONARY EMBOLISM WITHOUT ACUTE COR PULMONALE   Status

: Suspected   Comment: continue heparin drip per DVT/PE protocol   





(8) Afib


Code(s): I48.91 - UNSPECIFIED ATRIAL FIBRILLATION   Status: Chronic   


Qualifiers: 


   Atrial fibrillation type: paroxysmal   Qualified Code(s): I48.0 - Paroxysmal 

atrial fibrillation   


Comment: On Amiodarone per cardiology, recommeded Digoxin and Cardizem drip. 

Will closley Monitor. Platelets stabel count.   





- Plan


cont current plan of care, , respiratory therapy, DVT proph w/

lovenox





* .








Review of Systems





- Review of Systems


Other: 


ROS unable to do as pt is Nonverbal.





- Medications/Allergies


Allergies/Adverse Reactions: 


 Allergies











Allergy/AdvReac Type Severity Reaction Status Date / Time


 


No Known Drug Allergies Allergy   Verified 05/06/18 14:16











Medications: 


 Current Medications





Acetaminophen (Tylenol Elixir)  1,000 mg PER TUBE Q6H PRN


   PRN Reason: Headache/Fever or Pain


   Last Admin: 05/20/18 08:53 Dose:  1,000 mg


Albuterol/Ipratropium (Duoneb)  3 ml NEB [id number] [facility]


   Last Admin: 05/20/18 13:25 Dose:  3 ml


Amiodarone HCl (Cordarone)  200 mg PO DAILY [facility]


   Last Admin: 05/20/18 08:53 Dose:  200 mg


Lipase/Protease/Amylase ([place] [address])  1 cap FS .PER PROTOCOL PRN


   PRN Reason: TUBE OCCLUSION PROTOCOL


Apixaban (Eliquis)  5 mg PO BID [facility]


   Last Admin: 05/20/18 08:53 Dose:  5 mg


Aspirin (Aspirin Chewable)  81 mg PO DAILY [facility]


   Last Admin: 05/20/18 08:53 Dose:  81 mg


Clonidine (Catapres)  0.1 mg PO Q4H PRN


   PRN Reason: Systolic BP > 180


   Last Admin: 05/18/18 21:05 Dose:  0.1 mg


Digoxin (Lanoxin)  [facility] [place] [facility] [place]


   Last Admin: 05/20/18 08:53 Dose:  0.125 mg


Hydralazine HCl (Apresoline)  10 mg SLOW IVP Q4H PRN


   PRN Reason: Systolic BP > 180


   Last Admin: 05/12/18 17:06 Dose:  10 mg


Potassium Chloride 40 meq/ (Sodium Chloride)  270 mls @ 135 mls/hr IVPB ASDIR 

PRN


   PRN Reason: FOR SERUM K+ 2.5 - 3.5


   Last Admin: 04/28/18 06:18 Dose:  270 mls


Potassium Chloride 40 meq/ (Device)  100 mls @ 50 mls/hr IVPB ASDIR PRN


   PRN Reason: FOR SERUM K+ 2.5 - 3.5


Magnesium Sulfate 1 gm/ Sodium (Chloride)  102 mls @ 102 mls/hr IV PRN PRN


   PRN Reason: MAG LEVEL 1.4 - 2.0


Magnesium Sulfate 2 gm/ Device  100 mls @ 100 mls/hr IVPB ASDIR PRN


   PRN Reason: MAGNESIUM < 1.4


Potassium Phosphate 9 mmol/ (Sodium Chloride)  103 mls @ 25.75 mls/hr IVPB 

ASDIR PRN


   PRN Reason: Phosphate 1.0-1.8


Potassium Phosphate 12 mmol/ (Sodium Chloride)  254 mls @ 63.5 mls/hr IV ASDIR 

PRN


   PRN Reason: Serum phosphate 0.5-0.9


Potassium Phosphate 15 mmol/ (Sodium Chloride)  255 mls @ 63.75 mls/hr IV ASDIR 

PRN


   PRN Reason: Serum Phos < 0.5


Diltiazem HCl 125 mg/ Sodium (Chloride)  125 mls @ 0 mls/hr IVPB INF [facility]; 

Titrate


   PRN Reason: Protocol


   Last Admin: 05/19/18 08:45 Dose:  125 mls


Losartan Potassium (Cozaar)  25 mg PO DAILY [facility]


   Last Admin: 05/20/18 08:53 Dose:  25 mg


Magnesium Oxide (Magnesium Oxide)  400 mg PO BIDPRN PRN


   PRN Reason: FOR SERUM MAG 1.4 - 2.0


Magnesium Oxide (Magnesium Oxide)  800 mg PO PRN PRN


   PRN Reason: FOR SERUM MAG < 1.4


Miscellaneous Medication (Phos-Nak)  1 pkt PO TIDPRN PRN


   PRN Reason: FOR PHOS LEVEL 1.0 - 1.8


Miscellaneous Medication (Phos-Nak)  2 pkt PO TIDPRN PRN


   PRN Reason: FOR PHOS LEVEL 0.5 - 1.0


Ondansetron HCl (Zofran Odt)  4 mg PO Q6H PRN


   PRN Reason: Nausea/Vomiting


Ondansetron HCl (Zofran)  4 mg IVP Q6H PRN


   PRN Reason: Nausea/Vomiting


Pantoprazole Sodium (Protonix)  40 mg PER TUBE DAILY [facility]


   Last Admin: 05/20/18 08:53 Dose:  40 mg


Potassium Chloride (K-Dur)  40 meq PO ASDIR PRN


   PRN Reason: FOR SERUM K+  2.5 - 3.5


   Last Admin: 05/02/18 06:43 Dose:  40 meq


Potassium Chloride (Klor-Con)  40 meq PER TUBE ASDIR PRN


   PRN Reason: FOR SERUM K+ 2.5-3.5


   Last Admin: 05/18/18 08:42 Dose:  40 meq


Sodium Bicarbonate (Bicarbonate, Sodium)  650 mg PER TUBE .PER PROTOCOL PRN


   PRN Reason: ENTERAL TUBE OCCLUSION


Sodium Chloride (Flush - Normal Saline)  10 ml IVF Q12HR KEE


   Last Admin: 05/20/18 08:54 Dose:  10 ml


Sodium Chloride (Flush - Normal Saline)  10 ml IVF PRN PRN


   PRN Reason: Saline Flush


   Last Admin: 05/12/18 08:28 Dose:  10 ml

## 2018-05-21 LAB
ANION GAP SERPL CALC-SCNC: 8 MMOL/L (ref 10–20)
BUN SERPL-MCNC: 40 MG/DL (ref 9.8–20.1)
CALCIUM SERPL-MCNC: 9.1 MG/DL (ref 7.8–10.44)
CHLORIDE SERPL-SCNC: 105 MMOL/L (ref 98–107)
CO2 SERPL-SCNC: 35 MMOL/L (ref 23–31)
CREAT CL PREDICTED SERPL C-G-VRATE: 196 ML/MIN (ref 70–130)
GLUCOSE SERPL-MCNC: 104 MG/DL (ref 80–115)
POTASSIUM SERPL-SCNC: 4.9 MMOL/L (ref 3.5–5.1)
SODIUM SERPL-SCNC: 143 MMOL/L (ref 136–145)

## 2018-05-21 RX ADMIN — Medication SCH ML: at 20:27

## 2018-05-21 RX ADMIN — Medication SCH ML: at 09:09

## 2018-05-21 RX ADMIN — PANTOPRAZOLE SODIUM SCH MG: 40 GRANULE, DELAYED RELEASE ORAL at 09:07

## 2018-05-21 NOTE — EKG
Test Reason : A-FIB

Blood Pressure : ***/*** mmHG

Vent. Rate : 159 BPM     Atrial Rate : 159 BPM

   P-R Int : 000 ms          QRS Dur : 114 ms

    QT Int : 306 ms       P-R-T Axes : 000 123 001 degrees

   QTc Int : 497 ms

 

Atrial fibrillation with rapid ventricular response

Right bundle branch block

Left posterior fascicular block

*** Bifascicular block ***

Abnormal ECG

When compared with ECG of 19-MAY-2018 06:09, (Unconfirmed)

Previous ECG has undetermined rhythm, needs review

Confirmed by NATI ARCHULETA (2) on 5/21/2018 6:40:26 PM

 

Referred By:             Confirmed By:NATI ARCHULETA

## 2018-05-21 NOTE — PDOC.CTH
Cardiology Progress Note





- Subjective





She is doing better. Remains in afib now. 





- Objective


 Vital Signs











  Temp Pulse Pulse Pulse Pulse Resp BP


 


 05/21/18 22:00       21 H 


 


 05/21/18 20:00  98.0 F  78     23 H 


 


 05/21/18 18:43   73     26 H 


 


 05/21/18 18:00       28 H 


 


 05/21/18 16:00  98.5 F      24 H 


 


 05/21/18 15:21   75      146/68 H


 


 05/21/18 13:39   81     23 H 


 


 05/21/18 12:00  98.2 F      


 


 05/21/18 11:52    88  86  84  














  BP BP BP Pulse Ox Pulse Ox Pulse Ox Pulse Ox


 


 05/21/18 22:00       


 


 05/21/18 20:00     93 L   


 


 05/21/18 18:43     90 L   


 


 05/21/18 18:00       


 


 05/21/18 16:00       


 


 05/21/18 15:21       


 


 05/21/18 13:39     91 L   


 


 05/21/18 12:00     35 L   


 


 05/21/18 11:52  148/63 H  168/62 H  141/64 H   91 L  91 L  92 L








 











Admit Weight                   600 lb


 


Weight                         376 lb 1.738 oz














 











 05/20/18 05/21/18 05/22/18





 06:59 06:59 06:59


 


Intake Total 1590.1 1557 795


 


Output Total 1320 1360 775


 


Balance 270.1 197 20














- Physical Examination


General/Neuro: NAD


Neck: no JVD present


Lungs: CTA, unlabored respirations


Heart: RRR


Abdomen: NT/ND


Extremities: + edema B (2+)





- Telemetry


Telemetry Rhythm: NSR





- Labs


Result Diagrams: 


 05/20/18 03:35





 05/21/18 04:36


 Troponin/CKMB











CK-MB (CK-2)  2.9 ng/mL (0-6.6)   04/26/18  01:05    


 


Troponin I  0.038 ng/mL (< 0.028)  H  05/19/18  08:29    














- Assessment/Plan





1. Acute hypoxic hypercapnic respiratory insufficiency


2. Acute on chronic RV dysfunction.


3. Acute on chronic diastolic heart failure, improved. 


4. Morbid obesity, 


5. Pickwickian syndrome


6. Afib RVR, back in sinus again. 





PLAN:


- Continue amiodarone.


- Will stop digoxin as she is back in afib.


- Continue Eliquis for stroke prophylaxis.

## 2018-05-21 NOTE — PRG
DATE OF SERVICE:  05/21/2018

 

The patient remains on mechanical ventilation.  Apparently had a decent night.  

 

PHYSICAL EXAMINATION: 

VITAL SIGNS:  Temperature 98.2, pulse 76, blood pressure 115/47, 24 intake 1557, output 1360.

HEENT:  Unremarkable.

NECK:  Trach in good position.

LUNGS:  Coarse rhonchi.

CARDIOVASCULAR:  S1, S2 regular.

ABDOMEN:  Soft, morbidly obese.

EXTREMITIES:  Decreased edema compared to previous.

 

LABORATORY DATA:  Hematocrit 37.6, platelet count 142.  Sodium 143, potassium 4.9, chloride 105, CO2 
35, BUN 40, creatinine 0.8, glucose 104.

 

ASSESSMENT:

1.  Paroxysmal atrial fibrillation.

2.  Acute respiratory failure requiring mechanical ventilation.

3.  Obesity hypoventilation syndrome.

 

PLAN:  Since her cardiac rate is now sinus and her rate is controlled I will go ahead and try to star
t weaning her again.  We can try a trach collar as tolerated, pressure support ventilation otherwise.

## 2018-05-21 NOTE — EKG
Test Reason : 

Blood Pressure : ***/*** mmHG

Vent. Rate : 157 BPM     Atrial Rate : 096 BPM

   P-R Int : 000 ms          QRS Dur : 112 ms

    QT Int : 340 ms       P-R-T Axes : 000 116 002 degrees

   QTc Int : 549 ms

 

Probable atrial fibrillation with rapid ventricular response

Incomplete right bundle branch block

Right ventricular hypertrophy with repolarization abnormality

T wave abnormality, consider inferior ischemia

Abnormal ECG

When compared with ECG of 26-APR-2018 01:04, (Unconfirmed)

Current undetermined rhythm precludes rhythm comparison, needs review

ST now depressed in Anterior leads

Confirmed by NATI ARCHULETA (2) on 5/21/2018 6:40:16 PM

 

Referred By:  RICK           Confirmed By:NATI ARCHULETA

## 2018-05-22 LAB
ANION GAP SERPL CALC-SCNC: 6 MMOL/L (ref 10–20)
BUN SERPL-MCNC: 39 MG/DL (ref 9.8–20.1)
CALCIUM SERPL-MCNC: 8.8 MG/DL (ref 7.8–10.44)
CHLORIDE SERPL-SCNC: 105 MMOL/L (ref 98–107)
CO2 SERPL-SCNC: 36 MMOL/L (ref 23–31)
CREAT CL PREDICTED SERPL C-G-VRATE: 212 ML/MIN (ref 70–130)
GLUCOSE SERPL-MCNC: 101 MG/DL (ref 80–115)
HGB BLD-MCNC: 10.4 G/DL (ref 12–16)
PLATELET # BLD AUTO: 152 THOU/UL (ref 130–400)
POTASSIUM SERPL-SCNC: 5 MMOL/L (ref 3.5–5.1)
SODIUM SERPL-SCNC: 142 MMOL/L (ref 136–145)

## 2018-05-22 RX ADMIN — PANTOPRAZOLE SODIUM SCH MG: 40 GRANULE, DELAYED RELEASE ORAL at 11:33

## 2018-05-22 RX ADMIN — Medication SCH ML: at 20:22

## 2018-05-22 RX ADMIN — Medication SCH ML: at 11:33

## 2018-05-22 NOTE — PDOC.PN
- Subjective


Encounter Start Date: 05/22/18


Encounter Start Time: 10:00





Patient is off of Vent again today, Her Afib is resolved now Sinus. She is 

Alert and awake.





- Objective


Resuscitation Status: 


 











Resuscitation Status           FULL:Full Resuscitation














MAR Reviewed: Yes


Vital Signs & Weight: 


 Vital Signs (12 hours)











  Temp Pulse Pulse Pulse Resp BP BP


 


 05/22/18 12:25   83    24 H  


 


 05/22/18 12:00  98.0 F      


 


 05/22/18 10:28    85  82   125/56 L  121/53 L


 


 05/22/18 08:00  98.2 F  83    24 H  


 


 05/22/18 07:27       


 


 05/22/18 07:24   73    17  


 


 05/22/18 07:00  98.2 F      


 


 05/22/18 06:00      21 H  


 


 05/22/18 04:00  98.1 F     19  














  Pulse Ox Pulse Ox Pulse Ox


 


 05/22/18 12:25  90 L  


 


 05/22/18 12:00   


 


 05/22/18 10:28   89 L  89 L


 


 05/22/18 08:00  92 L  


 


 05/22/18 07:27  90 L  


 


 05/22/18 07:24  90 L  


 


 05/22/18 07:00   


 


 05/22/18 06:00   


 


 05/22/18 04:00   








 Weight











Admit Weight                   600 lb


 


Weight                         376 lb 1.738 oz











 Most Recent Monitor Data











Heart Rate from ECG            84


 


NIBP                           118/64


 


NIBP BP-Mean                   82


 


Respiration from ECG           10


 


SpO2                           90














I&O: 


 











 05/21/18 05/22/18 05/23/18





 06:59 06:59 06:59


 


Intake Total 1557 2093 60


 


Output Total 1360 1125 825


 


Balance 197 968 -765











Result Diagrams: 


 05/22/18 05:14





 05/22/18 05:14


Radiology Reviewed by me: Yes





Phys Exam





- Physical Examination


HEENT: PERRLA, moist MMs


Neck: no nodes, no JVD


Respiratory: no wheezing, no rales


Cardiovascular: RRR, no significant murmur


Gastrointestinal: soft


Musculoskeletal: edema present


Neurological: non-focal, normal sensation


Lymphatic: no nodes





Dx/Plan


(1) Acute idiopathic thrombocytopenic purpura


Code(s): D69.3 - IMMUNE THROMBOCYTOPENIC PURPURA   Status: Resolved   Comment: 

resolved.   





(2) Acute and chronic respiratory failure


Code(s): J96.20 - ACUTE AND CHR RESP FAILURE, UNSP W HYPOXIA OR HYPERCAPNIA   

Status: Acute   


Qualifiers: 


   Respiratory failure complication: hypercapnia   Qualified Code(s): J96.22 - 

Acute and chronic respiratory failure with hypercapnia   


Comment: Pt on Trach back off mecanical ventilation now.   





(3) Acute on chronic diastolic (congestive) heart failure


Code(s): I50.33 - ACUTE ON CHRONIC DIASTOLIC (CONGESTIVE) HEART FAILURE   Status

: Chronic   Comment:  lasix Diuresis as needed. Currently on Hold due to pre 

renal azotemia.   





(4) Hypokalemia


Code(s): E87.6 - HYPOKALEMIA   Status: Acute   Comment: continue electrolyte 

replacement protocol   





(5) Morbid obesity with BMI of 70 and over, adult


Code(s): E66.01 - MORBID (SEVERE) OBESITY DUE TO EXCESS CALORIES; Z68.45 - BODY 

MASS INDEX (BMI) 70 OR GREATER, ADULT   Status: Chronic   





(6) Pickwickian syndrome


Code(s): E66.2 - MORBID (SEVERE) OBESITY WITH ALVEOLAR HYPOVENTILATION   Status

: Chronic   





(7) Pulmonary embolism


Code(s): I26.99 - OTHER PULMONARY EMBOLISM WITHOUT ACUTE COR PULMONALE   Status

: Suspected   Comment: continue heparin drip per DVT/PE protocol   





(8) Afib


Code(s): I48.91 - UNSPECIFIED ATRIAL FIBRILLATION   Status: Chronic   


Qualifiers: 


   Atrial fibrillation type: paroxysmal   Qualified Code(s): I48.0 - Paroxysmal 

atrial fibrillation   


Comment: On Amiodarone per cardiology, recommeded Digoxin and Cardizem drip. 

Will closley Monitor. Platelets stabel count. sinus now.   





- Plan


cont current plan of care, , DVT proph w/lovenox





* .








Review of Systems





- Review of Systems


Other: 





no ROS as pt is non verbal.





- Medications/Allergies


Allergies/Adverse Reactions: 


 Allergies











Allergy/AdvReac Type Severity Reaction Status Date / Time


 


No Known Drug Allergies Allergy   Verified 05/06/18 14:16











Medications: 


 Current Medications





Acetaminophen (Tylenol Elixir)  1,000 mg PER TUBE Q6H PRN


   PRN Reason: Headache/Fever or Pain


   Last Admin: 05/22/18 11:33 Dose:  1,000 mg


Albuterol/Ipratropium (Duoneb)  3 ml NEB [id number] KEE


   Last Admin: 05/22/18 12:25 Dose:  3 ml


Amiodarone HCl (Cordarone)  200 mg PO DAILY [facility]


   Last Admin: 05/22/18 11:33 Dose:  200 mg


Lipase/Protease/Amylase ([place] [address])  1 cap FS .PER PROTOCOL PRN


   PRN Reason: TUBE OCCLUSION PROTOCOL


Apixaban (Eliquis)  5 mg PO BID [facility]


   Last Admin: 05/22/18 11:57 Dose:  5 mg


Aspirin (Aspirin Chewable)  81 mg PO DAILY [facility]


   Last Admin: 05/22/18 11:32 Dose:  81 mg


Clonidine (Catapres)  0.1 mg PO Q4H PRN


   PRN Reason: Systolic BP > 180


   Last Admin: 05/18/18 21:05 Dose:  0.1 mg


Hydralazine HCl (Apresoline)  10 mg SLOW IVP Q4H PRN


   PRN Reason: Systolic BP > 180


   Last Admin: 05/12/18 17:06 Dose:  10 mg


Potassium Chloride 40 meq/ (Sodium Chloride)  270 mls @ 135 mls/hr IVPB ASDIR 

PRN


   PRN Reason: FOR SERUM K+ 2.5 - 3.5


   Last Admin: 04/28/18 06:18 Dose:  270 mls


Potassium Chloride 40 meq/ (Device)  100 mls @ 50 mls/hr IVPB ASDIR PRN


   PRN Reason: FOR SERUM K+ 2.5 - 3.5


Magnesium Sulfate 1 gm/ Sodium (Chloride)  102 mls @ 102 mls/hr IV PRN PRN


   PRN Reason: MAG LEVEL 1.4 - 2.0


Magnesium Sulfate 2 gm/ Device  100 mls @ 100 mls/hr IVPB ASDIR PRN


   PRN Reason: MAGNESIUM < 1.4


Potassium Phosphate 9 mmol/ (Sodium Chloride)  103 mls @ 25.75 mls/hr IVPB 

ASDIR PRN


   PRN Reason: Phosphate 1.0-1.8


Potassium Phosphate 12 mmol/ (Sodium Chloride)  254 mls @ 63.5 mls/hr IV ASDIR 

PRN


   PRN Reason: Serum phosphate 0.5-0.9


Potassium Phosphate 15 mmol/ (Sodium Chloride)  255 mls @ 63.75 mls/hr IV ASDIR 

PRN


   PRN Reason: Serum Phos < 0.5


Losartan Potassium (Cozaar)  25 mg PO DAILY [facility]


   Last Admin: 05/22/18 11:32 Dose:  25 mg


Magnesium Oxide (Magnesium Oxide)  400 mg PO BIDPRN PRN


   PRN Reason: FOR SERUM MAG 1.4 - 2.0


Magnesium Oxide (Magnesium Oxide)  800 mg PO PRN PRN


   PRN Reason: FOR SERUM MAG < 1.4


Miscellaneous Medication (Phos-Nak)  1 pkt PO TIDPRN PRN


   PRN Reason: FOR PHOS LEVEL 1.0 - 1.8


Miscellaneous Medication (Phos-Nak)  2 pkt PO TIDPRN PRN


   PRN Reason: FOR PHOS LEVEL 0.5 - 1.0


Ondansetron HCl (Zofran Odt)  4 mg PO Q6H PRN


   PRN Reason: Nausea/Vomiting


   Last Admin: 05/22/18 11:57 Dose:  4 mg


Ondansetron HCl (Zofran)  4 mg IVP Q6H PRN


   PRN Reason: Nausea/Vomiting


Pantoprazole Sodium (Protonix)  40 mg PER TUBE DAILY [facility]


   Last Admin: 05/22/18 11:33 Dose:  40 mg


Potassium Chloride (K-Dur)  40 meq PO ASDIR PRN


   PRN Reason: FOR SERUM K+  2.5 - 3.5


   Last Admin: 05/02/18 06:43 Dose:  40 meq


Potassium Chloride (Klor-Con)  40 meq PER TUBE ASDIR PRN


   PRN Reason: FOR SERUM K+ 2.5-3.5


   Last Admin: 05/18/18 08:42 Dose:  40 meq


Sodium Bicarbonate (Bicarbonate, Sodium)  650 mg PER TUBE .PER PROTOCOL PRN


   PRN Reason: ENTERAL TUBE OCCLUSION


Sodium Chloride (Flush - Normal Saline)  10 ml IVF Q12HR [facility]


   Last Admin: 05/22/18 11:33 Dose:  10 ml


Sodium Chloride (Flush - Normal Saline)  10 ml IVF PRN PRN


   PRN Reason: Saline Flush


   Last Admin: 05/12/18 08:28 Dose:  10 ml

## 2018-05-22 NOTE — PRG
DATE OF SERVICE:  05/22/2018

 

The patient remains in the CCU.  She is intermittently requiring mechanical ventilation because she g
ets tired shortly after being put on trach collar.

 

PHYSICAL EXAMINATION: 

VITAL SIGNS:  Temperature is 98.1, pulse 73, blood pressure 115/49.  24-hour intake 2093, output 1125
.

HEENT:  Unremarkable.

NECK:  Trach in good position.  A lot of secretions.

CARDIAC:  S1 and S2 regular.

LUNGS:  Coarse breath sounds.

GI:  Abdomen is soft, obese, nontender.

EXTREMITIES:  Brawny edema throughout.

 

LABORATORY DATA:  Hemoglobin 10, hematocrit 36.2, platelet count 152.  Sodium 142, potassium 5, chlor
peter 105, CO2 36, BUN 39, creatinine 0.7, glucose 101.

 

ASSESSMENT:

1.  Obesity hypoventilation syndrome.

2.  Status post tracheostomy for acute on chronic respiratory failure.

3.  Slowly increasing potassium level.

 

PLAN:  Continue trach collar trials and hopefully can extend time off the ventilator.  Prognosis for 
functional recovery seems uncertain at this point.  She is a challenge in regards to placement due to
 her lack of insurance and lack of residency.

## 2018-05-22 NOTE — PDOC.CTH
Cardiology Progress Note





- Subjective





No new issues.





- Objective


 Vital Signs











  Temp Pulse Pulse Pulse Resp BP BP


 


 05/22/18 12:25   83    24 H  


 


 05/22/18 12:00  98.0 F      


 


 05/22/18 10:28    85  82   125/56 L  121/53 L


 


 05/22/18 08:00  98.2 F  83    24 H  


 


 05/22/18 07:27       


 


 05/22/18 07:24   73    17  


 


 05/22/18 07:00  98.2 F      


 


 05/22/18 06:00      21 H  














  Pulse Ox Pulse Ox Pulse Ox


 


 05/22/18 12:25  90 L  


 


 05/22/18 12:00   


 


 05/22/18 10:28   89 L  89 L


 


 05/22/18 08:00  92 L  


 


 05/22/18 07:27  90 L  


 


 05/22/18 07:24  90 L  


 


 05/22/18 07:00   


 


 05/22/18 06:00   








 











Admit Weight                   600 lb


 


Weight                         376 lb 1.738 oz














 











 05/21/18 05/22/18 05/23/18





 06:59 06:59 06:59


 


Intake Total 1557 2093 60


 


Output Total 1360 1125 825


 


Balance 197 968 -765














- Physical Examination


General/Neuro: NAD


Neck: no JVD present


Lungs: CTA, unlabored respirations


Heart: RRR


Abdomen: NT/ND


Extremities: + edema B (2+)





- Telemetry


Telemetry Rhythm: NSR, PVC's





- Labs


Result Diagrams: 


 05/22/18 05:14





 05/22/18 05:14


 Troponin/CKMB











CK-MB (CK-2)  2.9 ng/mL (0-6.6)   04/26/18  01:05    


 


Troponin I  0.038 ng/mL (< 0.028)  H  05/19/18  08:29    














- Assessment/Plan





1. Acute hypoxic hypercapnic respiratory insufficiency. Improved. s/p trach.


2. Acute on chronic RV dysfunction.


3. Acute on chronic diastolic heart failure, improved. 


4. Morbid obesity, 


5. Pickwickian syndrome


6. Afib RVR, maintaining sinus again. 





PLAN:


- Continue amiodarone.


- Continue Eliquis for stroke prophylaxis.

## 2018-05-22 NOTE — PDOC.PN
- Subjective


Encounter Start Date: 05/22/18


Encounter Start Time: 15:00





Kamla is seen today, alert and oriented. She was trying to say she speaks 4 

languages, English,French, [place] and Romansh. She looked more comfortable 

today. 





- Objective


Resuscitation Status: 


 











Resuscitation Status           FULL:Full Resuscitation














MAR Reviewed: Yes


Vital Signs & Weight: 


 Vital Signs (12 hours)











  Temp Pulse Pulse Pulse Resp BP BP


 


 05/22/18 20:00  97.4 F L      


 


 05/22/18 18:47   77    22 H  


 


 05/22/18 16:00  98.7 F      


 


 05/22/18 12:25   83    24 H  


 


 05/22/18 12:00  98.0 F      


 


 05/22/18 10:28    85  82   125/56 L  121/53 L














  Pulse Ox Pulse Ox Pulse Ox


 


 05/22/18 20:00   


 


 05/22/18 18:47  92 L  


 


 05/22/18 16:00   


 


 05/22/18 12:25  90 L  


 


 05/22/18 12:00   


 


 05/22/18 10:28   89 L  89 L








 Weight











Admit Weight                   600 lb


 


Weight                         376 lb 1.738 oz











 Most Recent Monitor Data











Heart Rate from ECG            75


 


NIBP                           138/60


 


NIBP BP-Mean                   84


 


Respiration from ECG           19


 


SpO2                           94














I&O: 


 











 05/21/18 05/22/18 05/23/18





 06:59 06:59 06:59


 


Intake Total 1557 2093 655


 


Output Total 1360 1125 1105


 


Balance 197 968 -450











Result Diagrams: 


 05/22/18 05:14





 05/22/18 05:14





Phys Exam





- Physical Examination


HEENT: PERRLA, moist MMs


Neck: no nodes, no JVD


Respiratory: no wheezing, no rales


Cardiovascular: RRR, no significant murmur


Gastrointestinal: soft


Musculoskeletal: pulses present, edema present


Neurological: non-focal, normal sensation


Psychiatric: normal affect, A&O x 3





Dx/Plan


(1) Acute idiopathic thrombocytopenic purpura


Code(s): D69.3 - IMMUNE THROMBOCYTOPENIC PURPURA   Status: Resolved   Comment: 

resolved.   





(2) Acute and chronic respiratory failure


Code(s): J96.20 - ACUTE AND CHR RESP FAILURE, UNSP W HYPOXIA OR HYPERCAPNIA   

Status: Acute   


Qualifiers: 


   Respiratory failure complication: hypercapnia   Qualified Code(s): J96.22 - 

Acute and chronic respiratory failure with hypercapnia   


Comment: Pt on Trach back off mecanical ventilation now.   





(3) Acute on chronic diastolic (congestive) heart failure


Code(s): I50.33 - ACUTE ON CHRONIC DIASTOLIC (CONGESTIVE) HEART FAILURE   Status

: Chronic   Comment:  lasix Diuresis as needed. Currently on Hold due to pre 

renal azotemia.   





(4) Hypokalemia


Code(s): E87.6 - HYPOKALEMIA   Status: Acute   Comment: continue electrolyte 

replacement protocol   





(5) Morbid obesity with BMI of 70 and over, adult


Code(s): E66.01 - MORBID (SEVERE) OBESITY DUE TO EXCESS CALORIES; Z68.45 - BODY 

MASS INDEX (BMI) 70 OR GREATER, ADULT   Status: Chronic   





(6) Pickwickian syndrome


Code(s): E66.2 - MORBID (SEVERE) OBESITY WITH ALVEOLAR HYPOVENTILATION   Status

: Chronic   





(7) Pulmonary embolism


Code(s): I26.99 - OTHER PULMONARY EMBOLISM WITHOUT ACUTE COR PULMONALE   Status

: Suspected   Comment: continue heparin drip per DVT/PE protocol   





(8) Afib


Code(s): I48.91 - UNSPECIFIED ATRIAL FIBRILLATION   Status: Chronic   


Qualifiers: 


   Atrial fibrillation type: paroxysmal   Qualified Code(s): I48.0 - Paroxysmal 

atrial fibrillation   


Comment: On Amiodarone per cardiology, recommeded Digoxin and Cardizem drip. 

Will closley Monitor. Platelets stabel count. sinus now.   





- Plan


cont current plan of care, paiz catheter, , DVT proph w/lovenox





* .








Review of Systems





- Review of Systems


Other: 





Unable to get ROS due to Trach





- Medications/Allergies


Allergies/Adverse Reactions: 


 Allergies











Allergy/AdvReac Type Severity Reaction Status Date / Time


 


No Known Drug Allergies Allergy   Verified 05/06/18 14:16











Medications: 


 Current Medications





Acetaminophen (Tylenol Elixir)  1,000 mg PER TUBE Q6H PRN


   PRN Reason: Headache/Fever or Pain


   Last Admin: 05/22/18 11:33 Dose:  1,000 mg


Albuterol/Ipratropium (Duoneb)  3 ml NEB [id number] KEE


   Last Admin: 05/22/18 18:47 Dose:  3 ml


Amiodarone HCl (Cordarone)  200 mg PO DAILY [facility]


   Last Admin: 05/22/18 11:33 Dose:  200 mg


Lipase/Protease/Amylase ([place] Dr [address])  1 cap FS .PER PROTOCOL PRN


   PRN Reason: TUBE OCCLUSION PROTOCOL


Apixaban (Eliquis)  5 mg PO BID [facility]


   Last Admin: 05/22/18 20:21 Dose:  5 mg


Aspirin (Aspirin Chewable)  81 mg PO DAILY [facility]


   Last Admin: 05/22/18 11:32 Dose:  81 mg


Clonidine (Catapres)  0.1 mg PO Q4H PRN


   PRN Reason: Systolic BP > 180


   Last Admin: 05/18/18 21:05 Dose:  0.1 mg


Hydralazine HCl (Apresoline)  10 mg SLOW IVP Q4H PRN


   PRN Reason: Systolic BP > 180


   Last Admin: 05/12/18 17:06 Dose:  10 mg


Potassium Chloride 40 meq/ (Sodium Chloride)  270 mls @ 135 mls/hr IVPB ASDIR 

PRN


   PRN Reason: FOR SERUM K+ 2.5 - 3.5


   Last Admin: 04/28/18 06:18 Dose:  270 mls


Potassium Chloride 40 meq/ (Device)  100 mls @ 50 mls/hr IVPB ASDIR PRN


   PRN Reason: FOR SERUM K+ 2.5 - 3.5


Magnesium Sulfate 1 gm/ Sodium (Chloride)  102 mls @ 102 mls/hr IV PRN PRN


   PRN Reason: MAG LEVEL 1.4 - 2.0


Magnesium Sulfate 2 gm/ Device  100 mls @ 100 mls/hr IVPB ASDIR PRN


   PRN Reason: MAGNESIUM < 1.4


Potassium Phosphate 9 mmol/ (Sodium Chloride)  103 mls @ 25.75 mls/hr IVPB 

ASDIR PRN


   PRN Reason: Phosphate 1.0-1.8


Potassium Phosphate 12 mmol/ (Sodium Chloride)  254 mls @ 63.5 mls/hr IV ASDIR 

PRN


   PRN Reason: Serum phosphate 0.5-0.9


Potassium Phosphate 15 mmol/ (Sodium Chloride)  255 mls @ 63.75 mls/hr IV ASDIR 

PRN


   PRN Reason: Serum Phos < 0.5


Losartan Potassium (Cozaar)  25 mg PO DAILY [facility]


   Last Admin: 05/22/18 11:32 Dose:  25 mg


Magnesium Oxide (Magnesium Oxide)  400 mg PO BIDPRN PRN


   PRN Reason: FOR SERUM MAG 1.4 - 2.0


Magnesium Oxide (Magnesium Oxide)  800 mg PO PRN PRN


   PRN Reason: FOR SERUM MAG < 1.4


Miscellaneous Medication (Phos-Nak)  1 pkt PO TIDPRN PRN


   PRN Reason: FOR PHOS LEVEL 1.0 - 1.8


Miscellaneous Medication (Phos-Nak)  2 pkt PO TIDPRN PRN


   PRN Reason: FOR PHOS LEVEL 0.5 - 1.0


Ondansetron HCl (Zofran Odt)  4 mg PO Q6H PRN


   PRN Reason: Nausea/Vomiting


   Last Admin: 05/22/18 11:57 Dose:  4 mg


Ondansetron HCl (Zofran)  4 mg IVP Q6H PRN


   PRN Reason: Nausea/Vomiting


Pantoprazole Sodium (Protonix)  40 mg PER TUBE DAILY [facility]


   Last Admin: 05/22/18 11:33 Dose:  40 mg


Potassium Chloride (K-Dur)  40 meq PO ASDIR PRN


   PRN Reason: FOR SERUM K+  2.5 - 3.5


   Last Admin: 05/02/18 06:43 Dose:  40 meq


Potassium Chloride (Klor-Con)  40 meq PER TUBE ASDIR PRN


   PRN Reason: FOR SERUM K+ 2.5-3.5


   Last Admin: 05/18/18 08:42 Dose:  40 meq


Sodium Bicarbonate (Bicarbonate, Sodium)  650 mg PER TUBE .PER PROTOCOL PRN


   PRN Reason: ENTERAL TUBE OCCLUSION


Sodium Chloride (Flush - Normal Saline)  10 ml IVF Q12HR [facility]


   Last Admin: 05/22/18 20:22 Dose:  10 ml


Sodium Chloride (Flush - Normal Saline)  10 ml IVF PRN PRN


   PRN Reason: Saline Flush


   Last Admin: 05/12/18 08:28 Dose:  10 ml

## 2018-05-23 LAB
ANION GAP SERPL CALC-SCNC: 10 MMOL/L (ref 10–20)
BUN SERPL-MCNC: 41 MG/DL (ref 9.8–20.1)
CALCIUM SERPL-MCNC: 9.3 MG/DL (ref 7.8–10.44)
CHLORIDE SERPL-SCNC: 104 MMOL/L (ref 98–107)
CO2 SERPL-SCNC: 35 MMOL/L (ref 23–31)
CREAT CL PREDICTED SERPL C-G-VRATE: 194 ML/MIN (ref 70–130)
GLUCOSE SERPL-MCNC: 92 MG/DL (ref 80–115)
POTASSIUM SERPL-SCNC: 5.3 MMOL/L (ref 3.5–5.1)
SODIUM SERPL-SCNC: 144 MMOL/L (ref 136–145)

## 2018-05-23 RX ADMIN — Medication SCH ML: at 20:13

## 2018-05-23 RX ADMIN — PANTOPRAZOLE SODIUM SCH MG: 40 GRANULE, DELAYED RELEASE ORAL at 09:32

## 2018-05-23 RX ADMIN — Medication SCH ML: at 09:33

## 2018-05-23 NOTE — PDOC.CTH
Cardiology Progress Note





- Subjective





No new issues. 





- Objective


 Vital Signs











  Temp Pulse Resp Pulse Ox


 


 05/23/18 16:00  98.4 F   


 


 05/23/18 12:00  98.1 F   


 


 05/23/18 11:42   79  20  88 L


 


 05/23/18 08:00  98.7 F  82  22 H  86 L


 


 05/23/18 07:00  98.7 F   22 H 


 


 05/23/18 06:57     89 L


 


 05/23/18 06:53   77  24 H  89 L


 


 05/23/18 06:00    24 H 








 











Admit Weight                   600 lb


 


Weight                         376 lb 1.738 oz














 











 05/22/18 05/23/18 05/24/18





 06:59 06:59 06:59


 


Intake Total 2091 1231 595


 


Output Total 1124 1480 1025


 


Balance 117 -283 -420














- Physical Examination


General/Neuro: NAD


Neck: no JVD present


Lungs: CTA, unlabored respirations


Heart: RRR


Abdomen: NT/ND


Extremities: + edema B (1+)





- Telemetry


Telemetry Rhythm: NSR





- Labs


Result Diagrams: 


 05/22/18 05:14





 05/23/18 06:45


 Troponin/CKMB











CK-MB (CK-2)  2.9 ng/mL (0-6.6)   04/26/18  01:05    


 


Troponin I  0.038 ng/mL (< 0.028)  H  05/19/18  08:29    














- Assessment/Plan





1. Acute hypoxic hypercapnic respiratory insufficiency. Improved. s/p trach.


2. Acute on chronic RV dysfunction.


3. Acute on chronic diastolic heart failure, improved. 


4. Morbid obesity, 


5. Pickwickian syndrome


6. Afib RVR, maintaining sinus again. 





PLAN:


- Continue amiodarone.


- Continue Eliquis for stroke prophylaxis.

## 2018-05-23 NOTE — PDOC.PN
- Subjective


Encounter Start Date: 05/23/18


Encounter Start Time: 12:15


Subjective: awake, on trach collar





- Objective


Resuscitation Status: 


 











Resuscitation Status           FULL:Full Resuscitation














MAR Reviewed: Yes


Vital Signs & Weight: 


 Vital Signs (12 hours)











  Temp Pulse Resp Pulse Ox


 


 05/23/18 12:00  98.1 F   


 


 05/23/18 11:42   79  20  88 L


 


 05/23/18 08:00  98.7 F  82  22 H  86 L


 


 05/23/18 07:00  98.7 F   22 H 


 


 05/23/18 06:57     89 L


 


 05/23/18 06:53   77  24 H  89 L


 


 05/23/18 06:00    24 H 


 


 05/23/18 04:00  98.3 F   20 


 


 05/23/18 02:00    21 H 








 Weight











Admit Weight                   600 lb


 


Weight                         376 lb 1.738 oz











 Most Recent Monitor Data











Heart Rate from ECG            79


 


NIBP                           110/47


 


NIBP BP-Mean                   65


 


Respiration from ECG           23


 


SpO2                           87














I&O: 


 











 05/22/18 05/23/18 05/24/18





 06:59 06:59 06:59


 


Intake Total 2093 1231 60


 


Output Total 1125 1480 775


 


Balance 081 -780 -377











Result Diagrams: 


 05/22/18 05:14





 05/23/18 06:45





Phys Exam





- Physical Examination


HEENT: PERRLA, moist MMs


trach+


Respiratory: no wheezing, no rales


Cardiovascular: RRR, no significant murmur


Gastrointestinal: soft, non-tender, positive bowel sounds


peg+


Musculoskeletal: no edema, pulses present


Neurological: non-focal, moves all 4 limbs


Psychiatric: A&O x 3





Dx/Plan


(1) Acute and chronic respiratory failure


Code(s): J96.20 - ACUTE AND CHR RESP FAILURE, UNSP W HYPOXIA OR HYPERCAPNIA   

Status: Acute   


Qualifiers: 


   Respiratory failure complication: hypoxia and hypercapnia   Qualified Code(s)

: J96.21 - Acute and chronic respiratory failure with hypoxia; J96.22 - Acute 

and chronic respiratory failure with hypercapnia; J96.22 - Acute and chronic 

respiratory failure with hypercapnia; J96.22 - Acute and chronic respiratory 

failure with hypercapnia   





(2) MARY (obstructive sleep apnea)


Code(s): G47.33 - OBSTRUCTIVE SLEEP APNEA (ADULT) (PEDIATRIC)   Status: Chronic

   





(3) Afib


Code(s): I48.91 - UNSPECIFIED ATRIAL FIBRILLATION   Status: Chronic   


Qualifiers: 


   Atrial fibrillation type: paroxysmal   Qualified Code(s): I48.0 - Paroxysmal 

atrial fibrillation   





(4) Acute on chronic diastolic (congestive) heart failure


Code(s): I50.33 - ACUTE ON CHRONIC DIASTOLIC (CONGESTIVE) HEART FAILURE   Status

: Chronic   





(5) Morbid obesity with BMI of 70 and over, adult


Code(s): E66.01 - MORBID (SEVERE) OBESITY DUE TO EXCESS CALORIES; Z68.45 - BODY 

MASS INDEX (BMI) 70 OR GREATER, ADULT   Status: Chronic   





(6) Pickwickian syndrome


Code(s): E66.2 - MORBID (SEVERE) OBESITY WITH ALVEOLAR HYPOVENTILATION   Status

: Chronic   





(7) Status post tracheostomy


Code(s): Z93.0 - TRACHEOSTOMY STATUS   Status: Acute   Comment: done on 5/7/18 

  





(8) S/P percutaneous endoscopic gastrostomy (PEG) tube placement


Code(s): Z93.1 - GASTROSTOMY STATUS   Status: Acute   Comment: done on 5/7/18   





- Plan


is on vent at night and trach collar during day


-: is slowly coming off vent


-: PT to aggressively pursue mobilization, is severely deconditioned


-: post discharge option is home hence aggressive PT is needed


-: is on eliquis, amiodaron, cozaar, asp





* .








Review of Systems





- Medications/Allergies


Allergies/Adverse Reactions: 


 Allergies











Allergy/AdvReac Type Severity Reaction Status Date / Time


 


No Known Drug Allergies Allergy   Verified 05/06/18 14:16











Medications: 


 Current Medications





Acetaminophen (Tylenol Elixir)  1,000 mg PER TUBE Q6H PRN


   PRN Reason: Headache/Fever or Pain


   Last Admin: 05/22/18 11:33 Dose:  1,000 mg


Albuterol/Ipratropium (Duoneb)  3 ml NEB [id number] KEE


   Last Admin: 05/23/18 11:42 Dose:  3 ml


Amiodarone HCl (Cordarone)  200 mg PO DAILY [facility]


   Last Admin: 05/23/18 09:32 Dose:  200 mg


Lipase/Protease/Amylase ([place] [address])  1 cap FS .PER PROTOCOL PRN


   PRN Reason: TUBE OCCLUSION PROTOCOL


Apixaban (Eliquis)  5 mg PO BID [facility]


   Last Admin: 05/23/18 11:27 Dose:  5 mg


Aspirin (Aspirin Chewable)  81 mg PO DAILY [facility]


   Last Admin: 05/23/18 09:32 Dose:  81 mg


Clonidine (Catapres)  0.1 mg PO Q4H PRN


   PRN Reason: Systolic BP > 180


   Last Admin: 05/18/18 21:05 Dose:  0.1 mg


Furosemide (Lasix)  40 mg SLOW IVP DAILY [facility]


   Last Admin: 05/23/18 09:32 Dose:  40 mg


Hydralazine HCl (Apresoline)  10 mg SLOW IVP Q4H PRN


   PRN Reason: Systolic BP > 180


   Last Admin: 05/12/18 17:06 Dose:  10 mg


Potassium Chloride 40 meq/ (Sodium Chloride)  270 mls @ 135 mls/hr IVPB ASDIR 

PRN


   PRN Reason: FOR SERUM K+ 2.5 - 3.5


   Last Admin: 04/28/18 06:18 Dose:  270 mls


Potassium Chloride 40 meq/ (Device)  100 mls @ 50 mls/hr IVPB ASDIR PRN


   PRN Reason: FOR SERUM K+ 2.5 - 3.5


Magnesium Sulfate 1 gm/ Sodium (Chloride)  102 mls @ 102 mls/hr IV PRN PRN


   PRN Reason: MAG LEVEL 1.4 - 2.0


Magnesium Sulfate 2 gm/ Device  100 mls @ 100 mls/hr IVPB ASDIR PRN


   PRN Reason: MAGNESIUM < 1.4


Potassium Phosphate 9 mmol/ (Sodium Chloride)  103 mls @ 25.75 mls/hr IVPB 

ASDIR PRN


   PRN Reason: Phosphate 1.0-1.8


Potassium Phosphate 12 mmol/ (Sodium Chloride)  254 mls @ 63.5 mls/hr IV ASDIR 

PRN


   PRN Reason: Serum phosphate 0.5-0.9


Potassium Phosphate 15 mmol/ (Sodium Chloride)  255 mls @ 63.75 mls/hr IV ASDIR 

PRN


   PRN Reason: Serum Phos < 0.5


Losartan Potassium (Cozaar)  25 mg PO DAILY [facility]


   Last Admin: 05/23/18 09:32 Dose:  25 mg


Magnesium Oxide (Magnesium Oxide)  400 mg PO BIDPRN PRN


   PRN Reason: FOR SERUM MAG 1.4 - 2.0


Magnesium Oxide (Magnesium Oxide)  800 mg PO PRN PRN


   PRN Reason: FOR SERUM MAG < 1.4


Miscellaneous Medication (Phos-Nak)  1 pkt PO TIDPRN PRN


   PRN Reason: FOR PHOS LEVEL 1.0 - 1.8


Miscellaneous Medication (Phos-Nak)  2 pkt PO TIDPRN PRN


   PRN Reason: FOR PHOS LEVEL 0.5 - 1.0


Ondansetron HCl (Zofran Odt)  4 mg PO Q6H PRN


   PRN Reason: Nausea/Vomiting


   Last Admin: 05/22/18 11:57 Dose:  4 mg


Ondansetron HCl (Zofran)  4 mg IVP Q6H PRN


   PRN Reason: Nausea/Vomiting


Pantoprazole Sodium (Protonix)  40 mg PER TUBE DAILY [facility]


   Last Admin: 05/23/18 09:32 Dose:  40 mg


Potassium Chloride (K-Dur)  40 meq PO ASDIR PRN


   PRN Reason: FOR SERUM K+  2.5 - 3.5


   Last Admin: 05/02/18 06:43 Dose:  40 meq


Potassium Chloride (Klor-Con)  40 meq PER TUBE ASDIR PRN


   PRN Reason: FOR SERUM K+ 2.5-3.5


   Last Admin: 05/18/18 08:42 Dose:  40 meq


Sertraline HCl (Zoloft)  50 mg PER TUBE DAILY [facility]


   Last Admin: 05/23/18 09:32 Dose:  50 mg


Sodium Bicarbonate (Bicarbonate, Sodium)  650 mg PER TUBE .PER PROTOCOL PRN


   PRN Reason: ENTERAL TUBE OCCLUSION


Sodium Chloride (Flush - Normal Saline)  10 ml IVF Q12HR [facility]


   Last Admin: 05/23/18 09:33 Dose:  10 ml


Sodium Chloride (Flush - Normal Saline)  10 ml IVF PRN PRN


   PRN Reason: Saline Flush


   Last Admin: 05/12/18 08:28 Dose:  10 ml

## 2018-05-23 NOTE — PDOC.PULPN
Progress Note: Subj/Obj





- Subjective


Date: 05/23/18


Time: 07:21


Narrative: Sleeping.  No acute changes





- ROS


ROS unobtainable: other (due to trach)





- Objective


Allergies/Adverse Reactions: 


 Allergies











Allergy/AdvReac Type Severity Reaction Status Date / Time


 


No Known Drug Allergies Allergy   Verified 05/06/18 14:16











MAR Reviewed: Yes


Vital Signs: 


 Vital Signs











Temp  98.3 F   05/23/18 04:00


 


Pulse  77   05/23/18 06:53


 


Resp  24 H  05/23/18 06:53


 


BP  125/56 L  05/22/18 10:28


 


Pulse Ox  89 L  05/23/18 06:57








 Intake & Output











 05/22/18 05/23/18 05/23/18





 18:59 06:59 18:59


 


Intake Total 415 816 


 


Output Total 1005 475 


 


Balance -590 341 


 


Weight 376 lb 1.738 oz  


 


Intake:   


 


  Tube Feeding 325 576 


 


  Tube Irrigant 90 240 


 


Output:   


 


  Gastric Drainage 100  


 


  Output, [place] 905 475 


 


Other:   


 


  Voiding Method Indwelling Catheter Indwelling Catheter 














Progress Note: Exam





- Physical Exam


Constitutional: NAD


HEENT: PERRLA, sclera anicteric


Neck: no nodes, no JVD


Deviation from normal: trach site ok


Cardiovascular: RRR


Focused Respiratory Location: decreased breath sounds: Right, Left


Gastrointestinal: soft, non-tender


Musculoskeletal: edema present


Neurological: non-focal


Lymphatic: no nodes


Skin: no rash





Progress Note: Data





- Labs


Result Diagrams: 


 05/22/18 05:14





 05/22/18 05:14





Progress Note: A/P





- Problems


(1) Acute and chronic respiratory failure


Current Visit: Yes   Status: Acute   Code(s): J96.20 - ACUTE AND CHR RESP 

FAILURE, UNSP W HYPOXIA OR HYPERCAPNIA   


Qualifiers: 


   Respiratory failure complication: hypercapnia   Qualified Code(s): J96.22 - 

Acute and chronic respiratory failure with hypercapnia   





(2) Acute on chronic diastolic (congestive) heart failure


Current Visit: Yes   Status: Chronic   Code(s): I50.33 - ACUTE ON CHRONIC 

DIASTOLIC (CONGESTIVE) HEART FAILURE   





(3) Morbid obesity with BMI of 70 and over, adult


Current Visit: Yes   Status: Chronic   Code(s): E66.01 - MORBID (SEVERE) 

OBESITY DUE TO EXCESS CALORIES; Z68.45 - BODY MASS INDEX (BMI) 70 OR GREATER, 

ADULT   





(4) Pickwickian syndrome


Current Visit: Yes   Status: Chronic   Code(s): E66.2 - MORBID (SEVERE) OBESITY 

WITH ALVEOLAR HYPOVENTILATION   





- Time Spent with Patient


Time (minutes): 30 (cc time)





- Plan


Plan: 





Continue trach collar trials


Start an antidepressant 


restart diuretics


labs tomorrow

## 2018-05-24 LAB
ANION GAP SERPL CALC-SCNC: 8 MMOL/L (ref 10–20)
BUN SERPL-MCNC: 48 MG/DL (ref 9.8–20.1)
CALCIUM SERPL-MCNC: 9.4 MG/DL (ref 7.8–10.44)
CHLORIDE SERPL-SCNC: 103 MMOL/L (ref 98–107)
CO2 SERPL-SCNC: 37 MMOL/L (ref 23–31)
CREAT CL PREDICTED SERPL C-G-VRATE: 189 ML/MIN (ref 70–130)
GLUCOSE SERPL-MCNC: 99 MG/DL (ref 80–115)
HGB BLD-MCNC: 10.5 G/DL (ref 12–16)
PLATELET # BLD AUTO: 148 THOU/UL (ref 130–400)
POTASSIUM SERPL-SCNC: 4.9 MMOL/L (ref 3.5–5.1)
SODIUM SERPL-SCNC: 143 MMOL/L (ref 136–145)

## 2018-05-24 RX ADMIN — Medication SCH ML: at 09:52

## 2018-05-24 RX ADMIN — Medication SCH ML: at 20:38

## 2018-05-24 RX ADMIN — PANTOPRAZOLE SODIUM SCH MG: 40 GRANULE, DELAYED RELEASE ORAL at 09:52

## 2018-05-24 NOTE — PRG
DATE OF SERVICE:  05/24/2018

 

SUBJECTIVE:  The patient is doing well today.  She slept on the vent last night, but has been using t
he trach collar during the daytime.

 

OBJECTIVE:

VITAL SIGNS:  Temperature is 98.2, pulse 66, blood pressure 104/33, total intake for the last 24 hour
s is 1332, output 1605.  Weight currently 376 pounds.

HEENT:  Unremarkable.

NECK:  Trach in good position.

LUNGS:  Clear but distant breath sounds.

CARDIOVASCULAR:  S1, S2 regular.

ABDOMEN:  Soft, obese, nontender, nondistended.

EXTREMITIES:  Remarkable brawny edema.

 

LABORATORY DATA:  Hemoglobin 10.5, hematocrit 37, platelet count of 148,000.  Sodium 143, potassium 4
.9, chloride 103, CO2 37, BUN 48, creatinine 0.8, glucose 99.

 

ASSESSMENT:

1.  Obesity hypoventilation syndrome.

2.  Acute on chronic respiratory failure requiring tracheostomy placement.

3.  Very slow to wean.

4.  Paroxysmal atrial fibrillation.

 

PLAN:

1.  The patient is continuing daytime trach collar trials.

2.  Increase activity as tolerated.

3.  Sertraline was started yesterday for depression.

4.  Placement is the main issue at this point.  Her acute illness seems to have stabilized and we are
 now dealing with just chronic medical issues.

## 2018-05-24 NOTE — PDOC.CTH
Cardiology Progress Note





- Subjective





No new issues. Remains in sinus. 





- Objective


 Vital Signs











  Temp Pulse Resp BP Pulse Ox


 


 05/24/18 08:00  98.0 F  69  21 H   90 L


 


 05/24/18 07:00  98.0 F    


 


 05/24/18 06:29   65   104/33 L 


 


 05/24/18 06:27   66  19   89 L


 


 05/24/18 06:00    17  


 


 05/24/18 04:00    23 H  


 


 05/24/18 03:00  98.2 F    


 


 05/24/18 02:00    22 H  


 


 05/23/18 23:18   68  20   92 L


 


 05/23/18 23:00  98.1 F    








 











Admit Weight                   600 lb


 


Weight                         376 lb 1.738 oz














 











 05/23/18 05/24/18 05/25/18





 06:59 06:59 06:59


 


Intake Total 1231 1332 


 


Output Total 1480 1605 95


 


Balance -249 -273 -95














- Physical Examination


General/Neuro: alert & oriented x3, NAD


Neck: no JVD present


Lungs: unlabored respirations


Heart: RRR


Abdomen: NT/ND


Extremities: + edema B (1+)





- Telemetry


Telemetry Rhythm: NSR





- Labs


Result Diagrams: 


 05/24/18 03:30





 05/24/18 03:30


 Troponin/CKMB











CK-MB (CK-2)  2.9 ng/mL (0-6.6)   04/26/18  01:05    


 


Troponin I  0.038 ng/mL (< 0.028)  H  05/19/18  08:29    














- Assessment/Plan





1. Acute hypoxic hypercapnic respiratory insufficiency. Improved. s/p trach.


2. Acute on chronic RV dysfunction.


3. Acute on chronic diastolic heart failure, improved. 


4. Morbid obesity, 


5. Pickwickian syndrome


6. Afib RVR, maintaining sinus now for several days.





PLAN:


- Continue amiodarone.


- Continue Eliquis for stroke prophylaxis.


- Will sign off. Please call with any questions.

## 2018-05-24 NOTE — PDOC.PN
- Subjective


Encounter Start Date: 05/24/18


Encounter Start Time: 11:15


Subjective: awake, on trach collar





- Objective


Resuscitation Status: 


 











Resuscitation Status           FULL:Full Resuscitation














MAR Reviewed: Yes


Vital Signs & Weight: 


 Vital Signs (12 hours)











  Temp Pulse Resp BP Pulse Ox


 


 05/24/18 12:02   72  20   88 L


 


 05/24/18 11:00  98.0 F    


 


 05/24/18 08:00  98.0 F  69  21 H   90 L


 


 05/24/18 07:00  98.0 F    


 


 05/24/18 06:29   65   104/33 L 


 


 05/24/18 06:27   66  19   89 L


 


 05/24/18 06:00    17  


 


 05/24/18 04:00    23 H  


 


 05/24/18 03:00  98.2 F    


 


 05/24/18 02:00    22 H  








 Weight











Admit Weight                   600 lb


 


Weight                         376 lb 1.738 oz











 Most Recent Monitor Data











Heart Rate from ECG            68


 


NIBP                           105/42


 


NIBP BP-Mean                   55


 


Respiration from ECG           19


 


SpO2                           92














I&O: 


 











 05/23/18 05/24/18 05/25/18





 06:59 06:59 06:59


 


Intake Total 1231 1332 50


 


Output Total 1480 1605 745


 


Balance -062 -921 -695











Result Diagrams: 


 05/24/18 03:30





 05/24/18 03:30





Phys Exam





- Physical Examination


HEENT: PERRLA, moist MMs


Neck: no JVD, supple


trach+


Respiratory: no wheezing, no rales


Cardiovascular: RRR, no significant murmur


Gastrointestinal: soft, non-tender, positive bowel sounds


Musculoskeletal: pulses present, edema present


Neurological: non-focal, moves all 4 limbs


Psychiatric: A&O x 3





Dx/Plan


(1) Acute and chronic respiratory failure


Code(s): J96.20 - ACUTE AND CHR RESP FAILURE, UNSP W HYPOXIA OR HYPERCAPNIA   

Status: Acute   


Qualifiers: 


   Respiratory failure complication: hypoxia and hypercapnia   Qualified Code(s)

: J96.21 - Acute and chronic respiratory failure with hypoxia; J96.22 - Acute 

and chronic respiratory failure with hypercapnia; J96.22 - Acute and chronic 

respiratory failure with hypercapnia; J96.22 - Acute and chronic respiratory 

failure with hypercapnia   





(2) MARY (obstructive sleep apnea)


Code(s): G47.33 - OBSTRUCTIVE SLEEP APNEA (ADULT) (PEDIATRIC)   Status: Chronic

   





(3) Afib


Code(s): I48.91 - UNSPECIFIED ATRIAL FIBRILLATION   Status: Chronic   


Qualifiers: 


   Atrial fibrillation type: paroxysmal   Qualified Code(s): I48.0 - Paroxysmal 

atrial fibrillation   





(4) Acute on chronic diastolic (congestive) heart failure


Code(s): I50.33 - ACUTE ON CHRONIC DIASTOLIC (CONGESTIVE) HEART FAILURE   Status

: Chronic   





(5) Morbid obesity with BMI of 70 and over, adult


Code(s): E66.01 - MORBID (SEVERE) OBESITY DUE TO EXCESS CALORIES; Z68.45 - BODY 

MASS INDEX (BMI) 70 OR GREATER, ADULT   Status: Chronic   





(6) Pickwickian syndrome


Code(s): E66.2 - MORBID (SEVERE) OBESITY WITH ALVEOLAR HYPOVENTILATION   Status

: Chronic   





(7) Status post tracheostomy


Code(s): Z93.0 - TRACHEOSTOMY STATUS   Status: Acute   Comment: done on 5/7/18 

  





(8) S/P percutaneous endoscopic gastrostomy (PEG) tube placement


Code(s): Z93.1 - GASTROSTOMY STATUS   Status: Acute   Comment: done on 5/7/18   





- Plan


has lost atleast 74lbs (450 to 376lbs)


-: PT to aggressively work with pt/mobilize/oob to chair etc


-: is on eliquis, asp, nebs, amiodarone, cozaar


-: zoloft was initiated 2 days back for depression


-: suggest change her air/water mattress to regular so she can easily mobilize





* .


using vent at night and is on trach collar during day





Review of Systems





- Medications/Allergies


Allergies/Adverse Reactions: 


 Allergies











Allergy/AdvReac Type Severity Reaction Status Date / Time


 


No Known Drug Allergies Allergy   Verified 05/06/18 14:16











Medications: 


 Current Medications





Acetaminophen (Tylenol Elixir)  1,000 mg PER TUBE Q6H PRN


   PRN Reason: Headache/Fever or Pain


   Last Admin: 05/23/18 16:48 Dose:  1,000 mg


Albuterol/Ipratropium (Duoneb)  3 ml NEB [id number] KEE


   Last Admin: 05/24/18 12:02 Dose:  3 ml


Amiodarone HCl (Cordarone)  200 mg PO DAILY [facility]


   Last Admin: 05/24/18 09:52 Dose:  200 mg


Lipase/Protease/Amylase ([place] Dr [id number])  1 cap FS .PER PROTOCOL PRN


   PRN Reason: TUBE OCCLUSION PROTOCOL


Apixaban (Eliquis)  5 mg PO BID [facility]


   Last Admin: 05/24/18 09:52 Dose:  5 mg


Aspirin (Aspirin Chewable)  81 mg PO DAILY [facility]


   Last Admin: 05/24/18 09:52 Dose:  81 mg


Clonidine (Catapres)  0.1 mg PO Q4H PRN


   PRN Reason: Systolic BP > 180


   Last Admin: 05/18/18 21:05 Dose:  0.1 mg


Furosemide (Lasix)  40 mg SLOW IVP DAILY [facility]


   Last Admin: 05/24/18 09:51 Dose:  40 mg


Hydralazine HCl (Apresoline)  10 mg SLOW IVP Q4H PRN


   PRN Reason: Systolic BP > 180


   Last Admin: 05/12/18 17:06 Dose:  10 mg


Potassium Chloride 40 meq/ (Sodium Chloride)  270 mls @ 135 mls/hr IVPB ASDIR 

PRN


   PRN Reason: FOR SERUM K+ 2.5 - 3.5


   Last Admin: 04/28/18 06:18 Dose:  270 mls


Potassium Chloride 40 meq/ (Device)  100 mls @ 50 mls/hr IVPB ASDIR PRN


   PRN Reason: FOR SERUM K+ 2.5 - 3.5


Magnesium Sulfate 1 gm/ Sodium (Chloride)  102 mls @ 102 mls/hr IV PRN PRN


   PRN Reason: MAG LEVEL 1.4 - 2.0


Magnesium Sulfate 2 gm/ Device  100 mls @ 100 mls/hr IVPB ASDIR PRN


   PRN Reason: MAGNESIUM < 1.4


Potassium Phosphate 9 mmol/ (Sodium Chloride)  103 mls @ 25.75 mls/hr IVPB 

ASDIR PRN


   PRN Reason: Phosphate 1.0-1.8


Potassium Phosphate 12 mmol/ (Sodium Chloride)  254 mls @ 63.5 mls/hr IV ASDIR 

PRN


   PRN Reason: Serum phosphate 0.5-0.9


Potassium Phosphate 15 mmol/ (Sodium Chloride)  255 mls @ 63.75 mls/hr IV ASDIR 

PRN


   PRN Reason: Serum Phos < 0.5


Losartan Potassium (Cozaar)  25 mg PO DAILY [facility]


   Last Admin: 05/24/18 09:52 Dose:  25 mg


Magnesium Oxide (Magnesium Oxide)  400 mg PO BIDPRN PRN


   PRN Reason: FOR SERUM MAG 1.4 - 2.0


Magnesium Oxide (Magnesium Oxide)  800 mg PO PRN PRN


   PRN Reason: FOR SERUM MAG < 1.4


Miscellaneous Medication (Phos-Nak)  1 pkt PO TIDPRN PRN


   PRN Reason: FOR PHOS LEVEL 1.0 - 1.8


Miscellaneous Medication (Phos-Nak)  2 pkt PO TIDPRN PRN


   PRN Reason: FOR PHOS LEVEL 0.5 - 1.0


Ondansetron HCl (Zofran Odt)  4 mg PO Q6H PRN


   PRN Reason: Nausea/Vomiting


   Last Admin: 05/22/18 11:57 Dose:  4 mg


Ondansetron HCl (Zofran)  4 mg IVP Q6H PRN


   PRN Reason: Nausea/Vomiting


Pantoprazole Sodium (Protonix)  40 mg PER TUBE DAILY [facility]


   Last Admin: 05/24/18 09:52 Dose:  40 mg


Potassium Chloride (K-Dur)  40 meq PO ASDIR PRN


   PRN Reason: FOR SERUM K+  2.5 - 3.5


   Last Admin: 05/02/18 06:43 Dose:  40 meq


Potassium Chloride (Klor-Con)  40 meq PER TUBE ASDIR PRN


   PRN Reason: FOR SERUM K+ 2.5-3.5


   Last Admin: 05/18/18 08:42 Dose:  40 meq


Sertraline HCl (Zoloft)  50 mg PER TUBE DAILY [facility]


   Last Admin: 05/24/18 09:52 Dose:  50 mg


Sodium Bicarbonate (Bicarbonate, Sodium)  650 mg PER TUBE .PER PROTOCOL PRN


   PRN Reason: ENTERAL TUBE OCCLUSION


Sodium Chloride (Flush - Normal Saline)  10 ml IVF Q12HR [facility]


   Last Admin: 05/24/18 09:52 Dose:  10 ml


Sodium Chloride (Flush - Normal Saline)  10 ml IVF PRN PRN


   PRN Reason: Saline Flush


   Last Admin: 05/12/18 08:28 Dose:  10 ml

## 2018-05-25 LAB
ANION GAP SERPL CALC-SCNC: 9 MMOL/L (ref 10–20)
BUN SERPL-MCNC: 48 MG/DL (ref 9.8–20.1)
CALCIUM SERPL-MCNC: 9.4 MG/DL (ref 7.8–10.44)
CHLORIDE SERPL-SCNC: 104 MMOL/L (ref 98–107)
CO2 SERPL-SCNC: 35 MMOL/L (ref 23–31)
CREAT CL PREDICTED SERPL C-G-VRATE: 204 ML/MIN (ref 70–130)
GLUCOSE SERPL-MCNC: 102 MG/DL (ref 80–115)
POTASSIUM SERPL-SCNC: 4.6 MMOL/L (ref 3.5–5.1)
SODIUM SERPL-SCNC: 143 MMOL/L (ref 136–145)

## 2018-05-25 RX ADMIN — Medication SCH ML: at 08:37

## 2018-05-25 RX ADMIN — PANTOPRAZOLE SODIUM SCH MG: 40 GRANULE, DELAYED RELEASE ORAL at 08:36

## 2018-05-25 RX ADMIN — Medication SCH ML: at 20:22

## 2018-05-25 NOTE — PDOC.PN
- Subjective


Encounter Start Date: 05/25/18


Encounter Start Time: 13:00


Subjective: on trach collar, follows verbal stimuli





- Objective


Resuscitation Status: 


 











Resuscitation Status           FULL:Full Resuscitation














MAR Reviewed: Yes


Vital Signs & Weight: 


 Vital Signs (12 hours)











  Temp Pulse Resp Pulse Ox


 


 05/25/18 12:43   73  22 H  91 L


 


 05/25/18 12:00  98.0 F   


 


 05/25/18 08:00  98.0 F  70  24 H  88 L


 


 05/25/18 07:00  98.0 F   


 


 05/25/18 06:34   74  20  88 L


 


 05/25/18 04:00  98.2 F   20 








 Weight











Admit Weight                   600 lb


 


Weight                         376 lb 1.738 oz











 Most Recent Monitor Data











Heart Rate from ECG            74


 


NIBP                           125/55


 


NIBP BP-Mean                   74


 


Respiration from ECG           23


 


SpO2                           90














I&O: 


 











 05/24/18 05/25/18 05/26/18





 06:59 06:59 06:59


 


Intake Total 1332 1564 210


 


Output Total 1605 2395 500


 


Balance -273 -831 -290











Result Diagrams: 


 05/24/18 03:30





 05/25/18 05:00





Phys Exam





- Physical Examination


HEENT: PERRLA, moist MMs


Neck: no JVD


trach+


Respiratory: no wheezing


rhonchi+


Cardiovascular: RRR, no significant murmur


Gastrointestinal: soft, no distention, positive bowel sounds


peg+


Musculoskeletal: pulses present, edema present


Neurological: non-focal, moves all 4 limbs





Dx/Plan


(1) Acute and chronic respiratory failure


Code(s): J96.20 - ACUTE AND CHR RESP FAILURE, UNSP W HYPOXIA OR HYPERCAPNIA   

Status: Acute   


Qualifiers: 


   Respiratory failure complication: hypoxia and hypercapnia   Qualified Code(s)

: J96.21 - Acute and chronic respiratory failure with hypoxia; J96.22 - Acute 

and chronic respiratory failure with hypercapnia; J96.22 - Acute and chronic 

respiratory failure with hypercapnia; J96.22 - Acute and chronic respiratory 

failure with hypercapnia   





(2) MARY (obstructive sleep apnea)


Code(s): G47.33 - OBSTRUCTIVE SLEEP APNEA (ADULT) (PEDIATRIC)   Status: Chronic

   





(3) Afib


Code(s): I48.91 - UNSPECIFIED ATRIAL FIBRILLATION   Status: Chronic   


Qualifiers: 


   Atrial fibrillation type: paroxysmal   Qualified Code(s): I48.0 - Paroxysmal 

atrial fibrillation   





(4) Acute on chronic diastolic (congestive) heart failure


Code(s): I50.33 - ACUTE ON CHRONIC DIASTOLIC (CONGESTIVE) HEART FAILURE   Status

: Chronic   





(5) Morbid obesity with BMI of 70 and over, adult


Code(s): E66.01 - MORBID (SEVERE) OBESITY DUE TO EXCESS CALORIES; Z68.45 - BODY 

MASS INDEX (BMI) 70 OR GREATER, ADULT   Status: Chronic   





(6) Pickwickian syndrome


Code(s): E66.2 - MORBID (SEVERE) OBESITY WITH ALVEOLAR HYPOVENTILATION   Status

: Chronic   





(7) Status post tracheostomy


Code(s): Z93.0 - TRACHEOSTOMY STATUS   Status: Acute   Comment: done on 5/7/18 

  





(8) S/P percutaneous endoscopic gastrostomy (PEG) tube placement


Code(s): Z93.1 - GASTROSTOMY STATUS   Status: Acute   Comment: done on 5/7/18   





- Plan


is on vent at night and trach collar during day


-: not motivated, is seen sleeping for the most part


-: on zoloft, amiodarone, eliquis, cozaar


-: PT to work aggressively with patient, oob to chair/amb


-: poor prognosis





* .








Review of Systems





- Medications/Allergies


Allergies/Adverse Reactions: 


 Allergies











Allergy/AdvReac Type Severity Reaction Status Date / Time


 


No Known Drug Allergies Allergy   Verified 05/06/18 14:16











Medications: 


 Current Medications





Acetaminophen (Tylenol Elixir)  1,000 mg PER TUBE Q6H PRN


   PRN Reason: Headache/Fever or Pain


   Last Admin: 05/24/18 16:15 Dose:  1,000 mg


Albuterol/Ipratropium (Duoneb)  3 ml NEB [id number] KEE


   Last Admin: 05/25/18 12:43 Dose:  3 ml


Amiodarone HCl (Cordarone)  [facility] PO [facility]ILY [facility]


   Last Admin: 05/25/18 08:36 Dose:  200 mg


Lipase/Protease/[place]ylase ([place] [id number])  1 cap FS .PER PROTOCOL PRN


   PRN Reason: TUBE OCCLUSION PROTOCOL


Apixaban (Eliquis)  5 mg PO BID [facility]


   Last Admin: 05/25/18 08:36 Dose:  5 mg


Aspirin (Aspirin Chewable)  81 mg PO DAILY [facility]


   Last Admin: 05/25/18 08:36 Dose:  81 mg


Clonidine (Catapres)  0.1 mg PO Q4H PRN


   PRN Reason: Systolic BP > 180


   Last Admin: 05/18/18 21:05 Dose:  0.1 mg


Hydralazine HCl (Apresoline)  10 mg SLOW IVP Q4H PRN


   PRN Reason: Systolic BP > 180


   Last Admin: 05/12/18 17:06 Dose:  10 mg


Potassium Chloride 40 meq/ (Sodium Chloride)  270 mls @ 135 mls/hr IVPB ASDIR 

PRN


   PRN Reason: FOR SERUM K+ 2.5 - 3.5


   Last Admin: 04/28/18 06:18 Dose:  270 mls


Potassium Chloride 40 meq/ (Device)  100 mls @ 50 mls/hr IVPB ASDIR PRN


   PRN Reason: FOR SERUM K+ 2.5 - 3.5


Magnesium Sulfate 1 gm/ Sodium (Chloride)  102 mls @ 102 mls/hr IV PRN PRN


   PRN Reason: MAG LEVEL 1.4 - 2.0


Magnesium Sulfate 2 gm/ Device  100 mls @ 100 mls/hr IVPB ASDIR PRN


   PRN Reason: MAGNESIUM < 1.4


Potassium Phosphate 9 mmol/ (Sodium Chloride)  103 mls @ 25.75 mls/hr IVPB 

ASDIR PRN


   PRN Reason: Phosphate 1.0-1.8


Potassium Phosphate 12 mmol/ (Sodium Chloride)  254 mls @ 63.5 mls/hr IV ASDIR 

PRN


   PRN Reason: Serum phosphate 0.5-0.9


Potassium Phosphate 15 mmol/ (Sodium Chloride)  255 mls @ 63.75 mls/hr IV ASDIR 

PRN


   PRN Reason: Serum Phos < 0.5


Losartan Potassium (Cozaar)  25 mg PO DAILY [facility]


   Last Admin: 05/25/18 08:36 Dose:  25 mg


Magnesium Oxide (Magnesium Oxide)  400 mg PO BIDPRN PRN


   PRN Reason: FOR SERUM MAG 1.4 - 2.0


Magnesium Oxide (Magnesium Oxide)  800 mg PO PRN PRN


   PRN Reason: FOR SERUM MAG < 1.4


Miscellaneous Medication (Phos-Nak)  1 pkt PO TIDPRN PRN


   PRN Reason: FOR PHOS LEVEL 1.0 - 1.8


Miscellaneous Medication (Phos-Nak)  2 pkt PO TIDPRN PRN


   PRN Reason: FOR PHOS LEVEL 0.5 - 1.0


Ondansetron HCl (Zofran Odt)  4 mg PO Q6H PRN


   PRN Reason: Nausea/Vomiting


   Last Admin: 05/22/18 11:57 Dose:  4 mg


Ondansetron HCl (Zofran)  4 mg IVP Q6H PRN


   PRN Reason: Nausea/Vomiting


Pantoprazole Sodium (Protonix)  40 mg PER TUBE DAILY [facility]


   Last Admin: 05/25/18 08:36 Dose:  40 mg


Potassium Chloride (K-Dur)  40 meq PO ASDIR PRN


   PRN Reason: FOR SERUM K+  2.5 - 3.5


   Last Admin: 05/02/18 06:43 Dose:  40 meq


Potassium Chloride (Klor-Con)  40 meq PER TUBE ASDIR PRN


   PRN Reason: FOR SERUM K+ 2.5-3.5


   Last Admin: 05/18/18 08:42 Dose:  40 meq


Sertraline HCl (Zoloft)  50 mg PER TUBE DAILY [facility]


   Last Admin: 05/25/18 08:36 Dose:  50 mg


Sodium Bicarbonate (Bicarbonate, Sodium)  650 mg PER TUBE .PER PROTOCOL PRN


   PRN Reason: ENTERAL TUBE OCCLUSION


Sodium Chloride (Flush - Normal Saline)  10 ml IVF Q12HR [facility]


   Last Admin: 05/25/18 08:37 Dose:  10 ml


Sodium Chloride (Flush - Normal Saline)  10 ml IVF PRN PRN


   PRN Reason: Saline Flush


   Last Admin: 05/12/18 08:28 Dose:  10 ml

## 2018-05-25 NOTE — PRG
DATE OF SERVICE:  05/25/2018

 

A 35 minutes critical care time.

 

SUBJECTIVE:  The patient still remains on mechanical ventilation in the CCU.  
She has been doing trach collar trials during the daytime and tolerating that 
for the most part, but she has needed the ventilator at night.  Her O2 sats are 
usually running in the high 80s to low 90s and she does not appear to be in any 
overt distress today.

 

OBJECTIVE:

VITAL SIGNS:  Temperature is 98.2, pulse 73, blood pressure 111/46, O2 sat 87%.
  Total intake for 24 hours is 1564, output 2395.

HEENT:  Unremarkable.

NECK:  Trach in good position.

LUNGS:  Coarse rhonchi.

CARDIOVASCULAR:  S1, S2 regular.

ABDOMEN:  Soft, obese, nontender, nondistended.

EXTREMITIES:  Remarkable for brawny edema.

 

LABORATORY DATA:  Sodium 143, potassium 4.6, chloride 104, CO2 35, BUN 48, 
creatinine 0.7, glucose 102.

 

ASSESSMENT:

1.  Acute on chronic respiratory failure.

2.  Obesity hypoventilation syndrome.

3.  Morbid obesity.

4.  Paroxysmal atrial fibrillation.

 

PLAN:

1.  She is showing some evidence of prerenal azotemia, so I will go ahead and 
hold her diuretics today.  Her Lasix will probably have to be restarted by 
Sunday.

2.  Needs to get up in a chair as tolerated.

3.  Continue trach collar trials during the day.

4.  Placement is not a possibility secondary to the fact that she is not a 
citizen and has no medical insurance.

 

JOSE MARTIN

## 2018-05-26 LAB
ANION GAP SERPL CALC-SCNC: 7 MMOL/L (ref 10–20)
BUN SERPL-MCNC: 46 MG/DL (ref 9.8–20.1)
CALCIUM SERPL-MCNC: 9.4 MG/DL (ref 7.8–10.44)
CHLORIDE SERPL-SCNC: 105 MMOL/L (ref 98–107)
CO2 SERPL-SCNC: 38 MMOL/L (ref 23–31)
CREAT CL PREDICTED SERPL C-G-VRATE: 215 ML/MIN (ref 70–130)
GLUCOSE SERPL-MCNC: 93 MG/DL (ref 80–115)
HGB BLD-MCNC: 10.6 G/DL (ref 12–16)
PLATELET # BLD AUTO: 139 THOU/UL (ref 130–400)
POTASSIUM SERPL-SCNC: 4.6 MMOL/L (ref 3.5–5.1)
SODIUM SERPL-SCNC: 145 MMOL/L (ref 136–145)

## 2018-05-26 RX ADMIN — Medication SCH ML: at 08:26

## 2018-05-26 RX ADMIN — Medication SCH ML: at 20:32

## 2018-05-26 RX ADMIN — PANTOPRAZOLE SODIUM SCH MG: 40 GRANULE, DELAYED RELEASE ORAL at 08:25

## 2018-05-26 NOTE — PRG
DATE OF SERVICE:  05/26/2018

 

SERVICE:  Pulmonary Medicine.

 

INTERVAL HISTORY:  Patient is doing fine from a respiratory standpoint.  We tried a Passy-Burlison valve 
briefly on her this morning.  That being said, her trachea just is not large enough to accommodate th
e 8-0 tracheostomy, and allow for airflow around it.  As such, she did not tolerate this thing.  That
 being said, she is little tearful this morning.  There were no events overnight.

 

PHYSICAL EXAMINATION:

VITAL SIGNS:  Afebrile, pulse 75, blood pressure 113/41, respirations 26, saturation 89% on 28% FiO2.


GENERAL:  The patient is awake and alert.  No apparent distress.

LUNGS:  Decent air entry bilaterally.  There is no prolonged expiratory phase.

HEART:  Normal rate and regular.

ABDOMEN:  Soft, nontender, nondistended.  Bowel sounds are positive.

MUSCULOSKELETAL:  No cyanosis or clubbing.  There is no pitting in the bilateral lower extremities.

NEUROLOGIC:  Grossly nonfocal.

 

LABORATORY DATA:  Hemoglobin 10.6.  Basic metabolic profile is essentially unremarkable/stable.  Urin
alysis and blood culture x2 are negative.

 

IMAGING DATA:  Chest x-ray demonstrates soft tissue attenuation.  No obvious pleural effusions are id
entified.  There is pretty significant rotation.  That being said, the heart is quite generous in siz
e and my suspicion is she has significant cardiomegaly still.  There is a tracheostomy tube that term
inates in decent position.

 

ASSESSMENT:

1.  Acute on chronic hypoxic and hypercapnic respiratory failure.

2.  Obesity hypoventilation syndrome.

3.  Morbid obesity.

4.  Paroxysmal atrial fibrillation, currently sinus rhythm.

 

DISCUSSION AND PLAN:  I will continue to intermittently dose Lasix moving forward.  We will continue 
our mobility efforts.  We will continue T-collar trials through the day, and returned her to pressure
 support ventilation at night.  Pulmonary or Critical Care will continue to follow along.  She will l
ikely stay with us for quite some time as she has no place to go safely at this time.

## 2018-05-26 NOTE — PDOC.PN
- Subjective


Encounter Start Date: 05/26/18


Encounter Start Time: 10:45





patient seen and examined for Acute on chronic respiratory failure. Clinically 

stable and has no complaints. No acute events overnight. 





- Objective


Resuscitation Status: 


 











Resuscitation Status           FULL:Full Resuscitation














Vital Signs & Weight: 


 Vital Signs (12 hours)











  Temp Pulse Resp Pulse Ox


 


 05/26/18 07:24  98.4 F  88  20  88 L


 


 05/26/18 07:00  98.4 F   


 


 05/26/18 06:33     87 L


 


 05/26/18 06:11   74  


 


 05/26/18 06:03   74  20  87 L


 


 05/26/18 06:00    23 H 


 


 05/26/18 04:00  98.0 F   19 


 


 05/26/18 02:31   67  


 


 05/26/18 00:32   67  17  90 L


 


 05/26/18 00:00  97.6 F   18 


 


 05/25/18 22:47   72  








 Weight











Admit Weight                   600 lb


 


Weight                         376 lb 1.738 oz











 Most Recent Monitor Data











Heart Rate from ECG            74


 


NIBP                           98/42


 


NIBP BP-Mean                   57


 


Respiration from ECG           18


 


SpO2                           89














I&O: 


 











 05/25/18 05/26/18 05/27/18





 06:59 06:59 06:59


 


Intake Total 1564 1635 60


 


Output Total 2395 1440 190


 


Balance -831 195 -130











Result Diagrams: 


 05/26/18 04:50





 05/26/18 04:50





Phys Exam





- Physical Examination


Constitutional: NAD


HEENT: PERRLA, moist MMs, sclera anicteric


Neck: no JVD, supple, full ROM


Bronchial breath sounds bilterally. No wheezing, rales, rhonchi. 


Cardiovascular: no significant murmur, no rub, irregular


s1 s2 only. 


Gastrointestinal: soft, non-tender, no distention, positive bowel sounds


Obese


Musculoskeletal: pulses present, edema present


Neurological: non-focal


Skin: no rash, normal turgor





Dx/Plan


(1) Acute and chronic respiratory failure


Code(s): J96.20 - ACUTE AND CHR RESP FAILURE, UNSP W HYPOXIA OR HYPERCAPNIA   

Status: Acute   


Qualifiers: 


   Respiratory failure complication: hypoxia and hypercapnia   Qualified Code(s)

: J96.21 - Acute and chronic respiratory failure with hypoxia; J96.22 - Acute 

and chronic respiratory failure with hypercapnia; J96.22 - Acute and chronic 

respiratory failure with hypercapnia; J96.22 - Acute and chronic respiratory 

failure with hypercapnia   


Comment: Stable. Continue Trach collar trials.    





(2) S/P percutaneous endoscopic gastrostomy (PEG) tube placement


Code(s): Z93.1 - GASTROSTOMY STATUS   Status: Acute   Comment: Had procedure on 

5/7/18. Stable.    





(3) Status post tracheostomy


Code(s): Z93.0 - TRACHEOSTOMY STATUS   Status: Acute   Comment: Had procedure 

on 5/7/18. Stable.    





(4) Afib


Code(s): I48.91 - UNSPECIFIED ATRIAL FIBRILLATION   Status: Chronic   


Qualifiers: 


   Atrial fibrillation type: paroxysmal   Qualified Code(s): I48.0 - Paroxysmal 

atrial fibrillation   


Comment: Rate controlled. Continue Eliquis, ASA and Amiodarone. 


   





(5) Morbid obesity with BMI of 70 and over, adult


Code(s): E66.01 - MORBID (SEVERE) OBESITY DUE TO EXCESS CALORIES; Z68.45 - BODY 

MASS INDEX (BMI) 70 OR GREATER, ADULT   Status: Chronic   





(6) Pickwickian syndrome


Code(s): E66.2 - MORBID (SEVERE) OBESITY WITH ALVEOLAR HYPOVENTILATION   Status

: Chronic   Comment: s/p trach collar.    





(7) Chronic diastolic CHF (congestive heart failure), NYHA class 3


Code(s): I50.32 - CHRONIC DIASTOLIC (CONGESTIVE) HEART FAILURE   Status: 

Chronic   Comment: Stable. Diuretics held 2/2 azotemia.    





- Plan


cont current plan of care, paiz catheter, PT/OT, , respiratory 

therapy





Continue current management. 


Patient with no medical insurance so placement has been challenging. 








Review of Systems





- Medications/Allergies


Allergies/Adverse Reactions: 


 Allergies











Allergy/AdvReac Type Severity Reaction Status Date / Time


 


No Known Drug Allergies Allergy   Verified 05/06/18 14:16











Medications: 


 Current Medications





Acetaminophen (Tylenol Elixir)  1,000 mg PER TUBE Q6H PRN


   PRN Reason: Headache/Fever or Pain


   Last Admin: 05/25/18 20:21 Dose:  1,000 mg


Albuterol/Ipratropium (Duoneb)  3 ml NEB [id number] KEE


   Last Admin: 05/26/18 06:03 Dose:  3 ml


Amiodarone HCl (Cordarone)  200 mg PO DAILY [facility]


   Last Admin: 05/26/18 08:25 Dose:  200 mg


Lipase/Protease/Amylase ([place] [id number])  1 cap FS .PER PROTOCOL PRN


   PRN Reason: TUBE OCCLUSION PROTOCOL


Apixaban (Eliquis)  5 mg PO BID [facility]


   Last Admin: 05/26/18 08:24 Dose:  5 mg


Aspirin (Aspirin Chewable)  81 mg PO DAILY [facility]


   Last Admin: 05/26/18 08:25 Dose:  81 mg


Clonidine (Catapres)  0.1 mg PO Q4H PRN


   PRN Reason: Systolic BP > 180


   Last Admin: 05/18/18 21:05 Dose:  0.1 mg


Hydralazine HCl (Apresoline)  10 mg SLOW IVP Q4H PRN


   PRN Reason: Systolic BP > 180


   Last Admin: 05/12/18 17:06 Dose:  10 mg


Potassium Chloride 40 meq/ (Sodium Chloride)  270 mls @ 135 mls/hr IVPB ASDIR 

PRN


   PRN Reason: FOR SERUM K+ 2.5 - 3.5


   Last Admin: 04/28/18 06:18 Dose:  270 mls


Potassium Chloride 40 meq/ (Device)  100 mls @ 50 mls/hr IVPB ASDIR PRN


   PRN Reason: FOR SERUM K+ 2.5 - 3.5


Magnesium Sulfate 1 gm/ Sodium (Chloride)  102 mls @ 102 mls/hr IV PRN PRN


   PRN Reason: MAG LEVEL 1.4 - 2.0


Magnesium Sulfate 2 gm/ Device  100 mls @ 100 mls/hr IVPB ASDIR PRN


   PRN Reason: MAGNESIUM < 1.4


Potassium Phosphate 9 mmol/ (Sodium Chloride)  103 mls @ 25.75 mls/hr IVPB 

ASDIR PRN


   PRN Reason: Phosphate 1.0-1.8


Potassium Phosphate 12 mmol/ (Sodium Chloride)  254 mls @ 63.5 mls/hr IV ASDIR 

PRN


   PRN Reason: Serum phosphate 0.5-0.9


Potassium Phosphate 15 mmol/ (Sodium Chloride)  255 mls @ 63.75 mls/hr IV ASDIR 

PRN


   PRN Reason: Serum Phos < 0.5


Losartan Potassium (Cozaar)  25 mg PO DAILY [facility]


   Last Admin: 05/26/18 08:25 Dose:  25 mg


Magnesium Oxide (Magnesium Oxide)  400 mg PO BIDPRN PRN


   PRN Reason: FOR SERUM MAG 1.4 - 2.0


Magnesium Oxide (Magnesium Oxide)  800 mg PO PRN PRN


   PRN Reason: FOR SERUM MAG < 1.4


Miscellaneous Medication (Phos-Nak)  1 pkt PO TIDPRN PRN


   PRN Reason: FOR PHOS LEVEL 1.0 - 1.8


Miscellaneous Medication (Phos-Nak)  2 pkt PO TIDPRN PRN


   PRN Reason: FOR PHOS LEVEL 0.5 - 1.0


Ondansetron HCl (Zofran Odt)  4 mg PO Q6H PRN


   PRN Reason: Nausea/Vomiting


   Last Admin: 05/22/18 11:57 Dose:  4 mg


Ondansetron HCl (Zofran)  4 mg IVP Q6H PRN


   PRN Reason: Nausea/Vomiting


Pantoprazole Sodium (Protonix)  40 mg PER TUBE DAILY [facility]


   Last Admin: 05/26/18 08:25 Dose:  40 mg


Potassium Chloride (K-Dur)  40 meq PO ASDIR PRN


   PRN Reason: FOR SERUM K+  2.5 - 3.5


   Last Admin: 05/02/18 06:43 Dose:  40 meq


Potassium Chloride (Klor-Con)  40 meq PER TUBE ASDIR PRN


   PRN Reason: FOR SERUM K+ 2.5-3.5


   Last Admin: 05/18/18 08:42 Dose:  40 meq


Sertraline HCl (Zoloft)  50 mg PER TUBE DAILY [facility]


   Last Admin: 05/26/18 08:25 Dose:  50 mg


Sodium Bicarbonate (Bicarbonate, Sodium)  650 mg PER TUBE .PER PROTOCOL PRN


   PRN Reason: ENTERAL TUBE OCCLUSION


Sodium Chloride (Flush - Normal Saline)  10 ml IVF Q12HR [facility]


   Last Admin: 05/26/18 08:26 Dose:  10 ml


Sodium Chloride (Flush - Normal Saline)  10 ml IVF PRN PRN


   PRN Reason: Saline Flush


   Last Admin: 05/12/18 08:28 Dose:  10 ml

## 2018-05-26 NOTE — RAD
PORTABLE SEMIUPRIGHT FRONTAL CHEST RADIOGRAPH:

 

DATE: 5/26/18.

 

COMPARISON: 

4/28/18.

 

HISTORY: 

Ventilated patient.

 

FINDINGS: 

There is an endotracheal tube projecting over the tracheal air column, which terminates just below th
e level of the clavicular heads.  Stable prominence of the cardiac silhouette.  Body habitus, portabl
e technique, and rotation to the left limits detailed assessment.  Diffuse increased linear interstit
ial density noted suggesting chronic interstitial disease with possible superimposed inflammatory or 
edematous change.

 

IMPRESSION: 

No significant interval change.  Limited study as above.

 

POS: [facility]

## 2018-05-27 RX ADMIN — Medication SCH ML: at 09:15

## 2018-05-27 RX ADMIN — PANTOPRAZOLE SODIUM SCH MG: 40 GRANULE, DELAYED RELEASE ORAL at 09:14

## 2018-05-27 RX ADMIN — Medication SCH ML: at 20:10

## 2018-05-27 NOTE — PDOC.PN
- Subjective


Encounter Start Date: 05/27/18


Encounter Start Time: 14:07





Patient seen and examined today following Acute on chronic respiratory failure. 

She is s/p trach collar and clinically stable. She has no new complaints and no 

acute events overnight. No interval changes in her clinical status.


 





- Objective


Resuscitation Status: 


 











Resuscitation Status           FULL:Full Resuscitation














MAR Reviewed: Yes


Vital Signs & Weight: 


 Vital Signs (12 hours)











  Temp Pulse Resp Pulse Ox


 


 05/27/18 12:26   68  23 H  89 L


 


 05/27/18 12:00  98.3 F   


 


 05/27/18 08:00  97.9 F  67  17  89 L


 


 05/27/18 06:00    20 


 


 05/27/18 05:53   70  18  88 L


 


 05/27/18 05:00  98.8 F   


 


 05/27/18 04:00    16 


 


 05/27/18 02:44   69  








 Weight











Admit Weight                   600 lb


 


Weight                         376 lb 1.738 oz











 Most Recent Monitor Data











Heart Rate from ECG            67


 


NIBP                           154/56


 


NIBP BP-Mean                   92


 


Respiration from ECG           26


 


SpO2                           88














I&O: 


 











 05/26/18 05/27/18 05/28/18





 06:59 06:59 06:59


 


Intake Total 1635 1028 950


 


Output Total 1440 1378 245


 


Balance 195 -350 705











Result Diagrams: 


 05/26/18 04:50





 05/26/18 04:50





Phys Exam





- Physical Examination


Constitutional: NAD


HEENT: PERRLA, moist MMs, sclera anicteric, oral pharynx no lesions


Neck: no JVD, supple, full ROM


Respiratory: no wheezing, no rales, no rhonchi


Cardiovascular: RRR, no significant murmur, no rub


Gastrointestinal: soft, non-tender, no distention, positive bowel sounds


Musculoskeletal: pulses present, edema present


Neurological: non-focal


Skin: no rash, normal turgor





Dx/Plan


(1) Acute and chronic respiratory failure


Code(s): J96.20 - ACUTE AND CHR RESP FAILURE, UNSP W HYPOXIA OR HYPERCAPNIA   

Status: Acute   


Qualifiers: 


   Respiratory failure complication: hypoxia and hypercapnia   Qualified Code(s)

: J96.21 - Acute and chronic respiratory failure with hypoxia; J96.22 - Acute 

and chronic respiratory failure with hypercapnia; J96.22 - Acute and chronic 

respiratory failure with hypercapnia; J96.22 - Acute and chronic respiratory 

failure with hypercapnia   


Comment: Stable. Continue Trach collar trials and nightly mechanical 

ventilation per pulmonary/critical care.    





(2) S/P percutaneous endoscopic gastrostomy (PEG) tube placement


Code(s): Z93.1 - GASTROSTOMY STATUS   Status: Acute   Comment: Had procedure on 

5/7/18. Stable.    





(3) Status post tracheostomy


Code(s): Z93.0 - TRACHEOSTOMY STATUS   Status: Acute   Comment: Had procedure 

on 5/7/18. Stable.    





(4) Afib


Code(s): I48.91 - UNSPECIFIED ATRIAL FIBRILLATION   Status: Chronic   


Qualifiers: 


   Atrial fibrillation type: paroxysmal   Qualified Code(s): I48.0 - Paroxysmal 

atrial fibrillation   


Comment: Rate controlled. Continue Eliquis, ASA and Amiodarone. 


   





(5) Morbid obesity with BMI of 70 and over, adult


Code(s): E66.01 - MORBID (SEVERE) OBESITY DUE TO EXCESS CALORIES; Z68.45 - BODY 

MASS INDEX (BMI) 70 OR GREATER, ADULT   Status: Chronic   





(6) Pickwickian syndrome


Code(s): E66.2 - MORBID (SEVERE) OBESITY WITH ALVEOLAR HYPOVENTILATION   Status

: Chronic   Comment: s/p trach collar.    





(7) Chronic diastolic CHF (congestive heart failure), NYHA class 3


Code(s): I50.32 - CHRONIC DIASTOLIC (CONGESTIVE) HEART FAILURE   Status: 

Chronic   Comment: Stable. Diuretics initially held 2/2 azotemia. Will be given 

PRN.   





- Plan


cont current plan of care, plan discussed w/ family, PT/OT, respiratory therapy





* .








Review of Systems





- Medications/Allergies


Allergies/Adverse Reactions: 


 Allergies











Allergy/AdvReac Type Severity Reaction Status Date / Time


 


No Known Drug Allergies Allergy   Verified 05/06/18 14:16











Medications: 


 Current Medications





Acetaminophen (Tylenol Elixir)  1,000 mg PER TUBE Q6H PRN


   PRN Reason: Headache/Fever or Pain


   Last Admin: 05/25/18 20:21 Dose:  1,000 mg


Albuterol/Ipratropium (Duoneb)  3 ml NEB [id number] [facility]


   Last Admin: 05/27/18 12:26 Dose:  3 ml


Amiodarone HCl (Cordarone)  200 mg PO DAILY [facility]


   Last Admin: 05/27/18 09:14 Dose:  200 mg


Lipase/Protease/Amylase ([place] Dr [id number])  1 cap FS .PER PROTOCOL PRN


   PRN Reason: TUBE OCCLUSION PROTOCOL


Apixaban (Eliquis)  5 mg PO BID [facility]


   Last Admin: 05/27/18 09:14 Dose:  5 mg


Aspirin (Aspirin Chewable)  81 mg PO DAILY [facility]


   Last Admin: 05/27/18 09:15 Dose:  81 mg


Clonidine (Catapres)  0.1 mg PO Q4H PRN


   PRN Reason: Systolic BP > 180


   Last Admin: 05/18/18 21:05 Dose:  0.1 mg


Hydralazine HCl (Apresoline)  10 mg SLOW IVP Q4H PRN


   PRN Reason: Systolic BP > 180


   Last Admin: 05/12/18 17:06 Dose:  10 mg


Losartan Potassium (Cozaar)  25 mg PO DAILY [facility]


   Last Admin: 05/27/18 09:14 Dose:  25 mg


Ondansetron HCl (Zofran Odt)  4 mg PO Q6H PRN


   PRN Reason: Nausea/Vomiting


   Last Admin: 05/22/18 11:57 Dose:  4 mg


Ondansetron HCl (Zofran)  4 mg IVP Q6H PRN


   PRN Reason: Nausea/Vomiting


Pantoprazole Sodium (Protonix)  40 mg PER TUBE DAILY [facility]


   Last Admin: 05/27/18 09:14 Dose:  40 mg


Sertraline HCl (Zoloft)  50 mg PER TUBE DAILY [facility]


   Last Admin: 05/27/18 09:14 Dose:  50 mg


Sodium Bicarbonate (Bicarbonate, Sodium)  650 mg PER TUBE .PER PROTOCOL PRN


   PRN Reason: ENTERAL TUBE OCCLUSION


Sodium Chloride (Flush - Normal Saline)  10 ml IVF Q12HR [facility]


   Last Admin: 05/27/18 09:15 Dose:  10 ml


Sodium Chloride (Flush - Normal Saline)  10 ml IVF PRN PRN


   PRN Reason: Saline Flush


   Last Admin: 05/12/18 08:28 Dose:  10 ml

## 2018-05-27 NOTE — PRG
DATE OF SERVICE:  05/27/2018

 

SERVICE:  Pulmonary Medicine.

 

INTERVAL HISTORY:  The patient is doing fine from a respiratory standpoint.  She has no complaints of
 fevers or chills.  She is not having any coughing.  She is not bringing up any sputum.  Otherwise, t
here has been no interval change to her condition.

 

PHYSICAL EXAMINATION:

VITAL SIGNS:  Afebrile, pulse 66, blood pressure 126/52, respirations 10, saturation 88% on T-collar 
delivering a 35% FiO2.

HEENT:  Normocephalic, atraumatic.  Sclerae are white, conjunctivae pink.  Oral mucosa is moist witho
ut lesions.

LUNGS:  Decent air entry.  There is a prolonged expiratory phase.  No wheezing, rhonchi, or crackles 
are appreciated.

HEART:  Normal rate, regular.

ABDOMEN:  Soft, nontender, nondistended.  Bowel sounds are positive.

MUSCULOSKELETAL:  No cyanosis or clubbing.  There is no pitting in the bilateral lower extremities.

NEUROLOGIC:  Grossly nonfocal.

 

ASSESSMENT:

1.  Acute on chronic hypoxic and hypercapnic respiratory failure.

2.  Obesity hypoventilation syndrome.

3.  Morbid obesity.

4.  Paroxysmal atrial fibrillation, currently sinus.

 

DISCUSSION AND PLAN:  I will continue supportive care.  I will repeat laboratories tomorrow morning. 
 She will remain on T-collar through the day and pressure support ventilation at night.

## 2018-05-28 LAB
ANION GAP SERPL CALC-SCNC: 9 MMOL/L (ref 10–20)
BUN SERPL-MCNC: 43 MG/DL (ref 9.8–20.1)
CALCIUM SERPL-MCNC: 9.3 MG/DL (ref 7.8–10.44)
CHLORIDE SERPL-SCNC: 103 MMOL/L (ref 98–107)
CO2 SERPL-SCNC: 36 MMOL/L (ref 23–31)
CREAT CL PREDICTED SERPL C-G-VRATE: 204 ML/MIN (ref 70–130)
GLUCOSE SERPL-MCNC: 87 MG/DL (ref 80–115)
HGB BLD-MCNC: 10.9 G/DL (ref 12–16)
MAGNESIUM SERPL-MCNC: 1.9 MG/DL (ref 1.6–2.6)
MCH RBC QN AUTO: 25.7 PG (ref 27–31)
MCV RBC AUTO: 86.6 FL (ref 81–99)
MDIFF COMPLETE?: YES
PLATELET # BLD AUTO: 134 THOU/UL (ref 130–400)
POTASSIUM SERPL-SCNC: 4.5 MMOL/L (ref 3.5–5.1)
RBC # BLD AUTO: 4.23 MILL/UL (ref 4.2–5.4)
SODIUM SERPL-SCNC: 143 MMOL/L (ref 136–145)
WBC # BLD AUTO: 7.1 THOU/UL (ref 4.8–10.8)

## 2018-05-28 RX ADMIN — PANTOPRAZOLE SODIUM SCH MG: 40 GRANULE, DELAYED RELEASE ORAL at 08:43

## 2018-05-28 RX ADMIN — Medication SCH ML: at 20:40

## 2018-05-28 RX ADMIN — Medication SCH ML: at 08:47

## 2018-05-28 NOTE — PDOC.PN
- Subjective


Encounter Start Date: 05/28/18


Encounter Start Time: 10:53





Patient seen and examined today following Acute on chronic respiratory failure. 

She is s/p trach collar and clinically stable. No changes in her clinical 

status.  She has no new complaints and no acute events overnight.





- Objective


Resuscitation Status: 


 











Resuscitation Status           FULL:Full Resuscitation














MAR Reviewed: Yes


Vital Signs & Weight: 


 Vital Signs (12 hours)











  Temp Pulse Resp Pulse Ox


 


 05/28/18 08:00  98.3 F   


 


 05/28/18 07:37     90 L


 


 05/28/18 07:33   69  22 H  90 L


 


 05/28/18 06:00    17 


 


 05/28/18 04:00  98.2 F   17 


 


 05/28/18 02:52   65  


 


 05/28/18 02:00    20 


 


 05/28/18 00:45   66  16  89 L


 


 05/28/18 00:00  98.1 F   16  90 L








 Weight











Admit Weight                   600 lb


 


Weight                         376 lb 1.738 oz











 Most Recent Monitor Data











Heart Rate from ECG            64


 


NIBP                           162/68


 


NIBP BP-Mean                   85


 


Respiration from ECG           21


 


SpO2                           85














I&O: 


 











 05/27/18 05/28/18 05/29/18





 06:59 06:59 06:59


 


Intake Total 1028 2160 890


 


Output Total 1378 1005 250


 


Balance -350 1155 640











Result Diagrams: 


 05/28/18 05:05





 05/28/18 05:05





Phys Exam





- Physical Examination


Constitutional: NAD


HEENT: PERRLA, moist MMs, sclera anicteric


Neck: no JVD, supple, full ROM


Respiratory: no wheezing, no rales, no rhonchi


Cardiovascular: RRR, no significant murmur, no rub


Gastrointestinal: soft, non-tender, no distention, positive bowel sounds


Musculoskeletal: pulses present, edema present


Neurological: non-focal


Skin: no rash, normal turgor





Dx/Plan


(1) Acute and chronic respiratory failure


Code(s): J96.20 - ACUTE AND CHR RESP FAILURE, UNSP W HYPOXIA OR HYPERCAPNIA   

Status: Acute   


Qualifiers: 


   Respiratory failure complication: hypoxia and hypercapnia   Qualified Code(s)

: J96.21 - Acute and chronic respiratory failure with hypoxia; J96.22 - Acute 

and chronic respiratory failure with hypercapnia; J96.22 - Acute and chronic 

respiratory failure with hypercapnia; J96.22 - Acute and chronic respiratory 

failure with hypercapnia   


Comment: Stable. Continue Trach collar trials and nightly mechanical 

ventilation per pulmonary/critical care.    





(2) S/P percutaneous endoscopic gastrostomy (PEG) tube placement


Code(s): Z93.1 - GASTROSTOMY STATUS   Status: Acute   Comment: Had procedure on 

5/7/18. Stable.    





(3) Status post tracheostomy


Code(s): Z93.0 - TRACHEOSTOMY STATUS   Status: Acute   Comment: Had procedure 

on 5/7/18. Stable.    





(4) Afib


Code(s): I48.91 - UNSPECIFIED ATRIAL FIBRILLATION   Status: Chronic   


Qualifiers: 


   Atrial fibrillation type: paroxysmal   Qualified Code(s): I48.0 - Paroxysmal 

atrial fibrillation   


Comment: Rate controlled. Continue Eliquis, ASA and Amiodarone. 


   





(5) Morbid obesity with BMI of 70 and over, adult


Code(s): E66.01 - MORBID (SEVERE) OBESITY DUE TO EXCESS CALORIES; Z68.45 - BODY 

MASS INDEX (BMI) 70 OR GREATER, ADULT   Status: Chronic   





(6) Pickwickian syndrome


Code(s): E66.2 - MORBID (SEVERE) OBESITY WITH ALVEOLAR HYPOVENTILATION   Status

: Chronic   Comment: s/p trach collar.    





(7) Chronic diastolic CHF (congestive heart failure), NYHA class 3


Code(s): I50.32 - CHRONIC DIASTOLIC (CONGESTIVE) HEART FAILURE   Status: 

Chronic   Comment: Stable. Diuretics initially held 2/2 azotemia. Will be given 

PRN.   





- Plan


cont current plan of care, PT/OT, , respiratory therapy





* .








Review of Systems





- Medications/Allergies


Allergies/Adverse Reactions: 


 Allergies











Allergy/AdvReac Type Severity Reaction Status Date / Time


 


No Known Drug Allergies Allergy   Verified 05/06/18 14:16











Medications: 


 Current Medications





Acetaminophen (Tylenol Elixir)  1,000 mg PER TUBE Q6H PRN


   PRN Reason: Headache/Fever or Pain


   Last Admin: 05/28/18 01:42 Dose:  1,000 mg


Albuterol/Ipratropium (Duoneb)  3 ml NEB [id number] [facility]


   Last Admin: 05/28/18 07:33 Dose:  3 ml


Amiodarone HCl (Cordarone)  200 mg PO DAILY [facility]


   Last Admin: 05/28/18 08:44 Dose:  200 mg


Lipase/Protease/Amylase ([place] [address])  1 cap FS .PER PROTOCOL PRN


   PRN Reason: TUBE OCCLUSION PROTOCOL


Apixaban (Eliquis)  5 mg PO BID [facility]


   Last Admin: 05/28/18 08:43 Dose:  5 mg


Aspirin (Aspirin Chewable)  81 mg PO DAILY [facility]


   Last Admin: 05/28/18 08:44 Dose:  81 mg


Clonidine (Catapres)  0.1 mg PO Q4H PRN


   PRN Reason: Systolic BP > 180


   Last Admin: 05/18/18 21:05 Dose:  0.1 mg


Hydralazine HCl (Apresoline)  10 mg SLOW IVP Q4H PRN


   PRN Reason: Systolic BP > 180


   Last Admin: 05/12/18 17:06 Dose:  10 mg


Losartan Potassium (Cozaar)  25 mg PO DAILY [facility]


   Last Admin: 05/28/18 08:43 Dose:  25 mg


Ondansetron HCl (Zofran Odt)  4 mg PO Q6H PRN


   PRN Reason: Nausea/Vomiting


   Last Admin: 05/22/18 11:57 Dose:  4 mg


Ondansetron HCl (Zofran)  4 mg IVP Q6H PRN


   PRN Reason: Nausea/Vomiting


Pantoprazole Sodium (Protonix)  40 mg PER TUBE DAILY [facility]


   Last Admin: 05/28/18 08:43 Dose:  40 mg


Sertraline HCl (Zoloft)  50 mg PER TUBE DAILY [facility]


   Last Admin: 05/28/18 08:43 Dose:  50 mg


Sodium Bicarbonate (Bicarbonate, Sodium)  650 mg PER TUBE .PER PROTOCOL PRN


   PRN Reason: ENTERAL TUBE OCCLUSION


Sodium Chloride (Flush - Normal Saline)  10 ml IVF Q12HR [facility]


   Last Admin: 05/28/18 08:47 Dose:  10 ml


Sodium Chloride (Flush - Normal Saline)  10 ml IVF PRN PRN


   PRN Reason: Saline Flush


   Last Admin: 05/12/18 08:28 Dose:  10 ml

## 2018-05-28 NOTE — PRG
DATE OF SERVICE:  05/28/2018

 

SUBJECTIVE:  Mr. Jamey Hedrick denies being short of breath.

 

OBJECTIVE:

VITAL SIGNS:  Heart rate in the 60s, respiratory rate is 20, oximetry is 90%, blood pressure 149/54.

LUNGS:  Remarkable for bilateral mild rhonchi.

HEART:  Regular rhythm.

ABDOMEN:  Soft.

EXTREMITIES:  With asymmetry.

NEUROLOGIC:  Grossly nonfocal.

 

LABORATORY DATA:  White count 7.1, hemoglobin 10.9, platelets 134,000.

 

Sodium 143, potassium 4.5, chloride 103, bicarbonate 36, BUN 43, creatinine 0.7.

 

IMPRESSION:

1.  Chronic respiratory failure.

2.  Obesity hypoventilation.

3.  Extreme deconditioning.

4.  Exam consistent with mild retained secretions.

5.  History of atrial fibrillation.

 

PLAN:  Continue supportive care.  She remains anticoagulated and on amiodarone in sinus rhythm.

## 2018-05-29 RX ADMIN — Medication SCH ML: at 21:41

## 2018-05-29 RX ADMIN — Medication SCH ML: at 08:36

## 2018-05-29 RX ADMIN — PANTOPRAZOLE SODIUM SCH MG: 40 GRANULE, DELAYED RELEASE ORAL at 08:35

## 2018-05-29 NOTE — PDOC.PN
- Subjective


Encounter Start Date: 05/29/18


Encounter Start Time: 11:38





Patient seen and examined following Acute on chronic respiratory failure. She 

is s/p trach collar and clinically stable. No changes in her clinical status.  

She has no new complaints and no acute events overnight.








- Objective


Resuscitation Status: 


 











Resuscitation Status           FULL:Full Resuscitation














Vital Signs & Weight: 


 Vital Signs (12 hours)











  Temp Pulse Resp Pulse Ox


 


 05/29/18 08:00  98.1 F  64  18  87 L


 


 05/29/18 07:35   64  23 H  90 L


 


 05/29/18 06:00    21 H 


 


 05/29/18 04:00  98.1 F   17 


 


 05/29/18 02:00    18 


 


 05/29/18 00:00  98.0 F   17 








 Weight











Admit Weight                   600 lb


 


Weight                         376 lb 1.738 oz











 Most Recent Monitor Data











Heart Rate from ECG            66


 


NIBP                           140/53


 


NIBP BP-Mean                   70


 


Respiration from ECG           20


 


SpO2                           90














I&O: 


 











 05/28/18 05/29/18 05/30/18





 06:59 06:59 06:59


 


Intake Total 2160 3200 370


 


Output Total 1005 1345 205


 


Balance 1155 1855 165











Result Diagrams: 


 05/28/18 05:05





 05/28/18 05:05





Phys Exam





- Physical Examination


Constitutional: NAD


HEENT: PERRLA, moist MMs, sclera anicteric


Neck: supple, full ROM


Respiratory: no wheezing, no rales, no rhonchi, clear to auscultation bilateral


Cardiovascular: RRR, no significant murmur, no rub


Gastrointestinal: soft, non-tender, no distention, positive bowel sounds


Musculoskeletal: no edema, pulses present


Skin: no rash, normal turgor





Dx/Plan


(1) Acute and chronic respiratory failure


Code(s): J96.20 - ACUTE AND CHR RESP FAILURE, UNSP W HYPOXIA OR HYPERCAPNIA   

Status: Acute   


Qualifiers: 


   Respiratory failure complication: hypoxia and hypercapnia   Qualified Code(s)

: J96.21 - Acute and chronic respiratory failure with hypoxia; J96.22 - Acute 

and chronic respiratory failure with hypercapnia; J96.22 - Acute and chronic 

respiratory failure with hypercapnia; J96.22 - Acute and chronic respiratory 

failure with hypercapnia   


Comment: Stable. Continue Trach collar trials and nightly mechanical 

ventilation per pulmonary/critical care. Patient is to be weaned off mechanical 

ventilation over the next week.    





(2) S/P percutaneous endoscopic gastrostomy (PEG) tube placement


Code(s): Z93.1 - GASTROSTOMY STATUS   Status: Acute   Comment: Had procedure on 

5/7/18. Stable.    





(3) Status post tracheostomy


Code(s): Z93.0 - TRACHEOSTOMY STATUS   Status: Acute   Comment: Had procedure 

on 5/7/18. Stable.    





(4) Afib


Code(s): I48.91 - UNSPECIFIED ATRIAL FIBRILLATION   Status: Chronic   


Qualifiers: 


   Atrial fibrillation type: paroxysmal   Qualified Code(s): I48.0 - Paroxysmal 

atrial fibrillation   


Comment: Rate controlled. Continue Eliquis, ASA and Amiodarone. 


   





(5) Morbid obesity with BMI of 70 and over, adult


Code(s): E66.01 - MORBID (SEVERE) OBESITY DUE TO EXCESS CALORIES; Z68.45 - BODY 

MASS INDEX (BMI) 70 OR GREATER, ADULT   Status: Chronic   





(6) Pickwickian syndrome


Code(s): E66.2 - MORBID (SEVERE) OBESITY WITH ALVEOLAR HYPOVENTILATION   Status

: Chronic   Comment: s/p trach collar.    





(7) Chronic diastolic CHF (congestive heart failure), NYHA class 3


Code(s): I50.32 - CHRONIC DIASTOLIC (CONGESTIVE) HEART FAILURE   Status: 

Chronic   Comment: Stable. Diuretics initially held 2/2 azotemia. Will be given 

PRN.   





- Plan


cont current plan of care, paiz catheter, PT/OT, , respiratory 

therapy, out of bed/ambulate





Continue current management


 working on possible placement options- patient unfunded/uninsured. 








Review of Systems





- Medications/Allergies


Allergies/Adverse Reactions: 


 Allergies











Allergy/AdvReac Type Severity Reaction Status Date / Time


 


No Known Drug Allergies Allergy   Verified 05/06/18 14:16











Medications: 


 Current Medications





Acetaminophen (Tylenol Elixir)  1,000 mg PER TUBE Q6H PRN


   PRN Reason: Headache/Fever or Pain


   Last Admin: 05/28/18 01:42 Dose:  1,000 mg


Albuterol/Ipratropium (Duoneb)  3 ml NEB [id number] [facility]


   Last Admin: 05/29/18 07:35 Dose:  3 ml


Amiodarone HCl (Cordarone)  200 mg PO DAILY [facility]


   Last Admin: 05/29/18 08:35 Dose:  200 mg


Lipase/Protease/Amylase ([place] [id number])  1 cap FS .PER PROTOCOL PRN


   PRN Reason: TUBE OCCLUSION PROTOCOL


Apixaban (Eliquis)  5 mg PO BID [facility]


   Last Admin: 05/29/18 08:35 Dose:  5 mg


Aspirin (Aspirin Chewable)  81 mg PO DAILY [facility]


   Last Admin: 05/29/18 08:35 Dose:  81 mg


Clonidine (Catapres)  0.1 mg PO Q4H PRN


   PRN Reason: Systolic BP > 180


   Last Admin: 05/18/18 21:05 Dose:  0.1 mg


Hydralazine HCl (Apresoline)  10 mg SLOW IVP Q4H PRN


   PRN Reason: Systolic BP > 180


   Last Admin: 05/12/18 17:06 Dose:  10 mg


Losartan Potassium (Cozaar)  25 mg PO DAILY [facility]


   Last Admin: 05/29/18 08:35 Dose:  25 mg


Ondansetron HCl (Zofran Odt)  4 mg PO Q6H PRN


   PRN Reason: Nausea/Vomiting


   Last Admin: 05/22/18 11:57 Dose:  4 mg


Ondansetron HCl (Zofran)  4 mg IVP Q6H PRN


   PRN Reason: Nausea/Vomiting


Pantoprazole Sodium (Protonix)  40 mg PER TUBE DAILY [facility]


   Last Admin: 05/29/18 08:35 Dose:  40 mg


Sertraline HCl (Zoloft)  100 mg PER TUBE DAILY [facility]


   Last Admin: 05/29/18 08:36 Dose:  100 mg


Sodium Bicarbonate (Bicarbonate, Sodium)  650 mg PER TUBE .PER PROTOCOL PRN


   PRN Reason: ENTERAL TUBE OCCLUSION


Sodium Chloride (Flush - Normal Saline)  10 ml IVF Q12HR [facility]


   Last Admin: 05/29/18 08:36 Dose:  10 ml


Sodium Chloride (Flush - Normal Saline)  10 ml IVF PRN PRN


   PRN Reason: Saline Flush


   Last Admin: 05/12/18 08:28 Dose:  10 ml

## 2018-05-29 NOTE — PRG
DATE OF SERVICE:  05/29/2018

 

SUBJECTIVE:  The patient seems better today.  She is actually smiling for once.

 

OBJECTIVE:

VITAL SIGNS:  Temperature is 98.1, pulse 74, blood pressure 119/69.  A 24-hour intake 3200, output 13
45.

HEENT:  Unremarkable.

NECK:  Trach in place.

CARDIAC:  S1 and S2 regular.

LUNGS:  Fairly clear.

ABDOMEN:  Soft, obese, nontender.

EXTREMITIES:  No clubbing, cyanosis, or edema.

 

LABORATORY DATA:  No new labs were done today.

 

ASSESSMENT:

1.  Obesity hypoventilation syndrome.

2.  Status post tracheostomy placement.

3.  Persistent chronic respiratory failure.

4.  Paroxysmal atrial fibrillation.

 

PLAN:

1.  Reduce the amount of nocturnal ventilation.  My hope would be to try to slowly wean her off the n
octurnal ventilation over the next week or so.

2.  Up in a chair as tolerated.  I have emphasized this to the nursing staff that this is very import
ant for the patient's care.

3.  Continued attempts at placement.

## 2018-05-30 LAB
ANION GAP SERPL CALC-SCNC: 9 MMOL/L (ref 10–20)
ANISOCYTOSIS BLD QL SMEAR: (no result) (100X)
BUN SERPL-MCNC: 46 MG/DL (ref 9.8–20.1)
CALCIUM SERPL-MCNC: 9.3 MG/DL (ref 7.8–10.44)
CHLORIDE SERPL-SCNC: 103 MMOL/L (ref 98–107)
CO2 SERPL-SCNC: 35 MMOL/L (ref 23–31)
CREAT CL PREDICTED SERPL C-G-VRATE: 209 ML/MIN (ref 70–130)
GLUCOSE SERPL-MCNC: 92 MG/DL (ref 80–115)
HGB BLD-MCNC: 11 G/DL (ref 12–16)
MCH RBC QN AUTO: 25.9 PG (ref 27–31)
MCV RBC AUTO: 88.5 FL (ref 81–99)
MDIFF COMPLETE?: YES
PLATELET # BLD AUTO: 104 THOU/UL (ref 130–400)
PLATELET BLD QL SMEAR: (no result)
POTASSIUM SERPL-SCNC: 4.4 MMOL/L (ref 3.5–5.1)
RBC # BLD AUTO: 4.24 MILL/UL (ref 4.2–5.4)
SODIUM SERPL-SCNC: 143 MMOL/L (ref 136–145)
WBC # BLD AUTO: 6.7 THOU/UL (ref 4.8–10.8)

## 2018-05-30 RX ADMIN — PANTOPRAZOLE SODIUM SCH MG: 40 GRANULE, DELAYED RELEASE ORAL at 08:21

## 2018-05-30 RX ADMIN — Medication SCH ML: at 08:21

## 2018-05-30 RX ADMIN — Medication SCH ML: at 20:29

## 2018-05-30 NOTE — PDOC.PN
- Subjective


Encounter Start Date: 05/30/18


Encounter Start Time: 10:31





Patient seen and examined following Acute on chronic respiratory failure. She 

has had a prolonged ICU stay due to being unfunded- leading to difficulties in 

arranging placement. She remains in the CCU, is s/p trach collar and clinically 

stable. She receives nocturnal ventilation and is on her trach collar during 

the day. There has been no changes in her clinical status and she is otherwise 

stable.  She has no new complaints and had no acute events overnight.








- Objective


Resuscitation Status: 


 











Resuscitation Status           FULL:Full Resuscitation














MAR Reviewed: Yes


Vital Signs & Weight: 


 Vital Signs (12 hours)











  Temp Pulse Resp Pulse Ox


 


 05/30/18 07:21  98.6 F  67  20  89 L


 


 05/30/18 07:00  98.6 F   


 


 05/30/18 06:35   71  23 H  90 L


 


 05/30/18 06:00    26 H 


 


 05/30/18 04:00  98.0 F   19 


 


 05/30/18 02:00    19 


 


 05/30/18 00:32    18 


 


 05/30/18 00:00  98.3 F   


 


 05/29/18 23:32   70  20  90 L








 Weight











Admit Weight                   600 lb


 


Weight                         376 lb 1.738 oz











 Most Recent Monitor Data











Heart Rate from ECG            71


 


NIBP                           133/56


 


NIBP BP-Mean                   76


 


Respiration from ECG           24


 


SpO2                           93














I&O: 


 











 05/29/18 05/30/18 05/31/18





 06:59 06:59 06:59


 


Intake Total 3200 1920 430


 


Output Total 1345 1059 102


 


Balance 1855 861 328











Result Diagrams: 


 05/30/18 03:30





 05/30/18 03:30





Phys Exam





- Physical Examination


Constitutional: NAD


HEENT: PERRLA, moist MMs, sclera anicteric


Neck: supple, full ROM


Respiratory: no wheezing, no rales, no rhonchi


recuded breath sounds b/l


Cardiovascular: RRR, no significant murmur, no rub


Gastrointestinal: soft, non-tender, no distention, positive bowel sounds


Musculoskeletal: no edema, pulses present


Neurological: non-focal


Skin: no rash, normal turgor





Dx/Plan


(1) Acute and chronic respiratory failure


Code(s): J96.20 - ACUTE AND CHR RESP FAILURE, UNSP W HYPOXIA OR HYPERCAPNIA   

Status: Acute   


Qualifiers: 


   Respiratory failure complication: hypoxia and hypercapnia   Qualified Code(s)

: J96.21 - Acute and chronic respiratory failure with hypoxia; J96.22 - Acute 

and chronic respiratory failure with hypercapnia; J96.22 - Acute and chronic 

respiratory failure with hypercapnia; J96.22 - Acute and chronic respiratory 

failure with hypercapnia   


Comment: Stable.    





(2) S/P percutaneous endoscopic gastrostomy (PEG) tube placement


Code(s): Z93.1 - GASTROSTOMY STATUS   Status: Acute   Comment: Had procedure on 

5/7/18. Stable.    





(3) Status post tracheostomy


Code(s): Z93.0 - TRACHEOSTOMY STATUS   Status: Acute   Comment: Had procedure 

on 5/7/18. Stable.    





(4) Afib


Code(s): I48.91 - UNSPECIFIED ATRIAL FIBRILLATION   Status: Chronic   


Qualifiers: 


   Atrial fibrillation type: paroxysmal   Qualified Code(s): I48.0 - Paroxysmal 

atrial fibrillation   


Comment: Rate controlled. Continue Eliquis, ASA and Amiodarone. 


   





(5) Morbid obesity with BMI of 70 and over, adult


Code(s): E66.01 - MORBID (SEVERE) OBESITY DUE TO EXCESS CALORIES; Z68.45 - BODY 

MASS INDEX (BMI) 70 OR GREATER, ADULT   Status: Chronic   





(6) Pickwickian syndrome


Code(s): E66.2 - MORBID (SEVERE) OBESITY WITH ALVEOLAR HYPOVENTILATION   Status

: Chronic   Comment: s/p trach collar.    





(7) Chronic diastolic CHF (congestive heart failure), NYHA class 3


Code(s): I50.32 - CHRONIC DIASTOLIC (CONGESTIVE) HEART FAILURE   Status: 

Chronic   Comment: Stable. Diuretics initially held 2/2 azotemia. Will be given 

PRN.   





(8) Thrombocytopenia


Code(s): D69.6 - THROMBOCYTOPENIA, UNSPECIFIED   Status: Acute   





- Plan


cont current plan of care





Continue Trach collar trials and nightly mechanical ventilation per pulmonary/

critical care. Patient is to be weaned off mechanical ventilation over the next 

week. 


Monitor platelets


 working on disposition. 








Review of Systems





- Medications/Allergies


Allergies/Adverse Reactions: 


 Allergies











Allergy/AdvReac Type Severity Reaction Status Date / Time


 


No Known Drug Allergies Allergy   Verified 05/06/18 14:16











Medications: 


 Current Medications





Acetaminophen (Tylenol Elixir)  1,000 mg PER TUBE Q6H PRN


   PRN Reason: Headache/Fever or Pain


   Last Admin: 05/28/18 01:42 Dose:  1,000 mg


Albuterol/Ipratropium (Duoneb)  3 ml NEB [id number] [facility]


   Last Admin: 05/30/18 06:35 Dose:  3 ml


Amiodarone HCl (Cordarone)  200 mg PO DAILY [facility]


   Last Admin: 05/30/18 08:20 Dose:  200 mg


Lipase/Protease/Amylase ([place] [id number])  1 cap FS .PER PROTOCOL PRN


   PRN Reason: TUBE OCCLUSION PROTOCOL


Apixaban (Eliquis)  5 mg PO BID [facility]


   Last Admin: 05/30/18 08:21 Dose:  5 mg


Aspirin (Aspirin Chewable)  81 mg PO DAILY [facility]


   Last Admin: 05/30/18 08:21 Dose:  81 mg


Clonidine (Catapres)  0.1 mg PO Q4H PRN


   PRN Reason: Systolic BP > 180


   Last Admin: 05/18/18 21:05 Dose:  0.1 mg


Hydralazine HCl (Apresoline)  10 mg SLOW IVP Q4H PRN


   PRN Reason: Systolic BP > 180


   Last Admin: 05/12/18 17:06 Dose:  10 mg


Losartan Potassium (Cozaar)  25 mg PO DAILY [facility]


   Last Admin: 05/30/18 08:21 Dose:  25 mg


Ondansetron HCl (Zofran Odt)  4 mg PO Q6H PRN


   PRN Reason: Nausea/Vomiting


   Last Admin: 05/22/18 11:57 Dose:  4 mg


Ondansetron HCl (Zofran)  4 mg IVP Q6H PRN


   PRN Reason: Nausea/Vomiting


Pantoprazole Sodium (Protonix)  40 mg PER TUBE DAILY [facility]


   Last Admin: 05/30/18 08:21 Dose:  40 mg


Sertraline HCl (Zoloft)  100 mg PER TUBE DAILY [facility]


   Last Admin: 05/30/18 08:21 Dose:  100 mg


Sodium Bicarbonate (Bicarbonate, Sodium)  650 mg PER TUBE .PER PROTOCOL PRN


   PRN Reason: ENTERAL TUBE OCCLUSION


Sodium Chloride (Flush - Normal Saline)  10 ml IVF Q12HR [facility]


   Last Admin: 05/30/18 08:21 Dose:  10 ml


Sodium Chloride (Flush - Normal Saline)  10 ml IVF PRN PRN


   PRN Reason: Saline Flush


   Last Admin: 05/12/18 08:28 Dose:  10 ml

## 2018-05-30 NOTE — PRG
DATE OF SERVICE:  05/30/2018

 

Ms. Hedrick remains intermittently on mechanical ventilation she only used between midnight and 6:00 a
.m.  She did well yesterday.  She was able to get up in a chair for about 1.5 hours.

 

PHYSICAL EXAMINATION:

VITAL SIGNS:  Her temperature is 98.0, pulse 69, blood pressure 118/52, O2 sat generally in the upper
 80s.  Total intake for 24 hours 1920, output 1059.

HEENT:  Unremarkable.

NECK:  No JVD.

LUNGS:  Clear.

CARDIAC:  S1 and S2 regular.

ABDOMEN:  Soft, obese, nontender.  PEG site looks good.

EXTREMITIES:  Trace edema.

 

LABORATORY DATA:  White blood cell count 6.7, hematocrit 37.5, platelet count 104.  Sodium 143, potas
sium 4.4, chloride 103, CO2 35, BUN 46, creatinine 0.7, glucose 92.

 

ASSESSMENT:  

1.  No change in overall condition.  She has obesity hypoventilation syndrome and we are trying to re
duce the number of hours of mechanical ventilation slowly.

2.  Developing mild thrombocytopenia.

 

PLAN:

1.  Monitor platelet count.

2.  Continue 12 a.m. to 6:00 a.m. mechanical ventilation.

3.  Up in a chair as tolerated.

## 2018-05-31 LAB
ANION GAP SERPL CALC-SCNC: 14 MMOL/L (ref 10–20)
ANISOCYTOSIS BLD QL SMEAR: (no result) (100X)
BUN SERPL-MCNC: 49 MG/DL (ref 9.8–20.1)
CALCIUM SERPL-MCNC: 9.1 MG/DL (ref 7.8–10.44)
CHLORIDE SERPL-SCNC: 103 MMOL/L (ref 98–107)
CO2 SERPL-SCNC: 32 MMOL/L (ref 23–31)
CREAT CL PREDICTED SERPL C-G-VRATE: 204 ML/MIN (ref 70–130)
GLUCOSE SERPL-MCNC: 94 MG/DL (ref 80–115)
HGB BLD-MCNC: 10.6 G/DL (ref 12–16)
MCH RBC QN AUTO: 25.6 PG (ref 27–31)
MCV RBC AUTO: 89.2 FL (ref 81–99)
MDIFF COMPLETE?: YES
PLATELET # BLD AUTO: 95 THOU/UL (ref 130–400)
PLATELET BLD QL SMEAR: (no result)
POLYCHROMASIA BLD QL SMEAR: (no result) (100X)
POTASSIUM SERPL-SCNC: 4.3 MMOL/L (ref 3.5–5.1)
RBC # BLD AUTO: 4.13 MILL/UL (ref 4.2–5.4)
SODIUM SERPL-SCNC: 145 MMOL/L (ref 136–145)
WBC # BLD AUTO: 5.9 THOU/UL (ref 4.8–10.8)

## 2018-05-31 RX ADMIN — Medication SCH ML: at 21:57

## 2018-05-31 RX ADMIN — PANTOPRAZOLE SODIUM SCH MG: 40 GRANULE, DELAYED RELEASE ORAL at 09:16

## 2018-05-31 RX ADMIN — Medication SCH ML: at 09:17

## 2018-05-31 NOTE — PDOC.PN
- Subjective


Encounter Start Date: 05/31/18


Encounter Start Time: 17:00





Having some pain with bolus feeds.  No other issues reported.





- Objective


Resuscitation Status: 


 











Resuscitation Status           FULL:Full Resuscitation














Vital Signs & Weight: 


 Vital Signs (12 hours)











  Temp Pulse Pulse Pulse Resp BP BP


 


 05/31/18 12:19   63    17  


 


 05/31/18 10:45    69  66    107/52 L


 


 05/31/18 10:00  97.9 F      


 


 05/31/18 08:00  98.1 F  63    17  


 


 05/31/18 07:00  98.1 F      


 


 05/31/18 06:49   66     94/41 L 


 


 05/31/18 06:47   68    24 H  














  BP Pulse Ox


 


 05/31/18 12:19   91 L


 


 05/31/18 10:45  129/66 


 


 05/31/18 10:00  


 


 05/31/18 08:00   85 L


 


 05/31/18 07:00  


 


 05/31/18 06:49  


 


 05/31/18 06:47   92 L








 Weight











Admit Weight                   600 lb


 


Weight                         376 lb 1.738 oz











 Most Recent Monitor Data











Heart Rate from ECG            63


 


NIBP                           117/46


 


NIBP BP-Mean                   61


 


Respiration from ECG           20


 


SpO2                           86














I&O: 


 











 05/30/18 05/31/18 06/01/18





 06:59 06:59 06:59


 


Intake Total 1920 1680 666


 


Output Total 1059 1075 305


 


Balance 861 605 361











Result Diagrams: 


 05/31/18 06:16





 05/31/18 06:16





Phys Exam





- Physical Examination


HEENT: PERRLA, moist MMs


Trach


Respiratory: no wheezing, no rales, no rhonchi, clear to auscultation bilateral


Cardiovascular: RRR, no significant murmur


Gastrointestinal: soft, non-tender, no distention


Substantial edema with chronic stasis dermatitis


Neurological: non-focal





Dx/Plan


(1) Acute and chronic respiratory failure


Code(s): J96.20 - ACUTE AND CHR RESP FAILURE, UNSP W HYPOXIA OR HYPERCAPNIA   

Status: Acute   


Qualifiers: 


   Respiratory failure complication: hypoxia and hypercapnia   Qualified Code(s)

: J96.21 - Acute and chronic respiratory failure with hypoxia; J96.22 - Acute 

and chronic respiratory failure with hypercapnia; J96.22 - Acute and chronic 

respiratory failure with hypercapnia; J96.22 - Acute and chronic respiratory 

failure with hypercapnia   


Comment: Stable.    





(2) S/P percutaneous endoscopic gastrostomy (PEG) tube placement


Code(s): Z93.1 - GASTROSTOMY STATUS   Status: Acute   Comment: Had procedure on 

5/7/18. Stable.    





(3) Status post tracheostomy


Code(s): Z93.0 - TRACHEOSTOMY STATUS   Status: Acute   Comment: Had procedure 

on 5/7/18. Stable.    





(4) Thrombocytopenia


Code(s): D69.6 - THROMBOCYTOPENIA, UNSPECIFIED   Status: Acute   





(5) Acute on chronic diastolic (congestive) heart failure


Code(s): I50.33 - ACUTE ON CHRONIC DIASTOLIC (CONGESTIVE) HEART FAILURE   Status

: Chronic   Comment: Rate controlled.    





(6) Afib


Code(s): I48.91 - UNSPECIFIED ATRIAL FIBRILLATION   Status: Chronic   


Qualifiers: 


   Atrial fibrillation type: paroxysmal   Qualified Code(s): I48.0 - Paroxysmal 

atrial fibrillation   


Comment: Rate controlled. Continue Eliquis, ASA and Amiodarone. 


   





(7) Chronic diastolic CHF (congestive heart failure), NYHA class 3


Code(s): I50.32 - CHRONIC DIASTOLIC (CONGESTIVE) HEART FAILURE   Status: 

Chronic   Comment: Stable. Diuretics initially held 2/2 azotemia. Will be given 

PRN.   





- Plan





* Discussed with nursing.  


* The bolus feeds are being held.


* Will resume some lower volume continuous feeds tomorrow.


* Thrombocytopenia slightly worse today. Continue to monitor.  CC looking to 

possible drug related issues.

## 2018-05-31 NOTE — PRG
DATE OF SERVICE:  05/31/2018

 

SUBJECTIVE:  Patient did okay yesterday.

 

PHYSICAL EXAMINATION:

VITAL SIGNS:  Temperature is 98.4, pulse 62, blood pressure 97/41, O2 sat 91% on trach collar.

HEENT:  Unremarkable.  Trach in good position.

LUNGS:  Coarse rhonchi.

CARDIAC:  S1 and S2 regular.

ABDOMEN:  Soft.

EXTREMITIES:  Brawny edema.

 

LABORATORY DATA:  Sodium 145, potassium 4.3, chloride 103, CO2 32, BUN 49, creatinine 0.7, glucose 94
.  White blood cell count 5.9, hematocrit 36.8, platelet count 95.

 

ASSESSMENT:

1.  Acute on chronic respiratory failure.

2.  Obesity hypoventilation syndrome.

3.  Slightly worse thrombocytopenia

4.  Severe deconditioning.

 

PLAN:

1.  At the very least due to her bleeding risk, I would reduce the Eliquis dose.  I will review the o
ther drugs to see if anything causes thrombocytopenia.

2.  Decrease the length of time of trach collar.

## 2018-06-01 LAB
ANION GAP SERPL CALC-SCNC: 7 MMOL/L (ref 10–20)
BASOPHILS # BLD AUTO: 0.1 THOU/UL (ref 0–0.2)
BASOPHILS NFR BLD AUTO: 1.3 % (ref 0–1)
BUN SERPL-MCNC: 46 MG/DL (ref 9.8–20.1)
CALCIUM SERPL-MCNC: 9.3 MG/DL (ref 7.8–10.44)
CHLORIDE SERPL-SCNC: 104 MMOL/L (ref 98–107)
CO2 SERPL-SCNC: 37 MMOL/L (ref 23–31)
CREAT CL PREDICTED SERPL C-G-VRATE: 207 ML/MIN (ref 70–130)
EOSINOPHIL # BLD AUTO: 0.7 THOU/UL (ref 0–0.7)
EOSINOPHIL NFR BLD AUTO: 11.1 % (ref 0–10)
GLUCOSE SERPL-MCNC: 95 MG/DL (ref 80–115)
HGB BLD-MCNC: 10.5 G/DL (ref 12–16)
LYMPHOCYTES # BLD: 0.9 THOU/UL (ref 1.2–3.4)
LYMPHOCYTES NFR BLD AUTO: 14.2 % (ref 21–51)
MCH RBC QN AUTO: 26.8 PG (ref 27–31)
MCV RBC AUTO: 89 FL (ref 81–99)
MONOCYTES # BLD AUTO: 0.9 THOU/UL (ref 0.11–0.59)
MONOCYTES NFR BLD AUTO: 13.2 % (ref 0–10)
NEUTROPHILS # BLD AUTO: 3.9 THOU/UL (ref 1.4–6.5)
NEUTROPHILS NFR BLD AUTO: 60.2 % (ref 42–75)
PLATELET # BLD AUTO: 88 THOU/UL (ref 130–400)
POTASSIUM SERPL-SCNC: 4.2 MMOL/L (ref 3.5–5.1)
RBC # BLD AUTO: 3.92 MILL/UL (ref 4.2–5.4)
SODIUM SERPL-SCNC: 144 MMOL/L (ref 136–145)
WBC # BLD AUTO: 6.5 THOU/UL (ref 4.8–10.8)

## 2018-06-01 RX ADMIN — PANTOPRAZOLE SODIUM SCH MG: 40 GRANULE, DELAYED RELEASE ORAL at 08:50

## 2018-06-01 RX ADMIN — Medication SCH ML: at 20:36

## 2018-06-01 RX ADMIN — Medication SCH ML: at 08:51

## 2018-06-01 NOTE — PRG
DATE OF SERVICE:  06/01/2018

 

SUBJECTIVE:  The patient remains on mechanical ventilation 4 hours per night, so far that has worked 
out well.

 

PHYSICAL EXAMINATION:

VITAL SIGNS:  Her temperature is 98.1, pulse 65, blood pressure 144/61.  A 24-hour intake 1316, outpu
t 968.

HEENT:  Unremarkable.

NECK:  Trach in good position.

LUNGS:  Fairly clear.

CARDIAC:  S1 and S2 regular.

ABDOMEN:  Soft.

EXTREMITIES:  No overt edema.

 

LABORATORY DATA:  Sodium 144, potassium 4.2, chloride 104, CO2 37, BUN 46, creatinine 0.7, glucose 95
, white blood cell count 6.5, hematocrit 34.9, platelet count 88.

 

ASSESSMENT:

1.  Chronic respiratory failure requiring tracheostomy and nighttime mechanical ventilation.

2.  Obesity hypoventilation syndrome.

3.  Thrombocytopenia - after doing a drug checked yesterday with the Pharm.D, we feel that this is pr
obably the Zoloft that did this.  The Zoloft was stopped yesterday and she was changed to bupropion.

4.  Severe deconditioning.

 

PLAN:

1.  Hold the Zoloft.  Continue bupropion.

2.  If the thrombocytopenia worsens, we will need to stop Eliquis.

3.  Continue 4 hours a night on mechanical ventilation.  Next week, I will try to eliminate nighttime
 mechanical ventilation completely.

## 2018-06-01 NOTE — PDOC.PN
- Subjective


Encounter Start Date: 06/01/18


Encounter Start Time: 14:30





Reports modest improvement with the change to continuous feeds rather than 

bolus feeds.  





- Objective


Resuscitation Status: 


 











Resuscitation Status           FULL:Full Resuscitation














Vital Signs & Weight: 


 Vital Signs (12 hours)











  Temp Pulse Resp Pulse Ox


 


 06/01/18 16:00  98.3 F   


 


 06/01/18 13:13   79  20  88 L


 


 06/01/18 12:00  98.0 F   


 


 06/01/18 08:00  99.2 F  73  16  90 L


 


 06/01/18 07:33     99


 


 06/01/18 07:30   73  16  89 L


 


 06/01/18 07:00  99.2 F   








 Weight











Admit Weight                   600 lb


 


Weight                         376 lb 1.738 oz











 Most Recent Monitor Data











Heart Rate from ECG            79


 


NIBP                           130/63


 


NIBP BP-Mean                   75


 


Respiration from ECG           15


 


SpO2                           86














I&O: 


 











 05/31/18 06/01/18 06/02/18





 06:59 06:59 06:59


 


Intake Total 1680 1316 740


 


Output Total 1075 968 374


 


Balance 605 348 366











Result Diagrams: 


 06/01/18 03:09





 06/01/18 03:09





Phys Exam





- Physical Examination


Constitutional: NAD


Trached, morbidly obese


HEENT: PERRLA


Neck: no JVD


Respiratory: no wheezing, no rales, no rhonchi, clear to auscultation bilateral


Cardiovascular: RRR, no significant murmur, no rub


Gastrointestinal: soft, no distention


LE edema with some superficial blistering.  Chronic erythema.





Dx/Plan


(1) Acute and chronic respiratory failure


Code(s): J96.20 - ACUTE AND CHR RESP FAILURE, UNSP W HYPOXIA OR HYPERCAPNIA   

Status: Acute   


Qualifiers: 


   Respiratory failure complication: hypoxia and hypercapnia   Qualified Code(s)

: J96.21 - Acute and chronic respiratory failure with hypoxia; J96.22 - Acute 

and chronic respiratory failure with hypercapnia; J96.22 - Acute and chronic 

respiratory failure with hypercapnia; J96.22 - Acute and chronic respiratory 

failure with hypercapnia   


Comment: Stable.    





(2) S/P percutaneous endoscopic gastrostomy (PEG) tube placement


Code(s): Z93.1 - GASTROSTOMY STATUS   Status: Acute   Comment: Had procedure on 

5/7/18. Stable.    





(3) Status post tracheostomy


Code(s): Z93.0 - TRACHEOSTOMY STATUS   Status: Acute   Comment: Had procedure 

on 5/7/18. Stable.    





(4) Thrombocytopenia


Code(s): D69.6 - THROMBOCYTOPENIA, UNSPECIFIED   Status: Acute   





(5) Acute on chronic diastolic (congestive) heart failure


Code(s): I50.33 - ACUTE ON CHRONIC DIASTOLIC (CONGESTIVE) HEART FAILURE   Status

: Chronic   Comment: Rate controlled.    





(6) Afib


Code(s): I48.91 - UNSPECIFIED ATRIAL FIBRILLATION   Status: Chronic   


Qualifiers: 


   Atrial fibrillation type: paroxysmal   Qualified Code(s): I48.0 - Paroxysmal 

atrial fibrillation   


Comment: Rate controlled. Continue Eliquis, ASA and Amiodarone. 


   





(7) Chronic diastolic CHF (congestive heart failure), NYHA class 3


Code(s): I50.32 - CHRONIC DIASTOLIC (CONGESTIVE) HEART FAILURE   Status: 

Chronic   Comment: Stable. Diuretics initially held 2/2 azotemia. Will be given 

PRN.   





- Plan





* As above.

## 2018-06-02 LAB
ANION GAP SERPL CALC-SCNC: 8 MMOL/L (ref 10–20)
BUN SERPL-MCNC: 49 MG/DL (ref 9.8–20.1)
CALCIUM SERPL-MCNC: 9.2 MG/DL (ref 7.8–10.44)
CHLORIDE SERPL-SCNC: 102 MMOL/L (ref 98–107)
CO2 SERPL-SCNC: 37 MMOL/L (ref 23–31)
CREAT CL PREDICTED SERPL C-G-VRATE: 177 ML/MIN (ref 70–130)
GLUCOSE SERPL-MCNC: 107 MG/DL (ref 80–115)
HGB BLD-MCNC: 10.1 G/DL (ref 12–16)
MCH RBC QN AUTO: 25.9 PG (ref 27–31)
MCV RBC AUTO: 91.8 FL (ref 81–99)
MDIFF COMPLETE?: YES
PLATELET # BLD AUTO: 89 THOU/UL (ref 130–400)
PLATELET BLD QL SMEAR: (no result)
POTASSIUM SERPL-SCNC: 4.4 MMOL/L (ref 3.5–5.1)
RBC # BLD AUTO: 3.88 MILL/UL (ref 4.2–5.4)
SODIUM SERPL-SCNC: 143 MMOL/L (ref 136–145)
WBC # BLD AUTO: 6 THOU/UL (ref 4.8–10.8)

## 2018-06-02 RX ADMIN — Medication SCH ML: at 22:13

## 2018-06-02 RX ADMIN — PANTOPRAZOLE SODIUM SCH MG: 40 GRANULE, DELAYED RELEASE ORAL at 09:08

## 2018-06-02 RX ADMIN — Medication SCH ML: at 09:09

## 2018-06-02 NOTE — PRG
DATE OF SERVICE:  06/02/2018

 

SUBJECTIVE:  Ms. Hedrick slept 4 hours on the ventilator last night.

 

She is in no distress.  She says she feels fine.

 

OBJECTIVE:

VITAL SIGNS:  She is afebrile, heart rate is 66, blood pressure 126/50.

LUNGS:  Clear.

HEART:  Regular rhythm.  S1 and S2 are normal.

ABDOMEN:  Soft and nontender.

EXTREMITIES:  Without asymmetry.

NEUROLOGIC:  Nonfocal.

 

LABORATORY DATA:  White count 6.0, hemoglobin 10.1, platelets 89,000.

 

Sodium 143, potassium is 4.4, chloride 102, bicarbonate 37, BUN 49, creatinine 0.9.

 

ASSESSMENT AND PLAN:  She is receiving four 300 mL (300 calorie) boluses of feedings per day as for a
 total of 1200 calories per day.  I doubt she has burning 1200 calories a day and we could consider c
utting her back to 900 to 1000.  In my opinion given that the biggest problem here is her life threat
ening obesity.

 

We will try cutting her mechanical ventilation back to 3 hours tonight.

 

Critical care time was 30 minutes.

## 2018-06-02 NOTE — PDOC.PN
- Subjective


Encounter Start Date: 06/02/18


Encounter Start Time: 10:30





Patient seen and examined for Resp failure. No new complaints. No overnight 

events





- Objective


Resuscitation Status: 


 











Resuscitation Status           FULL:Full Resuscitation














MAR Reviewed: Yes


Vital Signs & Weight: 


 Vital Signs (12 hours)











  Temp Pulse Pulse Pulse Resp BP BP


 


 06/02/18 23:12   66    18  


 


 06/02/18 20:00  98.2 F  66    19  


 


 06/02/18 19:00  98.2 F      


 


 06/02/18 18:34   70    22 H  


 


 06/02/18 16:00  98.7 F      


 


 06/02/18 14:15    66  74   115/49 L  137/60


 


 06/02/18 13:06   64    20  


 


 06/02/18 12:00  97.6 F      














  Pulse Ox Pulse Ox Pulse Ox


 


 06/02/18 23:12  92 L  


 


 06/02/18 20:00  90 L  


 


 06/02/18 19:00   


 


 06/02/18 18:34  90 L  


 


 06/02/18 16:00   


 


 06/02/18 14:15   88 L  89 L


 


 06/02/18 13:06  92 L  


 


 06/02/18 12:00   








 Weight











Admit Weight                   600 lb


 


Weight                         376 lb 1.738 oz











 Most Recent Monitor Data











Heart Rate from ECG            66


 


NIBP                           128/63


 


NIBP BP-Mean                   82


 


Respiration from ECG           19


 


SpO2                           93














I&O: 


 











 06/01/18 06/02/18 06/03/18





 06:59 06:59 06:59


 


Intake Total 1316 1540 2090


 


Output Total 968 649 710


 


Balance 











Result Diagrams: 


 06/03/18 03:40





 06/03/18 03:40


EKG Reviewed by me: Yes (Tele SR)





Phys Exam





- Physical Examination


Constitutional: NAD


Respiratory: no wheezing, no rhonchi


Cardiovascular: RRR, no rub


Gastrointestinal: soft





Dx/Plan


(1) Acute respiratory failure with hypoxia and hypercapnia


Code(s): J96.01 - ACUTE RESPIRATORY FAILURE WITH HYPOXIA; J96.02 - ACUTE 

RESPIRATORY FAILURE WITH HYPERCAPNIA   Status: Acute   





(2) Morbid obesity with BMI of 60.0-69.9, adult


Code(s): E66.01 - MORBID (SEVERE) OBESITY DUE TO EXCESS CALORIES; Z68.44 - BODY 

MASS INDEX (BMI) 60.0-69.9, ADULT   Status: Chronic   





(3) Chronic a-fib


Code(s): I48.2 - CHRONIC ATRIAL FIBRILLATION   Status: Chronic   Comment: on 

anticoag/Amidarone   





(4) S/P percutaneous endoscopic gastrostomy (PEG) tube placement


Code(s): Z93.1 - GASTROSTOMY STATUS   Status: Acute   Comment: Had procedure on 

5/7/18. Stable.    





(5) Status post tracheostomy


Code(s): Z93.0 - TRACHEOSTOMY STATUS   Status: Acute   Comment: Had procedure 

on 5/7/18. Stable.    





(6) Acute on chronic diastolic (congestive) heart failure


Code(s): I50.33 - ACUTE ON CHRONIC DIASTOLIC (CONGESTIVE) HEART FAILURE   Status

: Chronic   





- Plan


cont current plan of care


Cont current meds as below





Review of Systems





- Review of Systems


Cardiovascular: negative: chest pain, palpitations, orthopnea, paroxysmal 

nocturnal dyspnea, edema, light headedness, other





- Medications/Allergies


Allergies/Adverse Reactions: 


 Allergies











Allergy/AdvReac Type Severity Reaction Status Date / Time


 


No Known Drug Allergies Allergy   Verified 05/06/18 14:16











Medications: 


 Current Medications





Acetaminophen (Tylenol Elixir)  1,000 mg PER TUBE Q6H PRN


   PRN Reason: Headache/Fever or Pain


   Last Admin: 05/28/18 01:42 Dose:  1,000 mg


Albuterol/Ipratropium (Duoneb)  3 ml NEB [id number] [facility]


   Last Admin: 06/02/18 23:12 Dose:  3 ml


Amiodarone HCl (Cordarone)  200 mg PO DAILY [facility]


   Last Admin: 06/02/18 09:08 Dose:  200 mg


Lipase/Protease/Amylase ([place] [id number])  1 cap FS .PER PROTOCOL PRN


   PRN Reason: TUBE OCCLUSION PROTOCOL


Apixaban (Eliquis)  5 mg PO BID [facility]


   Last Admin: 06/02/18 22:12 Dose:  5 mg


Aspirin (Aspirin Chewable)  81 mg PO DAILY [facility]


   Last Admin: 05/30/18 08:21 Dose:  81 mg


Bupropion HCl (Wellbutrin)  75 mg PER TUBE BID [facility]


   Last Admin: 06/02/18 22:12 Dose:  75 mg


Clonidine (Catapres)  0.1 mg PO Q4H PRN


   PRN Reason: Systolic BP > 180


   Last Admin: 05/18/18 21:05 Dose:  0.1 mg


Hydralazine HCl (Apresoline)  10 mg SLOW IVP Q4H PRN


   PRN Reason: Systolic BP > 180


   Last Admin: 05/12/18 17:06 Dose:  10 mg


Losartan Potassium (Cozaar)  25 mg PO DAILY [facility]


   Last Admin: 06/02/18 09:08 Dose:  25 mg


Ondansetron HCl (Zofran Odt)  4 mg PO Q6H PRN


   PRN Reason: Nausea/Vomiting


   Last Admin: 06/01/18 09:09 Dose:  4 mg


Ondansetron HCl (Zofran)  4 mg IVP Q6H PRN


   PRN Reason: Nausea/Vomiting


Pantoprazole Sodium (Protonix)  40 mg PER TUBE DAILY [facility]


   Last Admin: 06/02/18 09:08 Dose:  40 mg


Sodium Bicarbonate (Bicarbonate, Sodium)  650 mg PER TUBE .PER PROTOCOL PRN


   PRN Reason: ENTERAL TUBE OCCLUSION


Sodium Chloride (Flush - Normal Saline)  10 ml IVF Q12HR [facility]


   Last Admin: 06/02/18 22:13 Dose:  10 ml


Sodium Chloride (Flush - Normal Saline)  10 ml IVF PRN PRN


   PRN Reason: Saline Flush


   Last Admin: 05/12/18 08:28 Dose:  10 ml

## 2018-06-03 LAB
ANION GAP SERPL CALC-SCNC: 12 MMOL/L (ref 10–20)
BASOPHILS # BLD AUTO: 0.1 THOU/UL (ref 0–0.2)
BASOPHILS NFR BLD AUTO: 1.2 % (ref 0–1)
BUN SERPL-MCNC: 48 MG/DL (ref 9.8–20.1)
CALCIUM SERPL-MCNC: 9.3 MG/DL (ref 7.8–10.44)
CHLORIDE SERPL-SCNC: 103 MMOL/L (ref 98–107)
CO2 SERPL-SCNC: 34 MMOL/L (ref 23–31)
CREAT CL PREDICTED SERPL C-G-VRATE: 185 ML/MIN (ref 70–130)
EOSINOPHIL # BLD AUTO: 0.8 THOU/UL (ref 0–0.7)
EOSINOPHIL NFR BLD AUTO: 12.1 % (ref 0–10)
GLUCOSE SERPL-MCNC: 80 MG/DL (ref 80–115)
HGB BLD-MCNC: 10.3 G/DL (ref 12–16)
LYMPHOCYTES # BLD: 0.9 THOU/UL (ref 1.2–3.4)
LYMPHOCYTES NFR BLD AUTO: 14.7 % (ref 21–51)
MCH RBC QN AUTO: 27.2 PG (ref 27–31)
MCV RBC AUTO: 90.6 FL (ref 81–99)
MONOCYTES # BLD AUTO: 0.9 THOU/UL (ref 0.11–0.59)
MONOCYTES NFR BLD AUTO: 14.5 % (ref 0–10)
NEUTROPHILS # BLD AUTO: 3.7 THOU/UL (ref 1.4–6.5)
NEUTROPHILS NFR BLD AUTO: 57.6 % (ref 42–75)
PLATELET # BLD AUTO: 91 THOU/UL (ref 130–400)
POTASSIUM SERPL-SCNC: 4.2 MMOL/L (ref 3.5–5.1)
RBC # BLD AUTO: 3.8 MILL/UL (ref 4.2–5.4)
SODIUM SERPL-SCNC: 145 MMOL/L (ref 136–145)
WBC # BLD AUTO: 6.4 THOU/UL (ref 4.8–10.8)

## 2018-06-03 RX ADMIN — Medication SCH ML: at 21:29

## 2018-06-03 RX ADMIN — PANTOPRAZOLE SODIUM SCH MG: 40 GRANULE, DELAYED RELEASE ORAL at 09:24

## 2018-06-03 RX ADMIN — Medication SCH ML: at 09:25

## 2018-06-03 NOTE — PRG
DATE OF SERVICE:  06/03/2018

 

SUBJECTIVE:  Ms. Hedrick did well, just 3 hours mechanical ventilation last night.

 

OBJECTIVE:

GENERAL:  She is in no distress.

VITAL SIGNS:  Heart rate 74, respiratory 20, oximetry is 91, blood pressure 125/50.

LUNGS:  Clear.

HEART:  Regular rhythm.

ABDOMEN:  Soft.

 

LABORATORY DATA:  White count 6.4, hemoglobin 10.3, platelets 91,000.

 

Sodium 145, potassium 4.2, chloride 103, bicarbonate 34, BUN 48, creatinine 0.86.

 

IMPRESSION:

1.  Respiratory failure.

2.  Obesity hypoventilation.

3.  Status post tracheostomy.

4.  Life-threatening obesity.

5.  Hypertension.

 

Probably no reason to continue to do daily lab on her.

 

We will try switching ventilation to p.r.n.  Hopefully, she will do well with this.  Placement obviou
sly will be an issue.

## 2018-06-03 NOTE — PDOC.PN
- Subjective


Encounter Start Date: 06/03/18


Encounter Start Time: 12:30





Patient seen and examined for Resp failure/CHF. No new complaints. No overnight 

events





- Objective


Resuscitation Status: 


 











Resuscitation Status           FULL:Full Resuscitation














MAR Reviewed: Yes


Vital Signs & Weight: 


 Vital Signs (12 hours)











  Temp Pulse Pulse Pulse Resp BP BP


 


 06/03/18 19:00  98.3 F      


 


 06/03/18 18:47   71    24 H  


 


 06/03/18 16:00  98.3 F      


 


 06/03/18 14:55    86  81   119/75  139/61


 


 06/03/18 12:47   75    20  


 


 06/03/18 12:00  98.4 F      














  Pulse Ox Pulse Ox Pulse Ox


 


 06/03/18 19:00   


 


 06/03/18 18:47  91 L  


 


 06/03/18 16:00   


 


 06/03/18 14:55   88 L  89 L


 


 06/03/18 12:47  91 L  


 


 06/03/18 12:00   








 Weight











Admit Weight                   600 lb


 


Weight                         376 lb 1.738 oz











 Most Recent Monitor Data











Heart Rate from ECG            76


 


NIBP                           135/60


 


NIBP BP-Mean                   85


 


Respiration from ECG           25


 


SpO2                           90














I&O: 


 











 06/02/18 06/03/18 06/04/18





 06:59 06:59 06:59


 


Intake Total 1540 2340 2445


 


Output Total 649 1080 740


 


Balance 891 1260 1705











Result Diagrams: 


 06/03/18 03:40





 06/03/18 03:40


EKG Reviewed by me: Yes (Tele SR)





Phys Exam





- Physical Examination


Constitutional: NAD


Cardiovascular: RRR, no rub


Gastrointestinal: soft, non-tender, positive bowel sounds


Neurological: moves all 4 limbs





Dx/Plan


(1) Acute respiratory failure with hypoxia and hypercapnia


Code(s): J96.01 - ACUTE RESPIRATORY FAILURE WITH HYPOXIA; J96.02 - ACUTE 

RESPIRATORY FAILURE WITH HYPERCAPNIA   Status: Acute   Comment: s/p trach/PEG   





(2) Morbid obesity with BMI of 60.0-69.9, adult


Code(s): E66.01 - MORBID (SEVERE) OBESITY DUE TO EXCESS CALORIES; Z68.44 - BODY 

MASS INDEX (BMI) 60.0-69.9, ADULT   Status: Chronic   





(3) Acute on chronic diastolic (congestive) heart failure


Code(s): I50.33 - ACUTE ON CHRONIC DIASTOLIC (CONGESTIVE) HEART FAILURE   Status

: Chronic   





(4) Chronic a-fib


Code(s): I48.2 - CHRONIC ATRIAL FIBRILLATION   Status: Chronic   Comment: on 

anticoag/Amidarone   





(5) Thrombocytopenia


Code(s): D69.6 - THROMBOCYTOPENIA, UNSPECIFIED   Status: Acute   





- Plan


Cont current meds as below


-: Cont PEG tube feeds and supportive care





Review of Systems





- Review of Systems


Cardiovascular: negative: chest pain, palpitations, orthopnea, paroxysmal 

nocturnal dyspnea, edema, light headedness, other


Gastrointestinal: negative: Nausea, Vomiting, Abdominal Pain, Diarrhea, 

Constipation, Melena, Hematochezia, Other





- Medications/Allergies


Allergies/Adverse Reactions: 


 Allergies











Allergy/AdvReac Type Severity Reaction Status Date / Time


 


No Known Drug Allergies Allergy   Verified 05/06/18 14:16











Medications: 


 Current Medications





Acetaminophen (Tylenol Elixir)  1,000 mg PER TUBE Q6H PRN


   PRN Reason: Headache/Fever or Pain


   Last Admin: 05/28/18 01:42 Dose:  1,000 mg


Albuterol/Ipratropium (Duoneb)  3 ml NEB [id number] [facility]


   Last Admin: 06/03/18 18:47 Dose:  3 ml


Amiodarone HCl (Cordarone)  200 mg PO DAILY [facility]


   Last Admin: 06/03/18 09:24 Dose:  200 mg


Lipase/Protease/Amylase ([place] [id number])  1 cap FS .PER PROTOCOL PRN


   PRN Reason: TUBE OCCLUSION PROTOCOL


Apixaban (Eliquis)  5 mg PO BID [facility]


   Last Admin: 06/03/18 21:28 Dose:  5 mg


Aspirin (Aspirin Chewable)  81 mg PO DAILY [facility]


   Last Admin: 05/30/18 08:21 Dose:  81 mg


Bupropion HCl (Wellbutrin)  75 mg PER TUBE BID [facility]


   Last Admin: 06/03/18 21:28 Dose:  75 mg


Clonidine (Catapres)  0.1 mg PO Q4H PRN


   PRN Reason: Systolic BP > 180


   Last Admin: 05/18/18 21:05 Dose:  0.1 mg


Hydralazine HCl (Apresoline)  10 mg SLOW IVP Q4H PRN


   PRN Reason: Systolic BP > 180


   Last Admin: 05/12/18 17:06 Dose:  10 mg


Losartan Potassium (Cozaar)  25 mg PO DAILY [facility]


   Last Admin: 06/03/18 09:24 Dose:  25 mg


Ondansetron HCl (Zofran Odt)  4 mg PO Q6H PRN


   PRN Reason: Nausea/Vomiting


   Last Admin: 06/01/18 09:09 Dose:  4 mg


Ondansetron HCl (Zofran)  4 mg IVP Q6H PRN


   PRN Reason: Nausea/Vomiting


Pantoprazole Sodium (Protonix)  40 mg PER TUBE DAILY [facility]


   Last Admin: 06/03/18 09:24 Dose:  40 mg


Sodium Bicarbonate (Bicarbonate, Sodium)  650 mg PER TUBE .PER PROTOCOL PRN


   PRN Reason: ENTERAL TUBE OCCLUSION


Sodium Chloride (Flush - Normal Saline)  10 ml IVF Q12HR [facility]


   Last Admin: 06/03/18 21:29 Dose:  10 ml


Sodium Chloride (Flush - Normal Saline)  10 ml IVF PRN PRN


   PRN Reason: Saline Flush


   Last Admin: 05/12/18 08:28 Dose:  10 ml

## 2018-06-04 LAB
ANALYZER IN CARDIO: (no result)
BASE EXCESS STD BLDA CALC-SCNC: 11.4 MEQ/L
CA-I BLDA-SCNC: 1.3 MMOL/L (ref 1.12–1.3)
HCO3 BLDA-SCNC: 37.3 MEQ/L (ref 22–28)
HCT VFR BLDA CALC: 34.7 % (ref 36–47)
HGB BLDA-MCNC: 10.2 G/DL (ref 12–16)
PCO2 BLDA: 57 MMHG (ref 35–45)
PH BLDA: 7.43 [PH] (ref 7.35–7.45)
PO2 BLDA: 50.1 MMHG (ref 80–?)
POTASSIUM SERPL-SCNC: 3.6 MMOL/L (ref 3.5–5.1)
SPECIMEN DRAWN FROM PATIENT: (no result)

## 2018-06-04 RX ADMIN — Medication SCH ML: at 09:25

## 2018-06-04 RX ADMIN — PANTOPRAZOLE SODIUM SCH: 40 GRANULE, DELAYED RELEASE ORAL at 09:24

## 2018-06-04 RX ADMIN — Medication SCH ML: at 13:26

## 2018-06-04 NOTE — PDOC.PN
- Subjective


Encounter Start Date: 06/04/18


Encounter Start Time: 13:00





Patient seen and examined for resp failure. Somnolent with mild resp distress. 

On Vent. No overnight events





- Objective


Resuscitation Status: 


 











Resuscitation Status           FULL:Full Resuscitation














MAR Reviewed: Yes


Vital Signs & Weight: 


 Vital Signs (12 hours)











  Temp Pulse Pulse Pulse Resp BP BP


 


 06/04/18 22:00      25 H  


 


 06/04/18 20:15   125 H     92/49 L 


 


 06/04/18 20:00  98.9 F  122 H    23 H  


 


 06/04/18 18:00      27 H  


 


 06/04/18 16:27    78  133 H    150/72 H


 


 06/04/18 16:00  99 F     24 H  


 


 06/04/18 15:53   122 H     


 


 06/04/18 14:00      27 H  


 


 06/04/18 13:05   76     


 


 06/04/18 12:00  99.6 F     24 H  














  Pulse Ox Pulse Ox Pulse Ox


 


 06/04/18 22:00   


 


 06/04/18 20:15   


 


 06/04/18 20:00  88 L  


 


 06/04/18 18:00   


 


 06/04/18 16:27   89 L  86 L


 


 06/04/18 16:00   


 


 06/04/18 15:53   


 


 06/04/18 14:00   


 


 06/04/18 13:05   


 


 06/04/18 12:00   








 Weight











Admit Weight                   600 lb


 


Weight                         376 lb 1.738 oz











 Most Recent Monitor Data











Heart Rate from ECG            84


 


NIBP                           104/43


 


NIBP BP-Mean                   55


 


Respiration from ECG           23


 


SpO2                           91














I&O: 


 











 06/03/18 06/04/18 06/05/18





 06:59 06:59 06:59


 


Intake Total 2340 3155 2370


 


Output Total 1080 1330 1585


 


Balance 1260 1825 785











Result Diagrams: 


 06/03/18 03:40





 06/05/18 05:23


EKG Reviewed by me: Yes (Tele SR)





Phys Exam





- Physical Examination


Constitutional: NAD (on Vent)


Respiratory: no wheezing


Dec AE at bases


Cardiovascular: RRR, no rub


Gastrointestinal: soft, non-tender, positive bowel sounds





Dx/Plan


(1) Acute respiratory failure with hypoxia and hypercapnia


Code(s): J96.01 - ACUTE RESPIRATORY FAILURE WITH HYPOXIA; J96.02 - ACUTE 

RESPIRATORY FAILURE WITH HYPERCAPNIA   Status: Acute   Comment: s/p trach/PEG   





(2) Morbid obesity with BMI of 60.0-69.9, adult


Code(s): E66.01 - MORBID (SEVERE) OBESITY DUE TO EXCESS CALORIES; Z68.44 - BODY 

MASS INDEX (BMI) 60.0-69.9, ADULT   Status: Chronic   





(3) Acute on chronic diastolic (congestive) heart failure


Code(s): I50.33 - ACUTE ON CHRONIC DIASTOLIC (CONGESTIVE) HEART FAILURE   Status

: Chronic   





(4) Chronic a-fib


Code(s): I48.2 - CHRONIC ATRIAL FIBRILLATION   Status: Chronic   Comment: on 

anticoag/Amidarone   





(5) Thrombocytopenia


Code(s): D69.6 - THROMBOCYTOPENIA, UNSPECIFIED   Status: Acute   





- Plan


One dose of 40 mg IV Lasix


-: BM in AM


-: Cont to monitor


-: Cont current meds as below


-: On Anticoag for Afib





Review of Systems





- Review of Systems


Constitutional: negative: fever, chills, sweats, weakness, malaise, other


Gastrointestinal: negative: Nausea, Vomiting, Abdominal Pain, Diarrhea, 

Constipation, Melena, Hematochezia, Other





- Medications/Allergies


Allergies/Adverse Reactions: 


 Allergies











Allergy/AdvReac Type Severity Reaction Status Date / Time


 


No Known Drug Allergies Allergy   Verified 05/06/18 14:16











Medications: 


 Current Medications





Acetaminophen (Tylenol Elixir)  1,000 mg PER TUBE Q6H PRN


   PRN Reason: Headache/Fever or Pain


   Last Admin: 05/28/18 01:42 Dose:  1,000 mg


Albuterol/Ipratropium (Duoneb)  3 ml NEB [id number] [facility]


   Last Admin: 06/04/18 20:14 Dose:  3 ml


Amiodarone HCl (Cordarone)  200 mg PO DAILY [facility]


   Last Admin: 06/04/18 09:24 Dose:  200 mg


Lipase/Protease/Amylase ([place] [id number])  1 cap FS .PER PROTOCOL PRN


   PRN Reason: TUBE OCCLUSION PROTOCOL


Apixaban (Eliquis)  5 mg PO BID [facility]


   Last Admin: 06/04/18 20:57 Dose:  5 mg


Aspirin (Aspirin Chewable)  81 mg PO DAILY [facility]


   Last Admin: 06/04/18 09:24 Dose:  81 mg


Bupropion HCl (Wellbutrin)  75 mg PER TUBE BID [facility]


   Last Admin: 06/04/18 20:57 Dose:  75 mg


Clonidine (Catapres)  0.1 mg PO Q4H PRN


   PRN Reason: Systolic BP > 180


   Last Admin: 05/18/18 21:05 Dose:  0.1 mg


Hydralazine HCl (Apresoline)  10 mg SLOW IVP Q4H PRN


   PRN Reason: Systolic BP > 180


   Last Admin: 05/12/18 17:06 Dose:  10 mg


Losartan Potassium (Cozaar)  25 mg PO DAILY [facility]


   Last Admin: 06/04/18 09:24 Dose:  25 mg


Ondansetron HCl (Zofran Odt)  4 mg PO Q6H PRN


   PRN Reason: Nausea/Vomiting


   Last Admin: 06/01/18 09:09 Dose:  4 mg


Ondansetron HCl (Zofran)  4 mg IVP Q6H PRN


   PRN Reason: Nausea/Vomiting


Pantoprazole Sodium (Protonix)  40 mg IVP DAILY [facility]


Sodium Bicarbonate (Bicarbonate, Sodium)  650 mg PER TUBE .PER PROTOCOL PRN


   PRN Reason: ENTERAL TUBE OCCLUSION


Sodium Chloride (Flush - Normal Saline)  10 ml IVF Q12HR [facility]


   Last Admin: 06/04/18 13:26 Dose:  10 ml


Sodium Chloride (Flush - Normal Saline)  10 ml IVF PRN PRN


   PRN Reason: Saline Flush


   Last Admin: 05/12/18 08:28 Dose:  10 ml

## 2018-06-04 NOTE — PRG
DATE OF SERVICE:  06/04/2018

 

SERVICE:  Pulmonary Medicine

 

INTERVAL HISTORY:  The patient is doing really well from a respiratory standpoint.  She denies any cu
rrent chest pain, nausea, vomiting, fevers or chills.  There has been no interval change to her condi
tion.  No overnight events occurred.

 

PHYSICAL EXAMINATION:

VITAL SIGNS:  Afebrile, pulse 90, blood pressure 133/57, respirations 24, saturation 87% on a T-colla
r with a 35% FiO2.

HEENT:  Normocephalic, atraumatic.  Sclerae are white, conjunctivae pink.  Oral mucosa is moist witho
ut lesions.

NECK:  Tracheostomy in good position.  It is clean, dry, and intact.

LUNGS:  Decent air entry.  There is no prolonged expiratory phase appreciated.

HEART:  Normal rate, regular.

ABDOMEN:  Soft, nontender, nondistended.  Bowel sounds are positive.

MUSCULOSKELETAL:  No cyanosis or clubbing.  There is no pitting in the bilateral lower extremities.

NEUROLOGIC:  Grossly nonfocal.

 

ASSESSMENT:

1.  Chronic hypoxic and hypercapnic respiratory failure.

2.  Obesity hypoventilation syndrome.

3.  Morbid obesity.

 

PLAN:  We will continue working on mobilizing the patient.  She currently has no place to go because 
she is unfunded, and will remain with us until she is safe to transition out of the hospital.  We enma
l switch her vitals to q.6h.

## 2018-06-05 LAB
ALBUMIN SERPL BCG-MCNC: 2.6 G/DL (ref 3.4–4.8)
ANION GAP SERPL CALC-SCNC: 9 MMOL/L (ref 10–20)
BUN SERPL-MCNC: 44 MG/DL (ref 9.8–20.1)
BUN/CREAT SERPL: 55
CALCIUM SERPL-MCNC: 9 MG/DL (ref 7.8–10.44)
CHLORIDE SERPL-SCNC: 105 MMOL/L (ref 98–107)
CO2 SERPL-SCNC: 38 MMOL/L (ref 23–31)
CREAT CL PREDICTED SERPL C-G-VRATE: 199 ML/MIN (ref 70–130)
GLUCOSE SERPL-MCNC: 100 MG/DL (ref 80–115)
MAGNESIUM SERPL-MCNC: 1.8 MG/DL (ref 1.6–2.6)
POTASSIUM SERPL-SCNC: 4 MMOL/L (ref 3.5–5.1)
SODIUM SERPL-SCNC: 148 MMOL/L (ref 136–145)

## 2018-06-05 RX ADMIN — Medication SCH ML: at 09:49

## 2018-06-05 RX ADMIN — Medication SCH ML: at 20:23

## 2018-06-05 NOTE — PRG
DATE OF SERVICE:  06/05/2018

 

SERVICE:  Pulmonary Medicine.

 

INTERVAL HISTORY:  The patient went in and out of atrial fibrillation briefly.  She converted spontan
eously.  Her oxygen requirements are going up a little bit.  She is tolerating less time on T-collar.
  She denies any current fevers, chills, nausea or vomiting, but she did have a low-grade temperature
 overnight.  She has increasing swelling in the right upper extremity.

 

PHYSICAL EXAMINATION:

VITAL SIGNS:  Temperature max 100.6, pulse 81, blood pressure 121/44, respirations 21, saturation 94%
 on 30% FiO2 and a PEEP of 5.

GENERAL:  The patient is awake and alert, in no apparent distress.

LUNGS:  Excellent air entry with no prolonged expiratory phase.  Rhonchi and crackles are both presen
t.  No wheezing.

HEART:  Normal rate and regular.

ABDOMEN:  Soft, nontender, and nondistended.  Bowel sounds positive.

MUSCULOSKELETAL:  No cyanosis or clubbing.  There is trace pitting in the bilateral lower extremities
.  There is 1+ pitting in the left upper extremity and 3+ pitting in the right upper extremity.

GENITOURINARY:  No Lozano.

NEUROLOGIC:  Grossly nonfocal.

 

LABORATORY DATA:  WBC 6.4, hemoglobin 10.3, platelets 91,000 and up trending once again.  Creatinine 
0.8.  Magnesium and phosphorus unremarkable.  Albumin 2.6.  Basic metabolic profile is otherwise unre
markable.  Sodium is up trending to 148.

 

ASSESSMENT:

1.  Chronic hypoxic and hypercapnic respiratory failure.

2.  Obesity hypoventilation syndrome.

3.  Morbid obesity.

4.  Sepsis.

5.  Hypernatremia.

6.  Major depressive disorder.

7.  Right upper extremity swelling.

 

DISCUSSION AND PLAN:  We will get an ultrasound of the right upper extremity.  I will pan culture inc
luding blood, urine, and tracheal aspirate.  We will push the dose of SSRI.  I will add a little bit 
of free water to correct for the patient's hypernatremia.  We will continue to diurese her to euvolem
ia.

## 2018-06-05 NOTE — PDOC.PN
- Subjective


Encounter Start Date: 06/05/18


Encounter Start Time: 12:30





Patient seen and examined for Resp failure. Lethargic. Events noted





- Objective


Resuscitation Status: 


 











Resuscitation Status           FULL:Full Resuscitation














MAR Reviewed: Yes


Vital Signs & Weight: 


 Vital Signs (12 hours)











  Temp Pulse Resp Pulse Ox


 


 06/05/18 20:00  98.6 F   


 


 06/05/18 18:44   69  22 H  92 L


 


 06/05/18 16:00  98.1 F   


 


 06/05/18 14:43   72  25 H  91 L


 


 06/05/18 12:00  99.2 F    90 L








 Weight











Admit Weight                   600 lb


 


Weight                         376 lb 1.738 oz











 Most Recent Monitor Data











Heart Rate from ECG            68


 


NIBP                           98/46


 


NIBP BP-Mean                   64


 


Respiration from ECG           25


 


SpO2                           92














I&O: 


 











 06/04/18 06/05/18 06/06/18





 06:59 06:59 06:59


 


Intake Total 3155 2740 1352


 


Output Total 1330 2040 597


 


Balance 1825 700 755











Result Diagrams: 


 06/03/18 03:40





 06/05/18 05:23


EKG Reviewed by me: Yes (Tele SR)





Phys Exam





- Physical Examination


Constitutional: NAD


Respiratory: no wheezing, no rhonchi


Cardiovascular: RRR, no rub


Gastrointestinal: soft, positive bowel sounds


Musculoskeletal: edema present





Dx/Plan


(1) Acute respiratory failure with hypoxia and hypercapnia


Code(s): J96.01 - ACUTE RESPIRATORY FAILURE WITH HYPOXIA; J96.02 - ACUTE 

RESPIRATORY FAILURE WITH HYPERCAPNIA   Status: Acute   Comment: s/p trach/PEG   





(2) Morbid obesity with BMI of 60.0-69.9, adult


Code(s): E66.01 - MORBID (SEVERE) OBESITY DUE TO EXCESS CALORIES; Z68.44 - BODY 

MASS INDEX (BMI) 60.0-69.9, ADULT   Status: Chronic   





(3) Acute on chronic diastolic (congestive) heart failure


Code(s): I50.33 - ACUTE ON CHRONIC DIASTOLIC (CONGESTIVE) HEART FAILURE   Status

: Chronic   





(4) Chronic a-fib


Code(s): I48.2 - CHRONIC ATRIAL FIBRILLATION   Status: Chronic   Comment: on 

anticoag/Amidarone. Now paroxysmal   





(5) Thrombocytopenia


Code(s): D69.6 - THROMBOCYTOPENIA, UNSPECIFIED   Status: Acute   





- Plan


paiz catheter, PT/OT, respiratory therapy


AM labs


-: Repeat cultures done


-: Cont current meds as below


-: No family at bedside


-: Cont supportive care





Review of Systems





- Review of Systems


Respiratory: negative: Cough, Dry, Shortness of Breath, Hemoptysis, SOB with 

Excertion, Pleuritic Pain, Sputum, Wheezing


Cardiovascular: negative: chest pain, palpitations, orthopnea, paroxysmal 

nocturnal dyspnea, edema, light headedness, other





- Medications/Allergies


Allergies/Adverse Reactions: 


 Allergies











Allergy/AdvReac Type Severity Reaction Status Date / Time


 


No Known Drug Allergies Allergy   Verified 05/06/18 14:16











Medications: 


 Current Medications





Acetaminophen (Tylenol Elixir)  1,000 mg PER TUBE Q6H PRN


   PRN Reason: Headache/Fever or Pain


   Last Admin: 05/28/18 01:42 Dose:  1,000 mg


Albuterol/Ipratropium (Duoneb)  3 ml NEB [id number] [facility]


   Last Admin: 06/05/18 18:44 Dose:  3 ml


Amiodarone HCl (Cordarone)  200 mg PO DAILY [facility]


   Last Admin: 06/05/18 09:49 Dose:  200 mg


Lipase/Protease/Amylase ([place] [address])  1 cap FS .PER PROTOCOL PRN


   PRN Reason: TUBE OCCLUSION PROTOCOL


Apixaban (Eliquis)  5 mg PO BID [facility]


   Last Admin: 06/05/18 20:22 Dose:  5 mg


Aspirin (Aspirin Chewable)  81 mg PO DAILY [facility]


   Last Admin: 06/05/18 09:49 Dose:  81 mg


Bupropion HCl (Wellbutrin)  150 mg PER TUBE BID [facility]


   Last Admin: 06/05/18 20:22 Dose:  150 mg


Clonidine (Catapres)  0.1 mg PO Q4H PRN


   PRN Reason: Systolic BP > 180


   Last Admin: 05/18/18 21:05 Dose:  0.1 mg


Furosemide (Lasix)  40 mg SLOW IVP 0600 [facility]


Hydralazine HCl (Apresoline)  10 mg SLOW IVP Q4H PRN


   PRN Reason: Systolic BP > 180


   Last Admin: 05/12/18 17:06 Dose:  10 mg


Dextrose/Water (D5w)  1,000 mls @ 50 mls/hr IV .Q20H [facility]


   Last Admin: 06/05/18 11:29 Dose:  1,000 mls


Losartan Potassium (Cozaar)  25 mg PO DAILY [facility]


   Last Admin: 06/05/18 09:49 Dose:  25 mg


Ondansetron HCl (Zofran Odt)  4 mg PO Q6H PRN


   PRN Reason: Nausea/Vomiting


   Last Admin: 06/01/18 09:09 Dose:  4 mg


Ondansetron HCl (Zofran)  4 mg IVP Q6H PRN


   PRN Reason: Nausea/Vomiting


Pantoprazole Sodium (Protonix)  40 mg IVP DAILY [facility]


   Last Admin: 06/05/18 09:49 Dose:  40 mg


Sodium Bicarbonate (Bicarbonate, Sodium)  650 mg PER TUBE .PER PROTOCOL PRN


   PRN Reason: ENTERAL TUBE OCCLUSION


Sodium Chloride (Flush - Normal Saline)  10 ml IVF Q12HR [facility]


   Last Admin: 06/05/18 20:23 Dose:  10 ml


Sodium Chloride (Flush - Normal Saline)  10 ml IVF PRN PRN


   PRN Reason: Saline Flush


   Last Admin: 05/12/18 08:28 Dose:  10 ml

## 2018-06-05 NOTE — ULT
RIGHT UPPER EXTREMITY VENOUS DOPPLER ULTRASOUND:

 

06/05/2018

 

HISTORY:

Right upper extremity edema, swelling, assess for DVT.

 

COMPARISON:

None.

 

TECHNIQUE:

Multiplanar gray-scale sonographic imaging of the venous structures of the right upper extremity are 
obtained with color-flow and spectral analysis.

 

FINDINGS:

The right internal jugular vein, subclavian vein, and axillary vein are patent.

 

The right brachial vein, cephalic vein, radial vein, and ulnar vein are patent as visualized.  The ba
silic vein is patent as well.

 

The sonographer reports that the patient has an midline port at the mid upper arm level, obscuring
 the mid portion of the brachial vein.

 

IMPRESSION:

Imaged portions of the venous structures of the left upper extremity demonstrate no evidence for deep
 venous thrombosis.  A portion of the right brachial vein could be visualized secondary to obscuratio
n from midline port.

 

POS: YOLI

## 2018-06-06 LAB
ANION GAP SERPL CALC-SCNC: 10 MMOL/L (ref 10–20)
BUN SERPL-MCNC: 43 MG/DL (ref 9.8–20.1)
CALCIUM SERPL-MCNC: 8.9 MG/DL (ref 7.8–10.44)
CHLORIDE SERPL-SCNC: 103 MMOL/L (ref 98–107)
CO2 SERPL-SCNC: 37 MMOL/L (ref 23–31)
CREAT CL PREDICTED SERPL C-G-VRATE: 194 ML/MIN (ref 70–130)
GLUCOSE SERPL-MCNC: 110 MG/DL (ref 80–115)
HGB BLD-MCNC: 10.2 G/DL (ref 12–16)
MCH RBC QN AUTO: 26.6 PG (ref 27–31)
MCV RBC AUTO: 90.8 FL (ref 81–99)
MDIFF COMPLETE?: YES
PLATELET # BLD AUTO: 106 THOU/UL (ref 130–400)
PLATELET BLD QL SMEAR: (no result)
POLYCHROMASIA BLD QL SMEAR: (no result) (100X)
POTASSIUM SERPL-SCNC: 3.8 MMOL/L (ref 3.5–5.1)
RBC # BLD AUTO: 3.83 MILL/UL (ref 4.2–5.4)
SODIUM SERPL-SCNC: 146 MMOL/L (ref 136–145)
WBC # BLD AUTO: 6.8 THOU/UL (ref 4.8–10.8)

## 2018-06-06 RX ADMIN — CEFTRIAXONE SCH MLS: 2 INJECTION, POWDER, FOR SOLUTION INTRAMUSCULAR; INTRAVENOUS at 09:56

## 2018-06-06 RX ADMIN — Medication SCH ML: at 09:57

## 2018-06-06 RX ADMIN — Medication SCH ML: at 21:11

## 2018-06-06 NOTE — HP
DATE OF SERVICE:  06/06/2018

 

SERVICE:  Pulmonary Medicine.

 

INTERVAL HISTORY:  The patient is doing fine from a cardiovascular and respiratory standpoint.  Overn
ight, she was not placed on pressure support ventilation.  She denies any chest pain, nausea, vomitin
g, fevers or chills.  Otherwise, there has been no interval change to her condition.

 

PHYSICAL EXAMINATION:

VITAL SIGNS:  Afebrile with a T-max of 100.6 yesterday morning, pulse 68, blood pressure 121/45, resp
irations 23, saturation 92% on room air.

GENERAL:  The patient is awake, alert, no apparent distress.

LUNGS:  Decent air entry with no prolonged expiratory phase.  Rhonchi are present, but clear with cou
gh.  No wheezing or crackles are appreciated.

HEART:  Normal rate and regular.

ABDOMEN:  Soft, nontender, nondistended.  Bowel sounds are positive.

MUSCULOSKELETAL:  No cyanosis or clubbing.  There is 1+ pitting in the bilateral lower extremities.

NEUROLOGIC:  Grossly nonfocal.

 

LABORATORY DATA:  Blood cultures x2 are negative to date.  Respiratory cultures are negative to date.
  Urine culture is growing gram-positive cocci.

 

IMAGING DATA:  Ultrasound of the right upper extremity demonstrates no evidence of DVT.

 

ASSESSMENT:

1.  Chronic hypoxic and hypercapnic respiratory failure.

2.  Obesity hypoventilation syndrome.

3.  Morbid obesity.

4.  Sepsis.

5.  Urinary tract infection secondary to Streptococcus.

6.  Hypernatremia.

7.  Major depressive disorder.

8.  Right upper extremity swelling without evidence of deep venous thrombosis.

 

DISCUSSION AND PLAN:  I will put the patient on Rocephin.  This will be a 10-day course for complicat
ed urinary tract infection.  We will continue to diurese the patient to euvolemia.  Repeat laboratori
es will be obtained tomorrow morning.  We will look for increasing signs of sepsis through time.

## 2018-06-06 NOTE — PDOC.PN
- Subjective


Encounter Start Date: 06/06/18


Encounter Start Time: 14:00





Patient seen and examined Resp failure. No new complaints. No overnight events





- Objective


Resuscitation Status: 


 











Resuscitation Status           FULL:Full Resuscitation














MAR Reviewed: Yes


Vital Signs & Weight: 


 Vital Signs (12 hours)











  Temp Pulse Pulse Pulse Resp BP BP


 


 06/06/18 16:00  98.1 F      


 


 06/06/18 13:06   72    23 H  


 


 06/06/18 12:00  98.2 F      


 


 06/06/18 11:38    72  74    116/50 L


 


 06/06/18 08:24   68     121/45 L 


 


 06/06/18 08:00  98.3 F  72    23 H  


 


 06/06/18 07:00  98.3 F      














  BP Pulse Ox


 


 06/06/18 16:00  


 


 06/06/18 13:06   88 L


 


 06/06/18 12:00  


 


 06/06/18 11:38  122/59 L 


 


 06/06/18 08:24  


 


 06/06/18 08:00   94 L


 


 06/06/18 07:00  








 Weight











Admit Weight                   600 lb


 


Weight                         376 lb 1.738 oz











 Most Recent Monitor Data











Heart Rate from ECG            66


 


NIBP                           119/55


 


NIBP BP-Mean                   70


 


Respiration from ECG           23


 


SpO2                           90














I&O: 


 











 06/05/18 06/06/18 06/07/18





 06:59 06:59 06:59


 


Intake Total 2740 2152 911


 


Output Total 2040 1252 885


 


Balance 700 900 26











Result Diagrams: 


 06/06/18 08:27





 06/06/18 08:27


EKG Reviewed by me: Yes (Tele SR)





Phys Exam





- Physical Examination


Constitutional: NAD


Respiratory: no wheezing, no rhonchi


Cardiovascular: RRR, no rub


Gastrointestinal: soft, non-tender, positive bowel sounds


Musculoskeletal: edema present





Dx/Plan


(1) Acute respiratory failure with hypoxia and hypercapnia


Code(s): J96.01 - ACUTE RESPIRATORY FAILURE WITH HYPOXIA; J96.02 - ACUTE 

RESPIRATORY FAILURE WITH HYPERCAPNIA   Status: Acute   Comment: s/p trach/PEG   





(2) UTI (urinary tract infection)


Status: Acute   





(3) Morbid obesity with BMI of 60.0-69.9, adult


Code(s): E66.01 - MORBID (SEVERE) OBESITY DUE TO EXCESS CALORIES; Z68.44 - BODY 

MASS INDEX (BMI) 60.0-69.9, ADULT   Status: Chronic   





(4) Acute on chronic diastolic (congestive) heart failure


Code(s): I50.33 - ACUTE ON CHRONIC DIASTOLIC (CONGESTIVE) HEART FAILURE   Status

: Chronic   





(5) Chronic a-fib


Code(s): I48.2 - CHRONIC ATRIAL FIBRILLATION   Status: Chronic   Comment: on 

anticoag/Amidarone. Now paroxysmal   





(6) Thrombocytopenia


Code(s): D69.6 - THROMBOCYTOPENIA, UNSPECIFIED   Status: Acute   





(7) Physical deconditioning


Code(s): R53.81 - OTHER MALAISE   Status: Chronic   





- Plan


PT/OT, 


On Ceftriaxone for UTI


-: Cont current meds as below


-: Cont supportive care


-: DC planning





Review of Systems





- Review of Systems


Respiratory: negative: Cough, Dry, Shortness of Breath, Hemoptysis, SOB with 

Excertion, Pleuritic Pain, Sputum, Wheezing


Cardiovascular: negative: chest pain, palpitations, orthopnea, paroxysmal 

nocturnal dyspnea, edema, light headedness, other





- Medications/Allergies


Allergies/Adverse Reactions: 


 Allergies











Allergy/AdvReac Type Severity Reaction Status Date / Time


 


No Known Drug Allergies Allergy   Verified 05/06/18 14:16











Medications: 


 Current Medications





Acetaminophen (Tylenol Elixir)  1,000 mg PER TUBE Q6H PRN


   PRN Reason: Headache/Fever or Pain


   Last Admin: 05/28/18 01:42 Dose:  1,000 mg


Albuterol/Ipratropium (Duoneb)  3 ml NEB [id number]


   Last Admin: 06/06/18 13:06 Dose:  3 ml


Amiodarone HCl (Cordarone)  200 mg PO DAILY [facility]


   Last Admin: 06/06/18 09:56 Dose:  200 mg


Lipase/Protease/Amylase ([place] [address])  1 cap FS .PER PROTOCOL PRN


   PRN Reason: TUBE OCCLUSION PROTOCOL


Apixaban (Eliquis)  5 mg PO BID [facility]


   Last Admin: 06/06/18 09:56 Dose:  5 mg


Aspirin (Aspirin Chewable)  81 mg PO DAILY [facility]


   Last Admin: 06/06/18 09:56 Dose:  81 mg


Bupropion HCl (Wellbutrin)  150 mg PER TUBE BID [facility]


   Last Admin: 06/06/18 09:56 Dose:  150 mg


Clonidine (Catapres)  0.1 mg PO Q4H PRN


   PRN Reason: Systolic BP > 180


   Last Admin: 05/18/18 21:05 Dose:  0.1 mg


Furosemide (Lasix)  40 mg SLOW IVP 0600 [facility]


   Last Admin: 06/06/18 05:18 Dose:  40 mg


Hydralazine HCl (Apresoline)  10 mg SLOW IVP Q4H PRN


   PRN Reason: Systolic BP > 180


   Last Admin: 05/12/18 17:06 Dose:  10 mg


Dextrose/Water (D5w)  1,000 mls @ 50 mls/hr IV .Q20H [facility]


   Last Admin: 06/06/18 05:19 Dose:  1,000 mls


Ceftriaxone Sodium 2 gm/ (Sodium Chloride)  100 mls @ 200 mls/hr IVPB Q24HR [facility]


   Last Admin: 06/06/18 09:56 Dose:  100 mls


Losartan Potassium (Cozaar)  25 mg PO DAILY [facility]


   Last Admin: 06/06/18 09:56 Dose:  25 mg


Ondansetron HCl (Zofran Odt)  4 mg PO Q6H PRN


   PRN Reason: Nausea/Vomiting


   Last Admin: 06/01/18 09:09 Dose:  4 mg


Ondansetron HCl (Zofran)  4 mg IVP Q6H PRN


   PRN Reason: Nausea/Vomiting


Pantoprazole Sodium (Protonix)  40 mg IVP DAILY [facility]


   Last Admin: 06/06/18 09:56 Dose:  40 mg


Sodium Bicarbonate (Bicarbonate, Sodium)  650 mg PER TUBE .PER PROTOCOL PRN


   PRN Reason: ENTERAL TUBE OCCLUSION


Sodium Chloride (Flush - Normal Saline)  10 ml IVF Q12HR [facility]


   Last Admin: 06/06/18 09:57 Dose:  10 ml


Sodium Chloride (Flush - Normal Saline)  10 ml IVF PRN PRN


   PRN Reason: Saline Flush


   Last Admin: 05/12/18 08:28 Dose:  10 ml

## 2018-06-07 LAB
ANION GAP SERPL CALC-SCNC: 11 MMOL/L (ref 10–20)
ANISOCYTOSIS BLD QL SMEAR: (no result) (100X)
BUN SERPL-MCNC: 44 MG/DL (ref 9.8–20.1)
CALCIUM SERPL-MCNC: 9 MG/DL (ref 7.8–10.44)
CHLORIDE SERPL-SCNC: 101 MMOL/L (ref 98–107)
CO2 SERPL-SCNC: 38 MMOL/L (ref 23–31)
CREAT CL PREDICTED SERPL C-G-VRATE: 196 ML/MIN (ref 70–130)
GLUCOSE SERPL-MCNC: 95 MG/DL (ref 80–115)
HGB BLD-MCNC: 10.2 G/DL (ref 12–16)
MAGNESIUM SERPL-MCNC: 1.8 MG/DL (ref 1.6–2.6)
MCH RBC QN AUTO: 26.2 PG (ref 27–31)
MCV RBC AUTO: 90.8 FL (ref 81–99)
MDIFF COMPLETE?: YES
PLATELET # BLD AUTO: 97 THOU/UL (ref 130–400)
PLATELET BLD QL SMEAR: (no result)
POTASSIUM SERPL-SCNC: 3.8 MMOL/L (ref 3.5–5.1)
RBC # BLD AUTO: 3.87 MILL/UL (ref 4.2–5.4)
SODIUM SERPL-SCNC: 146 MMOL/L (ref 136–145)
WBC # BLD AUTO: 6.2 THOU/UL (ref 4.8–10.8)

## 2018-06-07 RX ADMIN — CEFTRIAXONE SCH MLS: 2 INJECTION, POWDER, FOR SOLUTION INTRAMUSCULAR; INTRAVENOUS at 09:14

## 2018-06-07 RX ADMIN — Medication SCH ML: at 09:07

## 2018-06-07 RX ADMIN — CEFTRIAXONE SCH: 2 INJECTION, POWDER, FOR SOLUTION INTRAMUSCULAR; INTRAVENOUS at 22:25

## 2018-06-07 RX ADMIN — Medication SCH ML: at 20:49

## 2018-06-07 RX ADMIN — CEFTRIAXONE SCH MLS: 2 INJECTION, POWDER, FOR SOLUTION INTRAMUSCULAR; INTRAVENOUS at 09:16

## 2018-06-07 NOTE — PDOC.PN
- Subjective


Encounter Start Date: 06/07/18


Encounter Start Time: 12:30


Subjective: pt up in bed trached, follows command





- Objective


Resuscitation Status: 


 











Resuscitation Status           FULL:Full Resuscitation














Vital Signs & Weight: 


 Vital Signs (12 hours)











  Temp Pulse Pulse Pulse Resp BP BP


 


 06/07/18 20:00  98.2 F  73    20  


 


 06/07/18 19:05       


 


 06/07/18 19:03       


 


 06/07/18 16:00  97.7 F      


 


 06/07/18 13:48   72    23 H  


 


 06/07/18 13:15    73  73   122/83  126/60


 


 06/07/18 12:00  98.7 F      


 


 06/07/18 11:00  98.7 F      














  Pulse Ox Pulse Ox Pulse Ox


 


 06/07/18 20:00  92 L  


 


 06/07/18 19:05  88 L  


 


 06/07/18 19:03  88 L  


 


 06/07/18 16:00   


 


 06/07/18 13:48   


 


 06/07/18 13:15   96  95


 


 06/07/18 12:00   


 


 06/07/18 11:00   








 Weight











Admit Weight                   600 lb


 


Weight                         376 lb 1.738 oz











 Most Recent Monitor Data











Heart Rate from ECG            71


 


NIBP                           103/47


 


NIBP BP-Mean                   61


 


Respiration from ECG           19


 


SpO2                           89














I&O: 


 











 06/06/18 06/07/18 06/08/18





 06:59 06:59 06:59


 


Intake Total 2152 2169 1673


 


Output Total 1252 1480 1435


 


Balance 900 689 238











Result Diagrams: 


 06/07/18 04:26





 06/07/18 04:26





Phys Exam





- Physical Examination


HEENT: PERRLA, moist MMs, sclera anicteric, TM's clear, oral pharynx no lesions

, 2+ tonsils


Neck: no nodes, no JVD, supple, full ROM


mild rhonchi noted to lower lungs, pt is trached


Cardiovascular: RRR, no significant murmur, no rub, gallop, irregular


Gastrointestinal: soft, non-tender, no distention, positive bowel sounds


Musculoskeletal: edema present


lower ext


Neurological: non-focal, normal sensation, moves all 4 limbs





Dx/Plan


(1) Acute respiratory failure with hypoxia and hypercapnia


Code(s): J96.01 - ACUTE RESPIRATORY FAILURE WITH HYPOXIA; J96.02 - ACUTE 

RESPIRATORY FAILURE WITH HYPERCAPNIA   Status: Acute   Comment: s/p trach/PEG   





(2) UTI (urinary tract infection)


Status: Acute   





(3) Acute on chronic diastolic (congestive) heart failure


Code(s): I50.33 - ACUTE ON CHRONIC DIASTOLIC (CONGESTIVE) HEART FAILURE   Status

: Chronic   





(4) Hypokalemia


Code(s): E87.6 - HYPOKALEMIA   Status: Acute   Comment: continue electrolyte 

replacement protocol   





(5) Chronic a-fib


Code(s): I48.2 - CHRONIC ATRIAL FIBRILLATION   Status: Chronic   Comment: on 

anticoag/Amidarone. Now paroxysmal   





- Plan


PT/OT, 


On Ceftriaxone for UTI


-: Cont current meds as below


-: Cont supportive care


-: DC planning


* .








Review of Systems





- Review of Systems


Respiratory: negative: Cough, Dry, Shortness of Breath, Hemoptysis, SOB with 

Excertion, Pleuritic Pain, Sputum, Wheezing


Cardiovascular: negative: chest pain, palpitations, orthopnea, paroxysmal 

nocturnal dyspnea, edema, light headedness, other


Gastrointestinal: negative: Nausea, Vomiting, Abdominal Pain, Diarrhea, 

Constipation, Melena, Hematochezia, Other





- Medications/Allergies


Allergies/Adverse Reactions: 


 Allergies











Allergy/AdvReac Type Severity Reaction Status Date / Time


 


No Known Drug Allergies Allergy   Verified 05/06/18 14:16











Medications: 


 Current Medications





Acetaminophen (Tylenol Elixir)  1,000 mg PER TUBE Q6H PRN


   PRN Reason: Headache/Fever or Pain


   Last Admin: 06/07/18 20:58 Dose:  1,000 mg


Albuterol/Ipratropium (Duoneb)  3 ml NEB [id number] [facility]


   Last Admin: 06/07/18 19:03 Dose:  3 ml


Amiodarone HCl (Cordarone)  200 mg PO DAILY [facility]


   Last Admin: 06/07/18 09:05 Dose:  200 mg


Lipase/Protease/Amylase ([place] [id number])  1 cap FS .PER PROTOCOL PRN


   PRN Reason: TUBE OCCLUSION PROTOCOL


Apixaban (Eliquis)  5 mg PO BID [facility]


   Last Admin: 06/07/18 20:48 Dose:  5 mg


Aspirin (Aspirin Chewable)  81 mg PO DAILY [facility]


   Last Admin: 06/07/18 09:07 Dose:  81 mg


Bupropion HCl (Wellbutrin)  150 mg PER TUBE BID [facility]


   Last Admin: 06/07/18 20:48 Dose:  150 mg


Clonidine (Catapres)  0.1 mg PO Q4H PRN


   PRN Reason: Systolic BP > 180


   Last Admin: 05/18/18 21:05 Dose:  0.1 mg


Furosemide (Lasix)  40 mg SLOW IVP 0600 [facility]


   Last Admin: 06/07/18 05:34 Dose:  40 mg


Hydralazine HCl (Apresoline)  10 mg SLOW IVP Q4H PRN


   PRN Reason: Systolic BP > 180


   Last Admin: 05/12/18 17:06 Dose:  10 mg


Dextrose/Water (D5w)  1,000 mls @ 50 mls/hr IV .Q20H [facility]


   Last Admin: 06/07/18 05:34 Dose:  1,000 mls


Ceftriaxone Sodium 2 gm/ (Sodium Chloride)  100 mls @ 200 mls/hr IVPB 0900 [facility]


   Last Admin: 06/07/18 09:16 Dose:  100 mls


Losartan Potassium (Cozaar)  25 mg PO DAILY [facility]


   Last Admin: 06/07/18 09:06 Dose:  25 mg


Ondansetron HCl (Zofran Odt)  4 mg PO Q6H PRN


   PRN Reason: Nausea/Vomiting


   Last Admin: 06/01/18 09:09 Dose:  4 mg


Ondansetron HCl (Zofran)  4 mg IVP Q6H PRN


   PRN Reason: Nausea/Vomiting


Pantoprazole Sodium (Protonix)  40 mg IVP DAILY [facility]


   Last Admin: 06/07/18 09:06 Dose:  40 mg


Potassium Chloride (Klor-Con)  20 meq PO QAM-WM [facility]


Sodium Bicarbonate (Bicarbonate, Sodium)  650 mg PER TUBE .PER PROTOCOL PRN


   PRN Reason: ENTERAL TUBE OCCLUSION


Sodium Chloride (Flush - Normal Saline)  10 ml IVF Q12HR [facility]


   Last Admin: 06/07/18 20:49 Dose:  10 ml


Sodium Chloride (Flush - Normal Saline)  10 ml IVF PRN PRN


   PRN Reason: Saline Flush


   Last Admin: 05/12/18 08:28 Dose:  10 ml

## 2018-06-07 NOTE — PRG
DATE OF SERVICE:  06/07/2018

 

SERVICE:  Pulmonary Medicine.

 

INTERVAL HISTORY:  The patient is doing fine from a respiratory standpoint.  She actually looks like 
she has better color today.  She denies any chest pain, shortness of breath, fevers or chills.

 

PHYSICAL EXAMINATION:

VITAL SIGNS:  Afebrile, pulse 68, blood pressure 135/60, respirations 19, saturation 98% on 28% FiO2 
delivered via trach collar.

HEENT:  Normocephalic, atraumatic.  Sclerae are white, conjunctivae pink.  Oral and nasal mucosa is m
oist without lesions.

LUNGS:  Decent air entry.  There is no prolonged expiratory phase, wheezing, rhonchi or crackles.

HEART:  Normal rate, regular.

ABDOMEN:  Soft, nontender, nondistended.  Bowel sounds are positive.

MUSCULOSKELETAL:  No cyanosis or clubbing.  There is no pitting in the bilateral lower extremities.

NEUROLOGIC:  Grossly nonfocal.

 

LABORATORY DATA:  WBC 6.2, hemoglobin 10.2, platelets 97,000.  Sodium 146, bicarbonate 38.  Basic met
abolic profile is otherwise unremarkable.  Urine culture is growing nonhemolytic Streptococcus.  Sens
itivities currently pending.  Respiratory cultures and repeat blood cultures are negative to date.

 

ASSESSMENT:

1.  Chronic hypoxic and hypercapnic respiratory failure.

2.  Obesity hypoventilation syndrome.

3.  Morbid obesity.

4.  Deconditioning.

5.  Sepsis.

6.  Urinary tract infection secondary to Streptococcus.

7.  Hypernatremia.

8.  Major depressive disorder.

 

PLAN:  We will continue Rocephin for a total duration of 10 days.  We will continue working on mobili
zing the patient.  She remains a little volume overloaded.  We will introduce a touch of free water b
ecause of her hypernatremia while we continue to make efforts at diuresing her.  Magnesium and potass
ium will be replaced once again.  Pulmonary and Critical Care continue to follow.  Ultimately, she is
 stable for transition out of the hospital, but currently she has no safe environment to go to.

## 2018-06-08 LAB
ANION GAP SERPL CALC-SCNC: 14 MMOL/L (ref 10–20)
ANISOCYTOSIS BLD QL SMEAR: (no result) (100X)
BASOPHILS # BLD AUTO: 0.1 THOU/UL (ref 0–0.2)
BASOPHILS NFR BLD AUTO: 1.2 % (ref 0–1)
BUN SERPL-MCNC: 44 MG/DL (ref 9.8–20.1)
CALCIUM SERPL-MCNC: 8.9 MG/DL (ref 7.8–10.44)
CHLORIDE SERPL-SCNC: 100 MMOL/L (ref 98–107)
CO2 SERPL-SCNC: 35 MMOL/L (ref 23–31)
CREAT CL PREDICTED SERPL C-G-VRATE: 192 ML/MIN (ref 70–130)
EOSINOPHIL # BLD AUTO: 1.5 THOU/UL (ref 0–0.7)
EOSINOPHIL NFR BLD AUTO: 24.2 % (ref 0–10)
GLUCOSE SERPL-MCNC: 92 MG/DL (ref 80–115)
HGB BLD-MCNC: 10.3 G/DL (ref 12–16)
LYMPHOCYTES # BLD: 0.9 THOU/UL (ref 1.2–3.4)
LYMPHOCYTES NFR BLD AUTO: 14.3 % (ref 21–51)
MAGNESIUM SERPL-MCNC: 2 MG/DL (ref 1.6–2.6)
MCH RBC QN AUTO: 26.2 PG (ref 27–31)
MCV RBC AUTO: 92.1 FL (ref 81–99)
MDIFF COMPLETE?: YES
MONOCYTES # BLD AUTO: 0.9 THOU/UL (ref 0.11–0.59)
MONOCYTES NFR BLD AUTO: 14.6 % (ref 0–10)
NEUTROPHILS # BLD AUTO: 2.9 THOU/UL (ref 1.4–6.5)
NEUTROPHILS NFR BLD AUTO: 45.7 % (ref 42–75)
PLATELET # BLD AUTO: 96 THOU/UL (ref 130–400)
PLATELET BLD QL SMEAR: (no result)
POTASSIUM SERPL-SCNC: 4 MMOL/L (ref 3.5–5.1)
RBC # BLD AUTO: 3.92 MILL/UL (ref 4.2–5.4)
SODIUM SERPL-SCNC: 145 MMOL/L (ref 136–145)
WBC # BLD AUTO: 6.3 THOU/UL (ref 4.8–10.8)

## 2018-06-08 RX ADMIN — Medication SCH ML: at 10:01

## 2018-06-08 RX ADMIN — Medication SCH ML: at 20:58

## 2018-06-08 RX ADMIN — CEFTRIAXONE SCH MLS: 2 INJECTION, POWDER, FOR SOLUTION INTRAMUSCULAR; INTRAVENOUS at 09:59

## 2018-06-08 NOTE — PRG
DATE OF SERVICE:  06/08/2018

 

SERVICE:  Pulmonary Medicine.

 

INTERVAL HISTORY:  The patient is doing fine from a respiratory standpoint.  She denies any fevers, c
hills, nausea, vomiting, shortness of breath, or chest discomfort.  Otherwise, there has been no inte
rval change to her condition.  She was able to stand for 18 seconds yesterday.  She felt very weak, b
ut is very happy that she is making progress.

 

PHYSICAL EXAMINATION:

VITAL SIGNS:  Afebrile, pulse 70, blood pressure 101/49, respirations 19, saturation 92% on trach col
lar.

HEENT:  Normocephalic and atraumatic.  Sclerae are white.  Conjunctivae pink.  Oral and nasal mucosa 
is moist without lesions.

LUNGS:  Decent air entry.  There is no prolonged expiratory phase.  Rhonchi and crackles are present.
  No significant wheezing.

HEART:  Normal rate, regular.

ABDOMEN:  Soft, nontender, nondistended.  Bowel sounds are positive.

MUSCULOSKELETAL:  No cyanosis or clubbing.  There is 2+ pitting in the right upper extremity.  There 
is 1+ pitting in the bilateral lower extremities. There is trace pitting in the left upper extremity.


GENITOURINARY:  No Lozano catheter in place.

 

LABORATORY DATA:  WBC 6.3, hemoglobin 10.3, platelets 96,000.  Basic metabolic profile is essentially
 unremarkable.  The sodium is gently downtrending into the normal range of 145, chloride 100, BUN 44,
 creatinine 0.83.

 

ASSESSMENT:

1.  Chronic hypoxic and hypercapnic respiratory failure.

2.  Obesity hypoventilation syndrome.

3.  Morbid obesity.

4.  Deconditioning.

5.  Sepsis.

6.  Urinary tract infection, secondary to Streptococcus.

7.  Hypernatremia, resolved.

8.  Major depressive disorder.

 

DISCUSSION AND PLAN:  The patient will continue Rocephin for a total duration of 10 days.  We will co
ntinue mobilizing the patient as best as she can tolerate.  I will drop her free water a little bit, 
but this is primarily in place to prevent her from developing significant hypernatremia, as she has d
one a couple times in the past.  She remains volume overloaded and we will continue to diurese her.  
Laboratory holiday will be provided through the weekend.

## 2018-06-09 RX ADMIN — Medication SCH ML: at 08:20

## 2018-06-09 RX ADMIN — Medication PRN ML: at 05:53

## 2018-06-09 RX ADMIN — SCOPALAMINE SCH MG: 1 PATCH, EXTENDED RELEASE TRANSDERMAL at 12:34

## 2018-06-09 RX ADMIN — Medication SCH ML: at 21:28

## 2018-06-09 NOTE — PDOC.PN
- Subjective


Encounter Start Date: 06/09/18


Encounter Start Time: 09:40





Patient seen and examined for mary. No new complaints. No overnight events





- Objective


Resuscitation Status: 


 











Resuscitation Status           FULL:Full Resuscitation














MAR Reviewed: Yes


Vital Signs & Weight: 


 Vital Signs (12 hours)











  Temp Pulse Resp BP Pulse Ox


 


 06/09/18 07:00  97.9 F  74  22 H  107/49 L  99


 


 06/09/18 06:46   70  14   98


 


 06/09/18 03:51  98.7 F  70  16  98/45 L  97


 


 06/09/18 00:04   71   


 


 06/09/18 00:01   70  18   96


 


 06/08/18 23:51  98.4 F  68  17  103/48 L  97








 Weight











Admit Weight                   600 lb


 


Weight                         376 lb 1.738 oz











 Most Recent Monitor Data











Heart Rate from ECG            73


 


NIBP                           109/61


 


NIBP BP-Mean                   72


 


Respiration from ECG           29


 


SpO2                           94














I&O: 


 











 06/08/18 06/09/18 06/10/18





 06:59 06:59 06:59


 


Intake Total 1923 1244 


 


Output Total 1740 1465 


 


Balance 183 -221 











Result Diagrams: 


 06/08/18 04:40





 06/08/18 04:40


EKG Reviewed by me: Yes (nsr)





Phys Exam





- Physical Examination


Constitutional: NAD


HEENT: PERRLA, moist MMs, sclera anicteric


Neck: no JVD, supple


trach+


Respiratory: no wheezing, no rales, no rhonchi


Cardiovascular: RRR, no significant murmur, no rub


Gastrointestinal: soft, non-tender, no distention, positive bowel sounds


PEG+


Musculoskeletal: pulses present, edema present


lymhoedema+


Neurological: moves all 4 limbs


Lymphatic: no nodes


Psychiatric: normal affect


Skin: no rash, normal turgor





Dx/Plan


(1) Acute respiratory failure with hypoxia and hypercapnia


Code(s): J96.01 - ACUTE RESPIRATORY FAILURE WITH HYPOXIA; J96.02 - ACUTE 

RESPIRATORY FAILURE WITH HYPERCAPNIA   Status: Acute   Comment: s/p trach/PEG   





(2) Thrombocytopenia


Code(s): D69.6 - THROMBOCYTOPENIA, UNSPECIFIED   Status: Acute   





(3) UTI (urinary tract infection)


Status: Acute   





(4) Anemia, normocytic normochromic


Code(s): D64.9 - ANEMIA, UNSPECIFIED   Status: Chronic   





(5) Chronic a-fib


Code(s): I48.2 - CHRONIC ATRIAL FIBRILLATION   Status: Chronic   Comment: on 

anticoag/Amidarone. Now paroxysmal   





(6) Chronic diastolic CHF (congestive heart failure), NYHA class 3


Code(s): I50.32 - CHRONIC DIASTOLIC (CONGESTIVE) HEART FAILURE   Status: 

Chronic   Comment: Stable. Diuretics initially held 2/2 azotemia. Will be given 

PRN.   





(7) Morbid obesity with BMI of 60.0-69.9, adult


Code(s): E66.01 - MORBID (SEVERE) OBESITY DUE TO EXCESS CALORIES; Z68.44 - BODY 

MASS INDEX (BMI) 60.0-69.9, ADULT   Status: Chronic   





(8) MARY (obstructive sleep apnea)


Code(s): G47.33 - OBSTRUCTIVE SLEEP APNEA (ADULT) (PEDIATRIC)   Status: Chronic

   





(9) Physical deconditioning


Code(s): R53.81 - OTHER MALAISE   Status: Chronic   





(10) Pickwickian syndrome


Code(s): E66.2 - MORBID (SEVERE) OBESITY WITH ALVEOLAR HYPOVENTILATION   Status

: Chronic   Comment: s/p trach collar.    





- Plan


cont current plan of care, PT/OT, 





* at this point she needs aggressive PT, once she shows that she is able to do 

activity by herself then we can safely discharge to discharge place.


* discharge process for her is challenging


* medication reviewed as below


* symptomatic treatment


* continue cpap








Review of Systems





- Review of Systems


Eyes: negative: Pain, Vision Change, Conjunctivae Inflammation, Eyelid 

Inflammation, Redness, Other


ENT: negative: Ear Pain, Ear Discharge, Nose Pain, Nose Discharge, Nose 

Congestion, Mouth Pain, Mouth Swelling, Throat Pain, Throat Swelling, Other


Respiratory: Cough.  negative: Dry, Shortness of Breath, Hemoptysis, SOB with 

Excertion, Pleuritic Pain, Sputum, Wheezing


Cardiovascular: negative: chest pain, palpitations, orthopnea, paroxysmal 

nocturnal dyspnea, edema, light headedness, other


Gastrointestinal: negative: Nausea, Vomiting, Abdominal Pain, Diarrhea, 

Constipation, Melena, Hematochezia, Other


Genitourinary: negative: Dysuria, Frequency, Incontinence, Hematuria, Retention

, Other


Musculoskeletal: negative: Neck Pain, Shoulder Pain, Arm Pain, Back Pain, Hand 

Pain, Leg Pain, Foot Pain, Other


Skin: negative: Rash, Lesions, Brock, Bruising, Other





- Medications/Allergies


Allergies/Adverse Reactions: 


 Allergies











Allergy/AdvReac Type Severity Reaction Status Date / Time


 


No Known Drug Allergies Allergy   Verified 05/06/18 14:16











Medications: 


 Current Medications





Acetaminophen (Tylenol Elixir)  1,000 mg PER TUBE Q6H PRN


   PRN Reason: Headache/Fever or Pain


   Last Admin: 06/09/18 09:30 Dose:  1,000 mg


Albuterol/Ipratropium (Duoneb)  3 ml NEB [id number] [facility]


   Last Admin: 06/09/18 06:46 Dose:  3 ml


Amiodarone HCl (Cordarone)  200 mg PO DAILY [facility]


   Last Admin: 06/09/18 08:20 Dose:  200 mg


Lipase/Protease/Amylase ([place] [id number])  1 cap FS .PER PROTOCOL PRN


   PRN Reason: TUBE OCCLUSION PROTOCOL


Apixaban (Eliquis)  5 mg PO BID [facility]


   Last Admin: 06/09/18 08:20 Dose:  5 mg


Aspirin (Aspirin Chewable)  81 mg PO DAILY [facility]


   Last Admin: 06/09/18 08:20 Dose:  81 mg


Bupropion HCl (Wellbutrin)  150 mg PER TUBE BID [facility]


   Last Admin: 06/09/18 08:38 Dose:  150 mg


Clonidine (Catapres)  0.1 mg PO Q4H PRN


   PRN Reason: Systolic BP > 180


   Last Admin: 05/18/18 21:05 Dose:  0.1 mg


Furosemide (Lasix)  40 mg SLOW IVP 0600 [facility]


   Last Admin: 06/09/18 05:53 Dose:  40 mg


Hydralazine HCl (Apresoline)  10 mg SLOW IVP Q4H PRN


   PRN Reason: Systolic BP > 180


   Last Admin: 05/12/18 17:06 Dose:  10 mg


Dextrose/Water (D5w)  1,000 mls @ 50 mls/hr IV .Q20H [facility]


   Last Admin: 06/09/18 09:30 Dose:  1,000 mls


Linezolid (Zyvox)  600 mg PO BID [facility]


   Last Admin: 06/09/18 08:20 Dose:  600 mg


Losartan Potassium (Cozaar)  25 mg PO DAILY [facility]


   Last Admin: 06/09/18 08:19 Dose:  25 mg


Ondansetron HCl (Zofran Odt)  4 mg PO Q6H PRN


   PRN Reason: Nausea/Vomiting


   Last Admin: 06/01/18 09:09 Dose:  4 mg


Ondansetron HCl (Zofran)  4 mg IVP Q6H PRN


   PRN Reason: Nausea/Vomiting


Pantoprazole Sodium (Protonix)  40 mg IVP DAILY [facility]


   Last Admin: 06/09/18 08:20 Dose:  40 mg


Potassium Chloride (Klor-Con)  20 meq PO QAM-WM [facility]


   Last Admin: 06/09/18 08:19 Dose:  20 meq


Sodium Bicarbonate (Bicarbonate, Sodium)  650 mg PER TUBE .PER PROTOCOL PRN


   PRN Reason: ENTERAL TUBE OCCLUSION


Sodium Chloride (Flush - Normal Saline)  10 ml IVF Q12HR [facility]


   Last Admin: 06/09/18 08:20 Dose:  10 ml


Sodium Chloride (Flush - Normal Saline)  10 ml IVF PRN PRN


   PRN Reason: Saline Flush


   Last Admin: 06/09/18 05:53 Dose:  10 ml

## 2018-06-09 NOTE — PRG
DATE OF SERVICE:  06/09/2018

 

SUBJECTIVE:  This morning, she still has a large amount of secretions.

 

OBJECTIVE:

VITAL SIGNS:  Sats are 98 on trach collar, respiratory rate 22, temperature 97, blood pressure 107/49
.

CHEST:  Extensive rhonchi.

CARDIAC:  Normal S1 and S2, no gallops.

ABDOMEN:  Soft, no masses.

 

IMPRESSION:

1.  Respiratory failure.

2.  Morbid obesity.

3.  Tracheostomy.

4.  Sleep apnea.

5.  Supraventricular tachycardia.

 

PLAN:  Awaiting placement.  We will try a Scop patch.

 

We will follow.

## 2018-06-10 RX ADMIN — PANTOPRAZOLE SODIUM SCH MG: 40 GRANULE, DELAYED RELEASE ORAL at 09:21

## 2018-06-10 RX ADMIN — Medication SCH ML: at 09:22

## 2018-06-10 RX ADMIN — Medication SCH ML: at 21:05

## 2018-06-10 NOTE — PDOC.PN
- Subjective


Encounter Start Date: 06/10/18


Encounter Start Time: 08:45





Patient seen and examined for mary. No new complaints. No overnight events





- Objective


Resuscitation Status: 


 











Resuscitation Status           FULL:Full Resuscitation














MAR Reviewed: Yes


Vital Signs & Weight: 


 Vital Signs (12 hours)











  Temp Pulse Resp BP BP Pulse Ox


 


 06/10/18 08:00  97.8 F  78  20    92 L


 


 06/10/18 07:51   74  18    93 L


 


 06/10/18 07:45  97.8 F  78  20   113/52 L  92 L


 


 06/10/18 03:30  98.3 F  74  20   95/39 L  93 L


 


 06/10/18 00:13   69   96/37 L  


 


 06/10/18 00:00  98.9 F  71  18   101/43 L  96








 Weight











Admit Weight                   600 lb


 


Weight                         376 lb 1.738 oz











 Most Recent Monitor Data











Heart Rate from ECG            73


 


NIBP                           109/61


 


NIBP BP-Mean                   72


 


Respiration from ECG           29


 


SpO2                           94














I&O: 


 











 06/09/18 06/10/18 06/11/18





 06:59 06:59 06:59


 


Intake Total 1844 2070 


 


Output Total 1890 1900 


 


Balance -46 170 











Result Diagrams: 


 06/08/18 04:40





 06/08/18 04:40


EKG Reviewed by me: Yes (nsr)





Phys Exam





- Physical Examination


Constitutional: NAD


HEENT: PERRLA, moist MMs, sclera anicteric


Neck: no JVD, supple


tracheostomy+


Respiratory: no wheezing, no rales, no rhonchi


Cardiovascular: RRR, no significant murmur, no rub


Gastrointestinal: soft, non-tender, no distention, positive bowel sounds


PEG+


Musculoskeletal: pulses present, edema present


lymphoedema


Neurological: moves all 4 limbs


Lymphatic: no nodes


Psychiatric: normal affect


Skin: no rash, normal turgor





Dx/Plan


(1) Acute respiratory failure with hypoxia and hypercapnia


Code(s): J96.01 - ACUTE RESPIRATORY FAILURE WITH HYPOXIA; J96.02 - ACUTE 

RESPIRATORY FAILURE WITH HYPERCAPNIA   Status: Acute   Comment: s/p trach/PEG   





(2) Thrombocytopenia


Code(s): D69.6 - THROMBOCYTOPENIA, UNSPECIFIED   Status: Acute   





(3) UTI (urinary tract infection)


Status: Acute   





(4) Anemia, normocytic normochromic


Code(s): D64.9 - ANEMIA, UNSPECIFIED   Status: Chronic   





(5) Chronic a-fib


Code(s): I48.2 - CHRONIC ATRIAL FIBRILLATION   Status: Chronic   Comment: on 

anticoag/Amidarone. Now paroxysmal   





(6) Chronic diastolic CHF (congestive heart failure), NYHA class 3


Code(s): I50.32 - CHRONIC DIASTOLIC (CONGESTIVE) HEART FAILURE   Status: 

Chronic   Comment: Stable. Diuretics initially held 2/2 azotemia. Will be given 

PRN.   





(7) Morbid obesity with BMI of 60.0-69.9, adult


Code(s): E66.01 - MORBID (SEVERE) OBESITY DUE TO EXCESS CALORIES; Z68.44 - BODY 

MASS INDEX (BMI) 60.0-69.9, ADULT   Status: Chronic   





(8) MARY (obstructive sleep apnea)


Code(s): G47.33 - OBSTRUCTIVE SLEEP APNEA (ADULT) (PEDIATRIC)   Status: Chronic

   





(9) Physical deconditioning


Code(s): R53.81 - OTHER MALAISE   Status: Chronic   





(10) Pickwickian syndrome


Code(s): E66.2 - MORBID (SEVERE) OBESITY WITH ALVEOLAR HYPOVENTILATION   Status

: Chronic   Comment: s/p trach collar.    





- Plan


cont current plan of care, PT/OT, 





* medication reviewed as below


* symptomatic treatment


* continue cpap


* social work is in progress for discharge planning


* supportive care


* DC IVF


* continue lasix.








Review of Systems





- Review of Systems


Other: 





difficult to review due to tach status, unable to communicate, so not reliable





- Medications/Allergies


Allergies/Adverse Reactions: 


 Allergies











Allergy/AdvReac Type Severity Reaction Status Date / Time


 


No Known Drug Allergies Allergy   Verified 05/06/18 14:16











Medications: 


 Current Medications





Acetaminophen (Tylenol Elixir)  1,000 mg PER TUBE Q6H PRN


   PRN Reason: Headache/Fever or Pain


   Last Admin: 06/09/18 21:28 Dose:  1,000 mg


Al Hydroxide/Mg Hydroxide (Maalox)  15 ml PER TUBE Q4H PRN


   PRN Reason: Heartburn  or Indigestion


Albuterol/Ipratropium (Duoneb)  3 ml NEB [id number] [facility]


   Last Admin: 06/10/18 07:51 Dose:  3 ml


Amiodarone HCl (Cordarone)  200 mg PO DAILY [facility]


   Last Admin: 06/09/18 08:20 Dose:  200 mg


Lipase/Protease/Amylase ([place] Dr [id number])  1 cap FS .PER PROTOCOL PRN


   PRN Reason: TUBE OCCLUSION PROTOCOL


Apixaban (Eliquis)  5 mg PO BID [facility]


   Last Admin: 06/09/18 21:28 Dose:  5 mg


Artificial Tears (Tears Renewed 15ml Bottle)  0 drop EA EYE PRN PRN


   PRN Reason: Dry Eyes


Aspirin (Aspirin Chewable)  81 mg PO DAILY [facility]


   Last Admin: 06/09/18 08:20 Dose:  81 mg


Bupropion HCl (Wellbutrin)  150 mg PER TUBE BID [facility]


   Last Admin: 06/09/18 21:28 Dose:  150 mg


Clonidine (Catapres)  0.1 mg PO Q4H PRN


   PRN Reason: Systolic BP > 180


   Last Admin: 05/18/18 21:05 Dose:  0.1 mg


Furosemide (Lasix)  40 mg SLOW IVP 0600 [facility]


   Last Admin: 06/10/18 05:41 Dose:  40 mg


Guaifenesin (Robitussin Sf)  200 mg PER TUBE Q4H PRN


   PRN Reason: Cough


Hydralazine HCl (Apresoline)  10 mg SLOW IVP Q4H PRN


   PRN Reason: Systolic BP > 180


   Last Admin: 05/12/18 17:06 Dose:  10 mg


Linezolid (Zyvox)  600 mg PO BID [facility]


   Last Admin: 06/09/18 21:28 Dose:  600 mg


Loperamide HCl (Imodium)  2 mg PER TUBE PRN PRN


   PRN Reason: Diarrhea/Loose Stools


Losartan Potassium (Cozaar)  12.5 mg PO DAILY [facility]


Magnesium Hydroxide (Milk Of Magnesium)  30 ml PER TUBE DAILYPRN PRN


   PRN Reason: Constipation


Mineral Oil/White Petrolatum (Eucerin Cream)  0 gm TOP BIDPRN PRN


   PRN Reason: Dry Skin


Ondansetron HCl (Zofran Odt)  4 mg PO Q6H PRN


   PRN Reason: Nausea/Vomiting


   Last Admin: 06/01/18 09:09 Dose:  4 mg


Ondansetron HCl (Zofran)  4 mg IVP Q6H PRN


   PRN Reason: Nausea/Vomiting


Pantoprazole Sodium (Protonix)  40 mg PER TUBE DAILY [facility]


Phenol (Chloraseptic Spray 180 Ml Bot)  0 ml PO PRN PRN


   PRN Reason: Sore Throat


Potassium Chloride (Klor-Con)  20 meq PO QAM-[facility]


   Last Admin: 06/09/18 08:19 Dose:  20 meq


Scopolamine (Transderm Scop)  1.5 mg TD Q3D [facility]


   Last Admin: 06/09/18 12:34 Dose:  1.5 mg


Senna (Senokot)  2 tab PER TUBE HSPRN PRN


   PRN Reason: Constipation


Sodium Bicarbonate (Bicarbonate, Sodium)  650 mg PER TUBE .PER PROTOCOL PRN


   PRN Reason: ENTERAL TUBE OCCLUSION


Sodium Chloride (Flush - Normal Saline)  10 ml IVF Q12HR [facility]


   Last Admin: 06/09/18 21:28 Dose:  10 ml


Sodium Chloride (Flush - Normal Saline)  10 ml IVF PRN PRN


   PRN Reason: Saline Flush


   Last Admin: 06/09/18 05:53 Dose:  10 ml


Sodium Chloride ([place]cean Nasal Spray 0.65%)  0 ml EA NARE QIDPRN PRN


   PRN Reason: Nasal Congestion

## 2018-06-10 NOTE — PRG
DATE OF SERVICE:  06/10/2018

 

SUBJECTIVE:  A 62-year-old female, continues to have intermittent desaturations in the 80s.

 

OBJECTIVE:

VITAL SIGNS:  Sats are 98% trach collar, respiration 20, temperature 97, blood pressure 130/52.

CHEST:  With anterior rhonchi.

CARDIAC:  Normal S1, S2.

ABDOMEN:  Soft, no mass.

 

IMPRESSION:  Trach, morbid obesity, secretions, supraventricular tachycardia.

 

Suction p.r.n., Mucinex p.r.n.

 

Supportive care.

## 2018-06-11 RX ADMIN — Medication SCH ML: at 21:21

## 2018-06-11 RX ADMIN — Medication PRN ML: at 05:41

## 2018-06-11 RX ADMIN — Medication SCH ML: at 10:00

## 2018-06-11 RX ADMIN — PANTOPRAZOLE SODIUM SCH MG: 40 GRANULE, DELAYED RELEASE ORAL at 10:00

## 2018-06-11 NOTE — PRG
DATE OF SERVICE:  06/11/2018 

 

Over the last several days she has been moved out to the Intermediate Care Unit from ICU.  It is my u
nderstanding that she is still using the ventilator occasionally at night between 2200 and 1 a.m.  Brayden alvarez has been placed in isolation for VRE in her urine.

 

PHYSICAL EXAMINATION: 

CURRENT VITAL SIGNS:  Temperature 98.3, pulse 74, respirations 20, O2 sat 100% on trach collar, blood
 pressure 115/50, 24-hour intake 2070, output 1900.

HEENT:  Unremarkable.

NECK:  Trach in good position.  She cannot move air around her mouth when the trachea is occluded.

CARDIAC:  S1 and S2 regular.

LUNGS:  Distant but clear breath sounds.

ABDOMEN:  Soft, nontender, nondistended.

EXTREMITIES:  Slight edema.  There have been no labs done since 06/08/2018.

 

ASSESSMENT:

1.  Obesity hypoventilation syndrome.

2.  Status post tracheostomy placement.

3.  Chronic hypoxic and hypercapnic respiratory failure.

4.  Empirically anticoagulated by Cardiology at the time of admission for question of a pulmonary emb
olus - the patient is too big to fit in the CT scanner to further workup.

 

PLAN:

1.  Try to reduce tidal mechanical ventilation.  

2.  Recheck labs to make sure she is not being overly diuresed.

3.  Work on placement if at all possible.

## 2018-06-11 NOTE — PDOC.PN
- Subjective


Encounter Start Date: 06/11/18


Encounter Start Time: 09:00





Patient seen and examined for mary. No new complaints. No overnight events





- Objective


Resuscitation Status: 


 











Resuscitation Status           FULL:Full Resuscitation














MAR Reviewed: Yes


Vital Signs & Weight: 


 Vital Signs (12 hours)











  Temp Pulse Resp BP BP Pulse Ox


 


 06/11/18 10:01   70  20   111/63  100


 


 06/11/18 08:23  98.3 F  66  20    100


 


 06/11/18 08:17       100


 


 06/11/18 08:11   74  20    100


 


 06/11/18 07:33  98.3 F  66  20   115/50 L  92 L


 


 06/11/18 04:00  98.0 F  71  20   106/42 L  94 L


 


 06/11/18 00:07   71   106/40 L  


 


 06/10/18 23:58  98.5 F  70  20   114/48 L  94 L








 Weight











Admit Weight                   600 lb


 


Weight                         376 lb 1.738 oz











 Most Recent Monitor Data











Heart Rate from ECG            73


 


NIBP                           109/61


 


NIBP BP-Mean                   72


 


Respiration from ECG           29


 


SpO2                           94














I&O: 


 











 06/10/18 06/11/18 06/12/18





 06:59 06:59 06:59


 


Intake Total 2070 710 330


 


Output Total 1900 375 


 


Balance 170 335 330











Result Diagrams: 


 06/08/18 04:40





 06/08/18 04:40


EKG Reviewed by me: Yes (nsr)





Phys Exam





- Physical Examination


Constitutional: NAD


HEENT: PERRLA, moist MMs, sclera anicteric


Neck: no JVD, supple


trach+


Respiratory: no wheezing, no rales, no rhonchi


Cardiovascular: RRR, no significant murmur, no rub


Gastrointestinal: soft, non-tender, no distention, positive bowel sounds


PEG+


Musculoskeletal: edema present


chronic lymphoedema


Neurological: non-focal, normal sensation


Lymphatic: no nodes


Psychiatric: normal affect


Skin: no rash, normal turgor





Dx/Plan


(1) Acute respiratory failure with hypoxia and hypercapnia


Code(s): J96.01 - ACUTE RESPIRATORY FAILURE WITH HYPOXIA; J96.02 - ACUTE 

RESPIRATORY FAILURE WITH HYPERCAPNIA   Status: Acute   Comment: s/p trach/PEG   





(2) Thrombocytopenia


Code(s): D69.6 - THROMBOCYTOPENIA, UNSPECIFIED   Status: Acute   





(3) UTI (urinary tract infection)


Status: Acute   





(4) Anemia, normocytic normochromic


Code(s): D64.9 - ANEMIA, UNSPECIFIED   Status: Chronic   





(5) Chronic a-fib


Code(s): I48.2 - CHRONIC ATRIAL FIBRILLATION   Status: Chronic   Comment: on 

anticoag/[place]ar[place]. Now paroxysmal   





(6) Chronic diastolic CHF (congestive heart failure), NYHA class 3


Code(s): I50.32 - CHRONIC DIASTOLIC (CONGESTIVE) HEART FAILURE   Status: 

Chronic   Comment: Stable. Diuretics initially held 2/2 azotemia. Will be given 

PRN.   





(7) Morbid obesity with BMI of 60.0-69.9, adult


Code(s): E66.01 - MORBID (SEVERE) OBESITY DUE TO EXCESS CALORIES; Z68.44 - BODY 

MASS INDEX (BMI) 60.0-69.9, ADULT   Status: Chronic   





(8) MARY (obstructive sleep apnea)


Code(s): G47.33 - OBSTRUCTIVE SLEEP APNEA (ADULT) (PEDIATRIC)   Status: Chronic

   





(9) Physical deconditioning


Code(s): R53.81 - OTHER MALAISE   Status: Chronic   





(10) Pickwickian syndrome


Code(s): E66.2 - MORBID (SEVERE) OBESITY WITH ALVEOLAR HYPOVENTILATION   Status

: Chronic   Comment: s/p trach collar.    





- Plan


cont current plan of care, continue antibiotics





* tracheostomy care as per pulmonary


* continue PT


* continue Zyvox


* discharge planning


* medication reviewed as below


* symptomatic treatment.








Review of Systems





- Review of Systems


Other: 





not reliable as she is not able to communicate verbally due to tracheostomy 

status





- Medications/Allergies


Allergies/Adverse Reactions: 


 Allergies











Allergy/AdvReac Type Severity Reaction Status Date / Time


 


No Known Drug Allergies Allergy   Verified 05/06/18 14:16











Medications: 


 Current Medications





Acetaminophen (Tylenol Elixir)  1,000 mg PER TUBE Q6H PRN


   PRN Reason: Headache/Fever or Pain


   Last Admin: 06/10/18 21:04 Dose:  1,000 mg


Al Hydroxide/Mg Hydroxide (Maalox)  15 ml PER TUBE Q4H PRN


   PRN Reason: Heartburn  or Indigestion


Albuterol/Ipratropium (Duoneb)  3 ml NEB [id number] KEE


   Last Admin: 06/11/18 08:11 Dose:  3 ml


Amiodarone HCl (Cordarone)  200 mg PO DAILY K[facility]


   Last Admin: 06/11/18 10:00 Dose:  200 mg


Lipase/Protease/Amylase ([place] [address])  1 cap FS .PER PROTOCOL PRN


   PRN Reason: TUBE OCCLUSION PROTOCOL


Apixaban (Eliquis)  5 mg PO BID [facility]


   Last Admin: 06/11/18 10:00 Dose:  5 mg


Artificial Tears (Tears Renewed 15ml Bottle)  0 drop EA EYE PRN PRN


   PRN Reason: Dry Eyes


Aspirin (Aspirin Chewable)  81 mg PO DAILY [facility]


   Last Admin: 06/11/18 10:00 Dose:  81 mg


Bupropion HCl (Wellbutrin)  150 mg PER TUBE BID [facility]


   Last Admin: 06/11/18 10:00 Dose:  150 mg


Clonidine (Catapres)  0.1 mg PO Q4H PRN


   PRN Reason: Systolic BP > 180


   Last Admin: 05/18/18 21:05 Dose:  0.1 mg


Furosemide (Lasix)  40 mg SLOW IVP 0600 [facility]


   Last Admin: 06/11/18 05:41 Dose:  40 mg


Guaifenesin (Robitussin Sf)  200 mg PER TUBE Q4H PRN


   PRN Reason: Cough


Hydralazine HCl (Apresoline)  10 mg SLOW IVP Q4H PRN


   PRN Reason: Systolic BP > 180


   Last Admin: 05/12/18 17:06 Dose:  10 mg


Linezolid (Zyvox)  600 mg PO BID [facility]


   Last Admin: 06/11/18 10:00 Dose:  600 mg


Loperamide HCl (Imodium)  2 mg PER TUBE PRN PRN


   PRN Reason: Diarrhea/Loose Stools


Losartan Potassium (Cozaar)  12.5 mg PO DAILY [facility]


   Last Admin: 06/11/18 10:01 Dose:  Not Given


Magnesium Hydroxide (Milk Of Magnesium)  30 ml PER TUBE DAILYPRN PRN


   PRN Reason: Constipation


Mineral Oil/White Petrolatum (Eucerin Cream)  0 gm TOP BIDPRN PRN


   PRN Reason: Dry Skin


Ondansetron HCl (Zofran Odt)  4 mg PO Q6H PRN


   PRN Reason: Nausea/Vomiting


   Last Admin: 06/01/18 09:09 Dose:  4 mg


Ondansetron HCl (Zofran)  4 mg IVP Q6H PRN


   PRN Reason: Nausea/Vomiting


Pantoprazole Sodium (Protonix)  40 mg PER TUBE DAILY [facility]


   Last Admin: 06/11/18 10:00 Dose:  40 mg


Phenol (Chloraseptic Spray 180 Ml Bot)  0 ml PO PRN PRN


   PRN Reason: Sore Throat


Potassium Chloride (Klor-Con)  20 meq PO QAM-WM [facility]


   Last Admin: 06/11/18 10:00 Dose:  20 meq


Scopolamine (Transderm Scop)  1.5 mg TD Q3D [facility]


   Last Admin: 06/09/18 12:34 Dose:  1.5 mg


Senna (Senokot)  2 tab PER TUBE HSPRN PRN


   PRN Reason: Constipation


Sodium Bicarbonate (Bicarbonate, Sodium)  650 mg PER TUBE .PER PROTOCOL PRN


   PRN Reason: ENTERAL TUBE OCCLUSION


Sodium Chloride (Flush - Normal Saline)  10 ml IVF Q12HR [facility]


   Last Admin: 06/11/18 10:00 Dose:  10 ml


Sodium Chloride (Flush - Normal Saline)  10 ml IVF PRN PRN


   PRN Reason: Saline Flush


   Last Admin: 06/11/18 05:41 Dose:  10 ml


Sodium Chloride (Ocean Nasal Spray 0.65%)  0 ml EA NARE QIDPRN PRN


   PRN Reason: Nasal Congestion

## 2018-06-12 LAB
ANION GAP SERPL CALC-SCNC: 11 MMOL/L (ref 10–20)
BUN SERPL-MCNC: 34 MG/DL (ref 9.8–20.1)
CALCIUM SERPL-MCNC: 8.9 MG/DL (ref 7.8–10.44)
CHLORIDE SERPL-SCNC: 97 MMOL/L (ref 98–107)
CO2 SERPL-SCNC: 37 MMOL/L (ref 23–31)
CREAT CL PREDICTED SERPL C-G-VRATE: 199 ML/MIN (ref 70–130)
GLUCOSE SERPL-MCNC: 89 MG/DL (ref 80–115)
HGB BLD-MCNC: 10.2 G/DL (ref 12–16)
PLATELET # BLD AUTO: 98 THOU/UL (ref 130–400)
POTASSIUM SERPL-SCNC: 3.9 MMOL/L (ref 3.5–5.1)
SODIUM SERPL-SCNC: 141 MMOL/L (ref 136–145)

## 2018-06-12 RX ADMIN — Medication SCH ML: at 20:42

## 2018-06-12 RX ADMIN — Medication SCH ML: at 10:33

## 2018-06-12 RX ADMIN — SCOPALAMINE SCH MG: 1 PATCH, EXTENDED RELEASE TRANSDERMAL at 10:32

## 2018-06-12 RX ADMIN — PANTOPRAZOLE SODIUM SCH MG: 40 GRANULE, DELAYED RELEASE ORAL at 10:36

## 2018-06-12 NOTE — PRG
DATE OF SERVICE:  06/12/2018  

 

She seems to be in good spirits did not use the ventilator last night. 

 

PHYSICAL EXAMINATION: 

VITAL SIGNS:  Temperature 97.7, pulse 69, respirations 20, O2 sat 96% on trach collar, blood pressure
 112/55.

HEENT:  Unremarkable.

NECK:  Trach in good position.

LUNGS:  Clear.

CARDIAC:  S1 and S2 regular.

ABDOMEN:  Soft, obese.

 

LABORATORY DATA:  Hemoglobin 10.2, platelet count 98.  Sodium 141, potassium 3.9, chloride 97, CO2 37
, BUN 34, creatinine 0.8, glucose 89.

 

ASSESSMENT:

1.  Obesity hypoventilation syndrome.

2.  Status post tracheostomy placement.

3.  VRE.

4.  Empirically anticoagulated by Cardiology for question of a pulmonary embolus - clinically.

 

PLAN:  

1.  Try to get respiratory therapist to locate a smaller trach with plans to change that out.  

2.  She continues on Zyvox.

3.  She is on Eliquis.

4.  Continuing once daily Lasix.

## 2018-06-12 NOTE — PDOC.PN
- Subjective


Encounter Start Date: 06/12/18


Encounter Start Time: 09:00





Patient seen and examined for mary. No new complaints. No overnight events





- Objective


Resuscitation Status: 


 











Resuscitation Status           FULL:Full Resuscitation














MAR Reviewed: Yes


Vital Signs & Weight: 


 Vital Signs (12 hours)











  Temp Pulse Resp BP Pulse Ox


 


 06/12/18 08:00  97.5 F L  70  16   95


 


 06/12/18 07:43  97.5 F L  70  16  112/54 L  91 L


 


 06/12/18 06:16   69  20   96


 


 06/12/18 06:11   68  20  112/55 L  94 L


 


 06/12/18 04:29  97.7 F  66  20  103/49 L  93 L


 


 06/12/18 02:09   70  20  112/51 L  95


 


 06/12/18 00:19   69  22 H   93 L


 


 06/12/18 00:00  98.9 F  71  18  97/57 L  90 L








 Weight











Admit Weight                   600 lb


 


Weight                         376 lb 1.738 oz











 Most Recent Monitor Data











Heart Rate from ECG            73


 


NIBP                           109/61


 


NIBP BP-Mean                   72


 


Respiration from ECG           29


 


SpO2                           94














I&O: 


 











 06/11/18 06/12/18 06/13/18





 06:59 06:59 06:59


 


Intake Total 710 1190 


 


Output Total 375 1450 


 


Balance 335 -260 











Result Diagrams: 


 06/12/18 04:35





 06/12/18 04:35


EKG Reviewed by me: Yes (nsr)





Phys Exam





- Physical Examination


Constitutional: NAD


HEENT: PERRLA, moist MMs, sclera anicteric


Neck: no JVD, supple


trach+


Respiratory: no wheezing, no rales, no rhonchi


Cardiovascular: RRR, no significant murmur, no rub


Gastrointestinal: soft, non-tender, no distention, positive bowel sounds


PEG+


Musculoskeletal: no edema, pulses present


Neurological: non-focal


Lymphatic: no nodes


Psychiatric: normal affect


Skin: no rash, normal turgor





Dx/Plan


(1) Acute respiratory failure with hypoxia and hypercapnia


Code(s): J96.01 - ACUTE RESPIRATORY FAILURE WITH HYPOXIA; J96.02 - ACUTE 

RESPIRATORY FAILURE WITH HYPERCAPNIA   Status: Acute   Comment: s/p trach/PEG   





(2) Thrombocytopenia


Code(s): D69.6 - THROMBOCYTOPENIA, UNSPECIFIED   Status: Acute   





(3) UTI (urinary tract infection)


Status: Acute   





(4) Anemia, normocytic normochromic


Code(s): D64.9 - ANEMIA, UNSPECIFIED   Status: Chronic   





(5) Chronic a-fib


Code(s): I48.2 - CHRONIC ATRIAL FIBRILLATION   Status: Chronic   Comment: on 

anticoag/[place]ar[place]. Now paroxysmal   





(6) Chronic diastolic CHF (congestive heart failure), NYHA class 3


Code(s): I50.32 - CHRONIC DIASTOLIC (CONGESTIVE) HEART FAILURE   Status: 

Chronic   Comment: Stable. Diuretics initially held 2/2 azotemia. Will be given 

PRN.   





(7) Morbid obesity with BMI of 60.0-69.9, adult


Code(s): E66.01 - MORBID (SEVERE) OBESITY DUE TO EXCESS CALORIES; Z68.44 - BODY 

MASS INDEX (BMI) 60.0-69.9, ADULT   Status: Chronic   





(8) MARY (obstructive sleep apnea)


Code(s): G47.33 - OBSTRUCTIVE SLEEP APNEA (ADULT) (PEDIATRIC)   Status: Chronic

   





(9) Physical deconditioning


Code(s): R53.81 - OTHER MALAISE   Status: Chronic   





(10) Pickwickian syndrome


Code(s): E66.2 - MORBID (SEVERE) OBESITY WITH ALVEOLAR HYPOVENTILATION   Status

: Chronic   Comment: s/p trach collar.    





- Plan


cont current plan of care, continue antibiotics, PT/OT, , 

respiratory therapy





* medication reviewed as below


* symptomatic treatment


* coninue elliquis, lasix changed to PO


* trach care as per pulmonary


* continue aggressive PT


* continue zyvox.








Review of Systems





- Review of Systems


Other: 





not reliable due o trach status and unable to communicate verbally





- Medications/Allergies


Allergies/Adverse Reactions: 


 Allergies











Allergy/AdvReac Type Severity Reaction Status Date / Time


 


No Known Drug Allergies Allergy   Verified 05/06/18 14:16











Medications: 


 Current Medications





Acetaminophen (Tylenol Elixir)  1,000 mg PER TUBE Q6H PRN


   PRN Reason: Headache/Fever or Pain


   Last Admin: 06/10/18 21:04 Dose:  1,000 mg


Al Hydroxide/Mg Hydroxide (Maalox)  15 ml PER TUBE Q4H PRN


   PRN Reason: Heartburn  or Indigestion


Albuterol/Ipratropium (Duoneb)  3 ml NEB [id number] K[facility]


   Last Admin: 06/12/18 06:16 Dose:  3 ml


Amiodarone HCl (Cordarone)  200 mg PO DAILY [facility]


   Last Admin: 06/11/18 10:00 Dose:  200 mg


Lipase/Protease/Amylase ([place] Dr [address])  1 cap FS .PER PROTOCOL PRN


   PRN Reason: TUBE OCCLUSION PROTOCOL


Apixaban (Eliquis)  5 mg PO BID [facility]


   Last Admin: 06/11/18 21:20 Dose:  5 mg


Artificial Tears (Tears Renewed 15ml Bottle)  0 drop EA EYE PRN PRN


   PRN Reason: Dry Eyes


Aspirin (Aspirin Chewable)  81 mg PO DAILY [facility]


   Last Admin: 06/11/18 10:00 Dose:  81 mg


Bupropion HCl (Wellbutrin)  150 mg PER TUBE BID [facility]


   Last Admin: 06/11/18 21:20 Dose:  150 mg


Clonidine (Catapres)  0.1 mg PO Q4H PRN


   PRN Reason: Systolic BP > 180


   Last Admin: 05/18/18 21:05 Dose:  0.1 mg


[facility] ([facility])  [facility]


Guaifenesin (Robitussin Sf)  200 mg PER TUBE Q4H PRN


   PRN Reason: Cough


Hydralazine HCl (Apresoline)  10 mg SLOW IVP Q4H PRN


   PRN Reason: Systolic BP > 180


   Last Admin: 05/12/18 17:06 Dose:  10 mg


Linezolid (Zyvox)  600 mg PO BID [facility]


   Last Admin: 06/11/18 21:20 Dose:  600 mg


Loperamide HCl (Imodium)  2 mg PER TUBE PRN PRN


   PRN Reason: Diarrhea/Loose Stools


Losartan Potassium (Cozaar)  12.5 mg PO DAILY [facility]


   Last Admin: 06/11/18 10:01 Dose:  Not Given


Magnesium Hydroxide (Milk Of Magnesium)  30 ml PER TUBE DAILYPRN PRN


   PRN Reason: Constipation


Mineral Oil/White Petrolatum (Eucerin Cream)  0 gm TOP BIDPRN PRN


   PRN Reason: Dry Skin


Ondansetron HCl (Zofran Odt)  4 mg PO Q6H PRN


   PRN Reason: Nausea/Vomiting


   Last Admin: 06/01/18 09:09 Dose:  4 mg


Ondansetron HCl (Zofran)  4 mg IVP Q6H PRN


   PRN Reason: Nausea/Vomiting


Pantoprazole Sodium (Protonix)  40 mg PER TUBE DAILY [facility]


   Last Admin: 06/11/18 10:00 Dose:  40 mg


Phenol (Chloraseptic Spray 180 Ml Bot)  0 ml PO PRN PRN


   PRN Reason: Sore Throat


Potassium Chloride (Klor-Con)  20 meq PO QAM-WM [facility]


   Last Admin: 06/11/18 10:00 Dose:  20 meq


Scopolamine (Transderm Scop)  1.5 mg TD Q3D [facility]


   Last Admin: 06/09/18 12:34 Dose:  1.5 mg


Senna (Senokot)  2 tab PER TUBE HSPRN PRN


   PRN Reason: Constipation


Sodium Bicarbonate (Bicarbonate, Sodium)  650 mg PER TUBE .PER PROTOCOL PRN


   PRN Reason: ENTERAL TUBE OCCLUSION


Sodium Chloride (Flush - Normal Saline)  10 ml IVF Q12HR [facility]


   Last Admin: 06/11/18 21:21 Dose:  10 ml


Sodium Chloride (Flush - Normal Saline)  10 ml IVF PRN PRN


   PRN Reason: Saline Flush


   Last Admin: 06/11/18 05:41 Dose:  10 ml


Sodium Chloride (Ocean Nasal Spray 0.65%)  0 ml EA NARE QIDPRN PRN


   PRN Reason: Nasal Congestion

## 2018-06-13 RX ADMIN — Medication SCH ML: at 12:15

## 2018-06-13 RX ADMIN — PANTOPRAZOLE SODIUM SCH MG: 40 GRANULE, DELAYED RELEASE ORAL at 12:13

## 2018-06-13 RX ADMIN — Medication SCH ML: at 21:21

## 2018-06-13 NOTE — PRG
DATE OF SERVICE:  06/13/2018

 

SUBJECTIVE:  For reasons that are not clear to me, she was put on the ventilator last night.  There i
s no report of respiratory distress or difficulty.

 

OBJECTIVE:

VITAL SIGNS:  On exam, temperature is 98.9, pulse 69, respirations 16, O2 sat 91%, blood pressure 113
/40.

HEENT:  Unremarkable.

NECK:  No JVD.  Trach in good position.

LUNGS:  Clear.

CARDIAC:  S1 and S2, regular.

ABDOMEN:  Obese.

EXTREMITIES:  No edema.

 

LABORATORY DATA:  No labs were done today.

 

ASSESSMENT:  No change in condition.

 

PLAN:

1.  Tracheostomy was changed out to a 6-0 cuff post trach.  If for some reason she would need ventila
tion at night, then we would have to do BiPAP instead of a regular mechanical ventilation device.

2.  Consult speech therapy for speaking valve.

3.  Continue physical therapy.

## 2018-06-13 NOTE — PDOC.PN
- Subjective


Encounter Start Date: 06/13/18


Encounter Start Time: 08:45





Patient seen and examined for mary. today she has bleeding through tracheostomy. 

No overnight events





- Objective


Resuscitation Status: 


 











Resuscitation Status           FULL:Full Resuscitation














MAR Reviewed: Yes


Vital Signs & Weight: 


 Vital Signs (12 hours)











  Temp Pulse Resp BP Pulse Ox


 


 06/13/18 11:02  98.6 F  75  18  127/60  96


 


 06/13/18 08:23      92 L


 


 06/13/18 08:18   79  19   92 L


 


 06/13/18 08:00  98.9 F  79  19   96


 


 06/13/18 07:25  98.9 F  69  16  138/40 L  91 L


 


 06/13/18 04:00  98.7 F  73  20  99/43 L  98


 


 06/13/18 02:13   70   


 


 06/13/18 00:00  97.7 F  71  20  97/44 L  96


 


 06/12/18 23:38   72   


 


 06/12/18 23:37   73  18   98








 Weight











Admit Weight                   600 lb


 


Weight                         376 lb 1.738 oz











 Most Recent Monitor Data











Heart Rate from ECG            73


 


NIBP                           109/61


 


NIBP BP-Mean                   72


 


Respiration from ECG           29


 


SpO2                           94














I&O: 


 











 06/12/18 06/13/18 06/14/18





 06:59 06:59 06:59


 


Intake Total 1190 1660 


 


Output Total 1450 1600 


 


Balance -260 60 











Result Diagrams: 


 06/12/18 04:35





 06/12/18 04:35


EKG Reviewed by me: Yes (nsr)





Phys Exam





- Physical Examination


Constitutional: NAD


HEENT: PERRLA, moist MMs, sclera anicteric


Neck: no nodes


trach+


coarse sound+


Cardiovascular: RRR, no significant murmur, no rub


Gastrointestinal: soft, no distention, positive bowel sounds


PEG, morbid obesity


chronic lymphoedema+


Neurological: moves all 4 limbs


Lymphatic: no nodes


Psychiatric: normal affect


Skin: no rash, normal turgor





Dx/Plan


(1) Acute respiratory failure with hypoxia and hypercapnia


Code(s): J96.01 - ACUTE RESPIRATORY FAILURE WITH HYPOXIA; J96.02 - ACUTE 

RESPIRATORY FAILURE WITH HYPERCAPNIA   Status: Acute   Comment: s/p trach/PEG   





(2) Thrombocytopenia


Code(s): D69.6 - THROMBOCYTOPENIA, UNSPECIFIED   Status: Acute   





(3) UTI (urinary tract infection)


Status: Acute   





(4) Anemia, normocytic normochromic


Code(s): D64.9 - ANEMIA, UNSPECIFIED   Status: Chronic   





(5) Chronic a-fib


Code(s): I48.2 - CHRONIC ATRIAL FIBRILLATION   Status: Chronic   Comment: on 

anticoag/Amidarone. Now paroxysmal   





(6) Chronic diastolic CHF (congestive heart failure), NYHA class 3


Code(s): I50.32 - CHRONIC DIASTOLIC (CONGESTIVE) HEART FAILURE   Status: 

Chronic   Comment: Stable. Diuretics initially held 2/2 azotemia. Will be given 

PRN.   





(7) Morbid obesity with BMI of 60.0-69.9, adult


Code(s): E66.01 - MORBID (SEVERE) OBESITY DUE TO EXCESS CALORIES; Z68.44 - BODY 

MASS INDEX (BMI) 60.0-69.9, ADULT   Status: Chronic   





(8) MARY (obstructive sleep apnea)


Code(s): G47.33 - OBSTRUCTIVE SLEEP APNEA (ADULT) (PEDIATRIC)   Status: Chronic

   





(9) Physical deconditioning


Code(s): R53.81 - OTHER MALAISE   Status: Chronic   





(10) Pickwickian syndrome


Code(s): E66.2 - MORBID (SEVERE) OBESITY WITH ALVEOLAR HYPOVENTILATION   Status

: Chronic   Comment: s/p trach collar.    





- Plan


cont current plan of care, continue antibiotics, PT/OT, 





* we are trying to simplify her trach status


* continue zyvox


* today will hold elliquis


* continue PT


* eventual plna to discharge home with family support when medically stable and 

physically stable.


* medication reviewed as below


* symptomatic treatment








Review of Systems





- Review of Systems


Eyes: negative: Pain, Vision Change, Conjunctivae Inflammation, Eyelid 

Inflammation, Redness, Other


ENT: negative: Ear Pain, Ear Discharge, Nose Pain, Nose Discharge, Nose 

Congestion, Mouth Pain, Mouth Swelling, Throat Pain, Throat Swelling, Other


Respiratory: negative: Cough, Dry, Shortness of Breath, Hemoptysis, SOB with 

Excertion, Pleuritic Pain, Sputum, Wheezing


Cardiovascular: negative: chest pain, palpitations, orthopnea, paroxysmal 

nocturnal dyspnea, edema, light headedness, other


Gastrointestinal: negative: Nausea, Vomiting, Abdominal Pain, Diarrhea, 

Constipation, Melena, Hematochezia, Other


Genitourinary: negative: Dysuria, Frequency, Incontinence, Hematuria, Retention

, Other


Musculoskeletal: negative: Neck Pain, Shoulder Pain, Arm Pain, Back Pain, Hand 

Pain, Leg Pain, Foot Pain, Other





- Medications/Allergies


Allergies/Adverse Reactions: 


 Allergies











Allergy/AdvReac Type Severity Reaction Status Date / Time


 


No Known Drug Allergies Allergy   Verified 05/06/18 14:16











Medications: 


 Current Medications





Acetaminophen (Tylenol Elixir)  1,000 mg PER TUBE Q6H PRN


   PRN Reason: Headache/Fever or Pain


   Last Admin: 06/10/18 21:04 Dose:  1,000 mg


Al Hydroxide/Mg Hydroxide (Maalox)  15 ml PER TUBE Q4H PRN


   PRN Reason: Heartburn  or Indigestion


Albuterol/Ipratropium (Duoneb)  3 ml NEB [id number] [facility]


   Last Admin: 06/13/18 08:18 Dose:  3 ml


Amiodarone HCl (Cordarone)  200 mg PO DAILY [facility]


   Last Admin: 06/12/18 10:34 Dose:  200 mg


Lipase/Protease/Amylase ([place] [address])  1 cap FS .PER PROTOCOL PRN


   PRN Reason: TUBE OCCLUSION PROTOCOL


Apixaban (Eliquis)  5 mg PO BID [facility]


   Last Admin: 06/12/18 20:41 Dose:  5 mg


Artificial Tears (Tears Renewed 15ml Bottle)  0 drop EA EYE PRN PRN


   PRN Reason: Dry Eyes


Aspirin (Aspirin Chewable)  81 mg PO DAILY [facility]


   Last Admin: 06/12/18 10:34 Dose:  81 mg


Bupropion HCl (Wellbutrin)  150 mg PER TUBE BID [facility]


   Last Admin: 06/12/18 20:41 Dose:  150 mg


Clonidine (Catapres)  0.1 mg PO Q4H PRN


   PRN Reason: Systolic BP > 180


   Last Admin: 05/18/18 21:05 Dose:  0.1 mg


Furosemide (Lasix)  40 mg PER TUBE DAILY-AC [facility]


   Last Admin: 06/12/18 10:36 Dose:  Not Given


Guaifenesin (Robitussin Sf)  200 mg PER TUBE Q4H PRN


   PRN Reason: Cough


Hydralazine HCl (Apresoline)  10 mg SLOW IVP Q4H PRN


   PRN Reason: Systolic BP > 180


   Last Admin: 05/12/18 17:06 Dose:  10 mg


Linezolid (Zyvox)  600 mg PO BID [facility]


   Last Admin: 06/12/18 20:41 Dose:  600 mg


Loperamide HCl (Imodium)  2 mg PER TUBE PRN PRN


   PRN Reason: Diarrhea/Loose Stools


Losartan Potassium (Cozaar)  12.5 mg PO DAILY [facility]


   Last Admin: 06/12/18 10:35 Dose:  12.5 mg


Magnesium Hydroxide (Milk Of Magnesium)  30 ml PER TUBE DAILYPRN PRN


   PRN Reason: Constipation


Mineral Oil/White Petrolatum (Eucerin Cream)  0 gm TOP BIDPRN PRN


   PRN Reason: Dry Skin


Ondansetron HCl (Zofran Odt)  4 mg PO Q6H PRN


   PRN Reason: Nausea/Vomiting


   Last Admin: 06/01/18 09:09 Dose:  4 mg


Ondansetron HCl (Zofran)  4 mg IVP Q6H PRN


   PRN Reason: Nausea/Vomiting


Pantoprazole Sodium (Protonix)  40 mg PER TUBE DAILY [facility]


   Last Admin: 06/12/18 10:36 Dose:  40 mg


Phenol (Chloraseptic Spray 180 Ml Bot)  0 ml PO PRN PRN


   PRN Reason: Sore Throat


Potassium Chloride (Klor-Con)  20 meq PO QAM-WM [facility]


   Last Admin: 06/12/18 10:36 Dose:  20 meq


Scopolamine (Transderm Scop)  1.5 mg TD Q3D [facility]


   Last Admin: 06/12/18 10:32 Dose:  1.5 mg


Senna (Senokot)  2 tab PER TUBE HSPRN PRN


   PRN Reason: Constipation


Sodium Bicarbonate (Bicarbonate, Sodium)  650 mg PER TUBE .PER PROTOCOL PRN


   PRN Reason: ENTERAL TUBE OCCLUSION


Sodium Chloride (Flush - Normal Saline)  10 ml IVF Q12HR [facility]


   Last Admin: 06/12/18 20:42 Dose:  10 ml


Sodium Chloride (Flush - Normal Saline)  10 ml IVF PRN PRN


   PRN Reason: Saline Flush


   Last Admin: 06/11/18 05:41 Dose:  10 ml


Sodium Chloride (Ocean Nasal Spray 0.65%)  0 ml EA NARE QIDPRN PRN


   PRN Reason: Nasal Congestion

## 2018-06-14 RX ADMIN — GUAIFENESIN AND DEXTROMETHORPHAN SCH ML: 100; 10 SYRUP ORAL at 15:24

## 2018-06-14 RX ADMIN — Medication SCH ML: at 08:55

## 2018-06-14 RX ADMIN — GUAIFENESIN AND DEXTROMETHORPHAN SCH ML: 100; 10 SYRUP ORAL at 20:30

## 2018-06-14 RX ADMIN — Medication SCH ML: at 20:31

## 2018-06-14 RX ADMIN — PANTOPRAZOLE SODIUM SCH MG: 40 GRANULE, DELAYED RELEASE ORAL at 08:53

## 2018-06-14 NOTE — PDOC.PN
- Subjective


Encounter Start Date: 06/14/18


Encounter Start Time: 09:00





Patient seen and examined for mary. No new complaints. No overnight events





- Objective


Resuscitation Status: 


 











Resuscitation Status           FULL:Full Resuscitation














MAR Reviewed: Yes


Vital Signs & Weight: 


 Vital Signs (12 hours)











  Temp Pulse Resp BP Pulse Ox


 


 06/14/18 08:32      100


 


 06/14/18 08:31   71  20   100


 


 06/14/18 07:19  98.3 F  75  21 H  118/48 L  96


 


 06/14/18 04:00  98.1 F  74  18  111/45 L  92 L


 


 06/14/18 00:07   78  22 H   92 L


 


 06/13/18 23:53  98.2 F  73  19  97/44 L  91 L








 Weight











Admit Weight                   600 lb


 


Weight                         376 lb 1.738 oz











 Most Recent Monitor Data











Heart Rate from ECG            73


 


NIBP                           109/61


 


NIBP BP-Mean                   72


 


Respiration from ECG           29


 


SpO2                           94














I&O: 


 











 06/13/18 06/14/18 06/15/18





 06:59 06:59 06:59


 


Intake Total 1660 1100 


 


Output Total 1600 1175 


 


Balance 60 -75 











Result Diagrams: 


 06/12/18 04:35





 06/12/18 04:35


EKG Reviewed by me: Yes (nsr)





Phys Exam





- Physical Examination


Constitutional: NAD


HEENT: PERRLA, moist MMs, sclera anicteric


Neck: no nodes, supple


trach+


Respiratory: no wheezing, no rales, no rhonchi


Cardiovascular: RRR, no significant murmur, no rub


Gastrointestinal: soft, no distention, positive bowel sounds


PEG+


chronic lymphoedema


Lymphatic: no nodes


Psychiatric: normal affect


Skin: no rash, normal turgor





Dx/Plan


(1) Acute respiratory failure with hypoxia and hypercapnia


Code(s): J96.01 - ACUTE RESPIRATORY FAILURE WITH HYPOXIA; J96.02 - ACUTE 

RESPIRATORY FAILURE WITH HYPERCAPNIA   Status: Acute   Comment: s/p trach/PEG   





(2) Thrombocytopenia


Code(s): D69.6 - THROMBOCYTOPENIA, UNSPECIFIED   Status: Acute   





(3) UTI (urinary tract infection)


Status: Acute   





(4) Anemia, normocytic normochromic


Code(s): D64.9 - ANEMIA, UNSPECIFIED   Status: Chronic   





(5) Chronic a-fib


Code(s): I48.2 - CHRONIC ATRIAL FIBRILLATION   Status: Chronic   Comment: on 

anticoag/Amidarone. Now paroxysmal   





(6) Chronic diastolic CHF (congestive heart failure), NYHA class 3


Code(s): I50.32 - CHRONIC DIASTOLIC (CONGESTIVE) HEART FAILURE   Status: 

Chronic   Comment: Stable. Diuretics initially held 2/2 azotemia. Will be given 

PRN.   





(7) Morbid obesity with BMI of 60.0-69.9, adult


Code(s): E66.01 - MORBID (SEVERE) OBESITY DUE TO EXCESS CALORIES; Z68.44 - BODY 

MASS INDEX (BMI) 60.0-69.9, ADULT   Status: Chronic   





(8) MARY (obstructive sleep apnea)


Code(s): G47.33 - OBSTRUCTIVE SLEEP APNEA (ADULT) (PEDIATRIC)   Status: Chronic

   





(9) Physical deconditioning


Code(s): R53.81 - OTHER MALAISE   Status: Chronic   





(10) Pickwickian syndrome


Code(s): E66.2 - MORBID (SEVERE) OBESITY WITH ALVEOLAR HYPOVENTILATION   Status

: Chronic   Comment: s/p trach collar.    





- Plan


cont current plan of care, continue antibiotics, PT/OT, 





* continue zyvox, oral lasix


* PT


* continue cpap


* tracheostomy care


* medication reviewed as below


* symptomatic treatment.








Review of Systems





- Review of Systems


Eyes: negative: Pain, Vision Change, Conjunctivae Inflammation, Eyelid 

Inflammation, Redness, Other


ENT: negative: Ear Pain, Ear Discharge, Nose Pain, Nose Discharge, Nose 

Congestion, Mouth Pain, Mouth Swelling, Throat Pain, Throat Swelling, Other


Respiratory: negative: Cough, Dry, Shortness of Breath, Hemoptysis, SOB with 

Excertion, Pleuritic Pain, Sputum, Wheezing


Cardiovascular: negative: chest pain, palpitations, orthopnea, paroxysmal 

nocturnal dyspnea, edema, light headedness, other


Gastrointestinal: negative: Nausea, Vomiting, Abdominal Pain, Diarrhea, 

Constipation, Melena, Hematochezia, Other


Genitourinary: negative: Dysuria, Frequency, Incontinence, Hematuria, Retention

, Other


Musculoskeletal: negative: Neck Pain, Shoulder Pain, Arm Pain, Back Pain, Hand 

Pain, Leg Pain, Foot Pain, Other


Skin: negative: Rash, Lesions, Brock, Bruising, Other





- Medications/Allergies


Allergies/Adverse Reactions: 


 Allergies











Allergy/AdvReac Type Severity Reaction Status Date / Time


 


No Known Drug Allergies Allergy   Verified 05/06/18 14:16











Medications: 


 Current Medications





Acetaminophen (Tylenol Elixir)  1,000 mg PER TUBE Q6H PRN


   PRN Reason: Headache/Fever or Pain


   Last Admin: 06/13/18 18:35 Dose:  1,000 mg


Al Hydroxide/Mg Hydroxide (Maalox)  15 ml PER TUBE Q4H PRN


   PRN Reason: Heartburn  or Indigestion


Albuterol/Ipratropium (Duoneb)  3 ml NEB [id number] [facility]


   Last Admin: 06/14/18 08:31 Dose:  3 ml


Amiodarone HCl (Cordarone)  200 mg PO DAILY [facility]


   Last Admin: 06/14/18 08:55 Dose:  200 mg


Lipase/Protease/[place]ylase ([place] [id number])  1 cap FS .PER PROTOCOL PRN


   PRN Reason: TUBE OCCLUSION PROTOCOL


Apixaban (Eliquis)  5 mg PO BID [facility]


   Last Admin: 06/14/18 09:04 Dose:  5 mg


Artificial Tears (Tears Renewed 15ml Bottle)  0 drop EA EYE PRN PRN


   PRN Reason: Dry Eyes


Aspirin (Aspirin Chewable)  81 mg PO DAILY [facility]


   Last Admin: 06/14/18 08:54 Dose:  81 mg


Bupropion HCl (Wellbutrin)  150 mg PER TUBE BID [facility]


   Last Admin: 06/14/18 08:54 Dose:  150 mg


Clonidine (Catapres)  0.1 mg PO Q4H PRN


   PRN Reason: Systolic BP > 180


   Last Admin: 05/18/18 21:05 Dose:  0.1 mg


Furosemide (Lasix)  40 mg PER TUBE DAILY-AC [facility]


   Last Admin: 06/14/18 08:55 Dose:  40 mg


Guaifenesin/Dextromethorphan (Robitussin Dm)  10 ml PER TUBE 0300,0900,1500,

2100 [facility]


Guaifenesin/Dextromethorphan (Robitussin Dm)  10 ml PER TUBE 1000 [facility]


   Stop: 06/14/18 11:00


Hydralazine HCl (Apresoline)  10 mg SLOW IVP Q4H PRN


   PRN Reason: Systolic BP > 180


   Last Admin: 05/12/18 17:06 Dose:  10 mg


Linezolid (Zyvox)  600 mg PO BID [facility]


   Last Admin: 06/14/18 08:54 Dose:  600 mg


Loperamide HCl (Imodium)  2 mg PER TUBE PRN PRN


   PRN Reason: Diarrhea/Loose Stools


Losartan Potassium (Cozaar)  12.5 mg PO DAILY [facility]


   Last Admin: 06/14/18 08:55 Dose:  12.5 mg


Magnesium Hydroxide (Milk Of Magnesium)  30 ml PER TUBE DAILYPRN PRN


   PRN Reason: Constipation


Mineral Oil/White Petrolatum (Eucerin Cream)  0 gm TOP BIDPRN PRN


   PRN Reason: Dry Skin


Ondansetron HCl (Zofran Odt)  4 mg PO Q6H PRN


   PRN Reason: Nausea/Vomiting


   Last Admin: 06/01/18 09:09 Dose:  4 mg


Ondansetron HCl (Zofran)  4 mg IVP Q6H PRN


   PRN Reason: Nausea/Vomiting


Pantoprazole Sodium (Protonix)  40 mg PER TUBE DAILY [facility]


   Last Admin: 06/14/18 08:53 Dose:  40 mg


Phenol (Chloraseptic Spray 180 Ml Bot)  0 ml PO PRN PRN


   PRN Reason: Sore Throat


Potassium Chloride (Klor-Con)  20 meq PO QAM-WM [facility]


   Last Admin: 06/14/18 08:53 Dose:  20 meq


Scopolamine (Transderm Scop)  1.5 mg TD Q3D [facility]


   Last Admin: 06/12/18 10:32 Dose:  1.5 mg


Senna (Senokot)  2 tab PER TUBE HSPRN PRN


   PRN Reason: Constipation


Sodium Bicarbonate (Bicarbonate, Sodium)  650 mg PER TUBE .PER PROTOCOL PRN


   PRN Reason: ENTERAL TUBE OCCLUSION


Sodium Chloride (Flush - Normal Saline)  10 ml IVF Q12HR [facility]


   Last Admin: 06/14/18 08:55 Dose:  10 ml


Sodium Chloride (Flush - Normal Saline)  10 ml IVF PRN PRN


   PRN Reason: Saline Flush


   Last Admin: 06/11/18 05:41 Dose:  10 ml


Sodium Chloride (Ocean Nasal Spray 0.65%)  0 ml EA NARE QIDPRN PRN


   PRN Reason: Nasal Congestion

## 2018-06-14 NOTE — PRG
DATE OF SERVICE:  06/14/2018.

 

SUBJECTIVE:  I put her speaking valve on this morning and she is surprisingly very conversant with th
at, her voice is low, but she speaks very good English.  She seems eager to participate in the rehabi
litative process and get back to normal.

 

PHYSICAL EXAMINATION:

VITAL SIGNS:  Temperature is 98.3, pulse 75, respiration 21, O2 sat 96%, blood pressure 118/48.

HEENT:  Unremarkable.

NECK:  No JVD.  Trachea has some secretions.

CARDIAC:  S1 and S2 regular.

ABDOMEN:  Soft.

EXTREMITIES:  No edema.

 

LABORATORY DATA:  She had no labs done today.

 

ASSESSMENT:

1.  Obesity hypoventilation syndrome.

2.  Status post tracheostomy.

3.  Morbid obesity.

 

PLAN:

1.  Continue [place] speaking valve trials.

2.  Suction secretions.

3.  Continue guaifenesin, think she might be better if that is scheduled.

## 2018-06-15 RX ADMIN — GUAIFENESIN AND DEXTROMETHORPHAN SCH ML: 100; 10 SYRUP ORAL at 09:26

## 2018-06-15 RX ADMIN — GUAIFENESIN AND DEXTROMETHORPHAN SCH ML: 100; 10 SYRUP ORAL at 21:45

## 2018-06-15 RX ADMIN — Medication SCH ML: at 21:46

## 2018-06-15 RX ADMIN — Medication SCH ML: at 09:27

## 2018-06-15 RX ADMIN — PANTOPRAZOLE SODIUM SCH MG: 40 GRANULE, DELAYED RELEASE ORAL at 09:27

## 2018-06-15 RX ADMIN — NYSTATIN SCH APPLIC: 100000 POWDER TOPICAL at 21:45

## 2018-06-15 RX ADMIN — SCOPALAMINE SCH MG: 1 PATCH, EXTENDED RELEASE TRANSDERMAL at 09:27

## 2018-06-15 RX ADMIN — GUAIFENESIN AND DEXTROMETHORPHAN SCH ML: 100; 10 SYRUP ORAL at 15:43

## 2018-06-15 RX ADMIN — GUAIFENESIN AND DEXTROMETHORPHAN SCH ML: 100; 10 SYRUP ORAL at 03:51

## 2018-06-15 NOTE — PDOC.PN
- Subjective


Encounter Start Date: 06/15/18


Encounter Start Time: 10:00





Patient seen and examined for mary. No new complaints. No overnight events





- Objective


Resuscitation Status: 


 











Resuscitation Status           FULL:Full Resuscitation














MAR Reviewed: Yes


Vital Signs & Weight: 


 Vital Signs (12 hours)











  Temp Pulse Resp BP Pulse Ox


 


 06/15/18 07:52  98.5 F  75  18   98


 


 06/15/18 07:35  98.5 F  75  18  114/64  95


 


 06/15/18 07:18      94 L


 


 06/15/18 07:13   74  20   94 L


 


 06/15/18 04:35  98.7 F  76  22 H  104/63  94 L


 


 06/15/18 00:29   74  20   94 L


 


 06/14/18 23:40  98.9 F  71  20  91/36 L  97








 Weight











Admit Weight                   600 lb


 


Weight                         376 lb 1.738 oz











 Most Recent Monitor Data











Heart Rate from ECG            73


 


NIBP                           109/61


 


NIBP BP-Mean                   72


 


Respiration from ECG           29


 


SpO2                           94














I&O: 


 











 06/14/18 06/15/18 06/16/18





 06:59 06:59 06:59


 


Intake Total 1100 1700 210


 


Output Total 1175 650 


 


Balance -75 1050 210











Result Diagrams: 


 06/12/18 04:35





 06/12/18 04:35


EKG Reviewed by me: Yes (nsr)





Phys Exam





- Physical Examination


Constitutional: NAD


HEENT: PERRLA, moist MMs, sclera anicteric


Neck: no JVD, supple


trach+


Respiratory: no wheezing, no rales, no rhonchi


Cardiovascular: RRR, no significant murmur, no rub


Gastrointestinal: soft, non-tender, no distention, positive bowel sounds


PEG


Musculoskeletal: edema present


chronic lymphoedema


Neurological: non-focal


Lymphatic: no nodes


Psychiatric: normal affect, A&O x 3


Skin: no rash, normal turgor





Dx/Plan


(1) Acute respiratory failure with hypoxia and hypercapnia


Code(s): J96.01 - ACUTE RESPIRATORY FAILURE WITH HYPOXIA; J96.02 - ACUTE 

RESPIRATORY FAILURE WITH HYPERCAPNIA   Status: Acute   Comment: s/p trach/PEG   





(2) Thrombocytopenia


Code(s): D69.6 - THROMBOCYTOPENIA, UNSPECIFIED   Status: Acute   





(3) UTI (urinary tract infection)


Status: Acute   





(4) Anemia, normocytic normochromic


Code(s): D64.9 - ANEMIA, UNSPECIFIED   Status: Chronic   





(5) Chronic a-fib


Code(s): I48.2 - CHRONIC ATRIAL FIBRILLATION   Status: Chronic   Comment: on 

anticoag/Amidarone. Now paroxysmal   





(6) Chronic diastolic CHF (congestive heart failure), NYHA class 3


Code(s): I50.32 - CHRONIC DIASTOLIC (CONGESTIVE) HEART FAILURE   Status: 

Chronic   Comment: Stable. Diuretics initially held 2/2 azotemia. Will be given 

PRN.   





(7) Morbid obesity with BMI of 60.0-69.9, adult


Code(s): E66.01 - MORBID (SEVERE) OBESITY DUE TO EXCESS CALORIES; Z68.44 - BODY 

MASS INDEX (BMI) 60.0-69.9, ADULT   Status: Chronic   





(8) MARY (obstructive sleep apnea)


Code(s): G47.33 - OBSTRUCTIVE SLEEP APNEA (ADULT) (PEDIATRIC)   Status: Chronic

   





(9) Physical deconditioning


Code(s): R53.81 - OTHER MALAISE   Status: Chronic   





(10) Pickwickian syndrome


Code(s): E66.2 - MORBID (SEVERE) OBESITY WITH ALVEOLAR HYPOVENTILATION   Status

: Chronic   Comment: s/p trach collar.    





- Plan


cont current plan of care, continue antibiotics, PT/OT, 





* see my other progress note for plan.








Review of Systems





- Review of Systems


Eyes: negative: Pain, Vision Change, Conjunctivae Inflammation, Eyelid 

Inflammation, Redness, Other


ENT: negative: Ear Pain, Ear Discharge, Nose Pain, Nose Discharge, Nose 

Congestion, Mouth Pain, Mouth Swelling, Throat Pain, Throat Swelling, Other


Respiratory: negative: Cough, Dry, Shortness of Breath, Hemoptysis, SOB with 

Excertion, Pleuritic Pain, Sputum, Wheezing


Cardiovascular: negative: chest pain, palpitations, orthopnea, paroxysmal 

nocturnal dyspnea, edema, light headedness, other


Gastrointestinal: negative: Nausea, Vomiting, Abdominal Pain, Diarrhea, 

Constipation, Melena, Hematochezia, Other


Genitourinary: negative: Dysuria, Frequency, Incontinence, Hematuria, Retention

, Other


Musculoskeletal: negative: Neck Pain, Shoulder Pain, Arm Pain, Back Pain, Hand 

Pain, Leg Pain, Foot Pain, Other


Skin: negative: Rash, Lesions, Brock, Bruising, Other





- Medications/Allergies


Allergies/Adverse Reactions: 


 Allergies











Allergy/AdvReac Type Severity Reaction Status Date / Time


 


No Known Drug Allergies Allergy   Verified 05/06/18 14:16











Medications: 


 Current Medications





Acetaminophen (Tylenol Elixir)  1,000 mg PER TUBE Q6H PRN


   PRN Reason: Headache/Fever or Pain


   Last Admin: 06/14/18 20:31 Dose:  1,000 mg


Al Hydroxide/Mg Hydroxide (Maalox)  15 ml PER TUBE Q4H PRN


   PRN Reason: Heartburn  or Indigestion


Albuterol/Ipratropium (Duoneb)  3 ml NEB [id number] [facility]


   Last Admin: 06/15/18 07:13 Dose:  3 ml


Amiodarone HCl (Cordarone)  200 mg PO DAILY [facility]


   Last Admin: 06/15/18 09:27 Dose:  200 mg


Lipase/Protease/Amylase ([place] [address])  1 cap FS .PER PROTOCOL PRN


   PRN Reason: TUBE OCCLUSION PROTOCOL


Apixaban (Eliquis)  5 mg PO BID [facility]


   Last Admin: 06/15/18 09:27 Dose:  5 mg


Artificial Tears (Tears Renewed 15ml Bottle)  0 drop EA EYE PRN PRN


   PRN Reason: Dry Eyes


Aspirin (Aspirin Chewable)  81 mg PO DAILY [facility]


   Last Admin: 06/15/18 09:26 Dose:  81 mg


Bupropion HCl (Wellbutrin)  150 mg PER TUBE BID [facility]


   Last Admin: 06/15/18 09:26 Dose:  150 mg


Clonidine (Catapres)  0.1 mg PO Q4H PRN


   PRN Reason: Systolic BP > 180


   Last Admin: 05/18/18 21:05 Dose:  0.1 mg


Furosemide (Lasix)  40 mg PER TUBE DAILY-AC [facility]


   Last Admin: 06/15/18 09:27 Dose:  40 mg


Guaifenesin/Dextromethorphan (Robitussin Dm)  10 ml PER TUBE 0300,0900,1500,

2100 [facility]


   Last Admin: 06/15/18 09:26 Dose:  10 ml


Hydralazine HCl (Apresoline)  10 mg SLOW IVP Q4H PRN


   PRN Reason: Systolic BP > 180


   Last Admin: 05/12/18 17:06 Dose:  10 mg


Linezolid (Zyvox)  600 mg PO BID [facility]


   Last Admin: 06/15/18 09:27 Dose:  600 mg


Loperamide HCl (Imodium)  2 mg PER TUBE PRN PRN


   PRN Reason: Diarrhea/Loose Stools


Losartan Potassium (Cozaar)  12.5 mg PO DAILY [facility]


   Last Admin: 06/15/18 09:26 Dose:  12.5 mg


Magnesium Hydroxide (Milk Of Magnesium)  30 ml PER TUBE DAILYPRN PRN


   PRN Reason: Constipation


Mineral Oil/White Petrolatum (Eucerin Cream)  0 gm TOP BIDPRN PRN


   PRN Reason: Dry Skin


Ondansetron HCl (Zofran Odt)  4 mg PO Q6H PRN


   PRN Reason: Nausea/Vomiting


   Last Admin: 06/01/18 09:09 Dose:  4 mg


Ondansetron HCl (Zofran)  4 mg IVP Q6H PRN


   PRN Reason: Nausea/Vomiting


Pantoprazole Sodium (Protonix)  40 mg PER TUBE DAILY [facility]


   Last Admin: 06/15/18 09:27 Dose:  40 mg


Phenol (Chloraseptic Spray 180 Ml Bot)  0 ml PO PRN PRN


   PRN Reason: Sore Throat


Potassium Chloride (Klor-Con)  20 meq PO QAM-WM [facility]


   Last Admin: 06/15/18 09:26 Dose:  20 meq


Scopolamine (Transderm Scop)  1.5 mg TD Q3D [facility]


   Last Admin: 06/15/18 09:27 Dose:  1.5 mg


Senna (Senokot)  2 tab PER TUBE HSPRN PRN


   PRN Reason: Constipation


Sodium Bicarbonate (Bicarbonate, Sodium)  650 mg PER TUBE .PER PROTOCOL PRN


   PRN Reason: ENTERAL TUBE OCCLUSION


Sodium Chloride (Flush - Normal Saline)  10 ml IVF Q12HR [facility]


   Last Admin: 06/15/18 09:27 Dose:  10 ml


Sodium Chloride (Flush - Normal Saline)  10 ml IVF PRN PRN


   PRN Reason: Saline Flush


   Last Admin: 06/11/18 05:41 Dose:  10 ml


Sodium Chloride ([place]cean Nasal Spray 0.65%)  0 ml EA NARE QIDPRN PRN


   PRN Reason: Nasal Congestion

## 2018-06-15 NOTE — PRG
DATE OF SERVICE:  06/15/2018.

 

SUBJECTIVE:  The patient appears to be doing okay.  She is still having problems with secretions.

 

OBJECTIVE:

VITAL SIGNS:  Temperature is 98.5, pulse 75, respirations 18, O2 sat 95%, blood pressure 114/64.

HEENT:  Unremarkable.

NECK:  No JVD.

LUNGS:  Fairly clear except for the rhonchi radiating from the tracheostomy site.

ABDOMEN:  Soft, nontender.

EXTREMITIES:  No edema.

 

LABORATORY DATA:  No labs were done today.

 

ASSESSMENT:  Stable pulmonary status - problem list unchanged.

 

RECOMMENDATIONS:  At some point, she needs to have placement.  I am not sure there is ever going to b
e an ideal time to discharge the patient.  Following with you.

## 2018-06-15 NOTE — PDOC.PN
- Subjective


Encounter Start Date: 06/15/18


Encounter Start Time: 09:15





Patient seen and examined for mary. No new complaints. No overnight events





- Objective


Resuscitation Status: 


 











Resuscitation Status           FULL:Full Resuscitation














MAR Reviewed: Yes


Vital Signs & Weight: 


 Vital Signs (12 hours)











  Temp Pulse Resp BP Pulse Ox


 


 06/15/18 11:26  98.6 F  77  18  97/41 L  100


 


 06/15/18 07:52  98.5 F  75  18   98


 


 06/15/18 07:35  98.5 F  75  18  114/64  95


 


 06/15/18 07:18      94 L


 


 06/15/18 07:13   74  20   94 L


 


 06/15/18 04:35  98.7 F  76  22 H  104/63  94 L


 


 06/15/18 00:29   74  20   94 L


 


 06/14/18 23:40  98.9 F  71  20  91/36 L  97








 Weight











Admit Weight                   600 lb


 


Weight                         376 lb 1.738 oz











 Most Recent Monitor Data











Heart Rate from ECG            73


 


NIBP                           109/61


 


NIBP BP-Mean                   72


 


Respiration from ECG           29


 


SpO2                           94














I&O: 


 











 06/14/18 06/15/18 06/16/18





 06:59 06:59 06:59


 


Intake Total 1100 1700 210


 


Output Total 1175 650 


 


Balance -75 1050 210











Result Diagrams: 


 06/12/18 04:35





 06/12/18 04:35


EKG Reviewed by me: Yes (nsr)





Phys Exam





- Physical Examination


Constitutional: NAD


HEENT: PERRLA, sclera anicteric


tracheostomy


coarse breath sound


Cardiovascular: RRR, no significant murmur, no rub


Gastrointestinal: soft, non-tender, no distention, positive bowel sounds


PEG+


chronic lymphoedema


Lymphatic: no nodes


Psychiatric: normal affect


Skin: no rash, normal turgor





Dx/Plan


(1) Acute respiratory failure with hypoxia and hypercapnia


Code(s): J96.01 - ACUTE RESPIRATORY FAILURE WITH HYPOXIA; J96.02 - ACUTE 

RESPIRATORY FAILURE WITH HYPERCAPNIA   Status: Acute   Comment: s/p trach/PEG   





(2) Thrombocytopenia


Code(s): D69.6 - THROMBOCYTOPENIA, UNSPECIFIED   Status: Acute   





(3) UTI (urinary tract infection)


Status: Acute   





(4) Anemia, normocytic normochromic


Code(s): D64.9 - ANEMIA, UNSPECIFIED   Status: Chronic   





(5) Chronic a-fib


Code(s): I48.2 - CHRONIC ATRIAL FIBRILLATION   Status: Chronic   Comment: on 

anticoag/[place]. Now paroxysmal   





(6) Chronic diastolic CHF (congestive heart failure), NYHA class 3


Code(s): I50.32 - CHRONIC DIASTOLIC (CONGESTIVE) HEART FAILURE   Status: 

Chronic   Comment: Stable. Diuretics initially held 2/2 azotemia. Will be given 

PRN.   





(7) Morbid obesity with BMI of 60.0-69.9, adult


Code(s): E66.01 - MORBID (SEVERE) OBESITY DUE TO EXCESS CALORIES; Z68.44 - BODY 

MASS INDEX (BMI) 60.0-69.9, ADULT   Status: Chronic   





(8) MARY (obstructive sleep apnea)


Code(s): G47.33 - OBSTRUCTIVE SLEEP APNEA (ADULT) (PEDIATRIC)   Status: Chronic

   





(9) Physical deconditioning


Code(s): R53.81 - OTHER MALAISE   Status: Chronic   





(10) Pickwickian syndrome


Code(s): E66.2 - MORBID (SEVERE) OBESITY WITH ALVEOLAR HYPOVENTILATION   Status

: Chronic   Comment: s/p trach collar.    





- Plan


cont current plan of care, continue antibiotics, PT/OT, 





* continue PT


* continue zyvox


* once physically stable then will consider discharge to home


* she does not have insurance and so likely family will take care her at home 

once less complicated


* medication reviewed as below


* symptomatic treatment.








Review of Systems





- Review of Systems


Eyes: negative: Pain, Vision Change, Conjunctivae Inflammation, Eyelid 

Inflammation, Redness, Other


ENT: negative: Ear Pain, Ear Discharge, Nose Pain, Nose Discharge, Nose 

Congestion, Mouth Pain, Mouth Swelling, Throat Pain, Throat Swelling, Other


Respiratory: negative: Cough, Dry, Shortness of Breath, Hemoptysis, SOB with 

Excertion, Pleuritic Pain, Sputum, Wheezing


Cardiovascular: negative: chest pain, palpitations, orthopnea, paroxysmal 

nocturnal dyspnea, edema, light headedness, other


Gastrointestinal: negative: Nausea, Vomiting, Abdominal Pain, Diarrhea, 

Constipation, Melena, Hematochezia, Other


Genitourinary: negative: Dysuria, Frequency, Incontinence, Hematuria, Retention

, Other


Musculoskeletal: negative: Neck Pain, Shoulder Pain, Arm Pain, Back Pain, Hand 

Pain, Leg Pain, Foot Pain, Other


Skin: negative: Rash, Lesions, Brock, Bruising, Other





- Medications/Allergies


Allergies/Adverse Reactions: 


 Allergies











Allergy/AdvReac Type Severity Reaction Status Date / Time


 


No Known Drug Allergies Allergy   Verified 05/06/18 14:16











Medications: 


 Current Medications





Acetaminophen (Tylenol Elixir)  1,000 mg PER TUBE Q6H PRN


   PRN Reason: Headache/Fever or Pain


   Last Admin: 06/14/18 20:31 Dose:  1,000 mg


Al Hydroxide/Mg Hydroxide (Maalox)  15 ml PER TUBE Q4H PRN


   PRN Reason: Heartburn  or Indigestion


Albuterol/Ipratropium (Duoneb)  3 ml NEB [id number] [facility]


   Last Admin: 06/15/18 07:13 Dose:  3 ml


Amiodarone HCl (Cordarone)  200 mg PO DAILY [facility]


   Last Admin: 06/15/18 09:27 Dose:  200 mg


Lipase/Protease/Amylase ([place] [id number])  1 cap FS .PER PROTOCOL PRN


   PRN Reason: TUBE OCCLUSION PROTOCOL


Apixaban (Eliquis)  5 mg PO BID [facility]


   Last Admin: 06/15/18 09:27 Dose:  5 mg


Artificial Tears (Tears Renewed 15ml Bottle)  0 drop EA EYE PRN PRN


   PRN Reason: Dry Eyes


Aspirin (Aspirin Chewable)  81 mg PO DAILY [facility]


   Last Admin: 06/15/18 09:26 Dose:  81 mg


Bupropion HCl (Wellbutrin)  150 mg PER TUBE BID [facility]


   Last Admin: 06/15/18 09:26 Dose:  150 mg


Clonidine (Catapres)  0.1 mg PO Q4H PRN


   PRN Reason: Systolic BP > 180


   Last Admin: 05/18/18 21:05 Dose:  0.1 mg


Furosemide (Lasix)  40 mg PER TUBE DAILY-AC [facility]


   Last Admin: 06/15/18 09:27 Dose:  40 mg


Guaifenesin/Dextromethorphan (Robitussin Dm)  10 ml PER TUBE 0300,0900,1500,

2100 [facility]


   Last Admin: 06/15/18 09:26 Dose:  10 ml


Hydralazine HCl (Apresoline)  10 mg SLOW IVP Q4H PRN


   PRN Reason: Systolic BP > 180


   Last Admin: 05/12/18 17:06 Dose:  10 mg


Linezolid (Zyvox)  600 mg PO BID [facility]


   Last Admin: 06/15/18 09:27 Dose:  600 mg


Loperamide HCl (Imodium)  2 mg PER TUBE PRN PRN


   PRN Reason: Diarrhea/Loose Stools


Losartan Potassium (Cozaar)  12.5 mg PO DAILY [facility]


   Last Admin: 06/15/18 09:26 Dose:  12.5 mg


Magnesium Hydroxide (Milk Of Magnesium)  30 ml PER TUBE DAILYPRN PRN


   PRN Reason: Constipation


Mineral Oil/White Petrolatum (Eucerin Cream)  0 gm TOP BIDPRN PRN


   PRN Reason: Dry Skin


Ondansetron HCl (Zofran Odt)  4 mg PO Q6H PRN


   PRN Reason: Nausea/Vomiting


   Last Admin: 06/01/18 09:09 Dose:  4 mg


Ondansetron HCl (Zofran)  4 mg IVP Q6H PRN


   PRN Reason: Nausea/Vomiting


Pantoprazole Sodium (Protonix)  40 mg PER TUBE DAILY [facility]


   Last Admin: 06/15/18 09:27 Dose:  40 mg


Phenol (Chloraseptic Spray 180 Ml Bot)  0 ml PO PRN PRN


   PRN Reason: Sore Throat


Potassium Chloride (Klor-Con)  20 meq PO QAM-WM [facility]


   Last Admin: 06/15/18 09:26 Dose:  20 meq


Scopolamine (Transderm Scop)  1.5 mg TD Q3D [facility]


   Last Admin: 06/15/18 09:27 Dose:  1.5 mg


Senna (Senokot)  2 tab PER TUBE HSPRN PRN


   PRN Reason: Constipation


Sodium Bicarbonate (Bicarbonate, Sodium)  650 mg PER TUBE .PER PROTOCOL PRN


   PRN Reason: ENTERAL TUBE OCCLUSION


Sodium Chloride (Flush - Normal Saline)  10 ml IVF Q12HR [facility]


   Last Admin: 06/15/18 09:27 Dose:  10 ml


Sodium Chloride (Flush - Normal Saline)  10 ml IVF PRN PRN


   PRN Reason: Saline Flush


   Last Admin: 06/11/18 05:41 Dose:  10 ml


Sodium Chloride (Ocean Nasal Spray 0.65%)  0 ml EA NARE QIDPRN PRN


   PRN Reason: Nasal Congestion

## 2018-06-16 RX ADMIN — GUAIFENESIN AND DEXTROMETHORPHAN SCH ML: 100; 10 SYRUP ORAL at 10:17

## 2018-06-16 RX ADMIN — Medication SCH ML: at 20:42

## 2018-06-16 RX ADMIN — Medication SCH ML: at 10:19

## 2018-06-16 RX ADMIN — GUAIFENESIN AND DEXTROMETHORPHAN SCH ML: 100; 10 SYRUP ORAL at 20:41

## 2018-06-16 RX ADMIN — NYSTATIN SCH APPLIC: 100000 POWDER TOPICAL at 20:42

## 2018-06-16 RX ADMIN — GUAIFENESIN AND DEXTROMETHORPHAN SCH ML: 100; 10 SYRUP ORAL at 03:21

## 2018-06-16 RX ADMIN — NYSTATIN SCH APPLIC: 100000 POWDER TOPICAL at 10:17

## 2018-06-16 RX ADMIN — GUAIFENESIN AND DEXTROMETHORPHAN SCH: 100; 10 SYRUP ORAL at 18:27

## 2018-06-16 RX ADMIN — PANTOPRAZOLE SODIUM SCH MG: 40 GRANULE, DELAYED RELEASE ORAL at 10:18

## 2018-06-16 NOTE — PDOC.PN
- Subjective


Encounter Start Date: 06/16/18


Encounter Start Time: 11:00


Subjective: awake, talking via speaking valve


-: on trach collar





- Objective


Resuscitation Status: 


 











Resuscitation Status           FULL:Full Resuscitation














MAR Reviewed: Yes


Vital Signs & Weight: 


 Vital Signs (12 hours)











  Temp Pulse Resp BP Pulse Ox


 


 06/16/18 11:40  98.1 F  67  22 H  118/86  93 L


 


 06/16/18 10:38      94 L


 


 06/16/18 10:36   68  18  


 


 06/16/18 07:48  98.2 F  71  20   97


 


 06/16/18 07:34  98.2 F  71  20  110/51 L  92 L


 


 06/16/18 03:57  98.7 F  76  20  106/47 L  92 L


 


 06/16/18 02:29   75  20   91 L


 


 06/16/18 02:07   71  18  115/51 L  91 L








 Weight











Admit Weight                   600 lb


 


Weight                         376 lb 1.738 oz











 Most Recent Monitor Data











Heart Rate from ECG            73


 


NIBP                           109/61


 


NIBP BP-Mean                   72


 


Respiration from ECG           29


 


SpO2                           94














I&O: 


 











 06/15/18 06/16/18 06/17/18





 06:59 06:59 06:59


 


Intake Total 1700 1720 210


 


Output Total 650 1825 


 


Balance 1050 -105 210











Result Diagrams: 


 06/12/18 04:35





 06/12/18 04:35





Phys Exam





- Physical Examination


HEENT: PERRLA, sclera anicteric


Neck: supple


trach+


Respiratory: no wheezing, no rales


Cardiovascular: RRR, no significant murmur


Gastrointestinal: soft, non-tender, positive bowel sounds


peg+


Musculoskeletal: pulses present, edema present


Neurological: non-focal, moves all 4 limbs


Psychiatric: A&O x 3





Dx/Plan


(1) Acute and chronic respiratory failure


Code(s): J96.20 - ACUTE AND CHR RESP FAILURE, UNSP W HYPOXIA OR HYPERCAPNIA   

Status: Chronic   


Qualifiers: 


   Respiratory failure complication: hypoxia and hypercapnia   Qualified Code(s)

: J96.21 - Acute and chronic respiratory failure with hypoxia; J96.22 - Acute 

and chronic respiratory failure with hypercapnia; J96.22 - Acute and chronic 

respiratory failure with hypercapnia; J96.22 - Acute and chronic respiratory 

failure with hypercapnia   


Comment: Stable on trach collar now.    





(2) MARY (obstructive sleep apnea)


Code(s): G47.33 - OBSTRUCTIVE SLEEP APNEA (ADULT) (PEDIATRIC)   Status: Chronic

   





(3) Afib


Code(s): I48.91 - UNSPECIFIED ATRIAL FIBRILLATION   Status: Deleted   


Qualifiers: 


   Atrial fibrillation type: paroxysmal   Qualified Code(s): I48.0 - Paroxysmal 

atrial fibrillation   


Comment: Rate controlled. Continue Eliquis, ASA and Amiodarone. 


   





(4) Acute on chronic diastolic (congestive) heart failure


Code(s): I50.33 - ACUTE ON CHRONIC DIASTOLIC (CONGESTIVE) HEART FAILURE   Status

: Chronic   





(5) Pickwickian syndrome


Code(s): E66.2 - MORBID (SEVERE) OBESITY WITH ALVEOLAR HYPOVENTILATION   Status

: Chronic   Comment: s/p trach collar.    





(6) Status post tracheostomy


Code(s): Z93.0 - TRACHEOSTOMY STATUS   Status: Deleted   Comment: Had procedure 

on 5/7/18. Stable.    





(7) S/P percutaneous endoscopic gastrostomy (PEG) tube placement


Code(s): Z93.1 - GASTROSTOMY STATUS   Status: Deleted   Comment: Had procedure 

on 5/7/18. Stable.    





- Plan


deconditioning


-: is stable on trach collar


-: cm to d/w family if they can take care of her at home


-: on asp, eliquis, amiod, lasix and cozaar


-: off zyvox, continue welbutrin





* .








Review of Systems





- Medications/Allergies


Allergies/Adverse Reactions: 


 Allergies











Allergy/AdvReac Type Severity Reaction Status Date / Time


 


No Known Drug Allergies Allergy   Verified 05/06/18 14:16











Medications: 


 Current Medications





Acetaminophen (Tylenol Elixir)  1,000 mg PER TUBE Q6H PRN


   PRN Reason: Headache/Fever or Pain


   Last Admin: 06/16/18 10:22 Dose:  1,000 mg


Al Hydroxide/Mg Hydroxide (Maalox)  15 ml PER TUBE Q4H PRN


   PRN Reason: Heartburn  or Indigestion


Albuterol/Ipratropium (Duoneb)  3 ml NEB [id number] [facility]


   Last Admin: 06/16/18 10:36 Dose:  3 ml


Amiodarone HCl (Cordarone)  200 mg PO DAILY [facility]


   Last Admin: 06/16/18 10:18 Dose:  200 mg


Lipase/Protease/Amylase ([place] [id number])  1 cap FS .PER PROTOCOL PRN


   PRN Reason: TUBE OCCLUSION PROTOCOL


Apixaban (Eliquis)  5 mg PO BID [facility]


   Last Admin: 06/16/18 10:18 Dose:  5 mg


Artificial Tears (Tears Renewed 15ml Bottle)  0 drop EA EYE PRN PRN


   PRN Reason: Dry Eyes


Aspirin (Aspirin Chewable)  81 mg PO DAILY [facility]


   Last Admin: 06/16/18 10:17 Dose:  81 mg


Bupropion HCl (Wellbutrin)  150 mg PER TUBE BID [facility]


   Last Admin: 06/16/18 10:17 Dose:  150 mg


Clonidine (Catapres)  0.1 mg PO Q4H PRN


   PRN Reason: Systolic BP > 180


   Last Admin: 05/18/18 21:05 Dose:  0.1 mg


Furosemide (Lasix)  40 mg PER TUBE DAILY-AC [facility]


   Last Admin: 06/16/18 10:18 Dose:  40 mg


Guaifenesin/Dextromethorphan (Robitussin Dm)  10 ml PER TUBE 0300,0900,1500,

2100 [facility]


   Last Admin: 06/16/18 10:17 Dose:  10 ml


Hydralazine HCl (Apresoline)  10 mg SLOW IVP Q4H PRN


   PRN Reason: Systolic BP > 180


   Last Admin: 05/12/18 17:06 Dose:  10 mg


Loperamide HCl (Imodium)  2 mg PER TUBE PRN PRN


   PRN Reason: Diarrhea/Loose Stools


Losartan Potassium (Cozaar)  12.5 mg PO DAILY [facility]


   Last Admin: 06/16/18 10:18 Dose:  12.5 mg


Magnesium Hydroxide (Milk Of Magnesium)  30 ml PER TUBE DAILYPRN PRN


   PRN Reason: Constipation


Mineral Oil/White Petrolatum (Eucerin Cream)  0 gm TOP BIDPRN PRN


   PRN Reason: Dry Skin


Nystatin (Mycostatin Powder)  30 gm TOP BID [facility]


   Last Admin: 06/16/18 10:17 Dose:  1 applic


Ondansetron HCl (Zofran Odt)  4 mg PO Q6H PRN


   PRN Reason: Nausea/Vomiting


   Last Admin: 06/01/18 09:09 Dose:  4 mg


Ondansetron HCl (Zofran)  4 mg IVP Q6H PRN


   PRN Reason: Nausea/Vomiting


Pantoprazole Sodium (Protonix)  40 mg PER TUBE DAILY [facility]


   Last Admin: 06/16/18 10:18 Dose:  40 mg


Phenol (Chloraseptic Spray 180 Ml Bot)  0 ml PO PRN PRN


   PRN Reason: Sore Throat


Potassium Chloride (Klor-Con)  20 meq PO QAM-WM [facility]


   Last Admin: 06/16/18 10:17 Dose:  20 meq


Scopolamine (Transderm Scop)  1.5 mg TD Q3D [facility]


   Last Admin: 06/15/18 09:27 Dose:  1.5 mg


Senna (Senokot)  2 tab PER TUBE HSPRN PRN


   PRN Reason: Constipation


Sodium Bicarbonate (Bicarbonate, Sodium)  650 mg PER TUBE .PER PROTOCOL PRN


   PRN Reason: ENTERAL TUBE OCCLUSION


Sodium Chloride (Flush - Normal Saline)  10 ml IVF Q12HR [facility]


   Last Admin: 06/16/18 10:19 Dose:  10 ml


Sodium Chloride (Flush - Normal Saline)  10 ml IVF PRN PRN


   PRN Reason: Saline Flush


   Last Admin: 06/11/18 05:41 Dose:  10 ml


Sodium Chloride (Ocean Nasal Spray 0.65%)  0 ml EA NARE QIDPRN PRN


   PRN Reason: Nasal Congestion

## 2018-06-16 NOTE — PRG
DATE OF SERVICE:  06/16/2018

 

SUBJECTIVE:  The patient is doing about the same.

 

OBJECTIVE:

VITAL SIGNS:  Temperature 98.2, pulse 71, respiration 20, O2 sat 97%, blood pressure 110/51.

HEENT:  Unremarkable.

NECK:  No JVD.  Trach in good position.  Secretions seen less.

CARDIAC:  S1 and S2 regular.

LUNGS:  Clear.

ABDOMEN:  Soft, obese, nontender.

EXTREMITIES:  No edema.

 

LABORATORY DATA:  Labs were not done today.

 

ASSESSMENT:

1.  Obesity hypoventilation syndrome.

2.  Chronic respiratory failure - situation appears to be stable.

 

PLAN:

1.  Awaiting placement.

2.  Continue strengthening therapy.

3.  Trial of oral liquids.

4.  Discontinue Zyvox as she has had over 7 days of that.

## 2018-06-16 NOTE — EKG
Test Reason : DYSPNEA

Blood Pressure : ***/*** mmHG

Vent. Rate : 091 BPM     Atrial Rate : 091 BPM

   P-R Int : 142 ms          QRS Dur : 126 ms

    QT Int : 388 ms       P-R-T Axes : 069 088 082 degrees

   QTc Int : 477 ms

 

Normal sinus rhythm

Right bundle branch block

Abnormal ECG

 

Confirmed by MUNDO NEGRON, SHERLEY ([id number]),  DONNELL FERRER ([id number]) on 6/16/2018 6:02:44 PM

 

Referred By:             Confirmed By:SHERLEY FLOWER MD

## 2018-06-17 RX ADMIN — NYSTATIN SCH APPLIC: 100000 POWDER TOPICAL at 21:01

## 2018-06-17 RX ADMIN — Medication SCH ML: at 21:01

## 2018-06-17 RX ADMIN — GUAIFENESIN AND DEXTROMETHORPHAN SCH ML: 100; 10 SYRUP ORAL at 09:13

## 2018-06-17 RX ADMIN — PANTOPRAZOLE SODIUM SCH MG: 40 GRANULE, DELAYED RELEASE ORAL at 09:15

## 2018-06-17 RX ADMIN — NYSTATIN SCH APPLIC: 100000 POWDER TOPICAL at 09:16

## 2018-06-17 RX ADMIN — GUAIFENESIN AND DEXTROMETHORPHAN SCH ML: 100; 10 SYRUP ORAL at 14:42

## 2018-06-17 RX ADMIN — Medication SCH ML: at 09:15

## 2018-06-17 RX ADMIN — GUAIFENESIN AND DEXTROMETHORPHAN SCH ML: 100; 10 SYRUP ORAL at 21:00

## 2018-06-17 RX ADMIN — GUAIFENESIN AND DEXTROMETHORPHAN SCH ML: 100; 10 SYRUP ORAL at 04:33

## 2018-06-17 NOTE — PRG
DATE OF SERVICE:  06/17/2018

 

SUBJECTIVE:  The patient is doing okay.  Her sons are visiting her today.  I got an opportunity to ta
lk to all of them.

 

OBJECTIVE:

VITAL SIGNS:  Temperature is 98.0, pulse 67, respiration 18, O2 sat 98%, blood pressure 122/53.

HEENT:  Unremarkable.  Trach in good position.

LUNGS:  Clear.

CARDIAC:  S1 and S2 regular.

ABDOMEN:  Soft.

EXTREMITIES:  No edema.

 

ASSESSMENT:  No change in status.

 

PLAN:  Emphasize rehabilitation.  The main issue is being able to transfer from bed to chair, etc.

## 2018-06-17 NOTE — PDOC.PN
- Subjective


Encounter Start Date: 06/17/18


Encounter Start Time: 10:30


Subjective: is sitting in bed, trying to work with PT this am





- Objective


Resuscitation Status: 


 











Resuscitation Status           FULL:Full Resuscitation














MAR Reviewed: Yes


Vital Signs & Weight: 


 Vital Signs (12 hours)











  Temp Pulse Resp BP Pulse Ox


 


 06/17/18 08:19   67  18   96


 


 06/17/18 08:00  98.5 F  67  18   96


 


 06/17/18 07:41  98.5 F  72  19  100/42 L  100


 


 06/17/18 03:55  97.7 F  69  18  115/47 L  94 L


 


 06/17/18 00:37   67  16   91 L


 


 06/17/18 00:00  98.6 F  67  16  121/49 L  95








 Weight











Admit Weight                   600 lb


 


Weight                         376 lb 1.738 oz











 Most Recent Monitor Data











Heart Rate from ECG            73


 


NIBP                           109/61


 


NIBP BP-Mean                   72


 


Respiration from ECG           29


 


SpO2                           94














I&O: 


 











 06/16/18 06/17/18 06/18/18





 06:59 06:59 06:59


 


Intake Total 1720 780 420


 


Output Total 1825 950 


 


Balance -105 -170 420











Result Diagrams: 


 06/12/18 04:35





 06/12/18 04:35





Phys Exam





- Physical Examination


HEENT: PERRLA, sclera anicteric


Neck: no JVD


trach+


Respiratory: no wheezing, no rales


rhonchi+


Cardiovascular: RRR, no significant murmur


Gastrointestinal: soft, non-tender, positive bowel sounds


Musculoskeletal: pulses present, edema present


Neurological: non-focal, moves all 4 limbs


Psychiatric: A&O x 3





Dx/Plan


(1) Acute and chronic respiratory failure


Code(s): J96.20 - ACUTE AND CHR RESP FAILURE, UNSP W HYPOXIA OR HYPERCAPNIA   

Status: Chronic   


Qualifiers: 


   Respiratory failure complication: hypoxia and hypercapnia   Qualified Code(s)

: J96.21 - Acute and chronic respiratory failure with hypoxia; J96.22 - Acute 

and chronic respiratory failure with hypercapnia; J96.22 - Acute and chronic 

respiratory failure with hypercapnia; J96.22 - Acute and chronic respiratory 

failure with hypercapnia   


Comment: Stable on trach collar now.    





(2) MARY (obstructive sleep apnea)


Code(s): G47.33 - OBSTRUCTIVE SLEEP APNEA (ADULT) (PEDIATRIC)   Status: Chronic

   





(3) Afib


Code(s): I48.91 - UNSPECIFIED ATRIAL FIBRILLATION   Status: Deleted   


Qualifiers: 


   Atrial fibrillation type: paroxysmal   Qualified Code(s): I48.0 - Paroxysmal 

atrial fibrillation   


Comment: Rate controlled. Continue Eliquis, ASA and Amiodarone. 


   





(4) Acute on chronic diastolic (congestive) heart failure


Code(s): I50.33 - ACUTE ON CHRONIC DIASTOLIC (CONGESTIVE) HEART FAILURE   Status

: Chronic   





(5) Pickwickian syndrome


Code(s): E66.2 - MORBID (SEVERE) OBESITY WITH ALVEOLAR HYPOVENTILATION   Status

: Chronic   Comment: s/p trach collar.    





(6) Status post tracheostomy


Code(s): Z93.0 - TRACHEOSTOMY STATUS   Status: Deleted   Comment: Had procedure 

on 5/7/18. Stable.    





(7) S/P percutaneous endoscopic gastrostomy (PEG) tube placement


Code(s): Z93.1 - GASTROSTOMY STATUS   Status: Deleted   Comment: Had procedure 

on 5/7/18. Stable.    





- Plan


hemostable


-: may tx to med floor if ok with pulm service


-: code status will be revisited some time today


-: continue eliquis, amiod, cozaar and lasix


-: staff to start training family with reg trach and peg care





* .








Review of Systems





- Medications/Allergies


Allergies/Adverse Reactions: 


 Allergies











Allergy/AdvReac Type Severity Reaction Status Date / Time


 


No Known Drug Allergies Allergy   Verified 05/06/18 14:16











Medications: 


 Current Medications





Acetaminophen (Tylenol Elixir)  1,000 mg PER TUBE Q6H PRN


   PRN Reason: Headache/Fever or Pain


   Last Admin: 06/16/18 10:22 Dose:  1,000 mg


Al Hydroxide/Mg Hydroxide (Maalox)  15 ml PER TUBE Q4H PRN


   PRN Reason: Heartburn  or Indigestion


Albuterol/Ipratropium (Duoneb)  3 ml NEB [id number]


   Last Admin: 06/17/18 08:19 Dose:  3 ml


Amiodarone HCl (Cordarone)  200 mg PO DAILY [facility]


   Last Admin: 06/17/18 09:14 Dose:  200 mg


Lipase/Protease/Amylase ([place] [address])  1 cap FS .PER PROTOCOL PRN


   PRN Reason: TUBE OCCLUSION PROTOCOL


Apixaban (Eliquis)  5 mg PO BID [facility]


   Last Admin: 06/17/18 09:15 Dose:  5 mg


Artificial Tears (Tears Renewed 15ml Bottle)  0 drop EA EYE PRN PRN


   PRN Reason: Dry Eyes


Aspirin (Aspirin Chewable)  81 mg PO DAILY [facility]


   Last Admin: 06/17/18 09:14 Dose:  81 mg


Bupropion HCl (Wellbutrin)  150 mg PER TUBE BID [facility]


   Last Admin: 06/17/18 09:14 Dose:  150 mg


Clonidine (Catapres)  0.1 mg PO Q4H PRN


   PRN Reason: Systolic BP > 180


   Last Admin: 05/18/18 21:05 Dose:  0.1 mg


Furosemide (Lasix)  40 mg PER TUBE DAILY-[facility]


   Last Admin: 06/17/18 09:14 Dose:  40 mg


Guaifenesin/Dextromethorphan (Robitussin Dm)  10 ml PER TUBE 0300,0900,1500,

2100 [facility]


   Last Admin: 06/17/18 09:13 Dose:  10 ml


Hydralazine HCl (Apresoline)  10 mg SLOW IVP Q4H PRN


   PRN Reason: Systolic BP > 180


   Last Admin: 05/12/18 17:06 Dose:  10 mg


Loperamide HCl (Imodium)  2 mg PER TUBE PRN PRN


   PRN Reason: Diarrhea/Loose Stools


Losartan Potassium (Cozaar)  12.5 mg PO DAILY [facility]


   Last Admin: 06/17/18 09:13 Dose:  Not Given


Magnesium Hydroxide (Milk Of Magnesium)  30 ml PER TUBE DAILYPRN PRN


   PRN Reason: Constipation


Mineral Oil/White Petrolatum (Eucerin Cream)  0 gm TOP BIDPRN PRN


   PRN Reason: Dry Skin


Nystatin (Mycostatin Powder)  30 gm TOP BID [facility]


   Last Admin: 06/17/18 09:16 Dose:  1 applic


Ondansetron HCl (Zofran Odt)  4 mg PO Q6H PRN


   PRN Reason: Nausea/Vomiting


   Last Admin: 06/01/18 09:09 Dose:  4 mg


Ondansetron HCl (Zofran)  4 mg IVP Q6H PRN


   PRN Reason: Nausea/Vomiting


Pantoprazole Sodium (Protonix)  40 mg PER TUBE DAILY [facility]


   Last Admin: 06/17/18 09:15 Dose:  40 mg


Phenol (Chloraseptic Spray 180 Ml Bot)  0 ml PO PRN PRN


   PRN Reason: Sore Throat


Potassium Chloride (Klor-Con)  20 meq PO QAM-WM [facility]


   Last Admin: 06/17/18 09:14 Dose:  20 meq


Scopolamine (Transderm Scop)  1.5 mg TD Q3D [facility]


   Last Admin: 06/15/18 09:27 Dose:  1.5 mg


Senna (Senokot)  2 tab PER TUBE HSPRN PRN


   PRN Reason: Constipation


Sodium Bicarbonate (Bicarbonate, Sodium)  650 mg PER TUBE .PER PROTOCOL PRN


   PRN Reason: ENTERAL TUBE OCCLUSION


Sodium Chloride (Flush - Normal Saline)  10 ml IVF Q12HR [facility]


   Last Admin: 06/17/18 09:15 Dose:  10 ml


Sodium Chloride (Flush - Normal Saline)  10 ml IVF PRN PRN


   PRN Reason: Saline Flush


   Last Admin: 06/11/18 05:41 Dose:  10 ml


Sodium Chloride (Ocean Nasal Spray 0.65%)  0 ml EA NARE QIDPRN PRN


   PRN Reason: Nasal Congestion

## 2018-06-18 RX ADMIN — Medication SCH ML: at 08:32

## 2018-06-18 RX ADMIN — NYSTATIN SCH APPLIC: 100000 POWDER TOPICAL at 20:26

## 2018-06-18 RX ADMIN — SCOPALAMINE SCH MG: 1 PATCH, EXTENDED RELEASE TRANSDERMAL at 09:09

## 2018-06-18 RX ADMIN — GUAIFENESIN AND DEXTROMETHORPHAN SCH ML: 100; 10 SYRUP ORAL at 20:24

## 2018-06-18 RX ADMIN — Medication SCH ML: at 20:24

## 2018-06-18 RX ADMIN — PANTOPRAZOLE SODIUM SCH MG: 40 GRANULE, DELAYED RELEASE ORAL at 08:31

## 2018-06-18 RX ADMIN — GUAIFENESIN AND DEXTROMETHORPHAN SCH ML: 100; 10 SYRUP ORAL at 15:14

## 2018-06-18 RX ADMIN — GUAIFENESIN AND DEXTROMETHORPHAN SCH ML: 100; 10 SYRUP ORAL at 08:31

## 2018-06-18 RX ADMIN — NYSTATIN SCH APPLIC: 100000 POWDER TOPICAL at 08:33

## 2018-06-18 RX ADMIN — GUAIFENESIN AND DEXTROMETHORPHAN SCH ML: 100; 10 SYRUP ORAL at 04:04

## 2018-06-18 NOTE — PRG
DATE OF SERVICE:  06/18/2018

 

The patient is doing well.

 

PHYSICAL EXAMINATION:

VITAL SIGNS:  Temperature is 98.1, pulse 80, respirations 18, O2 sat 92%, blood pressure 125/61.

HEENT:  Unremarkable.

NECK:  No JVD.  Trach in good position.

LUNGS:  Clear.

CARDIAC:  S1 and S2 regular.

ABDOMEN:  Soft.

EXTREMITIES:  Trace edema.

 

ASSESSMENT:  

1.  Obesity hypoventilation syndrome.

2.  Status post chronic respiratory failure requiring trach.

 

RECOMMENDATIONS:  This is really a rehabilitation issue at this point.  I am discouraged that the sta
ff is not getting the patient up into a chair.  She needs to learn how to manage transfers before she
 is allowed to go home.

## 2018-06-18 NOTE — PDOC.PN
- Subjective


Encounter Start Date: 06/18/18


Encounter Start Time: 09:15


Subjective: on trach collar


-: no sob, not wearing her speaking valve this am





- Objective


Resuscitation Status: 


 











Resuscitation Status           FULL:Full Resuscitation














MAR Reviewed: Yes


Vital Signs & Weight: 


 Vital Signs (12 hours)











  Temp Pulse Resp BP Pulse Ox


 


 06/18/18 08:09      92 L


 


 06/18/18 08:08   70  18   92 L


 


 06/18/18 07:42  98.1 F  74  18  125/61  91 L


 


 06/18/18 07:24  99.3 F  77  19   91 L


 


 06/18/18 04:05  99.3 F  77  19  133/58 L  92 L


 


 06/18/18 00:18   75  20   91 L


 


 06/17/18 23:56  98.6 F  74  19  118/45 L  95








 Weight











Admit Weight                   600 lb


 


Weight                         342 lb 6.4 oz











 Most Recent Monitor Data











Heart Rate from ECG            73


 


NIBP                           109/61


 


NIBP BP-Mean                   72


 


Respiration from ECG           29


 


SpO2                           94














I&O: 


 











 06/17/18 06/18/18 06/19/18





 06:59 06:59 06:59


 


Intake Total 780 2040 390


 


Output Total 950 1300 


 


Balance -170 740 390











Result Diagrams: 


 06/12/18 04:35





 06/12/18 04:35





Phys Exam





- Physical Examination


HEENT: PERRLA, sclera anicteric


Neck: no JVD


trach+


Respiratory: no wheezing, no rales


Cardiovascular: RRR, no significant murmur


Gastrointestinal: soft, non-tender, positive bowel sounds


peg+


Musculoskeletal: pulses present, edema present


Neurological: non-focal, moves all 4 limbs


Psychiatric: A&O x 3





Dx/Plan


(1) Acute and chronic respiratory failure


Code(s): J96.20 - ACUTE AND CHR RESP FAILURE, UNSP W HYPOXIA OR HYPERCAPNIA   

Status: Chronic   


Qualifiers: 


   Respiratory failure complication: hypoxia and hypercapnia   Qualified Code(s)

: J96.21 - Acute and chronic respiratory failure with hypoxia; J96.22 - Acute 

and chronic respiratory failure with hypercapnia; J96.22 - Acute and chronic 

respiratory failure with hypercapnia; J96.22 - Acute and chronic respiratory 

failure with hypercapnia   


Comment: Stable on trach collar now.    





(2) MARY (obstructive sleep apnea)


Code(s): G47.33 - OBSTRUCTIVE SLEEP APNEA (ADULT) (PEDIATRIC)   Status: Chronic

   





(3) Afib


Code(s): I48.91 - UNSPECIFIED ATRIAL FIBRILLATION   Status: Deleted   


Qualifiers: 


   Atrial fibrillation type: paroxysmal   Qualified Code(s): I48.0 - Paroxysmal 

atrial fibrillation   


Comment: Rate controlled. Continue Eliquis, ASA and Amiodarone. 


   





(4) Acute on chronic diastolic (congestive) heart failure


Code(s): I50.33 - ACUTE ON CHRONIC DIASTOLIC (CONGESTIVE) HEART FAILURE   Status

: Chronic   





(5) Pickwickian syndrome


Code(s): E66.2 - MORBID (SEVERE) OBESITY WITH ALVEOLAR HYPOVENTILATION   Status

: Chronic   Comment: s/p trach collar.    





(6) Status post tracheostomy


Code(s): Z93.0 - TRACHEOSTOMY STATUS   Status: Deleted   Comment: Had procedure 

on 5/7/18. Stable.    





(7) S/P percutaneous endoscopic gastrostomy (PEG) tube placement


Code(s): Z93.1 - GASTROSTOMY STATUS   Status: Deleted   Comment: Had procedure 

on 5/7/18. Stable.    





(8) Severe muscle deconditioning


Code(s): R29.898 - OT SYMPTOMS AND SIGNS INVOLVING THE MUSCULOSKELETAL SYSTEM 

  Status: Acute   





- Plan


PT/OT to mobilize more


-: on eliquis, amiod, nebs, asp, lasix


-: wellbutrin


-: dc plan home when able to learn transfers to wheel chair





* .








Review of Systems





- Medications/Allergies


Allergies/Adverse Reactions: 


 Allergies











Allergy/AdvReac Type Severity Reaction Status Date / Time


 


No Known Drug Allergies Allergy   Verified 05/06/18 14:16











Medications: 


 Current Medications





Acetaminophen (Tylenol Elixir)  1,000 mg PER TUBE Q6H PRN


   PRN Reason: Headache/Fever or Pain


   Last Admin: 06/18/18 09:11 Dose:  1,000 mg


Al Hydroxide/Mg Hydroxide (Maalox)  15 ml PER TUBE Q4H PRN


   PRN Reason: Heartburn  or Indigestion


Albuterol/Ipratropium (Duoneb)  3 ml NEB [id number]


   Last Admin: 06/18/18 08:08 Dose:  3 ml


Amiodarone HCl (Cordarone)  200 mg PO DAILY [facility]


   Last Admin: 06/18/18 08:31 Dose:  200 mg


Lipase/Protease/Amylase ([place] Dr [address])  1 cap FS .PER PROTOCOL PRN


   PRN Reason: TUBE OCCLUSION PROTOCOL


Apixaban (Eliquis)  5 mg PO BID [facility]


   Last Admin: 06/18/18 08:32 Dose:  5 mg


Artificial Tears (Tears Renewed 15ml Bottle)  0 drop EA EYE PRN PRN


   PRN Reason: Dry Eyes


Aspirin (Aspirin Chewable)  81 mg PO DAILY [facility]


   Last Admin: 06/18/18 08:31 Dose:  81 mg


Bupropion HCl (Wellbutrin)  150 mg PER TUBE BID [facility]


   Last Admin: 06/18/18 08:32 Dose:  150 mg


Clonidine (Catapres)  0.1 mg PO Q4H PRN


   PRN Reason: Systolic BP > 180


   Last Admin: 05/18/18 21:05 Dose:  0.1 mg


Furosemide (Lasix)  40 mg PER TUBE DAILY-[facility]


   Last Admin: 06/18/18 08:32 Dose:  40 mg


Guaifenesin/Dextromethorphan (Robitussin Dm)  10 ml PER TUBE 0300,0900,1500,

2100 [facility]


   Last Admin: 06/18/18 08:31 Dose:  10 ml


Hydralazine HCl (Apresoline)  10 mg SLOW IVP Q4H PRN


   PRN Reason: Systolic BP > 180


   Last Admin: 05/12/18 17:06 Dose:  10 mg


Loperamide HCl (Imodium)  2 mg PER TUBE PRN PRN


   PRN Reason: Diarrhea/Loose Stools


Losartan Potassium (Cozaar)  12.5 mg PO DAILY [facility]


   Last Admin: 06/18/18 08:30 Dose:  Not Given


Magnesium Hydroxide (Milk Of Magnesium)  30 ml PER TUBE DAILYPRN PRN


   PRN Reason: Constipation


Mineral Oil/White Petrolatum (Eucerin Cream)  0 gm TOP BIDPRN PRN


   PRN Reason: Dry Skin


Nystatin (Mycostatin Powder)  30 gm TOP BID [facility]


   Last Admin: 06/18/18 08:33 Dose:  1 applic


Ondansetron HCl (Zofran Odt)  4 mg PO Q6H PRN


   PRN Reason: Nausea/Vomiting


   Last Admin: 06/01/18 09:09 Dose:  4 mg


Ondansetron HCl (Zofran)  4 mg IVP Q6H PRN


   PRN Reason: Nausea/Vomiting


Pantoprazole Sodium (Protonix)  40 mg PER TUBE DAILY [facility]


   Last Admin: 06/18/18 08:31 Dose:  40 mg


Phenol (Chloraseptic Spray 180 Ml Bot)  0 ml PO PRN PRN


   PRN Reason: Sore Throat


Potassium Chloride (Klor-Con)  20 meq PO QAM-WM [facility]


   Last Admin: 06/18/18 08:31 Dose:  20 meq


Scopolamine (Transderm Scop)  1.5 mg TD Q3D [facility]


   Last Admin: 06/18/18 09:09 Dose:  1.5 mg


Senna (Senokot)  2 tab PER TUBE HSPRN PRN


   PRN Reason: Constipation


Sodium Bicarbonate (Bicarbonate, Sodium)  650 mg PER TUBE .PER PROTOCOL PRN


   PRN Reason: ENTERAL TUBE OCCLUSION


Sodium Chloride (Flush - Normal Saline)  10 ml IVF Q12HR [facility]


   Last Admin: 06/18/18 08:32 Dose:  10 ml


Sodium Chloride (Flush - Normal Saline)  10 ml IVF PRN PRN


   PRN Reason: Saline Flush


   Last Admin: 06/11/18 05:41 Dose:  10 ml


Sodium Chloride (Ocean Nasal Spray 0.65%)  0 ml EA NARE QIDPRN PRN


   PRN Reason: Nasal Congestion

## 2018-06-19 LAB
CREAT CL PREDICTED SERPL C-G-VRATE: 196 ML/MIN (ref 70–130)
HGB BLD-MCNC: 10.4 G/DL (ref 12–16)
PLATELET # BLD AUTO: 88 THOU/UL (ref 130–400)

## 2018-06-19 RX ADMIN — PANTOPRAZOLE SODIUM SCH MG: 40 GRANULE, DELAYED RELEASE ORAL at 10:57

## 2018-06-19 RX ADMIN — Medication SCH ML: at 21:17

## 2018-06-19 RX ADMIN — GUAIFENESIN AND DEXTROMETHORPHAN SCH ML: 100; 10 SYRUP ORAL at 21:16

## 2018-06-19 RX ADMIN — NYSTATIN SCH APPLIC: 100000 POWDER TOPICAL at 21:17

## 2018-06-19 RX ADMIN — Medication SCH ML: at 11:00

## 2018-06-19 RX ADMIN — NYSTATIN SCH APPLIC: 100000 POWDER TOPICAL at 10:59

## 2018-06-19 RX ADMIN — GUAIFENESIN AND DEXTROMETHORPHAN SCH ML: 100; 10 SYRUP ORAL at 16:33

## 2018-06-19 RX ADMIN — GUAIFENESIN AND DEXTROMETHORPHAN SCH ML: 100; 10 SYRUP ORAL at 10:59

## 2018-06-19 RX ADMIN — GUAIFENESIN AND DEXTROMETHORPHAN SCH ML: 100; 10 SYRUP ORAL at 03:39

## 2018-06-19 NOTE — PDOC.PN
- Subjective


Encounter Start Date: 06/19/18


Encounter Start Time: 10:00


Subjective: awake, no sob, follows verbal stimuli





- Objective


Resuscitation Status: 


 











Resuscitation Status           FULL:Full Resuscitation














MAR Reviewed: Yes


Vital Signs & Weight: 


 Vital Signs (12 hours)











  Temp Pulse Resp BP Pulse Ox


 


 06/19/18 11:02  98.9 F  73  20  126/54 L  93 L


 


 06/19/18 08:00  98.9 F  73  20   96


 


 06/19/18 07:43  97.5 F L  74  19  114/55 L  93 L


 


 06/19/18 07:29   77  18   91 L


 


 06/19/18 04:00  98.8 F  73  16  100/52 L  87 L


 


 06/19/18 03:14      89 L


 


 06/19/18 00:00  98.6 F  73  20  119/49 L  89 L








 Weight











Admit Weight                   600 lb


 


Weight                         342 lb 1.6 oz











 Most Recent Monitor Data











Heart Rate from ECG            73


 


NIBP                           109/61


 


NIBP BP-Mean                   72


 


Respiration from ECG           29


 


SpO2                           94














I&O: 


 











 06/18/18 06/19/18 06/20/18





 06:59 06:59 06:59


 


Intake Total 2040 1340 


 


Output Total 1300 1100 


 


Balance 740 240 











Result Diagrams: 


 06/19/18 04:04





 06/19/18 04:04





Phys Exam





- Physical Examination


HEENT: PERRLA, moist MMs


Neck: no JVD


trach+


Respiratory: no wheezing, no rales


Cardiovascular: RRR, no significant murmur


Gastrointestinal: soft, positive bowel sounds


peg+


Musculoskeletal: pulses present, edema present


Neurological: non-focal, moves all 4 limbs


Psychiatric: A&O x 3





Dx/Plan


(1) Acute and chronic respiratory failure


Code(s): J96.20 - ACUTE AND CHR RESP FAILURE, UNSP W HYPOXIA OR HYPERCAPNIA   

Status: Chronic   


Qualifiers: 


   Respiratory failure complication: hypoxia and hypercapnia   Qualified Code(s)

: J96.21 - Acute and chronic respiratory failure with hypoxia; J96.22 - Acute 

and chronic respiratory failure with hypercapnia; J96.22 - Acute and chronic 

respiratory failure with hypercapnia; J96.22 - Acute and chronic respiratory 

failure with hypercapnia   


Comment: Stable on trach collar now.    





(2) MARY (obstructive sleep apnea)


Code(s): G47.33 - OBSTRUCTIVE SLEEP APNEA (ADULT) (PEDIATRIC)   Status: Chronic

   





(3) Afib


Code(s): I48.91 - UNSPECIFIED ATRIAL FIBRILLATION   Status: Deleted   


Qualifiers: 


   Atrial fibrillation type: paroxysmal   Qualified Code(s): I48.0 - Paroxysmal 

atrial fibrillation   


Comment: Rate controlled. Continue Eliquis, ASA and Amiodarone. 


   





(4) Acute on chronic diastolic (congestive) heart failure


Code(s): I50.33 - ACUTE ON CHRONIC DIASTOLIC (CONGESTIVE) HEART FAILURE   Status

: Chronic   





(5) Pickwickian syndrome


Code(s): E66.2 - MORBID (SEVERE) OBESITY WITH ALVEOLAR HYPOVENTILATION   Status

: Chronic   Comment: s/p trach collar.    





(6) Status post tracheostomy


Code(s): Z93.0 - TRACHEOSTOMY STATUS   Status: Deleted   Comment: Had procedure 

on 5/7/18. Stable.    





(7) S/P percutaneous endoscopic gastrostomy (PEG) tube placement


Code(s): Z93.1 - GASTROSTOMY STATUS   Status: Deleted   Comment: Had procedure 

on 5/7/18. Stable.    





(8) Severe muscle deconditioning


Code(s): R29.898 - OT SYMPTOMS AND SIGNS INVOLVING THE MUSCULOSKELETAL SYSTEM 

  Status: Acute   





- Plan


for oral feeding trials from today, has cleared ba swallow per speech


-: pt to wear speaking valve for longer periods as tolerated


-: PT/OT to mobilize pt more/learn to pivot for dc planning


-: is on eliquis, asp, amiodarone, cozaar


-: hemostable





* .








Review of Systems





- Medications/Allergies


Allergies/Adverse Reactions: 


 Allergies











Allergy/AdvReac Type Severity Reaction Status Date / Time


 


No Known Drug Allergies Allergy   Verified 05/06/18 14:16











Medications: 


 Current Medications





Acetaminophen (Tylenol Elixir)  1,000 mg PER TUBE Q6H PRN


   PRN Reason: Headache/Fever or Pain


   Last Admin: 06/18/18 09:11 Dose:  1,000 mg


Al Hydroxide/Mg Hydroxide (Maalox)  15 ml PER TUBE Q4H PRN


   PRN Reason: Heartburn  or Indigestion


Albuterol/Ipratropium (Duoneb)  3 ml NEB [id number] K[facility]


   Last Admin: 06/19/18 07:29 Dose:  3 ml


Amiodarone HCl (Cordarone)  200 mg PO DAILY [facility]


   Last Admin: 06/19/18 10:57 Dose:  200 mg


Lipase/Protease/Amylase ([place] Dr [address])  1 cap FS .PER PROTOCOL PRN


   PRN Reason: TUBE OCCLUSION PROTOCOL


Apixaban (Eliquis)  5 mg PO BID [facility]


   Last Admin: 06/19/18 10:57 Dose:  5 mg


Artificial Tears (Tears Renewed 15ml Bottle)  0 drop EA EYE PRN PRN


   PRN Reason: Dry Eyes


Aspirin (Aspirin Chewable)  81 mg PO DAILY [facility]


   Last Admin: 06/19/18 10:58 Dose:  81 mg


Bupropion HCl (Wellbutrin)  150 mg PER TUBE BID [facility]


   Last Admin: 06/19/18 10:58 Dose:  150 mg


Clonidine (Catapres)  0.1 mg PO Q4H PRN


   PRN Reason: Systolic BP > 180


   Last Admin: 05/18/18 21:05 Dose:  0.1 mg


Furosemide (Lasix)  40 mg PER TUBE DAILY-[facility]


   Last Admin: 06/19/18 10:57 Dose:  40 mg


Guaifenesin/Dextromethorphan (Robitussin Dm)  10 ml PER TUBE 0300,0900,1500,

2100 [facility]


   Last Admin: 06/19/18 10:59 Dose:  10 ml


Hydralazine HCl (Apresoline)  10 mg SLOW IVP Q4H PRN


   PRN Reason: Systolic BP > 180


   Last Admin: 05/12/18 17:06 Dose:  10 mg


Loperamide HCl (Imodium)  2 mg PER TUBE PRN PRN


   PRN Reason: Diarrhea/Loose Stools


Losartan Potassium (Cozaar)  12.5 mg PO DAILY [facility]


   Last Admin: 06/19/18 11:47 Dose:  12.5 mg


Magnesium Hydroxide (Milk Of Magnesium)  30 ml PER TUBE DAILYPRN PRN


   PRN Reason: Constipation


Mineral Oil/White Petrolatum (Eucerin Cream)  0 gm TOP BIDPRN PRN


   PRN Reason: Dry Skin


Nystatin (Mycostatin Powder)  30 gm TOP BID [facility]


   Last Admin: 06/19/18 10:59 Dose:  1 applic


Ondansetron HCl (Zofran Odt)  4 mg PO Q6H PRN


   PRN Reason: Nausea/Vomiting


   Last Admin: 06/01/18 09:09 Dose:  4 mg


Ondansetron HCl (Zofran)  4 mg IVP Q6H PRN


   PRN Reason: Nausea/Vomiting


Pantoprazole Sodium (Protonix)  40 mg PER TUBE DAILY [facility]


   Last Admin: 06/19/18 10:57 Dose:  40 mg


Phenol (Chloraseptic Spray 180 Ml Bot)  0 ml PO PRN PRN


   PRN Reason: Sore Throat


Potassium Chloride (Klor-Con)  20 meq PO QAM-WM [facility]


   Last Admin: 06/19/18 10:58 Dose:  20 meq


Scopolamine (Transderm Scop)  1.5 mg TD Q3D [facility]


   Last Admin: 06/18/18 09:09 Dose:  1.5 mg


Senna (Senokot)  2 tab PER TUBE HSPRN PRN


   PRN Reason: Constipation


Sodium Bicarbonate (Bicarbonate, Sodium)  650 mg PER TUBE .PER PROTOCOL PRN


   PRN Reason: ENTERAL TUBE OCCLUSION


Sodium Chloride (Flush - Normal Saline)  10 ml IVF Q12HR [facility]


   Last Admin: 06/19/18 11:00 Dose:  10 ml


Sodium Chloride (Flush - Normal Saline)  10 ml IVF PRN PRN


   PRN Reason: Saline Flush


   Last Admin: 06/11/18 05:41 Dose:  10 ml


Sodium Chloride (Ocean Nasal Spray 0.65%)  0 ml EA NARE QIDPRN PRN


   PRN Reason: Nasal Congestion

## 2018-06-19 NOTE — PRG
DATE OF SERVICE:  06/19/2018

 

The patient is doing okay.  I am not sure she made much progress in terms of physical therapy yesterd
ay.

 

PHYSICAL EXAMINATION:

VITAL SIGNS:  Temperature 97.5, pulse 84, respirations 19, O2 sat 93%, blood pressure 114/55.  24 kirk
r intake 1340, output 1100.  Weight 342 pounds.

HEENT:  Unremarkable.

NECK:  No JVD.

LUNGS:  Fairly clear.

CARDIAC:  S1 and S2 regular.

ABDOMEN:  Soft.

EXTREMITIES:  Trace edema.

 

LABORATORY DATA:  Hematocrit 34, platelet count 88.  

 

ASSESSMENT:  No overt changes in clinical status.

 

PLAN:  

1.  Need to focus on physical therapy.

2.  Watch platelet count closely given thrombocytopenia and patient being on concurrent anticoagulati
on.

## 2018-06-20 RX ADMIN — GUAIFENESIN AND DEXTROMETHORPHAN SCH ML: 100; 10 SYRUP ORAL at 17:32

## 2018-06-20 RX ADMIN — Medication SCH ML: at 10:48

## 2018-06-20 RX ADMIN — NYSTATIN SCH APPLIC: 100000 POWDER TOPICAL at 10:48

## 2018-06-20 RX ADMIN — GUAIFENESIN AND DEXTROMETHORPHAN SCH ML: 100; 10 SYRUP ORAL at 02:52

## 2018-06-20 RX ADMIN — GUAIFENESIN AND DEXTROMETHORPHAN SCH ML: 100; 10 SYRUP ORAL at 20:16

## 2018-06-20 RX ADMIN — GUAIFENESIN AND DEXTROMETHORPHAN SCH ML: 100; 10 SYRUP ORAL at 10:45

## 2018-06-20 RX ADMIN — PANTOPRAZOLE SODIUM SCH MG: 40 GRANULE, DELAYED RELEASE ORAL at 10:45

## 2018-06-20 RX ADMIN — Medication SCH ML: at 20:17

## 2018-06-20 RX ADMIN — NYSTATIN SCH APPLIC: 100000 POWDER TOPICAL at 20:17

## 2018-06-20 NOTE — PRG
DATE OF SERVICE:  06/20/2018

 

The patient is advanced in terms of diet and is now on regular food, that seems to make her much happ
ier.

 

PHYSICAL EXAMINATION: 

VITAL SIGNS:  On exam temperature is 98.0, pulse 82, respirations 20, O2 sat 96%.

HEENT:  Unremarkable.

NECK:  No JVD.  Trach looks clear.

CARDIAC:  S1 and S2 regular.

LUNGS:  Clear.

ABDOMEN:  Soft, obese.

EXTREMITIES:  No edema.

 

ASSESSMENT:

1.  Obese hypoventilation syndrome.

2.  Status post tracheostomy.

3.  Chronic respiratory failure.

 

PLAN:  She needs to work aggressively on reconditioning as that is really the only thing that is keep
ing her in the hospital at this time.

## 2018-06-20 NOTE — PDOC.PN
- Subjective


Encounter Start Date: 06/20/18


Encounter Start Time: 10:20


Subjective: awake, eating her breakfast


-: no sob





- Objective


Resuscitation Status: 


 











Resuscitation Status           FULL:Full Resuscitation














MAR Reviewed: Yes


Vital Signs & Weight: 


 Vital Signs (12 hours)











  Temp Pulse Pulse Pulse Resp BP BP


 


 06/20/18 11:59  97.4 F L  73    23 H  


 


 06/20/18 11:15    75  78   115/53 L  128/56 L


 


 06/20/18 07:54  98.0 F  72    20  


 


 06/20/18 07:38  98.0 F  72    20  


 


 06/20/18 07:04   76    20  


 


 06/20/18 07:03       


 


 06/20/18 03:53  98.7 F  73    20  














  BP Pulse Ox Pulse Ox Pulse Ox


 


 06/20/18 11:59  115/53 L  97  


 


 06/20/18 11:15    96  98


 


 06/20/18 07:54   96  


 


 06/20/18 07:38  117/49 L  99  


 


 06/20/18 07:04   92 L  


 


 06/20/18 07:03   92 L  


 


 06/20/18 03:53  105/54 L  95  








 Weight











Admit Weight                   600 lb


 


Weight                         342 lb 1.6 oz











 Most Recent Monitor Data











Heart Rate from ECG            73


 


NIBP                           109/61


 


NIBP BP-Mean                   72


 


Respiration from ECG           29


 


SpO2                           94














I&O: 


 











 06/19/18 06/20/18 06/21/18





 06:59 06:59 06:59


 


Intake Total 1340 1430 350


 


Output Total 1100 600 


 


Balance 240 830 350











Result Diagrams: 


 06/19/18 04:04





 06/19/18 04:04





Phys Exam





- Physical Examination


HEENT: PERRLA, sclera anicteric


Neck: no JVD


trach+


Respiratory: no wheezing, no rales


Cardiovascular: RRR, no significant murmur


Gastrointestinal: soft, non-tender, positive bowel sounds


Musculoskeletal: no edema, pulses present


Neurological: non-focal, moves all 4 limbs


Psychiatric: normal affect, A&O x 3





Dx/Plan


(1) Acute and chronic respiratory failure


Code(s): J96.20 - ACUTE AND CHR RESP FAILURE, UNSP W HYPOXIA OR HYPERCAPNIA   

Status: Chronic   


Qualifiers: 


   Respiratory failure complication: hypoxia and hypercapnia   Qualified Code(s)

: J96.21 - Acute and chronic respiratory failure with hypoxia; J96.22 - Acute 

and chronic respiratory failure with hypercapnia; J96.22 - Acute and chronic 

respiratory failure with hypercapnia; J96.22 - Acute and chronic respiratory 

failure with hypercapnia   


Comment: Stable on trach collar now.    





(2) MARY (obstructive sleep apnea)


Code(s): G47.33 - OBSTRUCTIVE SLEEP APNEA (ADULT) (PEDIATRIC)   Status: Chronic

   





(3) Afib


Code(s): I48.91 - UNSPECIFIED ATRIAL FIBRILLATION   Status: Deleted   


Qualifiers: 


   Atrial fibrillation type: paroxysmal   Qualified Code(s): I48.0 - Paroxysmal 

atrial fibrillation   


Comment: Rate controlled. Continue Eliquis, ASA and Amiodarone. 


   





(4) Acute on chronic diastolic (congestive) heart failure


Code(s): I50.33 - ACUTE ON CHRONIC DIASTOLIC (CONGESTIVE) HEART FAILURE   Status

: Chronic   





(5) Pickwickian syndrome


Code(s): E66.2 - MORBID (SEVERE) OBESITY WITH ALVEOLAR HYPOVENTILATION   Status

: Chronic   Comment: s/p trach collar.    





(6) Status post tracheostomy


Code(s): Z93.0 - TRACHEOSTOMY STATUS   Status: Deleted   Comment: Had procedure 

on 5/7/18. Stable.    





(7) S/P percutaneous endoscopic gastrostomy (PEG) tube placement


Code(s): Z93.1 - GASTROSTOMY STATUS   Status: Deleted   Comment: Had procedure 

on 5/7/18. Stable.    





(8) Severe muscle deconditioning


Code(s): R29.898 - OT SYMPTOMS AND SIGNS INVOLVING THE MUSCULOSKELETAL SYSTEM 

  Status: Acute   





- Plan


hemostable


-: dc scopalamine, she is eating orally now 


-: PT to mobilize more as tolerated


-: dc plan home if she can pivot and get into a wheel chair


-: continue amiod, asp, eliquis, cozaar and lasix





* .








Review of Systems





- Medications/Allergies


Allergies/Adverse Reactions: 


 Allergies











Allergy/AdvReac Type Severity Reaction Status Date / Time


 


No Known Drug Allergies Allergy   Verified 05/06/18 14:16











Medications: 


 Current Medications





Acetaminophen (Tylenol Elixir)  1,000 mg PER TUBE Q6H PRN


   PRN Reason: Headache/Fever or Pain


   Last Admin: 06/18/18 09:11 Dose:  1,000 mg


Al Hydroxide/Mg Hydroxide (Maalox)  15 ml PER TUBE Q4H PRN


   PRN Reason: Heartburn  or Indigestion


Albuterol/Ipratropium (Duoneb)  3 ml NEB [id number] [facility]


   Last Admin: 06/20/18 07:04 Dose:  3 ml


Amiodarone HCl (Cordarone)  200 mg PO DAILY [facility]


   Last Admin: 06/20/18 10:47 Dose:  200 mg


Lipase/Protease/Amylase ([place] [id number])  1 cap FS .PER PROTOCOL PRN


   PRN Reason: TUBE OCCLUSION PROTOCOL


Apixaban (Eliquis)  5 mg PO BID [facility]


   Last Admin: 06/20/18 10:46 Dose:  5 mg


Artificial Tears (Tears Renewed 15ml Bottle)  0 drop EA EYE PRN PRN


   PRN Reason: Dry Eyes


Aspirin (Aspirin Chewable)  81 mg PO DAILY [facility]


   Last Admin: 06/20/18 10:47 Dose:  81 mg


Bupropion HCl (Wellbutrin)  150 mg PER TUBE BID [facility]


   Last Admin: 06/20/18 10:45 Dose:  150 mg


Clonidine (Catapres)  0.1 mg PO Q4H PRN


   PRN Reason: Systolic BP > 180


   Last Admin: 05/18/18 21:05 Dose:  0.1 mg


Furosemide (Lasix)  40 mg PER TUBE DAILY-AC [facility]


   Last Admin: 06/20/18 10:46 Dose:  40 mg


Guaifenesin/Dextromethorphan (Robitussin Dm)  10 ml PER TUBE 0300,0900,1500,

2100 [facility]


   Last Admin: 06/20/18 10:45 Dose:  10 ml


Hydralazine HCl (Apresoline)  10 mg SLOW IVP Q4H PRN


   PRN Reason: Systolic BP > 180


   Last Admin: 05/12/18 17:06 Dose:  10 mg


Loperamide HCl (Imodium)  2 mg PER TUBE PRN PRN


   PRN Reason: Diarrhea/Loose Stools


Losartan Potassium (Cozaar)  12.5 mg PO DAILY [facility]


   Last Admin: 06/20/18 10:46 Dose:  12.5 mg


Magnesium Hydroxide (Milk Of Magnesium)  30 ml PER TUBE DAILYPRN PRN


   PRN Reason: Constipation


Mineral Oil/White Petrolatum (Eucerin Cream)  0 gm TOP BIDPRN PRN


   PRN Reason: Dry Skin


Nystatin (Mycostatin Powder)  30 gm TOP BID [facility]


   Last Admin: 06/20/18 10:48 Dose:  1 applic


Ondansetron HCl (Zofran Odt)  4 mg PO Q6H PRN


   PRN Reason: Nausea/Vomiting


   Last Admin: 06/01/18 09:09 Dose:  4 mg


Ondansetron HCl (Zofran)  4 mg IVP Q6H PRN


   PRN Reason: Nausea/Vomiting


Pantoprazole Sodium (Protonix)  40 mg PER TUBE DAILY [facility]


   Last Admin: 06/20/18 10:45 Dose:  40 mg


Phenol (Chloraseptic Spray 180 Ml Bot)  0 ml PO PRN PRN


   PRN Reason: Sore Throat


Potassium Chloride (Klor-Con)  20 meq PO QAM-WM [facility]


   Last Admin: 06/20/18 10:46 Dose:  20 meq


Senna (Senokot)  2 tab PER TUBE HSPRN PRN


   PRN Reason: Constipation


Sodium Bicarbonate (Bicarbonate, Sodium)  650 mg PER TUBE .PER PROTOCOL PRN


   PRN Reason: ENTERAL TUBE OCCLUSION


Sodium Chloride (Flush - Normal Saline)  10 ml IVF Q12HR [facility]


   Last Admin: 06/20/18 10:48 Dose:  10 ml


Sodium Chloride (Flush - Normal Saline)  10 ml IVF PRN PRN


   PRN Reason: Saline Flush


   Last Admin: 06/11/18 05:41 Dose:  10 ml


Sodium Chloride (Ocean Nasal Spray 0.65%)  0 ml EA NARE QIDPRN PRN


   PRN Reason: Nasal Congestion

## 2018-06-21 LAB
CREAT CL PREDICTED SERPL C-G-VRATE: 190 ML/MIN (ref 70–130)
HGB BLD-MCNC: 10.1 G/DL (ref 12–16)
PLATELET # BLD AUTO: 94 THOU/UL (ref 130–400)

## 2018-06-21 RX ADMIN — PANTOPRAZOLE SODIUM SCH MG: 40 GRANULE, DELAYED RELEASE ORAL at 10:54

## 2018-06-21 RX ADMIN — NYSTATIN SCH APPLIC: 100000 POWDER TOPICAL at 20:26

## 2018-06-21 RX ADMIN — Medication SCH ML: at 10:56

## 2018-06-21 RX ADMIN — GUAIFENESIN AND DEXTROMETHORPHAN SCH ML: 100; 10 SYRUP ORAL at 20:19

## 2018-06-21 RX ADMIN — NYSTATIN SCH APPLIC: 100000 POWDER TOPICAL at 10:56

## 2018-06-21 RX ADMIN — GUAIFENESIN AND DEXTROMETHORPHAN SCH ML: 100; 10 SYRUP ORAL at 03:46

## 2018-06-21 RX ADMIN — GUAIFENESIN AND DEXTROMETHORPHAN SCH ML: 100; 10 SYRUP ORAL at 10:54

## 2018-06-21 RX ADMIN — Medication SCH ML: at 20:18

## 2018-06-21 RX ADMIN — GUAIFENESIN AND DEXTROMETHORPHAN SCH ML: 100; 10 SYRUP ORAL at 17:02

## 2018-06-21 NOTE — RAD
MODIFIED BARIUM SWALLOW 

6/19/18

 

COMPARISON:  

None.

 

HISTORY: 

Dysphagia and feeding difficulties.

 

FINDINGS:  

Modified barium swallow is performed in conjunction with a member of the Division of Speech Pathology
. The patient was imaged in the lateral projection swallowing various consistencies of barium. 

 

FINDINGS:  

No penetration or aspiration is seen on this examination. 

 

IMPRESSION:  

No penetration or aspiration seen. 

 

POS: RADHA

## 2018-06-21 NOTE — PDOC.PN
- Subjective


Encounter Start Date: 06/21/18


Encounter Start Time: 11:00


Subjective: awake, no sob


-: responds well to verbal stimuli





- Objective


Resuscitation Status: 


 











Resuscitation Status           FULL:Full Resuscitation














MAR Reviewed: Yes


Vital Signs & Weight: 


 Vital Signs (12 hours)











  Temp Pulse Pulse Pulse Resp BP BP


 


 06/21/18 11:28  98.0 F  68    20  


 


 06/21/18 10:38    75  71   121/67  126/59 L


 


 06/21/18 08:00  98.0 F  68    20  


 


 06/21/18 07:51   70    18  


 


 06/21/18 07:48  97.9 F  68    20  


 


 06/21/18 04:08  98.6 F  94    17  


 


 06/21/18 01:16   92    18  














  BP Pulse Ox Pulse Ox Pulse Ox


 


 06/21/18 11:28  126/59 L  99  


 


 06/21/18 10:38    100  98


 


 06/21/18 08:00   96  


 


 06/21/18 07:51   100  


 


 06/21/18 07:48  103/59 L  98  


 


 06/21/18 04:08  103/58 L  98  


 


 06/21/18 01:16   97  








 Weight











Admit Weight                   600 lb


 


Weight                         342 lb 1.6 oz











 Most Recent Monitor Data











Heart Rate from ECG            73


 


NIBP                           109/61


 


NIBP BP-Mean                   72


 


Respiration from ECG           29


 


SpO2                           94














I&O: 


 











 06/20/18 06/21/18 06/22/18





 06:59 06:59 06:59


 


Intake Total 1430 1860 


 


Output Total 600 1550 


 


Balance 830 310 











Result Diagrams: 


 06/21/18 03:40





 06/21/18 03:40





Phys Exam





- Physical Examination


HEENT: PERRLA, sclera anicteric


Neck: no JVD, supple


trach+


Respiratory: no wheezing, no rales


Cardiovascular: RRR, no significant murmur


Gastrointestinal: soft, non-tender, positive bowel sounds


peg+


Musculoskeletal: pulses present, edema present


Neurological: non-focal, moves all 4 limbs


Psychiatric: normal affect, A&O x 3





Dx/Plan


(1) Acute and chronic respiratory failure


Code(s): J96.20 - ACUTE AND CHR RESP FAILURE, UNSP W HYPOXIA OR HYPERCAPNIA   

Status: Chronic   


Qualifiers: 


   Respiratory failure complication: hypoxia and hypercapnia   Qualified Code(s)

: J96.21 - Acute and chronic respiratory failure with hypoxia; J96.22 - Acute 

and chronic respiratory failure with hypercapnia; J96.22 - Acute and chronic 

respiratory failure with hypercapnia; J96.22 - Acute and chronic respiratory 

failure with hypercapnia   


Comment: Stable on trach collar.    





(2) MARY (obstructive sleep apnea)


Code(s): G47.33 - OBSTRUCTIVE SLEEP APNEA (ADULT) (PEDIATRIC)   Status: Chronic

   





(3) Afib


Code(s): I48.91 - UNSPECIFIED ATRIAL FIBRILLATION   Status: Chronic   


Qualifiers: 


   Atrial fibrillation type: paroxysmal   Qualified Code(s): I48.0 - Paroxysmal 

atrial fibrillation   


Comment: Rate controlled. Continue Eliquis, ASA and Amiodarone. 


   





(4) Acute on chronic diastolic (congestive) heart failure


Code(s): I50.33 - ACUTE ON CHRONIC DIASTOLIC (CONGESTIVE) HEART FAILURE   Status

: Chronic   





(5) Pickwickian syndrome


Code(s): E66.2 - MORBID (SEVERE) OBESITY WITH ALVEOLAR HYPOVENTILATION   Status

: Chronic   Comment: s/p trach collar.    





(6) Status post tracheostomy


Code(s): Z93.0 - TRACHEOSTOMY STATUS   Status: Deleted   Comment: Had procedure 

on 5/7/18. Stable.    





(7) S/P percutaneous endoscopic gastrostomy (PEG) tube placement


Code(s): Z93.1 - GASTROSTOMY STATUS   Status: Deleted   Comment: Had procedure 

on 5/7/18. Stable.    





(8) Severe muscle deconditioning


Code(s): R29.898 - OT SYMPTOMS AND SIGNS INVOLVING THE MUSCULOSKELETAL SYSTEM 

  Status: Acute   





- Plan


hemostable


-: tolerating oral diet


-: to mobilize for dc plan


-: d/w PT, CM on floor


-: will likely need Eli lift or similar for her weight for dc





* .


On amiodarone, eliquis, asp, cozaar and lasix.





Review of Systems





- Medications/Allergies


Allergies/Adverse Reactions: 


 Allergies











Allergy/AdvReac Type Severity Reaction Status Date / Time


 


No Known Drug Allergies Allergy   Verified 05/06/18 14:16











Medications: 


 Current Medications





Acetaminophen (Tylenol Elixir)  1,000 mg PER TUBE Q6H PRN


   PRN Reason: Headache/Fever or Pain


   Last Admin: 06/21/18 08:16 Dose:  1,000 mg


Al Hydroxide/Mg Hydroxide (Maalox)  15 ml PER TUBE Q4H PRN


   PRN Reason: Heartburn  or Indigestion


Albuterol/Ipratropium (Duoneb)  3 ml NEB [id number] [facility]


   Last Admin: 06/21/18 07:51 Dose:  3 ml


Amiodarone HCl (Cordarone)  200 mg PO DAILY [facility]


   Last Admin: 06/21/18 10:54 Dose:  200 mg


Lipase/Protease/Amylase ([place] [id number])  1 cap FS .PER PROTOCOL PRN


   PRN Reason: TUBE OCCLUSION PROTOCOL


Apixaban (Eliquis)  5 mg PO BID [facility]


   Last Admin: 06/21/18 10:54 Dose:  5 mg


Artificial Tears (Tears Renewed 15ml Bottle)  0 drop EA EYE PRN PRN


   PRN Reason: Dry Eyes


Aspirin (Aspirin Chewable)  81 mg PO DAILY [facility]


   Last Admin: 06/21/18 10:54 Dose:  81 mg


Bupropion HCl (Wellbutrin)  150 mg PER TUBE BID [facility]


   Last Admin: 06/21/18 10:55 Dose:  150 mg


Clonidine (Catapres)  0.1 mg PO Q4H PRN


   PRN Reason: Systolic BP > 180


   Last Admin: 05/18/18 21:05 Dose:  0.1 mg


Furosemide (Lasix)  40 mg PER TUBE DAILY-AC [facility]


   Last Admin: 06/21/18 10:53 Dose:  40 mg


Guaifenesin/Dextromethorphan (Robitussin Dm)  10 ml PER TUBE 0300,0900,1500,

2100 [facility]


   Last Admin: 06/21/18 10:54 Dose:  10 ml


Hydralazine HCl (Apresoline)  10 mg SLOW IVP Q4H PRN


   PRN Reason: Systolic BP > 180


   Last Admin: 05/12/18 17:06 Dose:  10 mg


Loperamide HCl (Imodium)  2 mg PER TUBE PRN PRN


   PRN Reason: Diarrhea/Loose Stools


Losartan Potassium (Cozaar)  12.5 mg PO DAILY [facility]


   Last Admin: 06/21/18 10:54 Dose:  12.5 mg


Magnesium Hydroxide (Milk Of Magnesium)  30 ml PER TUBE DAILYPRN PRN


   PRN Reason: Constipation


Mineral Oil/White Petrolatum (Eucerin Cream)  0 gm TOP BIDPRN PRN


   PRN Reason: Dry Skin


Nystatin (Mycostatin Powder)  30 gm TOP BID [facility]


   Last Admin: 06/21/18 10:56 Dose:  1 applic


Ondansetron HCl (Zofran Odt)  4 mg PO Q6H PRN


   PRN Reason: Nausea/Vomiting


   Last Admin: 06/01/18 09:09 Dose:  4 mg


Ondansetron HCl (Zofran)  4 mg IVP Q6H PRN


   PRN Reason: Nausea/Vomiting


Pantoprazole Sodium (Protonix)  40 mg PER TUBE DAILY [facility]


   Last Admin: 06/21/18 10:54 Dose:  40 mg


Phenol (Chloraseptic Spray 180 Ml Bot)  0 ml PO PRN PRN


   PRN Reason: Sore Throat


Potassium Chloride (Klor-Con)  20 meq PO QAM-WM [facility]


   Last Admin: 06/21/18 10:54 Dose:  20 meq


Senna (Senokot)  2 tab PER TUBE HSPRN PRN


   PRN Reason: Constipation


Sodium Bicarbonate (Bicarbonate, Sodium)  650 mg PER TUBE .PER PROTOCOL PRN


   PRN Reason: ENTERAL TUBE OCCLUSION


Sodium Chloride (Flush - Normal Saline)  10 ml IVF Q12HR [facility]


   Last Admin: 06/21/18 10:56 Dose:  10 ml


Sodium Chloride (Flush - Normal Saline)  10 ml IVF PRN PRN


   PRN Reason: Saline Flush


   Last Admin: 06/11/18 05:41 Dose:  10 ml


Sodium Chloride (Ocean Nasal Spray 0.65%)  0 ml EA NARE QIDPRN PRN


   PRN Reason: Nasal Congestion

## 2018-06-21 NOTE — PRG
DATE OF SERVICE:  06/21/2018

 

SUBJECTIVE:  The patient is doing well, has no complaints.

 

OBJECTIVE:

VITAL SIGNS:  Temperature 97.9, pulse 70, respiratory rate 18, O2 saturation 100%, blood pressure 103
/59.

HEENT:  Unremarkable.

NECK:  No JVD.

CHEST:  Clear.

CARDIAC:  S1 and S2 regular.

ABDOMEN:  Soft.

EXTREMITIES:  No edema.

 

ASSESSMENT:

1.  Obstructive sleep apnea/obesity hypoventilation syndrome.

2.  Status post trach.

 

PLAN:  Really rehabilitation project at this point.  I have reviewed the orders.  Everything seems ap
propriate since she is on anticoagulation.  She is having H&H and platelets checked every 3 days.  To
day, his numbers look okay.

## 2018-06-22 RX ADMIN — NYSTATIN SCH APPLIC: 100000 POWDER TOPICAL at 20:20

## 2018-06-22 RX ADMIN — NYSTATIN SCH APPLIC: 100000 POWDER TOPICAL at 09:19

## 2018-06-22 RX ADMIN — PANTOPRAZOLE SODIUM SCH MG: 40 GRANULE, DELAYED RELEASE ORAL at 09:18

## 2018-06-22 RX ADMIN — GUAIFENESIN AND DEXTROMETHORPHAN SCH ML: 100; 10 SYRUP ORAL at 20:19

## 2018-06-22 RX ADMIN — Medication SCH ML: at 20:19

## 2018-06-22 RX ADMIN — GUAIFENESIN AND DEXTROMETHORPHAN SCH: 100; 10 SYRUP ORAL at 03:10

## 2018-06-22 RX ADMIN — Medication SCH ML: at 09:19

## 2018-06-22 RX ADMIN — GUAIFENESIN AND DEXTROMETHORPHAN SCH ML: 100; 10 SYRUP ORAL at 09:16

## 2018-06-22 RX ADMIN — GUAIFENESIN AND DEXTROMETHORPHAN SCH ML: 100; 10 SYRUP ORAL at 15:26

## 2018-06-22 NOTE — PRG
DATE OF SERVICE:  06/22/2018

 

The patient is doing well except for secretions.

 

PHYSICAL EXAMINATION: 

VITAL SIGNS:  Temperature  is 98.1, pulse 72, respirations 16, O2 sat 97%.

HEENT:  Unremarkable.

NECK:  No adenopathy or JVD.

LUNGS:  Clear.

CARDIAC:  S1 and S2 regular.

ABDOMEN:  Soft.

EXTREMITIES:  No edema.

 

ASSESSMENT:  

1.  Severe deconditioning. 

2.  Obstructive sleep apnea/obesity hypoventilation syndrome.

3.  Status post trach.

 

PLAN:  As per yesterday, this is a rehabilitation issue.

## 2018-06-22 NOTE — PDOC.PN
- Subjective


Encounter Start Date: 06/22/18


Encounter Start Time: 11:35


Subjective: feels better


-: no trouble swallowing





- Objective


Resuscitation Status: 


 











Resuscitation Status           FULL:Full Resuscitation














MAR Reviewed: Yes


Vital Signs & Weight: 


 Vital Signs (12 hours)











  Temp Pulse Resp BP Pulse Ox


 


 06/22/18 11:36  97.6 F  75  20  98/57 L  99


 


 06/22/18 08:23      97


 


 06/22/18 08:16   72  16   97


 


 06/22/18 08:00  97.7 F  72  16  124/63  97


 


 06/22/18 04:00  98.1 F  70  16  125/57 L  95


 


 06/22/18 00:43   65  16   99








 Weight











Admit Weight                   600 lb


 


Weight                         342 lb 1.6 oz











 Most Recent Monitor Data











Heart Rate from ECG            73


 


NIBP                           109/61


 


NIBP BP-Mean                   72


 


Respiration from ECG           29


 


SpO2                           94














I&O: 


 











 06/21/18 06/22/18 06/23/18





 06:59 06:59 06:59


 


Intake Total 1860 1100 


 


Output Total 1550 1360 


 


Balance 310 -260 











Result Diagrams: 


 06/21/18 03:40





 06/21/18 03:40





Phys Exam





- Physical Examination


HEENT: PERRLA, sclera anicteric


Neck: no JVD


trach+


Respiratory: no wheezing, no rales


Cardiovascular: RRR, no significant murmur


Gastrointestinal: soft, no distention, positive bowel sounds


Musculoskeletal: pulses present, edema present


Neurological: non-focal, moves all 4 limbs


Psychiatric: normal affect, A&O x 3





Dx/Plan


(1) Acute and chronic respiratory failure


Code(s): J96.20 - ACUTE AND CHR RESP FAILURE, UNSP W HYPOXIA OR HYPERCAPNIA   

Status: Chronic   


Qualifiers: 


   Respiratory failure complication: hypoxia and hypercapnia   Qualified Code(s)

: J96.21 - Acute and chronic respiratory failure with hypoxia; J96.22 - Acute 

and chronic respiratory failure with hypercapnia; J96.22 - Acute and chronic 

respiratory failure with hypercapnia; J96.22 - Acute and chronic respiratory 

failure with hypercapnia   


Comment: Stable on trach collar.    





(2) MARY (obstructive sleep apnea)


Code(s): G47.33 - OBSTRUCTIVE SLEEP APNEA (ADULT) (PEDIATRIC)   Status: Chronic

   





(3) Afib


Code(s): I48.91 - UNSPECIFIED ATRIAL FIBRILLATION   Status: Chronic   


Qualifiers: 


   Atrial fibrillation type: paroxysmal   Qualified Code(s): I48.0 - Paroxysmal 

atrial fibrillation   


Comment: Rate controlled. Continue Eliquis, ASA and Amiodarone. 


   





(4) Acute on chronic diastolic (congestive) heart failure


Code(s): I50.33 - ACUTE ON CHRONIC DIASTOLIC (CONGESTIVE) HEART FAILURE   Status

: Chronic   





(5) Pickwickian syndrome


Code(s): E66.2 - MORBID (SEVERE) OBESITY WITH ALVEOLAR HYPOVENTILATION   Status

: Chronic   Comment: s/p trach collar.    





(6) Status post tracheostomy


Code(s): Z93.0 - TRACHEOSTOMY STATUS   Status: Deleted   Comment: Had procedure 

on 5/7/18. Stable.    





(7) S/P percutaneous endoscopic gastrostomy (PEG) tube placement


Code(s): Z93.1 - GASTROSTOMY STATUS   Status: Deleted   Comment: Had procedure 

on 5/7/18. Stable.    





(8) Severe muscle deconditioning


Code(s): R29.898 - OTH SYMPTOMS AND SIGNS INVOLVING THE MUSCULOSKELETAL SYSTEM 

  Status: Acute   





- Plan


to work with PT/OT


-: continue oral feeding


-: teach pt to do trach suction prn, she moves upper extr well


-: CM for help with dc plan/home devices including lift chair etc


-: on eliquis, amiod and cozaar





* .








Review of Systems





- Medications/Allergies


Allergies/Adverse Reactions: 


 Allergies











Allergy/AdvReac Type Severity Reaction Status Date / Time


 


No Known Drug Allergies Allergy   Verified 05/06/18 14:16











Medications: 


 Current Medications





Acetaminophen (Tylenol Elixir)  1,000 mg PER TUBE Q6H PRN


   PRN Reason: Headache/Fever or Pain


   Last Admin: 06/21/18 08:16 Dose:  1,000 mg


Al Hydroxide/Mg Hydroxide (Maalox)  15 ml PER TUBE Q4H PRN


   PRN Reason: Heartburn  or Indigestion


Albuterol/Ipratropium (Duoneb)  3 ml NEB [id number] [facility]


   Last Admin: 06/22/18 08:16 Dose:  3 ml


Amiodarone HCl (Cordarone)  200 mg PO DAILY [facility]


   Last Admin: 06/22/18 09:17 Dose:  200 mg


Lipase/Protease/Amylase ([place] [id number])  1 cap FS .PER PROTOCOL PRN


   PRN Reason: TUBE OCCLUSION PROTOCOL


Apixaban (Eliquis)  5 mg PO BID [facility]


   Last Admin: 06/22/18 09:17 Dose:  5 mg


Artificial Tears (Tears Renewed 15ml Bottle)  0 drop EA EYE PRN PRN


   PRN Reason: Dry Eyes


Aspirin (Aspirin Chewable)  81 mg PO DAILY [facility]


   Last Admin: 06/22/18 09:17 Dose:  81 mg


Bupropion HCl (Wellbutrin)  150 mg PER TUBE BID [facility]


   Last Admin: 06/22/18 09:17 Dose:  150 mg


Clonidine (Catapres)  0.1 mg PO Q4H PRN


   PRN Reason: Systolic BP > 180


   Last Admin: 05/18/18 21:05 Dose:  0.1 mg


Furosemide (Lasix)  40 mg PER TUBE DAILY-AC [facility]


   Last Admin: 06/22/18 09:17 Dose:  40 mg


Guaifenesin/Dextromethorphan (Robitussin Dm)  10 ml PER TUBE 0300,0900,1500,

2100 [facility]


   Last Admin: 06/22/18 09:16 Dose:  10 ml


Hydralazine HCl (Apresoline)  10 mg SLOW IVP Q4H PRN


   PRN Reason: Systolic BP > 180


   Last Admin: 05/12/18 17:06 Dose:  10 mg


Loperamide HCl (Imodium)  2 mg PER TUBE PRN PRN


   PRN Reason: Diarrhea/Loose Stools


Losartan Potassium (Cozaar)  12.5 mg PO DAILY [facility]


   Last Admin: 06/22/18 09:17 Dose:  12.5 mg


Magnesium Hydroxide (Milk Of Magnesium)  30 ml PER TUBE DAILYPRN PRN


   PRN Reason: Constipation


Mineral Oil/White Petrolatum (Eucerin Cream)  0 gm TOP BIDPRN PRN


   PRN Reason: Dry Skin


Nystatin (Mycostatin Powder)  30 gm TOP BID [facility]


   Last Admin: 06/22/18 09:19 Dose:  1 applic


Ondansetron HCl (Zofran Odt)  4 mg PO Q6H PRN


   PRN Reason: Nausea/Vomiting


   Last Admin: 06/01/18 09:09 Dose:  4 mg


Ondansetron HCl (Zofran)  4 mg IVP Q6H PRN


   PRN Reason: Nausea/Vomiting


Pantoprazole Sodium (Protonix)  40 mg PER TUBE DAILY [facility]


   Last Admin: 06/22/18 09:18 Dose:  40 mg


Phenol (Chloraseptic Spray 180 Ml Bot)  0 ml PO PRN PRN


   PRN Reason: Sore Throat


Potassium Chloride (Klor-Con)  20 meq PO QAM-WM KEE


   Last Admin: 06/22/18 09:18 Dose:  20 meq


Senna (Senokot)  2 tab PER TUBE HSPRN PRN


   PRN Reason: Constipation


Sodium Bicarbonate (Bicarbonate, Sodium)  650 mg PER TUBE .PER PROTOCOL PRN


   PRN Reason: ENTERAL TUBE OCCLUSION


Sodium Chloride (Flush - Normal Saline)  10 ml IVF Q12HR KEE


   Last Admin: 06/22/18 09:19 Dose:  10 ml


Sodium Chloride (Flush - Normal Saline)  10 ml IVF PRN PRN


   PRN Reason: Saline Flush


   Last Admin: 06/11/18 05:41 Dose:  10 ml


Sodium Chloride (Ocean Nasal Spray 0.65%)  0 ml EA NARE QIDPRN PRN


   PRN Reason: Nasal Congestion

## 2018-06-23 LAB
CREAT CL PREDICTED SERPL C-G-VRATE: 193 ML/MIN (ref 70–130)
HGB BLD-MCNC: 10.2 G/DL (ref 12–16)
PLATELET # BLD AUTO: 98 THOU/UL (ref 130–400)

## 2018-06-23 RX ADMIN — Medication SCH ML: at 09:06

## 2018-06-23 RX ADMIN — NYSTATIN SCH APPLIC: 100000 POWDER TOPICAL at 20:41

## 2018-06-23 RX ADMIN — GUAIFENESIN AND DEXTROMETHORPHAN SCH ML: 100; 10 SYRUP ORAL at 20:38

## 2018-06-23 RX ADMIN — PANTOPRAZOLE SODIUM SCH MG: 40 GRANULE, DELAYED RELEASE ORAL at 09:04

## 2018-06-23 RX ADMIN — NYSTATIN SCH APPLIC: 100000 POWDER TOPICAL at 09:05

## 2018-06-23 RX ADMIN — GUAIFENESIN AND DEXTROMETHORPHAN SCH ML: 100; 10 SYRUP ORAL at 15:54

## 2018-06-23 RX ADMIN — GUAIFENESIN AND DEXTROMETHORPHAN SCH ML: 100; 10 SYRUP ORAL at 09:05

## 2018-06-23 RX ADMIN — GUAIFENESIN AND DEXTROMETHORPHAN SCH ML: 100; 10 SYRUP ORAL at 03:49

## 2018-06-23 RX ADMIN — Medication SCH ML: at 20:39

## 2018-06-23 NOTE — PRG
DATE OF SERVICE:  06/23/2018

 

SERVICE:  Pulmonary Medicine.

 

INTERVAL HISTORY:  The patient is doing fine from a respiratory standpoint.  She denies any current c
hest pain, nausea, vomiting, fever or chills.  She is breathing comfortably.  She is working with [facility]
sical therapy on a day by day basis in order to get stronger.  Otherwise, there has been no interval 
change to her condition.

 

PHYSICAL EXAMINATION:

VITAL SIGNS:  Afebrile, pulse 87, blood pressure 127/69, respirations 20, saturation 99% on 28% FiO2.


HEENT:  Normocephalic, atraumatic.  Sclerae are white, conjunctivae pink.  Oral and nasal mucosa is m
oist without lesions.

LUNGS:  Excellent air entry.  No prolonged expiratory phase, wheezing, rhonchi, or crackles present.

HEART:  Normal rate, regular.

ABDOMEN:  Soft, nontender, nondistended.  Bowel sounds are positive.

MUSCULOSKELETAL:  No cyanosis or clubbing.  No pitting in the bilateral lower extremities.

NEUROLOGIC:  Grossly nonfocal.

 

LABORATORY DATA:  Hemoglobin 10.2, platelets 98,000 and roughly stable.  Creatinine 0.75.  Urine cult
ures growing vancomycin resistant Enterococcus.

 

ASSESSMENT:

1.  Chronic hypoxic and hypercapnic respiratory failure.

2.  Obstructive sleep apnea, status post tracheostomy.

3.  Obesity hypoventilation syndrome.

4.  Deconditioning.

 

PLAN:  We will continue to follow along.  Mobilization efforts will be pushed.  As soon as she is str
dilma enough to transition and transfer, and walk on her own, she will be ready for transition out of [facility].  We will continue to follow along in the meantime.

## 2018-06-23 NOTE — PDOC.PN
- Subjective


Encounter Start Date: 06/23/18


Encounter Start Time: 08:50


-: old records requested/rev





Pt seen and examined, chart reviewed in its entirety, this is my first visit 

with this patient





complainins of cough, NP,feels like she cannot mobilize.  No F/C, no N/v/D/c, 

weak, but stood with PT x 3 for 1 minute each yesterday





All systems reviewed and neg for all except as stated above





- Objective


Resuscitation Status: 


 











Resuscitation Status           FULL:Full Resuscitation














MAR Reviewed: Yes


Vital Signs & Weight: 


 Vital Signs (12 hours)











  Temp Pulse Resp BP Pulse Ox


 


 06/23/18 14:23   68  20   96


 


 06/23/18 12:00  98.2 F  70  22 H  124/59 L  100


 


 06/23/18 08:01   87  20   98


 


 06/23/18 08:00  98.5 F  87  20   99


 


 06/23/18 07:00  98.4 F  77  24 H  127/69  100


 


 06/23/18 05:08  98.5 F  72  17  123/62  99








 Weight











Admit Weight                   600 lb


 


Weight                         342 lb 1.6 oz











 Most Recent Monitor Data











Heart Rate from ECG            73


 


NIBP                           109/61


 


NIBP BP-Mean                   72


 


Respiration from ECG           29


 


SpO2                           94














I&O: 


 











 06/22/18 06/23/18 06/24/18





 06:59 06:59 06:59


 


Intake Total 1100 1560 


 


Output Total 1360 1625 


 


Balance -260 -65 











Result Diagrams: 


 06/23/18 03:55





 06/23/18 03:55


Radiology Reviewed by me: Yes


EKG Reviewed by me: Yes





Phys Exam





- Physical Examination


Constitutional: NAD


HEENT: PERRLA, moist MMs, sclera anicteric, oral pharynx no lesions


Neck: no nodes, no JVD, supple, full ROM


trach C/D/I


Respiratory: no wheezing, no rales, no rhonchi, clear to auscultation bilateral


Cardiovascular: RRR, no significant murmur, no rub


Gastrointestinal: soft, non-tender, positive bowel sounds


Musculoskeletal: edema present


Neurological: moves all 4 limbs


Lymphatic: no nodes


Psychiatric: normal affect, A&O x 3


Skin: no rash, normal turgor, cap refill <2 seconds





Dx/Plan


(1) Acute respiratory failure with hypoxia and hypercapnia


Code(s): J96.01 - ACUTE RESPIRATORY FAILURE WITH HYPOXIA; J96.02 - ACUTE 

RESPIRATORY FAILURE WITH HYPERCAPNIA   Status: Acute   Comment: s/p trach/PEG   





(2) Severe muscle deconditioning


Code(s): R29.898 - OTH SYMPTOMS AND SIGNS INVOLVING THE MUSCULOSKELETAL SYSTEM 

  Status: Acute   





(3) Thrombocytopenia


Code(s): D69.6 - THROMBOCYTOPENIA, UNSPECIFIED   Status: Acute   





(4) UTI (urinary tract infection)


Status: Acute   





(5) Acute and chronic respiratory failure


Code(s): J96.20 - ACUTE AND CHR RESP FAILURE, UNSP W HYPOXIA OR HYPERCAPNIA   

Status: Chronic   


Qualifiers: 


   Respiratory failure complication: hypoxia and hypercapnia   Qualified Code(s)

: J96.21 - Acute and chronic respiratory failure with hypoxia; J96.22 - Acute 

and chronic respiratory failure with hypercapnia; J96.22 - Acute and chronic 

respiratory failure with hypercapnia; J96.22 - Acute and chronic respiratory 

failure with hypercapnia   


Comment: Stable on trach collar.    





(6) Acute on chronic diastolic (congestive) heart failure


Code(s): I50.33 - ACUTE ON CHRONIC DIASTOLIC (CONGESTIVE) HEART FAILURE   Status

: Chronic   





(7) Afib


Code(s): I48.91 - UNSPECIFIED ATRIAL FIBRILLATION   Status: Chronic   


Qualifiers: 


   Atrial fibrillation type: paroxysmal   Qualified Code(s): I48.0 - Paroxysmal 

atrial fibrillation   


Comment: Rate controlled. Continue Eliquis, ASA and Amiodarone. 


   





(8) Anemia, normocytic normochromic


Code(s): D64.9 - ANEMIA, UNSPECIFIED   Status: Chronic   





(9) Chronic a-fib


Code(s): I48.2 - CHRONIC ATRIAL FIBRILLATION   Status: Chronic   Comment: on 

anticoag/Amidarone. Now paroxysmal   





(10) Chronic diastolic CHF (congestive heart failure), NYHA class 3


Code(s): I50.32 - CHRONIC DIASTOLIC (CONGESTIVE) HEART FAILURE   Status: 

Chronic   Comment: Stable. Diuretics initially held 2/2 azotemia. Will be given 

PRN.   





(11) Morbid obesity with BMI of 60.0-69.9, adult


Code(s): E66.01 - MORBID (SEVERE) OBESITY DUE TO EXCESS CALORIES; Z68.44 - BODY 

MASS INDEX (BMI) 60.0-69.9, ADULT   Status: Chronic   





(12) MARY (obstructive sleep apnea)


Code(s): G47.33 - OBSTRUCTIVE SLEEP APNEA (ADULT) (PEDIATRIC)   Status: Chronic

   





(13) Physical deconditioning


Code(s): R53.81 - OTHER MALAISE   Status: Chronic   





(14) Pickwickian syndrome


Code(s): E66.2 - MORBID (SEVERE) OBESITY WITH ALVEOLAR HYPOVENTILATION   Status

: Chronic   Comment: s/p trach collar.    





- Plan


cont current plan of care, PT/OT, out of bed/ambulate





* .home when can stand pivot and transfer to

## 2018-06-24 RX ADMIN — PANTOPRAZOLE SODIUM SCH MG: 40 GRANULE, DELAYED RELEASE ORAL at 09:24

## 2018-06-24 RX ADMIN — NYSTATIN SCH APPLIC: 100000 POWDER TOPICAL at 21:31

## 2018-06-24 RX ADMIN — GUAIFENESIN AND DEXTROMETHORPHAN SCH ML: 100; 10 SYRUP ORAL at 09:24

## 2018-06-24 RX ADMIN — NYSTATIN SCH APPLIC: 100000 POWDER TOPICAL at 09:25

## 2018-06-24 RX ADMIN — GUAIFENESIN AND DEXTROMETHORPHAN SCH ML: 100; 10 SYRUP ORAL at 03:00

## 2018-06-24 RX ADMIN — Medication SCH ML: at 21:32

## 2018-06-24 RX ADMIN — Medication SCH ML: at 09:24

## 2018-06-24 NOTE — PRG
DATE OF SERVICE:  06/24/2018

 

SERVICE:  Pulmonary Medicine.

 

INTERVAL HISTORY:  The patient is doing great from a respiratory standpoint.  He is breathing comfort
ably.  No complaints of chest pain, nausea, vomiting, fevers or chills.  Otherwise, she is in her [facility] state of health.  There were no overnight events.

 

OBJECTIVE:

VITAL SIGNS:  Afebrile, pulse 76, blood pressure 98/70, respirations 20, saturation 100% on 28% FiO2.


GENERAL:  The patient is awake, alert, no apparent distress.

LUNGS:  Decent air entry.  There is no prolonged expiratory phase.  Adventitious sounds really cannot
 be appreciated, but body habitus excludes accurate evaluation.

HEART:  Normal rate, regular.

ABDOMEN:  Soft, nontender, nondistended.  Bowel sounds are positive.

MUSCULOSKELETAL:  No cyanosis or clubbing.  There is 1+ pitting in the bilateral lower extremities.

NEUROLOGIC:  Grossly nonfocal.

 

ASSESSMENT:

1.  Chronic hypoxic and hypercapnic respiratory failure.

2.  Obstructive sleep apnea status post tracheostomy.

3.  Obesity hypoventilation syndrome.

4.  Deconditioning.

 

DISCUSSION AND PLAN:  We will try to move her out to the floor.  We will continue her mobilization ef
forts.  Once she is strong enough to move around under her own strength, she will be stable for trans
ition out of the hospital.  That being said, she will stay with us until this occurs because she is u
nfunded.  Pulmonary will continue to follow.

## 2018-06-25 LAB
ANION GAP SERPL CALC-SCNC: 11 MMOL/L (ref 10–20)
ANISOCYTOSIS BLD QL SMEAR: (no result) (100X)
BUN SERPL-MCNC: 16 MG/DL (ref 9.8–20.1)
CALCIUM SERPL-MCNC: 8.8 MG/DL (ref 7.8–10.44)
CHLORIDE SERPL-SCNC: 93 MMOL/L (ref 98–107)
CO2 SERPL-SCNC: 33 MMOL/L (ref 23–31)
CREAT CL PREDICTED SERPL C-G-VRATE: 190 ML/MIN (ref 70–130)
GLUCOSE SERPL-MCNC: 79 MG/DL (ref 80–115)
HGB BLD-MCNC: 10.5 G/DL (ref 12–16)
MAGNESIUM SERPL-MCNC: 1.6 MG/DL (ref 1.6–2.6)
MCH RBC QN AUTO: 27.2 PG (ref 27–31)
MCV RBC AUTO: 90 FL (ref 78–98)
MDIFF COMPLETE?: YES
PLATELET # BLD AUTO: 105 THOU/UL (ref 130–400)
PLATELET BLD QL SMEAR: (no result)
POTASSIUM SERPL-SCNC: 4.1 MMOL/L (ref 3.5–5.1)
RBC # BLD AUTO: 3.87 MILL/UL (ref 4.2–5.4)
SODIUM SERPL-SCNC: 133 MMOL/L (ref 136–145)
WBC # BLD AUTO: 5 THOU/UL (ref 4.8–10.8)

## 2018-06-25 RX ADMIN — NYSTATIN SCH APPLIC: 100000 POWDER TOPICAL at 21:13

## 2018-06-25 RX ADMIN — PANTOPRAZOLE SODIUM SCH: 40 GRANULE, DELAYED RELEASE ORAL at 15:43

## 2018-06-25 RX ADMIN — NYSTATIN SCH APPLIC: 100000 POWDER TOPICAL at 09:50

## 2018-06-25 RX ADMIN — Medication SCH ML: at 09:50

## 2018-06-25 RX ADMIN — GUAIFENESIN AND DEXTROMETHORPHAN SCH ML: 100; 10 SYRUP ORAL at 21:11

## 2018-06-25 RX ADMIN — Medication SCH: at 21:11

## 2018-06-25 NOTE — PRG
DATE OF SERVICE:  06/25/2018

 

The patient was moved up to the floor yesterday.  She seems to be doing well.

 

PHYSICAL EXAMINATION: 

VITAL SIGNS:  Temperature is 98.1, pulse 95, respirations 18, O2 sat 100% on 8 liters, blood pressure
 115/67.

HEENT:  Unremarkable.

NECK:  No JVD.  Trach in good position.

LUNGS:  Clear.

CARDIAC:  S1 and S2 regular.

ABDOMEN:  Soft, obese, nontender.

EXTREMITIES:  No edema.

 

LABORATORY DATA:  White blood cell count 5, hematocrit 34.9, platelet count 105.  Sodium 132, potassi
um 4.1, chloride 93, CO2 33, BUN 16, creatinine 0.7, glucose 79.

 

ASSESSMENT:  

1.  Chronic respiratory failure secondary to obesity hypoventilation syndrome.

2.  Severe deconditioning.

 

RECOMMENDATIONS:  This patient is probably about as good as she is going to get from a functional sta
ndpoint.  I think we need to begin making arrangements for her to go home with her son, just making s
ure that he has some kind of help to take care of her.  She needs to continue on the Lasix as she is 
doing.  No other recommendations at this time.

## 2018-06-25 NOTE — PDOC.PN
- Subjective


Encounter Start Date: 06/25/18


Encounter Start Time: 15:00





DENIES ANY COMPLAINTS.  UNDERSTANDS THE PLAN OF CARE.





- Objective


Resuscitation Status: 


 











Resuscitation Status           FULL:Full Resuscitation














MAR Reviewed: Yes


Vital Signs & Weight: 


 Vital Signs (12 hours)











  Temp Pulse Resp BP Pulse Ox


 


 06/25/18 12:56   89  16   99


 


 06/25/18 11:55  97.9 F  93  20  155/97 H  100


 


 06/25/18 08:00  97.7 F  97  20   100


 


 06/25/18 07:59  97.7 F  97  20  154/96 H  100


 


 06/25/18 06:34   95  18   100








 Weight











Admit Weight                   600 lb


 


Weight                         342 lb 1.6 oz











 Most Recent Monitor Data











Heart Rate from ECG            73


 


NIBP                           109/61


 


NIBP BP-Mean                   72


 


Respiration from ECG           29


 


SpO2                           94














I&O: 


 











 06/24/18 06/25/18 06/26/18





 06:59 06:59 06:59


 


Intake Total 1130 1080 240


 


Output Total 1900 2150 


 


Balance -770 -1070 240











Result Diagrams: 


 06/25/18 04:26





 06/25/18 04:26





Phys Exam





- Physical Examination


Constitutional: NAD


MORBID OBESITY


HEENT: PERRLA, moist MMs


TRACH


Respiratory: no wheezing, no rales, no rhonchi


Cardiovascular: RRR, no significant murmur, no rub


Gastrointestinal: soft, non-tender, no distention, positive bowel sounds


2-3+ EDEMA B LE'S.


Neurological: non-focal


Psychiatric: normal affect





Dx/Plan


(1) Acute and chronic respiratory failure


Code(s): J96.20 - ACUTE AND CHR RESP FAILURE, UNSP W HYPOXIA OR HYPERCAPNIA   

Status: Chronic   


Qualifiers: 


   Respiratory failure complication: hypoxia and hypercapnia   Qualified Code(s)

: J96.21 - Acute and chronic respiratory failure with hypoxia; J96.22 - Acute 

and chronic respiratory failure with hypercapnia; J96.22 - Acute and chronic 

respiratory failure with hypercapnia; J96.22 - Acute and chronic respiratory 

failure with hypercapnia   


Comment: Stable on trach collar.    





(2) S/P percutaneous endoscopic gastrostomy (PEG) tube placement


Code(s): Z93.1 - GASTROSTOMY STATUS   Status: Deleted   


Plan: 





NOW TOLERATING PO'S.  CONVERTING MEDS TO PO.  WILL NOT ANTICIPATE REMOVING THE 

PEG FOR A WHILE.


Comment: Had procedure on 5/7/18. Stable.    





(3) Status post tracheostomy


Code(s): Z93.0 - TRACHEOSTOMY STATUS   Status: Deleted   Comment: Had procedure 

on 5/7/18. Stable.    





(4) Thrombocytopenia


Code(s): D69.6 - THROMBOCYTOPENIA, UNSPECIFIED   Status: Chronic   Comment: 

STABLE   





(5) Acute on chronic diastolic (congestive) heart failure


Code(s): I50.33 - ACUTE ON CHRONIC DIASTOLIC (CONGESTIVE) HEART FAILURE   Status

: Chronic   





(6) Afib


Code(s): I48.91 - UNSPECIFIED ATRIAL FIBRILLATION   Status: Chronic   


Qualifiers: 


   Atrial fibrillation type: paroxysmal   Qualified Code(s): I48.0 - Paroxysmal 

atrial fibrillation   


Comment: Rate controlled. Continue Eliquis, ASA and Amiodarone. 


   





(7) Chronic diastolic CHF (congestive heart failure), NYHA class 3


Code(s): I50.32 - CHRONIC DIASTOLIC (CONGESTIVE) HEART FAILURE   Status: 

Chronic   Comment: Stable. Diuretics initially held 2/2 azotemia. Will be given 

PRN.   





(8) Debility


Code(s): R53.81 - OTHER MALAISE   Status: Chronic   


Plan: 


PROFOUND DEBILITY.  WAS ABLE TO STAND WITH PT.  CONTINUE TO WORK WITH PT.








- Plan





* AVRIL CAME OUT TODAY.  WILL GIVE HER THE OPPORTUNITY TO VOID WITHOUT IT.  

WOULD BE BETTER TO HAVE IT OUT IN THE LONG-TERM.


* DISCUSSED WITH CM.  SHE IS GETTING CLOSE TO DISCHARGE.  SHE WILL LIKELY HAVE 

SOME NEEDS FOR EQUIPMENT AT HOME, BUT SHE DOES NOT HAVE RESOURCES.  PLAN IS TO 

GO TO HER SON'S HOME.

## 2018-06-26 RX ADMIN — GUAIFENESIN AND DEXTROMETHORPHAN SCH ML: 100; 10 SYRUP ORAL at 20:07

## 2018-06-26 RX ADMIN — Medication SCH: at 20:11

## 2018-06-26 RX ADMIN — NYSTATIN SCH APPLIC: 100000 POWDER TOPICAL at 20:12

## 2018-06-26 RX ADMIN — Medication SCH: at 09:38

## 2018-06-26 RX ADMIN — NYSTATIN SCH APPLIC: 100000 POWDER TOPICAL at 09:37

## 2018-06-26 NOTE — PRG
DATE OF SERVICE:  06/26/2018

 

SUBJECTIVE:  The patient is doing reasonably well, had no acute complaints.

 

PHYSICAL EXAMINATION:

VITAL SIGNS:  Temperature 97.9, pulse 88, blood pressure last measured 120/68, 90% on 5 liters.

HEENT:  Unremarkable.

NECK:  Trach in good position.

LUNGS:  Clear.

CARDIAC:  S1 and S2 regular.

ABDOMEN:  Soft, obese, nontender.

EXTREMITIES:  No edema.

 

LABORATORY DATA:  No labs were done today.

 

ASSESSMENT:  No change in clinical status - see yesterday's note.

 

PLAN:  Awaiting placement.  Continue rehab.

## 2018-06-26 NOTE — PDOC.PN
- Subjective


Encounter Start Date: 06/26/18


Encounter Start Time: 14:00





Doing well.  Was able to void after Lozano out.  Standing with PT several times 

today.  Limited by right knee pain.





- Objective


Resuscitation Status: 


 











Resuscitation Status           FULL:Full Resuscitation














Vital Signs & Weight: 


 Vital Signs (12 hours)











  Temp Pulse Pulse Pulse Pulse Resp BP


 


 06/26/18 13:15    79  80  79   114/69


 


 06/26/18 11:57   79     16 


 


 06/26/18 08:40    78  79  75   113/69


 


 06/26/18 08:30  97.5 F L  71     16 


 


 06/26/18 08:00  97.5 F L  71     16 


 


 06/26/18 06:05   88     18 














  BP Pulse Ox Pulse Ox Pulse Ox Pulse Ox


 


 06/26/18 13:15    100  99  100


 


 06/26/18 11:57   98   


 


 06/26/18 08:40    96  99  100


 


 06/26/18 08:30  113/69  100   


 


 06/26/18 08:00   100   


 


 06/26/18 06:05   99   








 Weight











Admit Weight                   600 lb


 


Weight                         342 lb 1.6 oz











 Most Recent Monitor Data











Heart Rate from ECG            73


 


NIBP                           109/61


 


NIBP BP-Mean                   72


 


Respiration from ECG           29


 


SpO2                           94














I&O: 


 











 06/25/18 06/26/18 06/27/18





 06:59 06:59 06:59


 


Intake Total 1080 840 400


 


Output Total 2150 775 


 


Balance -1070 65 400











Result Diagrams: 


 06/25/18 04:26





 06/25/18 04:26





Phys Exam





- Physical Examination


Constitutional: NAD


Trach'd


HEENT: oral pharynx no lesions


Neck: no JVD, supple


Trach with collar


Respiratory: no wheezing, no rales, no rhonchi


Upper airway noise.


Cardiovascular: RRR, no significant murmur, no rub


Gastrointestinal: soft, non-tender, no distention, positive bowel sounds


Massive peripheral edema with pendulous pannus and chronic skin changes LE'


Neurological: non-focal


Psychiatric: normal affect





Dx/Plan


(1) Acute and chronic respiratory failure


Code(s): J96.20 - ACUTE AND CHR RESP FAILURE, UNSP W HYPOXIA OR HYPERCAPNIA   

Status: Chronic   


Qualifiers: 


   Respiratory failure complication: hypoxia and hypercapnia   Qualified Code(s)

: J96.21 - Acute and chronic respiratory failure with hypoxia; J96.22 - Acute 

and chronic respiratory failure with hypercapnia; J96.22 - Acute and chronic 

respiratory failure with hypercapnia; J96.22 - Acute and chronic respiratory 

failure with hypercapnia   


Plan: 





Improved.  Now on trach collar.


Comment: Stable on trach collar.    





(2) S/P percutaneous endoscopic gastrostomy (PEG) tube placement


Code(s): Z93.1 - GASTROSTOMY STATUS   Status: Deleted   


Plan: 





Taking po's.  Maintain the PEG for now.  


Comment: Had procedure on 5/7/18. Stable.    





(3) Status post tracheostomy


Code(s): Z93.0 - TRACHEOSTOMY STATUS   Status: Deleted   


Plan: 





Tolerating well.  Continue trach care.


Comment: Had procedure on 5/7/18. Stable.    





(4) Thrombocytopenia


Code(s): D69.6 - THROMBOCYTOPENIA, UNSPECIFIED   Status: Chronic   Comment: 

STABLE   





(5) Acute on chronic diastolic (congestive) heart failure


Code(s): I50.33 - ACUTE ON CHRONIC DIASTOLIC (CONGESTIVE) HEART FAILURE   Status

: Chronic   





(6) Afib


Code(s): I48.91 - UNSPECIFIED ATRIAL FIBRILLATION   Status: Chronic   


Qualifiers: 


   Atrial fibrillation type: paroxysmal   Qualified Code(s): I48.0 - Paroxysmal 

atrial fibrillation   


Comment: Rate controlled. Continue Eliquis, ASA and Amiodarone. 


   





(7) Chronic diastolic CHF (congestive heart failure), NYHA class 3


Code(s): I50.32 - CHRONIC DIASTOLIC (CONGESTIVE) HEART FAILURE   Status: 

Chronic   Comment: Stable. Diuretics initially held 2/2 azotemia. Will be given 

PRN.   





(8) Debility


Code(s): R53.81 - OTHER MALAISE   Status: Chronic   


Plan: 





Continue to work with PT.  Improving.








- Plan





* Disposition issue. 


* Continue work with PT.


* Has three son's in their 20's who work full-time and live in [place].  She 

needs someone available to assist her 24/7.

## 2018-06-27 RX ADMIN — Medication SCH: at 08:47

## 2018-06-27 RX ADMIN — NYSTATIN SCH APPLIC: 100000 POWDER TOPICAL at 19:54

## 2018-06-27 RX ADMIN — NYSTATIN SCH APPLIC: 100000 POWDER TOPICAL at 08:47

## 2018-06-27 RX ADMIN — GUAIFENESIN AND DEXTROMETHORPHAN SCH ML: 100; 10 SYRUP ORAL at 19:50

## 2018-06-27 RX ADMIN — Medication SCH: at 19:54

## 2018-06-27 NOTE — PRG
DATE OF SERVICE:  06/27/2018

 

SUBJECTIVE:  The patient is doing well, had no acute complaints.

 

OBJECTIVE:

VITAL SIGNS:  Temperature is 98.1, pulse 74, respiration 16, O2 sat 100%, blood pressure 140/78.

HEENT:  Unremarkable.

NECK:  No JVD.  Trach in good position.

LUNGS:  Clear.

CARDIAC:  S1 and S2 regular.

ABDOMEN:  Soft.

EXTREMITIES:  No edema.

 

ASSESSMENT:

1.  Obstructive sleep apnea/obesity hypoventilation syndrome.

2.  Severe deconditioning.

 

PLAN:  Continuing rehabilitation and awaiting placement.

## 2018-06-27 NOTE — PDOC.PN
- Subjective


Encounter Start Date: 06/27/18


Encounter Start Time: 13:40





DOING WELL.  DENIES NEEDS.  HAS NOT HAD ANY BLEEDING FROM THE [facility]THRA AS SHE 

HAD YESTERDAY.  





- Objective


Resuscitation Status: 


 











Resuscitation Status           FULL:Full Resuscitation














MAR Reviewed: Yes


Vital Signs & Weight: 


 Vital Signs (12 hours)











  Temp Pulse Pulse Pulse Pulse Resp BP


 


 06/27/18 16:00  97.9 F  75     18 


 


 06/27/18 13:15    109 H  111 H  107 H   154/94 H


 


 06/27/18 13:14   97     20 


 


 06/27/18 11:00  98.0 F  93     18 


 


 06/27/18 08:45    109 H  108 H  98   144/78 H


 


 06/27/18 08:00  98.1 F  75     16 


 


 06/27/18 07:09  98.1 F  75     16 


 


 06/27/18 06:55       


 


 06/27/18 06:52   77     16 














  BP Pulse Ox Pulse Ox Pulse Ox Pulse Ox


 


 06/27/18 16:00  144/88 H  98   


 


 06/27/18 13:15    99  99  96


 


 06/27/18 13:14   98   


 


 06/27/18 11:00  154/94 H  93 L   


 


 06/27/18 08:45    100  99  100


 


 06/27/18 08:00   100   


 


 06/27/18 07:09  144/78 H  100   


 


 06/27/18 06:55   100   


 


 06/27/18 06:52   100   








 Weight











Admit Weight                   600 lb


 


Weight                         341 lb











 Most Recent Monitor Data











Heart Rate from ECG            73


 


NIBP                           109/61


 


NIBP BP-Mean                   72


 


Respiration from ECG           29


 


SpO2                           94














I&O: 


 











 06/26/18 06/27/18 06/28/18





 06:59 06:59 06:59


 


Intake Total 840 1160 


 


Output Total 775  


 


Balance 65 1160 











Result Diagrams: 


 06/25/18 04:26





 06/25/18 04:26





Phys Exam





- Physical Examination


Constitutional: NAD


HEENT: oral pharynx no lesions


Neck: no JVD, supple


Respiratory: no wheezing, no rales, no rhonchi, clear to auscultation bilateral


Cardiovascular: RRR, no significant murmur, no rub


Gastrointestinal: soft, non-tender, no distention


Musculoskeletal: no edema


Psychiatric: normal affect


Skin: normal turgor





Dx/Plan


(1) Acute and chronic respiratory failure


Code(s): J96.20 - ACUTE AND CHR RESP FAILURE, UNSP W HYPOXIA OR HYPERCAPNIA   

Status: Chronic   


Qualifiers: 


   Respiratory failure complication: hypoxia and hypercapnia   Qualified Code(s)

: J96.21 - Acute and chronic respiratory failure with hypoxia; J96.22 - Acute 

and chronic respiratory failure with hypercapnia; J96.22 - Acute and chronic 

respiratory failure with hypercapnia; J96.22 - Acute and chronic respiratory 

failure with hypercapnia   


Comment: Stable on trach collar.    





(2) S/P percutaneous endoscopic gastrostomy (PEG) tube placement


Code(s): Z93.1 - GASTROSTOMY STATUS   Status: Deleted   Comment: Had procedure 

on 5/7/18. NO LONGER BEING USED.  TAKING PO'S.   





(3) Status post tracheostomy


Code(s): Z93.0 - TRACHEOSTOMY STATUS   Status: Deleted   Comment: Had procedure 

on 5/7/18. Stable.    





(4) Thrombocytopenia


Code(s): D69.6 - THROMBOCYTOPENIA, UNSPECIFIED   Status: Chronic   Comment: 

STABLE   





(5) Acute on chronic diastolic (congestive) heart failure


Code(s): I50.33 - ACUTE ON CHRONIC DIASTOLIC (CONGESTIVE) HEART FAILURE   Status

: Chronic   





(6) Afib


Code(s): I48.91 - UNSPECIFIED ATRIAL FIBRILLATION   Status: Chronic   


Qualifiers: 


   Atrial fibrillation type: paroxysmal   Qualified Code(s): I48.0 - Paroxysmal 

atrial fibrillation   


Comment: Rate controlled. Continue ASA and Amiodarone.  ELIQUIS HELD SECONDARY 

TO BLEEDING AFTER MACKENZIE CAME OUT.  RESUME IN A DAY OR TWO.


   





(7) Chronic diastolic CHF (congestive heart failure), NYHA class 3


Code(s): I50.32 - CHRONIC DIASTOLIC (CONGESTIVE) HEART FAILURE   Status: 

Chronic   Comment: Stable. Diuretics initially held 2/2 azotemia. Will be given 

PRN.   





(8) Debility


Code(s): R53.81 - OTHER MALAISE   Status: Chronic   Comment: 


WORKING WITH PT.  MAKING SOME PROGRESS.  PLACEMENT ISSUE.   





- Plan





* .

## 2018-06-28 RX ADMIN — GUAIFENESIN AND DEXTROMETHORPHAN SCH ML: 100; 10 SYRUP ORAL at 20:59

## 2018-06-28 RX ADMIN — Medication SCH ML: at 09:55

## 2018-06-28 RX ADMIN — Medication SCH: at 21:00

## 2018-06-28 RX ADMIN — NYSTATIN SCH APPLIC: 100000 POWDER TOPICAL at 21:02

## 2018-06-28 RX ADMIN — NYSTATIN SCH APPLIC: 100000 POWDER TOPICAL at 09:55

## 2018-06-28 NOTE — PDOC.PN
- Subjective


Encounter Start Date: 06/28/18


Encounter Start Time: 13:30





- Objective


Resuscitation Status: 


 











Resuscitation Status           FULL:Full Resuscitation














MAR Reviewed: Yes


Vital Signs & Weight: 


 Vital Signs (12 hours)











  Temp Pulse Resp BP Pulse Ox


 


 06/28/18 12:20   77  20  


 


 06/28/18 12:11  98.7 F  79  18  129/73  95


 


 06/28/18 08:00  98.5 F  71  16   97


 


 06/28/18 07:33  98.5 F  71  16  118/68  97


 


 06/28/18 06:40   79  20   99


 


 06/28/18 04:00  98.0 F  74  20  146/77 H  97








 Weight











Admit Weight                   600 lb


 


Weight                         342 lb











 Most Recent Monitor Data











Heart Rate from ECG            73


 


NIBP                           109/61


 


NIBP BP-Mean                   72


 


Respiration from ECG           29


 


SpO2                           94














I&O: 


 











 06/27/18 06/28/18 06/29/18





 06:59 06:59 06:59


 


Intake Total 1160 920 


 


Balance 1160 920 











Result Diagrams: 


 06/25/18 04:26





 06/25/18 04:26





Phys Exam





- Physical Examination


Constitutional: NAD


HEENT: PERRLA, oral pharynx no lesions


Neck: no JVD


Respiratory: no wheezing, no rales, no rhonchi


UPPER AIRWAY NOISE.


Cardiovascular: RRR, no significant murmur


Gastrointestinal: soft, non-tender, no distention, positive bowel sounds


Musculoskeletal: no edema, pulses present


Neurological: non-focal


Psychiatric: normal affect, A&O x 3





Dx/Plan


(1) Acute and chronic respiratory failure


Code(s): J96.20 - ACUTE AND CHR RESP FAILURE, UNSP W HYPOXIA OR HYPERCAPNIA   

Status: Chronic   


Qualifiers: 


   Respiratory failure complication: hypoxia and hypercapnia   Qualified Code(s)

: J96.21 - Acute and chronic respiratory failure with hypoxia; J96.22 - Acute 

and chronic respiratory failure with hypercapnia; J96.22 - Acute and chronic 

respiratory failure with hypercapnia; J96.22 - Acute and chronic respiratory 

failure with hypercapnia   


Comment: Stable on trach collar.    





(2) S/P percutaneous endoscopic gastrostomy (PEG) tube placement


Code(s): Z93.1 - GASTROSTOMY STATUS   Status: Deleted   Comment: Had procedure 

on 5/7/18. NO LONGER BEING USED.  TAKING PO'S.   





(3) Status post tracheostomy


Code(s): Z93.0 - TRACHEOSTOMY STATUS   Status: Deleted   Comment: Had procedure 

on 5/7/18. Stable.    





(4) Thrombocytopenia


Code(s): D69.6 - THROMBOCYTOPENIA, UNSPECIFIED   Status: Chronic   Comment: 

STABLE   





(5) Acute on chronic diastolic (congestive) heart failure


Code(s): I50.33 - ACUTE ON CHRONIC DIASTOLIC (CONGESTIVE) HEART FAILURE   Status

: Chronic   





(6) Afib


Code(s): I48.91 - UNSPECIFIED ATRIAL FIBRILLATION   Status: Chronic   


Qualifiers: 


   Atrial fibrillation type: paroxysmal   Qualified Code(s): I48.0 - Paroxysmal 

atrial fibrillation   


Comment: Rate controlled. Continue ASA and Amiodarone.  ELIQUIS HELD SECONDARY 

TO BLEEDING AFTER MACKENZIE CAME OUT.  RESUMED 6/28.


   





(7) Chronic diastolic CHF (congestive heart failure), NYHA class 3


Code(s): I50.32 - CHRONIC DIASTOLIC (CONGESTIVE) HEART FAILURE   Status: 

Chronic   Comment: Stable. Diuretics initially held 2/2 azotemia. Will be given 

PRN.   





(8) Debility


Code(s): R53.81 - OTHER MALAISE   Status: Chronic   Comment: 


WORKING WITH PT.  MAKING SOME PROGRESS.  PLACEMENT ISSUE.   





(9) Urethral bleeding


Status: Acute   Comment: 


BLEEDING AFTER MACKENZIE CAME OUT.  HELD ELIQUIS.  STOPPED.  RESUMING ELIQUIS.  

CONTINUE TO MONITOR.   





- Plan





* CONTINUE TO WORK WITH PT.  WORKING ON DISPOSITION.

## 2018-06-28 NOTE — PRG
DATE OF SERVICE:  06/28/2018

 

The patient is doing well and had no specific complaints.  Physical therapy actually stood her up whi
thony I was in the room.  

 

PHYSICAL EXAMINATION: 

VITAL SIGNS:  On exam temperature is 98.5, pulse 71, respiration 16, O2 sat 97%, blood pressure 118/6
8.

HEENT:  Unremarkable.

NECK:  Trach in good position.

LUNGS:  Clear.

CARDIOVASCULAR:  S1, S2 regular.

ABDOMEN:  Soft.

EXTREMITIES:  Mild edema.

 

ASSESSMENT:

1.  Obesity hypoventilation syndrome.

2.  Status post trach and PEG.

3.  Deconditioning, which is slowly improving.

 

PLAN:  Mainly a rehab issue at this point.  It is clear that she is starting to progress with physica
l therapy.  Hopefully, she will be able to go home with her family soon.

## 2018-06-29 RX ADMIN — Medication SCH: at 20:37

## 2018-06-29 RX ADMIN — Medication SCH: at 08:33

## 2018-06-29 RX ADMIN — GUAIFENESIN AND DEXTROMETHORPHAN SCH ML: 100; 10 SYRUP ORAL at 20:37

## 2018-06-29 RX ADMIN — NYSTATIN SCH APPLIC: 100000 POWDER TOPICAL at 20:40

## 2018-06-29 RX ADMIN — NYSTATIN SCH APPLIC: 100000 POWDER TOPICAL at 08:23

## 2018-06-29 NOTE — PDOC.PN
- Subjective


Encounter Start Date: 06/29/18


Encounter Start Time: 11:00





FEELING A LITTLE DOWN TODAY.  SHE HAS CONCERNS ABOUT BEING A BURDEN ON HER 

CHILDREN.  HAS A NEW LARGE BRUISE ON THE LEFT UPPER ARM. 





- Objective


Resuscitation Status: 


 











Resuscitation Status           FULL:Full Resuscitation














Vital Signs & Weight: 


 Vital Signs (12 hours)











  Temp Pulse Resp BP Pulse Ox


 


 06/29/18 19:30  98.3 F  77  16  122/61  92 L


 


 06/29/18 19:01   78  16   98


 


 06/29/18 16:46  98 F  79  16  135/70  94 L


 


 06/29/18 16:40  98 F  79  16   94 L


 


 06/29/18 14:08   76  16   97


 


 06/29/18 11:55  98.4 F  77  22 H  129/71  93 L








 Weight











Admit Weight                   600 lb


 


Weight                         342 lb











 Most Recent Monitor Data











Heart Rate from ECG            73


 


NIBP                           109/61


 


NIBP BP-Mean                   72


 


Respiration from ECG           29


 


SpO2                           94














I&O: 


 











 06/28/18 06/29/18 06/30/18





 06:59 06:59 06:59


 


Intake Total 920 1040 


 


Balance 920 1040 











Result Diagrams: 


 06/25/18 04:26





 06/25/18 04:26





Phys Exam





- Physical Examination


Constitutional: NAD


UPPER AIRWAY RALES.


Cardiovascular: RRR, no significant murmur


Gastrointestinal: soft, non-tender, no distention, positive bowel sounds


CHRONIC STASIS DERMATITIS AND EDEMA LE'S.  


3-4 CM HEMATOMA OF THE MEDIAL, PROXIMAL L UPPER ARM.  


Psychiatric: normal affect


Deviation from normal: STASIS DERMATITIS LE'S





Dx/Plan


(1) Acute and chronic respiratory failure


Code(s): J96.20 - ACUTE AND CHR RESP FAILURE, UNSP W HYPOXIA OR HYPERCAPNIA   

Status: Chronic   


Qualifiers: 


   Respiratory failure complication: hypoxia and hypercapnia   Qualified Code(s)

: J96.21 - Acute and chronic respiratory failure with hypoxia; J96.22 - Acute 

and chronic respiratory failure with hypercapnia; J96.22 - Acute and chronic 

respiratory failure with hypercapnia; J96.22 - Acute and chronic respiratory 

failure with hypercapnia   


Comment: Stable on trach collar.    





(2) S/P percutaneous endoscopic gastrostomy (PEG) tube placement


Code(s): Z93.1 - GASTROSTOMY STATUS   Status: Deleted   Comment: Had procedure 

on 5/7/18. NO LONGER BEING USED.  TAKING PO'S.   





(3) Status post tracheostomy


Code(s): Z93.0 - TRACHEOSTOMY STATUS   Status: Deleted   Comment: Had procedure 

on 5/7/18. Stable.    





(4) Thrombocytopenia


Code(s): D69.6 - THROMBOCYTOPENIA, UNSPECIFIED   Status: Chronic   Comment: 

STABLE   





(5) Acute on chronic diastolic (congestive) heart failure


Code(s): I50.33 - ACUTE ON CHRONIC DIASTOLIC (CONGESTIVE) HEART FAILURE   Status

: Chronic   





(6) Afib


Code(s): I48.91 - UNSPECIFIED ATRIAL FIBRILLATION   Status: Chronic   


Qualifiers: 


   Atrial fibrillation type: paroxysmal   Qualified Code(s): I48.0 - Paroxysmal 

atrial fibrillation   


Comment: Rate controlled. Continue ASA and Amiodarone.  ELIQUIS HELD SECONDARY 

TO BLEEDING AFTER MACKENZIE CAME OUT.  RESUMED 6/28.


   





(7) Chronic diastolic CHF (congestive heart failure), NYHA class 3


Code(s): I50.32 - CHRONIC DIASTOLIC (CONGESTIVE) HEART FAILURE   Status: 

Chronic   Comment: Stable. Diuretics initially held 2/2 azotemia. Will be given 

PRN.   





(8) Debility


Code(s): R53.81 - OTHER MALAISE   Status: Chronic   Comment: 


WORKING WITH PT.  MAKING SOME PROGRESS.  PLACEMENT ISSUE.   





(9) Urethral bleeding


Status: Resolved   Comment: 


BLEEDING AFTER MACKENZIE CAME OUT.  HELD ELIQUIS.  STOPPED.  RESUMING ELIQUIS.  

CONTINUE TO MONITOR.   





(10) Hematoma


Code(s): T14.8XXA - OTHER INJURY OF UNSPECIFIED BODY REGION, INITIAL ENCOUNTER 

  Status: Acute   


Plan: 





MAY BE RELATED TO THE ELIQUIS.  AS DEBILITATED AS SHE IS, SHE NEEDS MUCH HELP 

WITH LIFTING.  MAY INADVERTENTLY CAUSE SOME BRUISING.  NEEDS TO STAY ON IT DUE 

TO A-FIB ALTHOUGH SHE SEEMS TO BE IN SINUS AND TO HELP WITH DVT PROPHYLAXIS.








- Plan





* .

## 2018-06-30 LAB
ANION GAP SERPL CALC-SCNC: 8 MMOL/L (ref 10–20)
BUN SERPL-MCNC: 11 MG/DL (ref 9.8–20.1)
CALCIUM SERPL-MCNC: 8.7 MG/DL (ref 7.8–10.44)
CHLORIDE SERPL-SCNC: 95 MMOL/L (ref 98–107)
CO2 SERPL-SCNC: 34 MMOL/L (ref 23–31)
CREAT CL PREDICTED SERPL C-G-VRATE: 181 ML/MIN (ref 70–130)
GLUCOSE SERPL-MCNC: 79 MG/DL (ref 80–115)
HGB BLD-MCNC: 10.6 G/DL (ref 12–16)
MCH RBC QN AUTO: 28.1 PG (ref 27–31)
MCV RBC AUTO: 90.4 FL (ref 78–98)
MDIFF COMPLETE?: YES
PLATELET # BLD AUTO: 126 THOU/UL (ref 130–400)
PLATELET BLD QL SMEAR: (no result)
POTASSIUM SERPL-SCNC: 3.9 MMOL/L (ref 3.5–5.1)
RBC # BLD AUTO: 3.76 MILL/UL (ref 4.2–5.4)
SODIUM SERPL-SCNC: 133 MMOL/L (ref 136–145)
WBC # BLD AUTO: 5.1 THOU/UL (ref 4.8–10.8)

## 2018-06-30 RX ADMIN — NYSTATIN SCH APPLIC: 100000 POWDER TOPICAL at 10:12

## 2018-06-30 RX ADMIN — GUAIFENESIN AND DEXTROMETHORPHAN SCH ML: 100; 10 SYRUP ORAL at 21:01

## 2018-06-30 RX ADMIN — Medication SCH: at 09:59

## 2018-06-30 RX ADMIN — NYSTATIN SCH APPLIC: 100000 POWDER TOPICAL at 21:02

## 2018-06-30 RX ADMIN — Medication SCH: at 21:03

## 2018-06-30 NOTE — PDOC.PN
- Subjective


Encounter Start Date: 06/30/18


Encounter Start Time: 13:20





DOING WELL.  NO COMPLAINTS.  NO BLOOD FROM THE URETHRA.  LEFT ARM HEMATOMA DOES 

NOT HURT.





- Objective


Resuscitation Status: 


 











Resuscitation Status           FULL:Full Resuscitation














Vital Signs & Weight: 


 Vital Signs (12 hours)











  Temp Pulse Resp BP Pulse Ox


 


 06/30/18 13:38   77  18   97


 


 06/30/18 08:00  98.2 F  77  18   92 L


 


 06/30/18 07:27  98.2 F  77  18  122/75  92 L


 


 06/30/18 06:56   78  18   92 L


 


 06/30/18 04:27  98.4 F    








 Weight











Admit Weight                   600 lb


 


Weight                         342 lb











 Most Recent Monitor Data











Heart Rate from ECG            73


 


NIBP                           109/61


 


NIBP BP-Mean                   72


 


Respiration from ECG           29


 


SpO2                           94














I&O: 


 











 06/29/18 06/30/18 07/01/18





 06:59 06:59 06:59


 


Intake Total 1040 480 


 


Balance 1040 480 











Result Diagrams: 


 06/30/18 04:26





 06/30/18 04:26





Phys Exam





- Physical Examination


Constitutional: NAD


Respiratory: no wheezing, no rales, no rhonchi, clear to auscultation bilateral


UPPER AIRWAY NOISE


Cardiovascular: RRR, no significant murmur


Gastrointestinal: soft, non-tender, no distention, positive bowel sounds


Musculoskeletal: no edema


Neurological: non-focal


Psychiatric: normal affect, A&O x 3





Dx/Plan


(1) Acute and chronic respiratory failure


Code(s): J96.20 - ACUTE AND CHR RESP FAILURE, UNSP W HYPOXIA OR HYPERCAPNIA   

Status: Chronic   


Qualifiers: 


   Respiratory failure complication: hypoxia and hypercapnia   Qualified Code(s)

: J96.21 - Acute and chronic respiratory failure with hypoxia; J96.22 - Acute 

and chronic respiratory failure with hypercapnia; J96.22 - Acute and chronic 

respiratory failure with hypercapnia; J96.22 - Acute and chronic respiratory 

failure with hypercapnia   


Comment: Stable on trach collar.    





(2) S/P percutaneous endoscopic gastrostomy (PEG) tube placement


Code(s): Z93.1 - GASTROSTOMY STATUS   Status: Deleted   Comment: Had procedure 

on 5/7/18. NO LONGER BEING USED.  TAKING PO'S.   





(3) Status post tracheostomy


Code(s): Z93.0 - TRACHEOSTOMY STATUS   Status: Deleted   Comment: Had procedure 

on 5/7/18. Stable.    





(4) Thrombocytopenia


Code(s): D69.6 - THROMBOCYTOPENIA, UNSPECIFIED   Status: Chronic   Comment: 

STABLE   





(5) Acute on chronic diastolic (congestive) heart failure


Code(s): I50.33 - ACUTE ON CHRONIC DIASTOLIC (CONGESTIVE) HEART FAILURE   Status

: Chronic   





(6) Afib


Code(s): I48.91 - UNSPECIFIED ATRIAL FIBRILLATION   Status: Chronic   


Qualifiers: 


   Atrial fibrillation type: paroxysmal   Qualified Code(s): I48.0 - Paroxysmal 

atrial fibrillation   


Comment: Rate controlled. Continue ASA and Amiodarone.  ELIQUIS HELD SECONDARY 

TO BLEEDING AFTER MACKENZIE CAME OUT.  RESUMED 6/28.  REGULAR NOW.


   





(7) Chronic diastolic CHF (congestive heart failure), NYHA class 3


Code(s): I50.32 - CHRONIC DIASTOLIC (CONGESTIVE) HEART FAILURE   Status: 

Chronic   Comment: Stable. Diuretics initially held 2/2 azotemia. Will be given 

PRN.   





(8) Debility


Code(s): R53.81 - OTHER MALAISE   Status: Chronic   Comment: 


WORKING WITH PT.  MAKING SOME PROGRESS.  PLACEMENT ISSUE.   





(9) Urethral bleeding


Status: Resolved   





(10) Hematoma


Code(s): T14.8XXA - OTHER INJURY OF UNSPECIFIED BODY REGION, INITIAL ENCOUNTER 

  Status: Acute   Comment: MILD LUE HEMATOMA.  STABLE.  LIKELY SECONDARY TO 

ELIQUIS.   





(11) Physical debility


Code(s): R53.81 - OTHER MALAISE   Status: Acute   





- Plan





* CONTINUE WORK WITH PT ON IMPROVING STRENGTH.  CM STILL WORKING ON PLACEMENT.

## 2018-06-30 NOTE — PRG
DATE OF SERVICE:  06/30/2018

 

SUBJECTIVE:  Obese patient with trach in place.  She is complaining of some phlegm issues  this [place].

 

OBJECTIVE:

VITAL SIGNS:  Sats are 90% on trach collar, respirations 18, temperature 98, blood pressure 120/75.

CHEST:  Anterior rhonchi.

CARDIAC:  Normal S1, S2.  No gallops.

ABDOMEN:  Soft, no masses.

 

LABORATORY DATA:  Electrolytes and CBC unremarkable.

 

IMPRESSION:  Morbid obesity, trach, cardiac arrhythmias.

 

PLAN:  PT and supportive care, neb treatments.  We will follow.

## 2018-07-01 RX ADMIN — Medication SCH: at 08:57

## 2018-07-01 RX ADMIN — NYSTATIN SCH APPLIC: 100000 POWDER TOPICAL at 08:55

## 2018-07-01 RX ADMIN — GUAIFENESIN AND DEXTROMETHORPHAN SCH ML: 100; 10 SYRUP ORAL at 20:14

## 2018-07-01 RX ADMIN — NYSTATIN SCH APPLIC: 100000 POWDER TOPICAL at 20:15

## 2018-07-01 RX ADMIN — Medication SCH ML: at 20:15

## 2018-07-01 NOTE — PDOC.PN
- Subjective


Encounter Start Date: 07/01/18


Encounter Start Time: 16:52





DOING VERY WELL.  EXERCISING WITH THERABANDS WITH HER ARMS.  WORKING WITH PT. 





- Objective


Resuscitation Status: 


 











Resuscitation Status           FULL:Full Resuscitation














Vital Signs & Weight: 


 Vital Signs (12 hours)











  Temp Pulse Resp BP Pulse Ox


 


 07/01/18 13:21   75  18   92 L


 


 07/01/18 11:57  98.4 F  97  20  126/82  95


 


 07/01/18 08:00  97.7 F  73  20   93 L


 


 07/01/18 07:29  97.7 F  73  20  120/67  93 L


 


 07/01/18 06:48   79  20   92 L








 Weight











Admit Weight                   600 lb


 


Weight                         342 lb











 Most Recent Monitor Data











Heart Rate from ECG            73


 


NIBP                           109/61


 


NIBP BP-Mean                   72


 


Respiration from ECG           29


 


SpO2                           94














I&O: 


 











 06/30/18 07/01/18 07/02/18





 06:59 06:59 06:59


 


Intake Total 480 1200 


 


Balance 480 1200 











Result Diagrams: 


 06/30/18 04:26





 06/30/18 04:26





Phys Exam





- Physical Examination


Constitutional: NAD


MORBIDLY OBESE.


HEENT: PERRLA


TRACH COLLAR


Respiratory: no wheezing, no rales, no rhonchi, clear to auscultation bilateral


Cardiovascular: RRR, no significant murmur, no rub


Gastrointestinal: soft, non-tender, no distention, positive bowel sounds


PEG


Musculoskeletal: no edema


Psychiatric: normal affect, A&O x 3





Dx/Plan


(1) Acute and chronic respiratory failure


Code(s): J96.20 - ACUTE AND CHR RESP FAILURE, UNSP W HYPOXIA OR HYPERCAPNIA   

Status: Chronic   


Qualifiers: 


   Respiratory failure complication: hypoxia and hypercapnia   Qualified Code(s)

: J96.21 - Acute and chronic respiratory failure with hypoxia; J96.22 - Acute 

and chronic respiratory failure with hypercapnia; J96.22 - Acute and chronic 

respiratory failure with hypercapnia; J96.22 - Acute and chronic respiratory 

failure with hypercapnia   


Comment: Stable on trach collar.    





(2) S/P percutaneous endoscopic gastrostomy (PEG) tube placement


Code(s): Z93.1 - GASTROSTOMY STATUS   Status: Deleted   Comment: Had procedure 

on 5/7/18. NO LONGER BEING USED.  TAKING PO'S.   





(3) Status post tracheostomy


Code(s): Z93.0 - TRACHEOSTOMY STATUS   Status: Deleted   Comment: Had procedure 

on 5/7/18. Stable.    





(4) Thrombocytopenia


Code(s): D69.6 - THROMBOCYTOPENIA, UNSPECIFIED   Status: Chronic   Comment: 

STABLE   





(5) Acute on chronic diastolic (congestive) heart failure


Code(s): I50.33 - ACUTE ON CHRONIC DIASTOLIC (CONGESTIVE) HEART FAILURE   Status

: Chronic   





(6) Afib


Code(s): I48.91 - UNSPECIFIED ATRIAL FIBRILLATION   Status: Chronic   


Qualifiers: 


   Atrial fibrillation type: paroxysmal   Qualified Code(s): I48.0 - Paroxysmal 

atrial fibrillation   


Comment: Rate controlled. Continue ASA and Amiodarone.  ELIQUIS HELD SECONDARY 

TO BLEEDING AFTER MACKENZIE CAME OUT.  RESUMED 6/28.  REGULAR NOW.


   





(7) Chronic diastolic CHF (congestive heart failure), NYHA class 3


Code(s): I50.32 - CHRONIC DIASTOLIC (CONGESTIVE) HEART FAILURE   Status: 

Chronic   Comment: Stable. Diuretics initially held 2/2 azotemia. Will be given 

PRN.   





(8) Debility


Code(s): R53.81 - OTHER MALAISE   Status: Chronic   Comment: 


WORKING WITH PT.  MAKING SOME PROGRESS.  PLACEMENT ISSUE.   





(9) Urethral bleeding


Status: Resolved   





(10) Hematoma


Code(s): T14.8XXA - OTHER INJURY OF UNSPECIFIED BODY REGION, INITIAL ENCOUNTER 

  Status: Acute   Comment: MILD LUE HEMATOMA.  STABLE.  LIKELY SECONDARY TO 

ELIQUIS.   





(11) Physical debility


Code(s): R53.81 - OTHER MALAISE   Status: Acute   





(12) Morbid obesity


Code(s): E66.01 - MORBID (SEVERE) OBESITY DUE TO EXCESS CALORIES   Status: 

Acute   


Plan: 


ACCORDING TO THE CHART, SHE HAS LOST SUBSTANTIAL WEIGHT.  SHE IS CERTAINLY 

TRYING HER BEST TO DO SO.  








(13) Right knee pain


Code(s): M25.561 - PAIN IN RIGHT KNEE   Status: Acute   


Plan: 


UNDOUBTEDLY HAS SOME DJD IN THE KNEE SECONDARY TO THE WEIGHT.  LIMITING HER 

ABILITY TO GET UP WITH PT.








- Plan





* CONTINUE TO WORK ON MOBILITY WITH PT.  PLACEMENT ISSUE.  HAS THREE SONS IN 

THEIR 20'S WHO LIVE IN [place].  THEY WORK DAYS AND SHE IS NOT CAPABLE OF BEING 

HOME ALONE.  CM WORKING ON IT.

## 2018-07-01 NOTE — PRG
DATE OF SERVICE:  07/01/2018

 

SUBJECTIVE:  She is lying in bed with trach collar in place.

 

OBJECTIVE:

VITAL SIGNS:  90% sats.  Temperature 97, pulse 73, respirations 20, blood pressure is 120/67.  She de
nied shortness of breath.

CHEST:  Reveals bilateral rhonchi.

CARDIAC:  Normal S1, S2.  No gallops.

ABDOMEN:  No masses.

 

IMPRESSION:  Morbid obesity, trach.

 

PLAN:  PT, await placement.

## 2018-07-02 LAB
CREAT CL PREDICTED SERPL C-G-VRATE: 181 ML/MIN (ref 70–130)
HGB BLD-MCNC: 11.1 G/DL (ref 12–16)
PLATELET # BLD AUTO: 133 THOU/UL (ref 130–400)

## 2018-07-02 RX ADMIN — GUAIFENESIN AND DEXTROMETHORPHAN SCH ML: 100; 10 SYRUP ORAL at 20:00

## 2018-07-02 RX ADMIN — NYSTATIN SCH APPLIC: 100000 POWDER TOPICAL at 20:01

## 2018-07-02 RX ADMIN — Medication SCH: at 20:01

## 2018-07-02 RX ADMIN — Medication SCH: at 08:43

## 2018-07-02 RX ADMIN — NYSTATIN SCH APPLIC: 100000 POWDER TOPICAL at 08:43

## 2018-07-03 RX ADMIN — GUAIFENESIN AND DEXTROMETHORPHAN SCH ML: 100; 10 SYRUP ORAL at 20:23

## 2018-07-03 RX ADMIN — Medication SCH: at 20:24

## 2018-07-03 RX ADMIN — Medication SCH ML: at 09:00

## 2018-07-03 RX ADMIN — NYSTATIN SCH APPLIC: 100000 POWDER TOPICAL at 20:23

## 2018-07-03 RX ADMIN — NYSTATIN SCH APPLIC: 100000 POWDER TOPICAL at 09:00

## 2018-07-03 NOTE — PDOC.PN
- Subjective


Encounter Start Date: 07/03/18


Encounter Start Time: 15:00





Patient is seen today, alert and oriented. Waiting on placement. 





- Objective


Resuscitation Status: 


 











Resuscitation Status           FULL:Full Resuscitation














MAR Reviewed: Yes


Vital Signs & Weight: 


 Vital Signs (12 hours)











  Temp Pulse Resp BP Pulse Ox


 


 07/03/18 12:57   76  18  


 


 07/03/18 08:00  97.8 F  78  20   98


 


 07/03/18 07:10  97.8 F  78  20  118/69  98


 


 07/03/18 07:08   73  16  








 Weight











Admit Weight                   600 lb


 


Weight                         341 lb 9.6 oz











 Most Recent Monitor Data











Heart Rate from ECG            73


 


NIBP                           109/61


 


NIBP BP-Mean                   72


 


Respiration from ECG           29


 


SpO2                           94














I&O: 


 











 07/02/18 07/03/18 07/04/18





 06:59 06:59 06:59


 


Intake Total 1220 1555 240


 


Balance 1220 1555 240











Result Diagrams: 


 07/02/18 04:11





 07/02/18 04:11


Radiology Reviewed by me: Yes


EKG Reviewed by me: Yes





Dx/Plan


(1) Acute idiopathic thrombocytopenic purpura


Code(s): D69.3 - IMMUNE THROMBOCYTOPENIC PURPURA   Status: Deleted   Comment: 

resolved.   





(2) Acute and chronic respiratory failure


Code(s): J96.20 - ACUTE AND CHR RESP FAILURE, UNSP W HYPOXIA OR HYPERCAPNIA   

Status: Chronic   


Qualifiers: 


   Respiratory failure complication: hypoxia and hypercapnia   Qualified Code(s)

: J96.21 - Acute and chronic respiratory failure with hypoxia; J96.22 - Acute 

and chronic respiratory failure with hypercapnia; J96.22 - Acute and chronic 

respiratory failure with hypercapnia; J96.22 - Acute and chronic respiratory 

failure with hypercapnia   


Comment: Stable on trach collar.    





(3) Acute on chronic diastolic (congestive) heart failure


Code(s): I50.33 - ACUTE ON CHRONIC DIASTOLIC (CONGESTIVE) HEART FAILURE   Status

: Chronic   





(4) Hypokalemia


Code(s): E87.6 - HYPOKALEMIA   Status: Deleted   Comment: continue electrolyte 

replacement protocol   





(5) Morbid obesity with BMI of 70 and over, adult


Code(s): E66.01 - MORBID (SEVERE) OBESITY DUE TO EXCESS CALORIES; Z68.45 - BODY 

MASS INDEX (BMI) 70 OR GREATER, ADULT   Status: Deleted   





(6) Pickwickian syndrome


Code(s): E66.2 - MORBID (SEVERE) OBESITY WITH ALVEOLAR HYPOVENTILATION   Status

: Chronic   Comment: s/p trach collar.    





(7) Pulmonary embolism


Code(s): I26.99 - OTHER PULMONARY EMBOLISM WITHOUT ACUTE COR PULMONALE   Status

: Deleted   Comment: resolved. On DVT prophyalxis.   





(8) Afib


Code(s): I48.91 - UNSPECIFIED ATRIAL FIBRILLATION   Status: Chronic   


Qualifiers: 


   Atrial fibrillation type: paroxysmal   Qualified Code(s): I48.0 - Paroxysmal 

atrial fibrillation   


Comment: Rate controlled. Continue ASA and Amiodarone.  ELIQUIS HELD SECONDARY 

TO BLEEDING AFTER MACKENZIE CAME OUT.  RESUMED 6/28.  REGULAR NOW.


   





- Plan


cont current plan of care, PT/OT, , respiratory therapy, 

incentive spirometry, DVT proph w/lovenox





* .








Review of Systems





- Medications/Allergies


Allergies/Adverse Reactions: 


 Allergies











Allergy/AdvReac Type Severity Reaction Status Date / Time


 


No Known Drug Allergies Allergy   Verified 05/06/18 14:16











Medications: 


 Current Medications





Acetaminophen (Tylenol Elixir)  1,000 mg PO Q6H PRN


   PRN Reason: Headache/Fever or Pain


   Last Admin: 07/03/18 10:30 Dose:  1,000 mg


Al Hydroxide/Mg Hydroxide (Maalox)  15 ml PER TUBE Q4H PRN


   PRN Reason: Heartburn  or Indigestion


Albuterol/Ipratropium (Duoneb)  3 ml NEB [id number] [facility]


   Last Admin: 07/03/18 12:57 Dose:  3 ml


Amiodarone HCl (Cordarone)  200 mg PO DAILY [facility]


   Last Admin: 07/03/18 08:54 Dose:  200 mg


Lipase/Protease/Amylase ([place] [id number])  1 cap FS .PER PROTOCOL PRN


   PRN Reason: TUBE OCCLUSION PROTOCOL


Apixaban (Eliquis)  5 mg PO BID [facility]


   Last Admin: 07/03/18 08:55 Dose:  5 mg


Artificial Tears (Tears Renewed 15ml Bottle)  0 drop EA EYE PRN PRN


   PRN Reason: Dry Eyes


Aspirin (Aspirin Chewable)  81 mg PO DAILY [facility]


   Last Admin: 07/03/18 08:52 Dose:  81 mg


Bupropion HCl (Wellbutrin)  150 mg PO BID [facility]


   Last Admin: 07/03/18 08:51 Dose:  150 mg


Clonidine (Catapres)  0.1 mg PO Q4H PRN


   PRN Reason: Systolic BP > 180


   Last Admin: 05/18/18 21:05 Dose:  0.1 mg


Furosemide (Lasix)  40 mg PO DAILY-AC [facility]


   Last Admin: 07/03/18 08:55 Dose:  40 mg


Guaifenesin/Dextromethorphan (Robitussin Dm)  10 ml PO 2100 [facility]


   Last Admin: 07/02/18 20:00 Dose:  10 ml


Hydralazine HCl (Apresoline)  10 mg SLOW IVP Q4H PRN


   PRN Reason: Systolic BP > 180


   Last Admin: 05/12/18 17:06 Dose:  10 mg


Loperamide HCl (Imodium)  2 mg PER TUBE PRN PRN


   PRN Reason: Diarrhea/Loose Stools


Losartan Potassium (Cozaar)  12.5 mg PO DAILY [facility]


   Last Admin: 07/03/18 08:53 Dose:  12.5 mg


Magnesium Hydroxide (Milk Of Magnesium)  30 ml PER TUBE DAILYPRN PRN


   PRN Reason: Constipation


Mineral Oil/White Petrolatum (Eucerin Cream)  0 gm TOP BIDPRN PRN


   PRN Reason: Dry Skin


Nystatin (Mycostatin Powder)  30 gm TOP BID [facility]


   Last Admin: 07/03/18 09:00 Dose:  1 applic


Ondansetron HCl (Zofran Odt)  4 mg PO Q6H PRN


   PRN Reason: Nausea/Vomiting


   Last Admin: 06/01/18 09:09 Dose:  4 mg


Ondansetron HCl (Zofran)  4 mg IVP Q6H PRN


   PRN Reason: Nausea/Vomiting


Pantoprazole Sodium (Protonix)  40 mg PO DAILY [facility]


   Last Admin: 07/03/18 08:53 Dose:  40 mg


Phenol (Chloraseptic Spray 180 Ml Bot)  0 ml PO PRN PRN


   PRN Reason: Sore Throat


Potassium Chloride (Klor-Con)  20 meq PO QAM-WM [facility]


   Last Admin: 07/03/18 08:55 Dose:  20 meq


Senna (Senokot)  2 tab PO HSPRN PRN


   PRN Reason: Constipation


Sodium Bicarbonate (Bicarbonate, Sodium)  650 mg PER TUBE .PER PROTOCOL PRN


   PRN Reason: ENTERAL TUBE OCCLUSION


Sodium Chloride (Flush - Normal Saline)  10 ml IVF Q12HR [facility]


   Last Admin: 07/03/18 09:00 Dose:  10 ml


Sodium Chloride (Flush - Normal Saline)  10 ml IVF PRN PRN


   PRN Reason: Saline Flush


   Last Admin: 06/11/18 05:41 Dose:  10 ml


Sodium Chloride (Ocean Nasal Spray 0.65%)  0 ml EA NARE QIDPRN PRN


   PRN Reason: Nasal Congestion


Tramadol HCl (Ultram)  50 mg PO Q4H PRN


   PRN Reason: Moderate Pain (4-6)


   Last Admin: 06/28/18 08:04 Dose:  50 mg


Tramadol HCl (Ultram)  100 mg PO Q6H PRN


   PRN Reason: Moderate to Severe Pain (6-10)

## 2018-07-03 NOTE — PDOC.PN
- Subjective


Encounter Start Date: 07/02/18


Encounter Start Time: 13:00





PAtient is seen today, No Family meber Around, Pt is Able to speak with Trach, 

She is feeling fine, trying with PT/OT to transfer to chair. 





- Objective


Resuscitation Status: 


 











Resuscitation Status           FULL:Full Resuscitation














MAR Reviewed: Yes


Vital Signs & Weight: 


 Vital Signs (12 hours)











  Temp Pulse Resp BP Pulse Ox


 


 07/03/18 07:10  97.8 F  78  20  118/69  98


 


 07/03/18 07:08   73  16  


 


 07/02/18 23:57   78  16   92 L








 Weight











Admit Weight                   600 lb


 


Weight                         341 lb 9.6 oz











 Most Recent Monitor Data











Heart Rate from ECG            73


 


NIBP                           109/61


 


NIBP BP-Mean                   72


 


Respiration from ECG           29


 


SpO2                           94














I&O: 


 











 07/02/18 07/03/18 07/04/18





 06:59 06:59 06:59


 


Intake Total 1220 1555 


 


Balance 1220 1555 











Result Diagrams: 


 07/02/18 04:11





 07/02/18 04:11


Radiology Reviewed by me: Yes





Phys Exam





- Physical Examination


HEENT: PERRLA, moist MMs


Neck: no nodes, no JVD, supple


Respiratory: no wheezing, no rales


Cardiovascular: RRR, no significant murmur


Gastrointestinal: soft, non-tender


Lymphedema of Both lower extremitis. 


Neurological: non-focal, normal sensation


Lymphedema. 


Psychiatric: normal affect





Dx/Plan


(1) Acute idiopathic thrombocytopenic purpura


Code(s): D69.3 - IMMUNE THROMBOCYTOPENIC PURPURA   Status: Deleted   Comment: 

resolved.   





(2) Acute and chronic respiratory failure


Code(s): J96.20 - ACUTE AND CHR RESP FAILURE, UNSP W HYPOXIA OR HYPERCAPNIA   

Status: Chronic   


Qualifiers: 


   Respiratory failure complication: hypoxia and hypercapnia   Qualified Code(s)

: J96.21 - Acute and chronic respiratory failure with hypoxia; J96.22 - Acute 

and chronic respiratory failure with hypercapnia; J96.22 - Acute and chronic 

respiratory failure with hypercapnia; J96.22 - Acute and chronic respiratory 

failure with hypercapnia   


Comment: Stable on trach collar.    





(3) Acute on chronic diastolic (congestive) heart failure


Code(s): I50.33 - ACUTE ON CHRONIC DIASTOLIC (CONGESTIVE) HEART FAILURE   Status

: Chronic   





(4) Hypokalemia


Code(s): E87.6 - HYPOKALEMIA   Status: Deleted   Comment: continue electrolyte 

replacement protocol   





(5) Morbid obesity with BMI of 70 and over, adult


Code(s): E66.01 - MORBID (SEVERE) OBESITY DUE TO EXCESS CALORIES; Z68.45 - BODY 

MASS INDEX (BMI) 70 OR GREATER, ADULT   Status: Deleted   





(6) Pickwickian syndrome


Code(s): E66.2 - MORBID (SEVERE) OBESITY WITH ALVEOLAR HYPOVENTILATION   Status

: Chronic   Comment: s/p trach collar.    





(7) Pulmonary embolism


Code(s): I26.99 - OTHER PULMONARY EMBOLISM WITHOUT ACUTE COR PULMONALE   Status

: Deleted   Comment: resolved. On DVT prophyalxis.   





(8) Afib


Code(s): I48.91 - UNSPECIFIED ATRIAL FIBRILLATION   Status: Chronic   


Qualifiers: 


   Atrial fibrillation type: paroxysmal   Qualified Code(s): I48.0 - Paroxysmal 

atrial fibrillation   


Comment: Rate controlled. Continue ASA and Amiodarone.  ELIQUIS HELD SECONDARY 

TO BLEEDING AFTER MACKENZIE CAME OUT.  RESUMED 6/28.  REGULAR NOW.


   





- Plan


cont current plan of care, PT/OT, , respiratory therapy, 

incentive spirometry, out of bed/ambulate, DVT proph w/lovenox





* .








Review of Systems





- Review of Systems


Constitutional: weakness, malaise


Eyes: negative: Pain, Vision Change, Conjunctivae Inflammation, Eyelid 

Inflammation, Redness, Other


ENT: negative: Ear Pain, Ear Discharge, Nose Pain, Nose Discharge, Nose 

Congestion, Mouth Pain, Mouth Swelling, Throat Pain, Throat Swelling, Other


Respiratory: negative: Cough, Dry, Shortness of Breath, Hemoptysis, SOB with 

Excertion, Pleuritic Pain, Sputum, Wheezing


Cardiovascular: negative: chest pain, palpitations, orthopnea, paroxysmal 

nocturnal dyspnea, edema, light headedness, other


Gastrointestinal: negative: Nausea, Vomiting, Abdominal Pain, Diarrhea, 

Constipation, Melena, Hematochezia, Other


Genitourinary: negative: Dysuria, Frequency, Incontinence, Hematuria, Retention

, Other





- Medications/Allergies


Allergies/Adverse Reactions: 


 Allergies











Allergy/AdvReac Type Severity Reaction Status Date / Time


 


No Known Drug Allergies Allergy   Verified 05/06/18 14:16











Medications: 


 Current Medications





Acetaminophen (Tylenol Elixir)  1,000 mg PO Q6H PRN


   PRN Reason: Headache/Fever or Pain


   Last Admin: 07/02/18 10:37 Dose:  1,000 mg


Al Hydroxide/Mg Hydroxide (Maalox)  15 ml PER TUBE Q4H PRN


   PRN Reason: Heartburn  or Indigestion


Albuterol/Ipratropium (Duoneb)  3 ml NEB [id number] [facility]


   Last Admin: 07/03/18 07:08 Dose:  3 ml


Amiodarone HCl (Cordarone)  200 mg PO DAILY [facility]


   Last Admin: 07/03/18 08:54 Dose:  200 mg


Lipase/Protease/Amylase ([place] [id number])  1 cap FS .PER PROTOCOL PRN


   PRN Reason: TUBE OCCLUSION PROTOCOL


Apixaban (Eliquis)  5 mg PO BID [facility]


   Last Admin: 07/03/18 08:55 Dose:  5 mg


Artificial Tears (Tears Renewed 15ml Bottle)  0 drop EA EYE PRN PRN


   PRN Reason: Dry Eyes


Aspirin (Aspirin Chewable)  81 mg PO DAILY [facility]


   Last Admin: 07/03/18 08:52 Dose:  81 mg


Bupropion HCl (Wellbutrin)  150 mg PO BID [facility]


   Last Admin: 07/03/18 08:51 Dose:  150 mg


Clonidine (Catapres)  0.1 mg PO Q4H PRN


   PRN Reason: Systolic BP > 180


   Last Admin: 05/18/18 21:05 Dose:  0.1 mg


Furosemide (Lasix)  40 mg PO DAILY-AC [facility]


   Last Admin: 07/03/18 08:55 Dose:  40 mg


Guaifenesin/Dextromethorphan (Robitussin Dm)  10 ml PO 2100 [facility]


   Last Admin: 07/02/18 20:00 Dose:  10 ml


Hydralazine HCl (Apresoline)  10 mg SLOW IVP Q4H PRN


   PRN Reason: Systolic BP > 180


   Last Admin: 05/12/18 17:06 Dose:  10 mg


Loperamide HCl (Imodium)  2 mg PER TUBE PRN PRN


   PRN Reason: Diarrhea/Loose Stools


Losartan Potassium (Cozaar)  12.5 mg PO DAILY [facility]


   Last Admin: 07/03/18 08:53 Dose:  12.5 mg


Magnesium Hydroxide (Milk Of Magnesium)  30 ml PER TUBE DAILYPRN PRN


   PRN Reason: Constipation


Mineral Oil/White Petrolatum (Eucerin Cream)  0 gm TOP BIDPRN PRN


   PRN Reason: Dry Skin


Nystatin (Mycostatin Powder)  30 gm TOP BID [facility]


   Last Admin: 07/02/18 20:01 Dose:  1 applic


Ondansetron HCl (Zofran Odt)  4 mg PO Q6H PRN


   PRN Reason: Nausea/Vomiting


   Last Admin: 06/01/18 09:09 Dose:  4 mg


Ondansetron HCl (Zofran)  4 mg IVP Q6H PRN


   PRN Reason: Nausea/Vomiting


Pantoprazole Sodium (Protonix)  40 mg PO DAILY [facility]


   Last Admin: 07/03/18 08:53 Dose:  40 mg


Phenol (Chloraseptic Spray 180 Ml Bot)  0 ml PO PRN PRN


   PRN Reason: Sore Throat


Potassium Chloride (Klor-Con)  20 meq PO QAM-WM [facility]


   Last Admin: 07/03/18 08:55 Dose:  20 meq


Senna (Senokot)  2 tab PO HSPRN PRN


   PRN Reason: Constipation


Sodium Bicarbonate (Bicarbonate, Sodium)  650 mg PER TUBE .PER PROTOCOL PRN


   PRN Reason: ENTERAL TUBE OCCLUSION


Sodium Chloride (Flush - Normal Saline)  10 ml IVF Q12HR [facility]


   Last Admin: 07/02/18 20:01 Dose:  Not Given


Sodium Chloride (Flush - Normal Saline)  10 ml IVF PRN PRN


   PRN Reason: Saline Flush


   Last Admin: 06/11/18 05:41 Dose:  10 ml


Sodium Chloride (Ocean Nasal Spray 0.65%)  0 ml EA NARE QIDPRN PRN


   PRN Reason: Nasal Congestion


Tramadol HCl (Ultram)  50 mg PO Q4H PRN


   PRN Reason: Moderate Pain (4-6)


   Last Admin: 06/28/18 08:04 Dose:  50 mg


Tramadol HCl (Ultram)  100 mg PO Q6H PRN


   PRN Reason: Moderate to Severe Pain (6-10)

## 2018-07-03 NOTE — PRG
DATE OF SERVICE:  07/03/2018

 

SUBJECTIVE:  The patient appears about the same.  She has no complaints.

 

OBJECTIVE:

VITAL SIGNS:  Temperature 97.8, pulse 70, respirations 20, O2 sat 90% on trach collar, blood pressure
 118/69.

HEENT:  Unremarkable.

NECK:  Trach in good position.

LUNGS:  Fairly clear.

CARDIAC:  S1 and S2 regular.

ABDOMEN:  Soft, obese.

EXTREMITIES:  No edema.

 

ASSESSMENT:

1.  Obesity-hypoventilation syndrome.

2.  Status post tracheostomy and percutaneous endoscopic gastrostomy.

 

PLAN:  Continuing rehabilitation inside the hospital since she has no insurance.  Hopefully, she can 
be sent home with her children fairly soon.

## 2018-07-04 LAB
CREAT CL PREDICTED SERPL C-G-VRATE: 188 ML/MIN (ref 70–130)
HGB BLD-MCNC: 11.7 G/DL (ref 12–16)
PLATELET # BLD AUTO: 123 THOU/UL (ref 130–400)

## 2018-07-04 RX ADMIN — NYSTATIN SCH APPLIC: 100000 POWDER TOPICAL at 22:22

## 2018-07-04 RX ADMIN — Medication SCH: at 22:22

## 2018-07-04 RX ADMIN — GUAIFENESIN AND DEXTROMETHORPHAN SCH ML: 100; 10 SYRUP ORAL at 22:22

## 2018-07-04 RX ADMIN — Medication SCH: at 10:03

## 2018-07-04 RX ADMIN — NYSTATIN SCH APPLIC: 100000 POWDER TOPICAL at 08:46

## 2018-07-04 NOTE — PDOC.PN
- Subjective


Encounter Start Date: 07/04/18


Encounter Start Time: 10:15


Subjective: awake, no sob, feels better





- Objective


Resuscitation Status: 


 











Resuscitation Status           FULL:Full Resuscitation














MAR Reviewed: Yes


Vital Signs & Weight: 


 Vital Signs (12 hours)











  Temp Pulse Resp BP Pulse Ox


 


 07/04/18 12:13   81  20   96


 


 07/04/18 08:00  97.9 F  75  18  138/80  94 L


 


 07/04/18 07:09   79  16   95








 Weight











Admit Weight                   600 lb


 


Weight                         341 lb 9.6 oz











 Most Recent Monitor Data











Heart Rate from ECG            73


 


NIBP                           109/61


 


NIBP BP-Mean                   72


 


Respiration from ECG           29


 


SpO2                           94














I&O: 


 











 07/03/18 07/04/18 07/05/18





 06:59 06:59 06:59


 


Intake Total 1555 1250 


 


Balance 1555 1250 











Result Diagrams: 


 07/04/18 04:31





 07/04/18 04:31





Phys Exam





- Physical Examination


HEENT: PERRLA, sclera anicteric


Neck: no JVD


trach+


Respiratory: no wheezing, no rales


Cardiovascular: RRR, no significant murmur


Gastrointestinal: soft, no distention, positive bowel sounds


Musculoskeletal: pulses present, edema present


Neurological: non-focal, moves all 4 limbs


Psychiatric: A&O x 3





Dx/Plan


(1) Acute and chronic respiratory failure


Code(s): J96.20 - ACUTE AND CHR RESP FAILURE, UNSP W HYPOXIA OR HYPERCAPNIA   

Status: Chronic   


Qualifiers: 


   Respiratory failure complication: hypoxia and hypercapnia   Qualified Code(s)

: J96.21 - Acute and chronic respiratory failure with hypoxia; J96.22 - Acute 

and chronic respiratory failure with hypercapnia; J96.22 - Acute and chronic 

respiratory failure with hypercapnia; J96.22 - Acute and chronic respiratory 

failure with hypercapnia   


Comment: Stable on trach collar.    





(2) MARY (obstructive sleep apnea)


Code(s): G47.33 - OBSTRUCTIVE SLEEP APNEA (ADULT) (PEDIATRIC)   Status: Chronic

   





(3) Afib


Code(s): I48.91 - UNSPECIFIED ATRIAL FIBRILLATION   Status: Chronic   


Qualifiers: 


   Atrial fibrillation type: paroxysmal   Qualified Code(s): I48.0 - Paroxysmal 

atrial fibrillation   


Comment: Rate controlled. Continue ASA and Amiodarone.  ELIQUIS   





(4) Acute on chronic diastolic (congestive) heart failure


Code(s): I50.33 - ACUTE ON CHRONIC DIASTOLIC (CONGESTIVE) HEART FAILURE   Status

: Chronic   





(5) Pickwickian syndrome


Code(s): E66.2 - MORBID (SEVERE) OBESITY WITH ALVEOLAR HYPOVENTILATION   Status

: Chronic   Comment: s/p trach collar.    





(6) Status post tracheostomy


Code(s): Z93.0 - TRACHEOSTOMY STATUS   Status: Deleted   Comment: Had procedure 

on 5/7/18. Stable.    





(7) S/P percutaneous endoscopic gastrostomy (PEG) tube placement


Code(s): Z93.1 - GASTROSTOMY STATUS   Status: Deleted   Comment: Had procedure 

on 5/7/18. NO LONGER BEING USED.  TAKING PO   





(8) Severe muscle deconditioning


Code(s): R29.898 - OT SYMPTOMS AND SIGNS INVOLVING THE MUSCULOSKELETAL SYSTEM 

  Status: Acute   





- Plan


to mobilize with PT


-: dc plan when family can manage her at home


-: she needs to learn trach suction


-: on amiodarone, eliquis, aspirin and cozaar





* .








Review of Systems





- Medications/Allergies


Allergies/Adverse Reactions: 


 Allergies











Allergy/AdvReac Type Severity Reaction Status Date / Time


 


No Known Drug Allergies Allergy   Verified 05/06/18 14:16











Medications: 


 Current Medications





Acetaminophen (Tylenol Elixir)  1,000 mg PO Q6H PRN


   PRN Reason: Headache/Fever or Pain


   Last Admin: 07/04/18 10:04 Dose:  1,000 mg


Al Hydroxide/Mg Hydroxide (Maalox)  15 ml PER TUBE Q4H PRN


   PRN Reason: Heartburn  or Indigestion


Albuterol/Ipratropium (Duoneb)  3 ml NEB [id number] [facility]


   Last Admin: 07/04/18 12:13 Dose:  3 ml


Amiodarone HCl (Cordarone)  200 mg PO DAILY [facility]


   Last Admin: 07/04/18 10:02 Dose:  200 mg


Lipase/Protease/Amylase ([place] Dr [id number])  1 cap FS .PER PROTOCOL PRN


   PRN Reason: TUBE OCCLUSION PROTOCOL


Apixaban (Eliquis)  5 mg PO BID [facility]


   Last Admin: 07/04/18 10:00 Dose:  5 mg


Artificial Tears (Tears Renewed 15ml Bottle)  0 drop EA EYE PRN PRN


   PRN Reason: Dry Eyes


Aspirin (Aspirin Chewable)  81 mg PO DAILY [facility]


   Last Admin: 07/04/18 10:02 Dose:  81 mg


Bupropion HCl (Wellbutrin)  150 mg PO BID [facility]


   Last Admin: 07/04/18 10:01 Dose:  150 mg


Clonidine (Catapres)  0.1 mg PO Q4H PRN


   PRN Reason: Systolic BP > 180


   Last Admin: 05/18/18 21:05 Dose:  0.1 mg


Furosemide (Lasix)  40 mg PO DAILY-AC [facility]


   Last Admin: 07/04/18 08:41 Dose:  40 mg


Guaifenesin/Dextromethorphan (Robitussin Dm)  10 ml PO 2100 [facility]


   Last Admin: 07/03/18 20:23 Dose:  10 ml


Hydralazine HCl (Apresoline)  10 mg SLOW IVP Q4H PRN


   PRN Reason: Systolic BP > 180


   Last Admin: 05/12/18 17:06 Dose:  10 mg


Loperamide HCl (Imodium)  2 mg PER TUBE PRN PRN


   PRN Reason: Diarrhea/Loose Stools


Losartan Potassium (Cozaar)  12.5 mg PO DAILY [facility]


   Last Admin: 07/04/18 09:59 Dose:  12.5 mg


Magnesium Hydroxide (Milk Of Magnesium)  30 ml PER TUBE DAILYPRN PRN


   PRN Reason: Constipation


Mineral Oil/White Petrolatum (Eucerin Cream)  0 gm TOP BIDPRN PRN


   PRN Reason: Dry Skin


Nystatin (Mycostatin Powder)  30 gm TOP BID [facility]


   Last Admin: 07/04/18 08:46 Dose:  1 applic


Ondansetron HCl (Zofran Odt)  4 mg PO Q6H PRN


   PRN Reason: Nausea/Vomiting


   Last Admin: 06/01/18 09:09 Dose:  4 mg


Ondansetron HCl (Zofran)  4 mg IVP Q6H PRN


   PRN Reason: Nausea/Vomiting


Pantoprazole Sodium (Protonix)  40 mg PO DAILY [facility]


   Last Admin: 07/04/18 10:01 Dose:  40 mg


Phenol (Chloraseptic Spray 180 Ml Bot)  0 ml PO PRN PRN


   PRN Reason: Sore Throat


Potassium Chloride (Klor-Con)  20 meq PO QAM-WM [facility]


   Last Admin: 07/04/18 10:02 Dose:  20 meq


Senna (Senokot)  2 tab PO HSPRN PRN


   PRN Reason: Constipation


Sodium Bicarbonate (Bicarbonate, Sodium)  650 mg PER TUBE .PER PROTOCOL PRN


   PRN Reason: ENTERAL TUBE OCCLUSION


Sodium Chloride (Flush - Normal Saline)  10 ml IVF Q12HR [facility]


   Last Admin: 07/04/18 10:03 Dose:  Not Given


Sodium Chloride (Flush - Normal Saline)  10 ml IVF PRN PRN


   PRN Reason: Saline Flush


   Last Admin: 06/11/18 05:41 Dose:  10 ml


Sodium Chloride (Ocean Nasal Spray 0.65%)  0 ml EA NARE QIDPRN PRN


   PRN Reason: Nasal Congestion


Tramadol HCl (Ultram)  50 mg PO Q4H PRN


   PRN Reason: Moderate Pain (4-6)


   Last Admin: 06/28/18 08:04 Dose:  50 mg


Tramadol HCl (Ultram)  100 mg PO Q6H PRN


   PRN Reason: Moderate to Severe Pain (6-10)

## 2018-07-05 RX ADMIN — Medication SCH: at 19:40

## 2018-07-05 RX ADMIN — GUAIFENESIN AND DEXTROMETHORPHAN SCH ML: 100; 10 SYRUP ORAL at 21:18

## 2018-07-05 RX ADMIN — NYSTATIN SCH APPLIC: 100000 POWDER TOPICAL at 21:18

## 2018-07-05 RX ADMIN — Medication SCH: at 09:07

## 2018-07-05 RX ADMIN — NYSTATIN SCH APPLIC: 100000 POWDER TOPICAL at 09:06

## 2018-07-05 NOTE — PRG
DATE OF SERVICE:  07/05/2018

 

SUBJECTIVE:  The patient is doing reasonably well.  She seems depressed.

 

PHYSICAL EXAMINATION: 

VITAL SIGNS:  Temperature 98.2, pulse 82, respiration 16, O2 sat 97%, blood pressure 141/78.

HEENT:  Unremarkable.

NECK:  No JVD.

CHEST:  Clear without wheezing or rhonchi.

ABDOMEN:  Soft, nontender.

EXTREMITIES:  No edema.

 

ASSESSMENT:  

1.  Severe deconditioning.

2.  Status post trach for obesity hypoventilation syndrome.

 

PLAN:  Needs to transition towards home when felt stable.

## 2018-07-05 NOTE — PDOC.PN
- Subjective


Encounter Start Date: 07/05/18


Encounter Start Time: 10:00


Subjective: awake, no sob





- Objective


Resuscitation Status: 


 











Resuscitation Status           FULL:Full Resuscitation














MAR Reviewed: Yes


Vital Signs & Weight: 


 Vital Signs (12 hours)











  Temp Pulse Resp BP Pulse Ox


 


 07/05/18 08:00  98.2 F  72  16   97


 


 07/05/18 07:54  98.2 F  72  16  141/78 H  97


 


 07/05/18 07:01      97


 


 07/05/18 06:30   72  16  








 Weight











Admit Weight                   600 lb


 


Weight                         339 lb 12.423 oz











 Most Recent Monitor Data











Heart Rate from ECG            73


 


NIBP                           109/61


 


NIBP BP-Mean                   72


 


Respiration from ECG           29


 


SpO2                           94














I&O: 


 











 07/04/18 07/05/18 07/06/18





 06:59 06:59 06:59


 


Intake Total 1250 2015 240


 


Balance 1250 2015 240











Result Diagrams: 


 07/04/18 04:31





 07/04/18 04:31





Phys Exam





- Physical Examination


HEENT: PERRLA, sclera anicteric


Neck: no JVD


trach+


Respiratory: no wheezing, no rales


Cardiovascular: RRR, no significant murmur


Gastrointestinal: soft, non-tender, positive bowel sounds


Musculoskeletal: pulses present, edema present


Neurological: non-focal, moves all 4 limbs


Psychiatric: A&O x 3





Dx/Plan


(1) Acute and chronic respiratory failure


Code(s): J96.20 - ACUTE AND CHR RESP FAILURE, UNSP W HYPOXIA OR HYPERCAPNIA   

Status: Chronic   


Qualifiers: 


   Respiratory failure complication: hypoxia and hypercapnia   Qualified Code(s)

: J96.21 - Acute and chronic respiratory failure with hypoxia; J96.22 - Acute 

and chronic respiratory failure with hypercapnia; J96.22 - Acute and chronic 

respiratory failure with hypercapnia; J96.22 - Acute and chronic respiratory 

failure with hypercapnia   


Comment: Stable on trach collar.    





(2) MARY (obstructive sleep apnea)


Code(s): G47.33 - OBSTRUCTIVE SLEEP APNEA (ADULT) (PEDIATRIC)   Status: Chronic

   





(3) Afib


Code(s): I48.91 - UNSPECIFIED ATRIAL FIBRILLATION   Status: Chronic   


Qualifiers: 


   Atrial fibrillation type: paroxysmal   Qualified Code(s): I48.0 - Paroxysmal 

atrial fibrillation   


Comment: Rate controlled. Continue ASA and Amiodarone.  ELIQUIS   





(4) Acute on chronic diastolic (congestive) heart failure


Code(s): I50.33 - ACUTE ON CHRONIC DIASTOLIC (CONGESTIVE) HEART FAILURE   Status

: Chronic   





(5) Pickwickian syndrome


Code(s): E66.2 - MORBID (SEVERE) OBESITY WITH ALVEOLAR HYPOVENTILATION   Status

: Chronic   Comment: s/p trach collar.    





(6) Status post tracheostomy


Code(s): Z93.0 - TRACHEOSTOMY STATUS   Status: Deleted   Comment: Had procedure 

on 5/7/18. Stable.    





(7) S/P percutaneous endoscopic gastrostomy (PEG) tube placement


Code(s): Z93.1 - GASTROSTOMY STATUS   Status: Deleted   Comment: Had procedure 

on 5/7/18. NO LONGER BEING USED.  TAKING PO   





(8) Severe muscle deconditioning


Code(s): R29.898 - OTH SYMPTOMS AND SIGNS INVOLVING THE MUSCULOSKELETAL SYSTEM 

  Status: Acute   





- Plan


hemostable


-: d/w CM to arrange bariatric bed, trach and other supplies for home use


-: May DC anytime she is able to pivot to get to wheel chair


-: today is day #70 of hospitalization


-: once she is min assist then family should arrange help at home





* .








Review of Systems





- Medications/Allergies


Allergies/Adverse Reactions: 


 Allergies











Allergy/AdvReac Type Severity Reaction Status Date / Time


 


No Known Drug Allergies Allergy   Verified 05/06/18 14:16











Medications: 


 Current Medications





Acetaminophen (Tylenol Elixir)  1,000 mg PO Q6H PRN


   PRN Reason: Headache/Fever or Pain


   Last Admin: 07/05/18 06:34 Dose:  1,000 mg


Al Hydroxide/Mg Hydroxide (Maalox)  15 ml PER TUBE Q4H PRN


   PRN Reason: Heartburn  or Indigestion


Albuterol/Ipratropium (Duoneb)  3 ml NEB [id number] [facility]


   Last Admin: 07/05/18 06:30 Dose:  3 ml


Amiodarone HCl (Cordarone)  200 mg PO DAILY [facility]


   Last Admin: 07/05/18 09:06 Dose:  200 mg


Lipase/Protease/Amylase ([place] [id number])  1 cap FS .PER PROTOCOL PRN


   PRN Reason: TUBE OCCLUSION PROTOCOL


Apixaban (Eliquis)  5 mg PO BID [facility]


   Last Admin: 07/05/18 09:06 Dose:  5 mg


Artificial Tears (Tears Renewed 15ml Bottle)  0 drop EA EYE PRN PRN


   PRN Reason: Dry Eyes


Aspirin (Aspirin Chewable)  81 mg PO DAILY [facility]


   Last Admin: 07/05/18 09:06 Dose:  81 mg


Bupropion HCl (Wellbutrin)  150 mg PO BID [facility]


   Last Admin: 07/05/18 09:06 Dose:  150 mg


Clonidine (Catapres)  0.1 mg PO Q4H PRN


   PRN Reason: Systolic BP > 180


   Last Admin: 05/18/18 21:05 Dose:  0.1 mg


Furosemide (Lasix)  40 mg PO DAILY-AC [facility]


   Last Admin: 07/05/18 09:06 Dose:  40 mg


Guaifenesin/Dextromethorphan (Robitussin Dm)  10 ml PO 2100 [facility]


   Last Admin: 07/04/18 22:22 Dose:  10 ml


Hydralazine HCl (Apresoline)  10 mg SLOW IVP Q4H PRN


   PRN Reason: Systolic BP > 180


   Last Admin: 05/12/18 17:06 Dose:  10 mg


Loperamide HCl (Imodium)  2 mg PER TUBE PRN PRN


   PRN Reason: Diarrhea/Loose Stools


Losartan Potassium (Cozaar)  12.5 mg PO DAILY [facility]


   Last Admin: 07/05/18 09:05 Dose:  12.5 mg


Magnesium Hydroxide (Milk Of Magnesium)  30 ml PER TUBE DAILYPRN PRN


   PRN Reason: Constipation


Mineral Oil/White Petrolatum (Eucerin Cream)  0 gm TOP BIDPRN PRN


   PRN Reason: Dry Skin


Nystatin (Mycostatin Powder)  30 gm TOP BID [facility]


   Last Admin: 07/05/18 09:06 Dose:  1 applic


Ondansetron HCl (Zofran Odt)  4 mg PO Q6H PRN


   PRN Reason: Nausea/Vomiting


   Last Admin: 06/01/18 09:09 Dose:  4 mg


Ondansetron HCl (Zofran)  4 mg IVP Q6H PRN


   PRN Reason: Nausea/Vomiting


Pantoprazole Sodium (Protonix)  40 mg PO DAILY [facility]


   Last Admin: 07/05/18 09:06 Dose:  40 mg


Phenol (Chloraseptic Spray 180 Ml Bot)  0 ml PO PRN PRN


   PRN Reason: Sore Throat


Potassium Chloride (Klor-Con)  20 meq PO QAM-WM [facility]


   Last Admin: 07/05/18 09:06 Dose:  20 meq


Senna (Senokot)  2 tab PO HSPRN PRN


   PRN Reason: Constipation


Sodium Bicarbonate (Bicarbonate, Sodium)  650 mg PER TUBE .PER PROTOCOL PRN


   PRN Reason: ENTERAL TUBE OCCLUSION


Sodium Chloride (Flush - Normal Saline)  10 ml IVF Q12HR [facility]


   Last Admin: 07/05/18 09:07 Dose:  Not Given


Sodium Chloride (Flush - Normal Saline)  10 ml IVF PRN PRN


   PRN Reason: Saline Flush


   Last Admin: 06/11/18 05:41 Dose:  10 ml


Sodium Chloride (Ocean Nasal Spray 0.65%)  0 ml EA NARE QIDPRN PRN


   PRN Reason: Nasal Congestion

## 2018-07-06 LAB
CREAT CL PREDICTED SERPL C-G-VRATE: 187 ML/MIN (ref 70–130)
HGB BLD-MCNC: 11.4 G/DL (ref 12–16)
PLATELET # BLD AUTO: 111 THOU/UL (ref 130–400)

## 2018-07-06 RX ADMIN — Medication SCH: at 10:03

## 2018-07-06 RX ADMIN — NYSTATIN SCH APPLIC: 100000 POWDER TOPICAL at 09:59

## 2018-07-06 RX ADMIN — NYSTATIN SCH APPLIC: 100000 POWDER TOPICAL at 21:04

## 2018-07-06 RX ADMIN — GUAIFENESIN AND DEXTROMETHORPHAN SCH ML: 100; 10 SYRUP ORAL at 21:02

## 2018-07-06 RX ADMIN — Medication SCH ML: at 21:04

## 2018-07-06 NOTE — PRG
DATE OF SERVICE:  07/06/2018

 

SUBJECTIVE:  Ms. Pascual is doing well.  She has no complaints.  She was sitting in the chair next to t
he bed.  She is much more interactive than she was last time I rounded on her and she was smiling.

 

PHYSICAL EXAMINATION:

VITAL SIGNS:  She is afebrile, heart rate is 96, respiratory rate 14, oximetry is 92%-96% on a trach 
collar, blood pressure 109/71.

LUNGS:  Clear and distant breath sounds.

HEART:  Regular rhythm.

ABDOMEN:  Soft.

 

Her weight was reported 450 pounds when she was weighed back in early May, I am not sure these weight
s are accurate, but she does appear to be losing significant amount of weight.

 

PLAN:  Continue with supportive care.

## 2018-07-06 NOTE — PDOC.PN
- Subjective


Encounter Start Date: 07/06/18


Encounter Start Time: 08:15


Subjective: no sob, feels good





- Objective


Resuscitation Status: 


 











Resuscitation Status           FULL:Full Resuscitation














MAR Reviewed: Yes


Vital Signs & Weight: 


 Vital Signs (12 hours)











  Temp Pulse Resp BP Pulse Ox


 


 07/06/18 07:57  98.7 F  96  14  109/71  92 L


 


 07/06/18 07:12   96  20   96


 


 07/06/18 01:06   74  16   94 L








 Weight











Admit Weight                   600 lb


 


Weight                         336 lb 14.4 oz











 Most Recent Monitor Data











Heart Rate from ECG            73


 


NIBP                           109/61


 


NIBP BP-Mean                   72


 


Respiration from ECG           29


 


SpO2                           94














I&O: 


 











 07/05/18 07/06/18 07/07/18





 06:59 06:59 06:59


 


Intake Total 2015 1580 


 


Balance 2015 1580 











Result Diagrams: 


 07/06/18 04:55





 07/06/18 04:55





Phys Exam





- Physical Examination


HEENT: PERRLA, sclera anicteric


Neck: no JVD


trach+


Respiratory: no wheezing, no rales


Cardiovascular: RRR, no significant murmur


Gastrointestinal: soft, non-tender, positive bowel sounds


Musculoskeletal: pulses present, edema present


Neurological: non-focal, moves all 4 limbs


Psychiatric: normal affect, A&O x 3





Dx/Plan


(1) Acute and chronic respiratory failure


Code(s): J96.20 - ACUTE AND CHR RESP FAILURE, UNSP W HYPOXIA OR HYPERCAPNIA   

Status: Chronic   


Qualifiers: 


   Respiratory failure complication: hypoxia and hypercapnia   Qualified Code(s)

: J96.21 - Acute and chronic respiratory failure with hypoxia; J96.22 - Acute 

and chronic respiratory failure with hypercapnia; J96.22 - Acute and chronic 

respiratory failure with hypercapnia; J96.22 - Acute and chronic respiratory 

failure with hypercapnia   


Comment: Stable on trach collar.    





(2) MARY (obstructive sleep apnea)


Code(s): G47.33 - OBSTRUCTIVE SLEEP APNEA (ADULT) (PEDIATRIC)   Status: Chronic

   





(3) Afib


Code(s): I48.91 - UNSPECIFIED ATRIAL FIBRILLATION   Status: Chronic   


Qualifiers: 


   Atrial fibrillation type: paroxysmal   Qualified Code(s): I48.0 - Paroxysmal 

atrial fibrillation   


Comment: Rate controlled. Continue ASA and Amiodarone.  ELIQUIS   





(4) Acute on chronic diastolic (congestive) heart failure


Code(s): I50.33 - ACUTE ON CHRONIC DIASTOLIC (CONGESTIVE) HEART FAILURE   Status

: Chronic   





(5) Pickwickian syndrome


Code(s): E66.2 - MORBID (SEVERE) OBESITY WITH ALVEOLAR HYPOVENTILATION   Status

: Chronic   Comment: s/p trach collar.    





(6) Status post tracheostomy


Code(s): Z93.0 - TRACHEOSTOMY STATUS   Status: Deleted   Comment: Had procedure 

on 5/7/18. Stable.    





(7) S/P percutaneous endoscopic gastrostomy (PEG) tube placement


Code(s): Z93.1 - GASTROSTOMY STATUS   Status: Deleted   Comment: Had procedure 

on 5/7/18. NO LONGER BEING USED.  TAKING PO   





(8) Severe muscle deconditioning


Code(s): R29.898 - OT SYMPTOMS AND SIGNS INVOLVING THE MUSCULOSKELETAL SYSTEM 

  Status: Acute   





- Plan


has ambulated 5ft with min assist yesterday


-: d/w CM about arrangements to be done to go home


-: may dc anytime if family is ready to take her


-: on amiodarone, eliquis, cozaar, nebs and lasix


-: is tolerating oral diet and is using speaking valve to communicate





* .








Review of Systems





- Medications/Allergies


Allergies/Adverse Reactions: 


 Allergies











Allergy/AdvReac Type Severity Reaction Status Date / Time


 


No Known Drug Allergies Allergy   Verified 05/06/18 14:16











Medications: 


 Current Medications





Acetaminophen (Tylenol Elixir)  1,000 mg PO Q6H PRN


   PRN Reason: Headache/Fever or Pain


   Last Admin: 07/06/18 10:11 Dose:  1,000 mg


Al Hydroxide/Mg Hydroxide (Maalox)  15 ml PER TUBE Q4H PRN


   PRN Reason: Heartburn  or Indigestion


Albuterol/Ipratropium (Duoneb)  3 ml NEB [id number] [id number]


   Last Admin: 07/06/18 07:12 Dose:  3 ml


Amiodarone HCl (Cordarone)  200 mg PO DAILY [facility]


   Last Admin: 07/06/18 09:58 Dose:  200 mg


Lipase/Protease/Amylase ([place] [id number])  1 cap FS .PER PROTOCOL PRN


   PRN Reason: TUBE OCCLUSION PROTOCOL


Apixaban (Eliquis)  5 mg PO BID [facility]


   Last Admin: 07/06/18 09:56 Dose:  5 mg


Artificial Tears (Tears Renewed 15ml Bottle)  0 drop EA EYE PRN PRN


   PRN Reason: Dry Eyes


Aspirin (Aspirin Chewable)  81 mg PO DAILY [facility]


   Last Admin: 07/06/18 09:57 Dose:  81 mg


Bupropion HCl (Wellbutrin)  150 mg PO BID [facility]


   Last Admin: 07/06/18 09:56 Dose:  150 mg


Clonidine (Catapres)  0.1 mg PO Q4H PRN


   PRN Reason: Systolic BP > 180


   Last Admin: 05/18/18 21:05 Dose:  0.1 mg


Furosemide (Lasix)  40 mg PO DAILY-AC [facility]


   Last Admin: 07/06/18 09:59 Dose:  40 mg


Guaifenesin/Dextromethorphan (Robitussin Dm)  10 ml PO 2100 [facility]


   Last Admin: 07/05/18 21:18 Dose:  10 ml


Hydralazine HCl (Apresoline)  10 mg SLOW IVP Q4H PRN


   PRN Reason: Systolic BP > 180


   Last Admin: 05/12/18 17:06 Dose:  10 mg


Loperamide HCl (Imodium)  2 mg PER TUBE PRN PRN


   PRN Reason: Diarrhea/Loose Stools


Losartan Potassium (Cozaar)  12.5 mg PO DAILY [facility]


   Last Admin: 07/06/18 10:02 Dose:  Not Given


Magnesium Hydroxide (Milk Of Magnesium)  30 ml PER TUBE DAILYPRN PRN


   PRN Reason: Constipation


Mineral Oil/White Petrolatum (Eucerin Cream)  0 gm TOP BIDPRN PRN


   PRN Reason: Dry Skin


Nystatin (Mycostatin Powder)  30 gm TOP BID [facility]


   Last Admin: 07/06/18 09:59 Dose:  1 applic


Ondansetron HCl (Zofran Odt)  4 mg PO Q6H PRN


   PRN Reason: Nausea/Vomiting


   Last Admin: 06/01/18 09:09 Dose:  4 mg


Ondansetron HCl (Zofran)  4 mg IVP Q6H PRN


   PRN Reason: Nausea/Vomiting


Pantoprazole Sodium (Protonix)  40 mg PO DAILY [facility]


   Last Admin: 07/06/18 09:57 Dose:  40 mg


Phenol (Chloraseptic Spray 180 Ml Bot)  0 ml PO PRN PRN


   PRN Reason: Sore Throat


Potassium Chloride (Klor-Con)  20 meq PO QAM-WM [facility]


   Last Admin: 07/06/18 09:58 Dose:  20 meq


Senna (Senokot)  2 tab PO HSPRN PRN


   PRN Reason: Constipation


   Last Admin: 07/05/18 22:11 Dose:  2 tab


Sodium Bicarbonate (Bicarbonate, Sodium)  650 mg PER TUBE .PER PROTOCOL PRN


   PRN Reason: ENTERAL TUBE OCCLUSION


Sodium Chloride (Flush - Normal Saline)  10 ml IVF Q12HR [facility]


   Last Admin: 07/06/18 10:03 Dose:  Not Given


Sodium Chloride (Flush - Normal Saline)  10 ml IVF PRN PRN


   PRN Reason: Saline Flush


   Last Admin: 06/11/18 05:41 Dose:  10 ml


Sodium Chloride (Ocean Nasal Spray 0.65%)  0 ml EA NARE QIDPRN PRN


   PRN Reason: Nasal Congestion

## 2018-07-07 RX ADMIN — GUAIFENESIN AND DEXTROMETHORPHAN SCH ML: 100; 10 SYRUP ORAL at 19:36

## 2018-07-07 RX ADMIN — NYSTATIN SCH APPLIC: 100000 POWDER TOPICAL at 19:39

## 2018-07-07 RX ADMIN — Medication SCH: at 08:05

## 2018-07-07 RX ADMIN — Medication SCH: at 19:39

## 2018-07-07 RX ADMIN — NYSTATIN SCH APPLIC: 100000 POWDER TOPICAL at 07:48

## 2018-07-07 NOTE — PDOC.PN
- Subjective


Encounter Start Date: 07/07/18


Encounter Start Time: 10:20


Subjective: no sob, feels better, is watching soccer





- Objective


Resuscitation Status: 


 











Resuscitation Status           FULL:Full Resuscitation














MAR Reviewed: Yes


Vital Signs & Weight: 


 Vital Signs (12 hours)











  Temp Pulse Resp BP Pulse Ox


 


 07/07/18 08:11  98.6 F  98  20  105/62  92 L


 


 07/07/18 08:00  98.6 F  97  16  


 


 07/07/18 07:14      97


 


 07/07/18 07:10   97  16   97


 


 07/07/18 00:29   81  16  








 Weight











Admit Weight                   600 lb


 


Weight                         336 lb 14.4 oz











 Most Recent Monitor Data











Heart Rate from ECG            73


 


NIBP                           109/61


 


NIBP BP-Mean                   72


 


Respiration from ECG           29


 


SpO2                           94














I&O: 


 











 07/06/18 07/07/18 07/08/18





 06:59 06:59 06:59


 


Intake Total 1580 720 


 


Balance 1580 720 











Result Diagrams: 


 07/06/18 04:55





 07/06/18 04:55





Phys Exam





- Physical Examination


HEENT: PERRLA, moist MMs


Neck: no JVD, supple


Respiratory: no wheezing, no rales


Cardiovascular: RRR, no significant murmur


Gastrointestinal: soft, non-tender, positive bowel sounds


Musculoskeletal: pulses present, edema present


Neurological: non-focal, moves all 4 limbs


Psychiatric: normal affect, A&O x 3





Dx/Plan


(1) Acute and chronic respiratory failure


Code(s): J96.20 - ACUTE AND CHR RESP FAILURE, UNSP W HYPOXIA OR HYPERCAPNIA   

Status: Chronic   


Qualifiers: 


   Respiratory failure complication: hypoxia and hypercapnia   Qualified Code(s)

: J96.21 - Acute and chronic respiratory failure with hypoxia; J96.22 - Acute 

and chronic respiratory failure with hypercapnia; J96.22 - Acute and chronic 

respiratory failure with hypercapnia; J96.22 - Acute and chronic respiratory 

failure with hypercapnia   


Comment: Stable on trach collar.    





(2) MARY (obstructive sleep apnea)


Code(s): G47.33 - OBSTRUCTIVE SLEEP APNEA (ADULT) (PEDIATRIC)   Status: Chronic

   





(3) Afib


Code(s): I48.91 - UNSPECIFIED ATRIAL FIBRILLATION   Status: Chronic   


Qualifiers: 


   Atrial fibrillation type: paroxysmal   Qualified Code(s): I48.0 - Paroxysmal 

atrial fibrillation   


Comment: Rate controlled. Continue ASA and Amiodarone.  ELIQUIS   





(4) Acute on chronic diastolic (congestive) heart failure


Code(s): I50.33 - ACUTE ON CHRONIC DIASTOLIC (CONGESTIVE) HEART FAILURE   Status

: Chronic   





(5) Pickwickian syndrome


Code(s): E66.2 - MORBID (SEVERE) OBESITY WITH ALVEOLAR HYPOVENTILATION   Status

: Chronic   Comment: s/p trach collar.    





(6) Status post tracheostomy


Code(s): Z93.0 - TRACHEOSTOMY STATUS   Status: Chronic   Comment: Had procedure 

on 5/7/18. Stable.    





(7) S/P percutaneous endoscopic gastrostomy (PEG) tube placement


Code(s): Z93.1 - GASTROSTOMY STATUS   Status: Chronic   Comment: Had procedure 

on 5/7/18. NO LONGER BEING USED.  TAKING PO   





(8) Severe muscle deconditioning


Code(s): R29.898 - [facility] SYMPTOMS AND SIGNS INVOLVING THE MUSCULOSKELETAL SYSTEM 

  Status: Acute   Comment: slowly improving   





- Plan


hemostable


-: continue amiodarone, eliquis, asp and cozaar


-: nebs prn


-: PT to mobilize more, needs every day PT


-: day #72 in hospital





* .








Review of Systems





- Medications/Allergies


Allergies/Adverse Reactions: 


 Allergies











Allergy/AdvReac Type Severity Reaction Status Date / Time


 


No Known Drug Allergies Allergy   Verified 05/06/18 14:16











Medications: 


 Current Medications





Acetaminophen (Tylenol Elixir)  1,000 mg PO Q6H PRN


   PRN Reason: Headache/Fever or Pain


   Last Admin: 07/07/18 08:19 Dose:  1,000 mg


Al Hydroxide/Mg Hydroxide (Maalox)  15 ml PER TUBE Q4H PRN


   PRN Reason: Heartburn  or Indigestion


Albuterol/Ipratropium (Duoneb)  3 ml NEB [id number] [facility]


   Last Admin: 07/07/18 07:10 Dose:  3 ml


Amiodarone HCl (Cordarone)  200 mg PO DAILY [facility]


   Last Admin: 07/07/18 07:47 Dose:  200 mg


Lipase/Protease/Amylase ([place] Dr [id number])  1 cap FS .PER PROTOCOL PRN


   PRN Reason: TUBE OCCLUSION PROTOCOL


Apixaban (Eliquis)  5 mg PO BID [facility]


   Last Admin: 07/07/18 07:48 Dose:  5 mg


Artificial Tears (Tears Renewed 15ml Bottle)  0 drop EA EYE PRN PRN


   PRN Reason: Dry Eyes


Aspirin (Aspirin Chewable)  81 mg PO DAILY [facility]


   Last Admin: 07/07/18 07:48 Dose:  81 mg


Bupropion HCl (Wellbutrin)  150 mg PO BID [facility]


   Last Admin: 07/07/18 07:47 Dose:  150 mg


Clonidine (Catapres)  0.1 mg PO Q4H PRN


   PRN Reason: Systolic BP > 180


   Last Admin: 05/18/18 21:05 Dose:  0.1 mg


Furosemide (Lasix)  40 mg PO DAILY-AC [facility]


   Last Admin: 07/07/18 07:47 Dose:  40 mg


Guaifenesin/Dextromethorphan (Robitussin Dm)  10 ml PO 2100 [facility]


   Last Admin: 07/06/18 21:02 Dose:  10 ml


Hydralazine HCl (Apresoline)  10 mg SLOW IVP Q4H PRN


   PRN Reason: Systolic BP > 180


   Last Admin: 05/12/18 17:06 Dose:  10 mg


Loperamide HCl (Imodium)  2 mg PER TUBE PRN PRN


   PRN Reason: Diarrhea/Loose Stools


Losartan Potassium (Cozaar)  12.5 mg PO DAILY [facility]


   Last Admin: 07/07/18 07:48 Dose:  Not Given


Magnesium Hydroxide (Milk Of Magnesium)  30 ml PER TUBE DAILYPRN PRN


   PRN Reason: Constipation


Mineral Oil/White Petrolatum (Eucerin Cream)  0 gm TOP BIDPRN PRN


   PRN Reason: Dry Skin


Nystatin (Mycostatin Powder)  30 gm TOP BID [facility]


   Last Admin: 07/07/18 07:48 Dose:  1 applic


Ondansetron HCl (Zofran Odt)  4 mg PO Q6H PRN


   PRN Reason: Nausea/Vomiting


   Last Admin: 06/01/18 09:09 Dose:  4 mg


Ondansetron HCl (Zofran)  4 mg IVP Q6H PRN


   PRN Reason: Nausea/Vomiting


Pantoprazole Sodium (Protonix)  40 mg PO DAILY [facility]


   Last Admin: 07/07/18 07:48 Dose:  40 mg


Phenol (Chloraseptic Spray 180 Ml Bot)  0 ml PO PRN PRN


   PRN Reason: Sore Throat


Potassium Chloride (Klor-Con)  20 meq PO QAM-WM [facility]


   Last Admin: 07/07/18 07:48 Dose:  20 meq


Senna (Senokot)  2 tab PO HSPRN PRN


   PRN Reason: Constipation


   Last Admin: 07/05/18 22:11 Dose:  2 tab


Sodium Bicarbonate (Bicarbonate, Sodium)  650 mg PER TUBE .PER PROTOCOL PRN


   PRN Reason: ENTERAL TUBE OCCLUSION


Sodium Chloride (Flush - Normal Saline)  10 ml IVF Q12HR [facility]


   Last Admin: 07/07/18 08:05 Dose:  Not Given


Sodium Chloride (Flush - Normal Saline)  10 ml IVF PRN PRN


   PRN Reason: Saline Flush


   Last Admin: 06/11/18 05:41 Dose:  10 ml


Sodium Chloride (Ocean Nasal Spray 0.65%)  0 ml EA NARE QIDPRN PRN


   PRN Reason: Nasal Congestion

## 2018-07-07 NOTE — PRG
DATE OF SERVICE:  07/07/2018

 

Ms. Hedrick is afebrile, heart rate is 70, respiratory 20, oximetry is 92 on trach collar.

 

There have been no changes overall.  Continue supportive care.

## 2018-07-08 LAB
CREAT CL PREDICTED SERPL C-G-VRATE: 183 ML/MIN (ref 70–130)
HGB BLD-MCNC: 11.1 G/DL (ref 12–16)
PLATELET # BLD AUTO: 108 THOU/UL (ref 130–400)

## 2018-07-08 RX ADMIN — NYSTATIN SCH APPLIC: 100000 POWDER TOPICAL at 21:04

## 2018-07-08 RX ADMIN — Medication SCH: at 21:05

## 2018-07-08 RX ADMIN — NYSTATIN SCH APPLIC: 100000 POWDER TOPICAL at 08:23

## 2018-07-08 RX ADMIN — Medication SCH: at 08:41

## 2018-07-08 RX ADMIN — GUAIFENESIN AND DEXTROMETHORPHAN SCH ML: 100; 10 SYRUP ORAL at 20:59

## 2018-07-08 NOTE — PRG
DATE OF SERVICE:  07/08/2018

 

Ms. Hedrick states she is almost able to stand on her own.

 

She was quite verbose today explaining she could not tell me how much she appreciated what everyone h
as done for her, she has no recollection at the beginning of her hospitalization.

 

PHYSICAL EXAMINATION:

VITAL SIGNS:  I have explained to her that she appears to have lost close to 100 pounds.  She is afeb
rile, heart rates in the 60s, respiratory rate is 18, oximetry is 90 on her trach collar, and 90-94 o
n her trach collar.  Blood pressure 114/63.

LUNGS:  Clear.

HEART:  Regular rhythm.

ABDOMEN:  Soft.

 

IMPRESSION:

1.  Acute on chronic respiratory failure, status post tracheostomy.  Clinically, doing well.

2.  Deconditioning secondary to massive obesity with weight loss and physical therapy.  Her strength 
is gradually improving.

 

PLAN:  Continue supportive care.

## 2018-07-08 NOTE — PDOC.PN
- Subjective


Encounter Start Date: 07/08/18


Encounter Start Time: 11:00


Subjective: awake, feels better, watching tv





- Objective


Resuscitation Status: 


 











Resuscitation Status           FULL:Full Resuscitation














MAR Reviewed: Yes


Vital Signs & Weight: 


 Vital Signs (12 hours)











  Temp Pulse Resp BP Pulse Ox


 


 07/08/18 08:00  98.1 F  65  18   90 L


 


 07/08/18 07:53  98.1 F  65  18  114/63  90 L


 


 07/08/18 07:21      96


 


 07/08/18 07:17   92  20   96


 


 07/08/18 01:13      94 L


 


 07/08/18 01:08   73  16   94 L








 Weight











Admit Weight                   600 lb


 


Weight                         337 lb 6.4 oz











 Most Recent Monitor Data











Heart Rate from ECG            73


 


NIBP                           109/61


 


NIBP BP-Mean                   72


 


Respiration from ECG           29


 


SpO2                           94














I&O: 


 











 07/07/18 07/08/18 07/09/18





 06:59 06:59 06:59


 


Intake Total 720 1160 


 


Output Total  2 


 


Balance 720 1158 











Result Diagrams: 


 07/08/18 04:23





 07/08/18 04:23





Phys Exam





- Physical Examination


HEENT: PERRLA, moist MMs


Neck: no JVD


trach+


Respiratory: no wheezing, no rales


Cardiovascular: RRR, no significant murmur


Gastrointestinal: soft, non-tender, positive bowel sounds


Musculoskeletal: pulses present, edema present


Neurological: non-focal, moves all 4 limbs


Psychiatric: A&O x 3





Dx/Plan


(1) Acute and chronic respiratory failure


Code(s): J96.20 - ACUTE AND CHR RESP FAILURE, UNSP W HYPOXIA OR HYPERCAPNIA   

Status: Chronic   


Qualifiers: 


   Respiratory failure complication: hypoxia and hypercapnia   Qualified Code(s)

: J96.21 - Acute and chronic respiratory failure with hypoxia; J96.22 - Acute 

and chronic respiratory failure with hypercapnia; J96.22 - Acute and chronic 

respiratory failure with hypercapnia; J96.22 - Acute and chronic respiratory 

failure with hypercapnia   


Comment: Stable on trach collar.    





(2) MARY (obstructive sleep apnea)


Code(s): G47.33 - OBSTRUCTIVE SLEEP APNEA (ADULT) (PEDIATRIC)   Status: Chronic

   





(3) Afib


Code(s): I48.91 - UNSPECIFIED ATRIAL FIBRILLATION   Status: Chronic   


Qualifiers: 


   Atrial fibrillation type: paroxysmal   Qualified Code(s): I48.0 - Paroxysmal 

atrial fibrillation   


Comment: Rate controlled. Continue ASA and Amiodarone.  ELIQUIS   





(4) Acute on chronic diastolic (congestive) heart failure


Code(s): I50.33 - ACUTE ON CHRONIC DIASTOLIC (CONGESTIVE) HEART FAILURE   Status

: Chronic   





(5) Pickwickian syndrome


Code(s): E66.2 - MORBID (SEVERE) OBESITY WITH ALVEOLAR HYPOVENTILATION   Status

: Chronic   Comment: s/p trach collar.    





(6) Status post tracheostomy


Code(s): Z93.0 - TRACHEOSTOMY STATUS   Status: Chronic   Comment: Had procedure 

on 5/7/18. Stable.    





(7) S/P percutaneous endoscopic gastrostomy (PEG) tube placement


Code(s): Z93.1 - GASTROSTOMY STATUS   Status: Chronic   Comment: Had procedure 

on 5/7/18. NO LONGER BEING USED.  TAKING PO   





(8) Severe muscle deconditioning


Code(s): R29.898 - OT SYMPTOMS AND SIGNS INVOLVING THE MUSCULOSKELETAL SYSTEM 

  Status: Acute   Comment: slowly improving   





- Plan


family have arranged for help at home per pt


-: will have  arrange for hosp bed etc in am


-: Will re-evaluate her physical condition in am with reg to mobilization


-: Likely dc in am if she and family can manage her at home





* .








Review of Systems





- Medications/Allergies


Allergies/Adverse Reactions: 


 Allergies











Allergy/AdvReac Type Severity Reaction Status Date / Time


 


No Known Drug Allergies Allergy   Verified 05/06/18 14:16











Medications: 


 Current Medications





Acetaminophen (Tylenol Elixir)  1,000 mg PO Q6H PRN


   PRN Reason: Headache/Fever or Pain


   Last Admin: 07/08/18 09:12 Dose:  1,000 mg


Al Hydroxide/Mg Hydroxide (Maalox)  15 ml PER TUBE Q4H PRN


   PRN Reason: Heartburn  or Indigestion


Albuterol/Ipratropium (Duoneb)  3 ml NEB [id number]-[id number] KEE


   Last Admin: 07/08/18 07:17 Dose:  3 ml


Amiodarone HCl (Cordarone)  200 mg PO DAILY [facility]


   Last Admin: 07/08/18 08:23 Dose:  200 mg


Lipase/Protease/Amylase ([place] [id number])  1 cap FS .PER PROTOCOL PRN


   PRN Reason: TUBE OCCLUSION PROTOCOL


Apixaban (Eliquis)  5 mg PO BID [facility]


   Last Admin: 07/08/18 08:22 Dose:  5 mg


Artificial Tears (Tears Renewed 15ml Bottle)  0 drop EA EYE PRN PRN


   PRN Reason: Dry Eyes


Aspirin (Aspirin Chewable)  81 mg PO DAILY [facility]


   Last Admin: 07/08/18 08:23 Dose:  81 mg


Bupropion HCl (Wellbutrin)  150 mg PO BID [facility]


   Last Admin: 07/08/18 08:22 Dose:  150 mg


Clonidine (Catapres)  0.1 mg PO Q4H PRN


   PRN Reason: Systolic BP > 180


   Last Admin: 05/18/18 21:05 Dose:  0.1 mg


Furosemide (Lasix)  40 mg PO DAILY-AC [facility]


   Last Admin: 07/08/18 08:23 Dose:  40 mg


Guaifenesin/Dextromethorphan (Robitussin Dm)  10 ml PO 2100 [facility]


   Last Admin: 07/07/18 19:36 Dose:  10 ml


Hydralazine HCl (Apresoline)  10 mg SLOW IVP Q4H PRN


   PRN Reason: Systolic BP > 180


   Last Admin: 05/12/18 17:06 Dose:  10 mg


Loperamide HCl (Imodium)  2 mg PER TUBE PRN PRN


   PRN Reason: Diarrhea/Loose Stools


Losartan Potassium (Cozaar)  12.5 mg PO DAILY [facility]


   Last Admin: 07/08/18 08:23 Dose:  Not Given


Magnesium Hydroxide (Milk Of Magnesium)  30 ml PER TUBE DAILYPRN PRN


   PRN Reason: Constipation


Mineral Oil/White Petrolatum (Eucerin Cream)  0 gm TOP BIDPRN PRN


   PRN Reason: Dry Skin


Nystatin (Mycostatin Powder)  30 gm TOP BID [facility]


   Last Admin: 07/08/18 08:23 Dose:  1 applic


Ondansetron HCl (Zofran Odt)  4 mg PO Q6H PRN


   PRN Reason: Nausea/Vomiting


   Last Admin: 06/01/18 09:09 Dose:  4 mg


Ondansetron HCl (Zofran)  4 mg IVP Q6H PRN


   PRN Reason: Nausea/Vomiting


Pantoprazole Sodium (Protonix)  40 mg PO DAILY [facility]


   Last Admin: 07/08/18 08:22 Dose:  40 mg


Phenol (Chloraseptic Spray 180 Ml Bot)  0 ml PO PRN PRN


   PRN Reason: Sore Throat


Potassium Chloride (Klor-Con)  20 meq PO QAM-[facility]


   Last Admin: 07/08/18 08:22 Dose:  20 meq


Senna (Senokot)  2 tab PO HSPRN PRN


   PRN Reason: Constipation


   Last Admin: 07/05/18 22:11 Dose:  2 tab


Sodium Bicarbonate (Bicarbonate, Sodium)  650 mg PER TUBE .PER PROTOCOL PRN


   PRN Reason: ENTERAL TUBE OCCLUSION


Sodium Chloride (Flush - Normal Saline)  10 ml IVF Q12HR KEE


   Last Admin: 07/08/18 08:41 Dose:  Not Given


Sodium Chloride (Flush - Normal Saline)  10 ml IVF PRN PRN


   PRN Reason: Saline Flush


   Last Admin: 06/11/18 05:41 Dose:  10 ml


Sodium Chloride (Ocean Nasal Spray 0.65%)  0 ml EA NARE QIDPRN PRN


   PRN Reason: Nasal Congestion

## 2018-07-09 RX ADMIN — NYSTATIN SCH APPLIC: 100000 POWDER TOPICAL at 20:35

## 2018-07-09 RX ADMIN — Medication SCH: at 20:35

## 2018-07-09 RX ADMIN — NYSTATIN SCH APPLIC: 100000 POWDER TOPICAL at 08:23

## 2018-07-09 RX ADMIN — Medication SCH: at 08:23

## 2018-07-09 RX ADMIN — GUAIFENESIN AND DEXTROMETHORPHAN SCH ML: 100; 10 SYRUP ORAL at 20:39

## 2018-07-09 NOTE — PRG
DATE OF SERVICE:  07/09/2018

 

SUBJECTIVE:  The patient is with physical therapy, tried to transfer from the wheelchair to the commo
de today.

 

OBJECTIVE:

VITAL SIGNS:  Temperature 98.9, pulse 80, respirations 20, O2 94%, blood pressure 120/72.

HEENT:  Unremarkable.

NECK:  Trach in good position.

LUNGS:  Clear.

CARDIAC:  S1 and S2 regular.

ABDOMEN:  Soft.

EXTREMITIES:  No edema.

 

ASSESSMENT:

1.  Severe deconditioning.

2.  Obesity hypoventilation syndrome requiring tracheostomy.

 

PLAN:  Continuing to increase physical therapy as tolerated with hopes of discharge to her son's [facility]
e very soon.

## 2018-07-09 NOTE — PDOC.PN
- Subjective


Encounter Start Date: 07/09/18


Encounter Start Time: 10:00


Subjective: feels better, no sob


-: awaiting PT to exercise





- Objective


Resuscitation Status: 


 











Resuscitation Status           FULL:Full Resuscitation














MAR Reviewed: Yes


Vital Signs & Weight: 


 Vital Signs (12 hours)











  Temp Pulse Resp BP Pulse Ox


 


 07/09/18 07:07  98.9 F  80  22 H  128/72  94 L


 


 07/09/18 06:45   90  16  


 


 07/09/18 02:25      95


 


 07/09/18 00:19   99  18   96








 Weight











Admit Weight                   600 lb


 


Weight                         337 lb 6.4 oz











 Most Recent Monitor Data











Heart Rate from ECG            73


 


NIBP                           109/61


 


NIBP BP-Mean                   72


 


Respiration from ECG           29


 


SpO2                           94














I&O: 


 











 07/08/18 07/09/18 07/10/18





 06:59 06:59 06:59


 


Intake Total 1160 1680 


 


Output Total 2  


 


Balance 1158 1680 











Result Diagrams: 


 07/08/18 04:23





 07/08/18 04:23





Phys Exam





- Physical Examination


HEENT: PERRLA, moist MMs


Neck: no JVD


trach+


Respiratory: no wheezing, no rales


Cardiovascular: RRR, no significant murmur


Gastrointestinal: soft, non-tender, positive bowel sounds


Musculoskeletal: no edema, pulses present


Neurological: non-focal, moves all 4 limbs


Psychiatric: A&O x 3





Dx/Plan


(1) Acute and chronic respiratory failure


Code(s): J96.20 - ACUTE AND CHR RESP FAILURE, UNSP W HYPOXIA OR HYPERCAPNIA   

Status: Chronic   


Qualifiers: 


   Respiratory failure complication: hypoxia and hypercapnia   Qualified Code(s)

: J96.21 - Acute and chronic respiratory failure with hypoxia; J96.22 - Acute 

and chronic respiratory failure with hypercapnia; J96.22 - Acute and chronic 

respiratory failure with hypercapnia; J96.22 - Acute and chronic respiratory 

failure with hypercapnia   


Comment: Stable on trach collar.    





(2) MARY (obstructive sleep apnea)


Code(s): G47.33 - OBSTRUCTIVE SLEEP APNEA (ADULT) (PEDIATRIC)   Status: Chronic

   





(3) Afib


Code(s): I48.91 - UNSPECIFIED ATRIAL FIBRILLATION   Status: Chronic   


Qualifiers: 


   Atrial fibrillation type: paroxysmal   Qualified Code(s): I48.0 - Paroxysmal 

atrial fibrillation   


Comment: Rate controlled. Continue ASA and Amiodarone.  ELIQUIS   





(4) Acute on chronic diastolic (congestive) heart failure


Code(s): I50.33 - ACUTE ON CHRONIC DIASTOLIC (CONGESTIVE) HEART FAILURE   Status

: Chronic   





(5) Pickwickian syndrome


Code(s): E66.2 - MORBID (SEVERE) OBESITY WITH ALVEOLAR HYPOVENTILATION   Status

: Chronic   Comment: s/p trach collar.    





(6) Status post tracheostomy


Code(s): Z93.0 - TRACHEOSTOMY STATUS   Status: Chronic   Comment: Had procedure 

on 5/7/18. Stable.    





(7) S/P percutaneous endoscopic gastrostomy (PEG) tube placement


Code(s): Z93.1 - GASTROSTOMY STATUS   Status: Chronic   Comment: Had procedure 

on 5/7/18. NO LONGER BEING USED.  TAKING PO   





(8) Severe muscle deconditioning


Code(s): R29.898 - OT SYMPTOMS AND SIGNS INVOLVING THE MUSCULOSKELETAL SYSTEM 

  Status: Acute   Comment: slowly improving   





- Plan


may dc anytime if family can manage her at home


-: cm will be setting up hosp bed and other req


-: on eliquis, amiod, cozaar


-: trach care to be taught to family


-: tolerating oral diet well





* .








Review of Systems





- Medications/Allergies


Allergies/Adverse Reactions: 


 Allergies











Allergy/AdvReac Type Severity Reaction Status Date / Time


 


No Known Drug Allergies Allergy   Verified 05/06/18 14:16











Medications: 


 Current Medications





Acetaminophen (Tylenol Elixir)  1,000 mg PO Q6H PRN


   PRN Reason: Headache/Fever or Pain


   Last Admin: 07/09/18 09:10 Dose:  1,000 mg


Al Hydroxide/Mg Hydroxide (Maalox)  15 ml PER TUBE Q4H PRN


   PRN Reason: Heartburn  or Indigestion


Albuterol/Ipratropium (Duoneb)  3 ml NEB [id number] [facility]


   Last Admin: 07/09/18 06:45 Dose:  3 ml


Amiodarone HCl (Cordarone)  200 mg PO DAILY [facility]


   Last Admin: 07/09/18 08:22 Dose:  200 mg


Lipase/Protease/Amylase ([place] [id number])  1 cap FS .PER PROTOCOL PRN


   PRN Reason: TUBE OCCLUSION PROTOCOL


Apixaban (Eliquis)  5 mg PO BID [facility]


   Last Admin: 07/09/18 08:23 Dose:  5 mg


Artificial Tears (Tears Renewed 15ml Bottle)  0 drop EA EYE PRN PRN


   PRN Reason: Dry Eyes


Aspirin (Aspirin Chewable)  81 mg PO DAILY [facility]


   Last Admin: 07/09/18 08:22 Dose:  81 mg


Bupropion HCl (Wellbutrin)  150 mg PO BID [facility]


   Last Admin: 07/09/18 08:21 Dose:  150 mg


Clonidine (Catapres)  0.1 mg PO Q4H PRN


   PRN Reason: Systolic BP > 180


   Last Admin: 05/18/18 21:05 Dose:  0.1 mg


Furosemide (Lasix)  40 mg PO DAILY[facility]


   Last Admin: 07/09/18 08:22 Dose:  40 mg


Guaifenesin/Dextromethorphan (Robitussin Dm)  10 ml PO 2100 [facility]


   Last Admin: 07/08/18 20:59 Dose:  10 ml


Hydralazine HCl (Apresoline)  10 mg SLOW IVP Q4H PRN


   PRN Reason: Systolic BP > 180


   Last Admin: 05/12/18 17:06 Dose:  10 mg


Loperamide HCl (Imodium)  2 mg PER TUBE PRN PRN


   PRN Reason: Diarrhea/Loose Stools


Losartan Potassium (Cozaar)  12.5 mg PO DAILY [facility]


   Last Admin: 07/09/18 08:22 Dose:  12.5 mg


Magnesium Hydroxide (Milk Of Magnesium)  30 ml PER TUBE DAILYPRN PRN


   PRN Reason: Constipation


Mineral Oil/White Petrolatum (Eucerin Cream)  0 gm TOP BIDPRN PRN


   PRN Reason: Dry Skin


Nystatin (Mycostatin Powder)  30 gm TOP BID [facility]


   Last Admin: 07/09/18 08:23 Dose:  1 applic


Ondansetron HCl (Zofran Odt)  4 mg PO Q6H PRN


   PRN Reason: Nausea/Vomiting


   Last Admin: 06/01/18 09:09 Dose:  4 mg


Ondansetron HCl (Zofran)  4 mg IVP Q6H PRN


   PRN Reason: Nausea/Vomiting


Pantoprazole Sodium (Protonix)  40 mg PO DAILY [facility]


   Last Admin: 07/09/18 08:23 Dose:  40 mg


Phenol (Chloraseptic Spray 180 Ml Bot)  0 ml PO PRN PRN


   PRN Reason: Sore Throat


Potassium Chloride (Klor-Con)  20 meq PO QAM-WM [facility]


   Last Admin: 07/09/18 08:22 Dose:  20 meq


Senna (Senokot)  2 tab PO HSPRN PRN


   PRN Reason: Constipation


   Last Admin: 07/05/18 22:11 Dose:  2 tab


Sodium Bicarbonate (Bicarbonate, Sodium)  650 mg PER TUBE .PER PROTOCOL PRN


   PRN Reason: ENTERAL TUBE OCCLUSION


Sodium Chloride (Flush - Normal Saline)  10 ml IVF Q12HR [facility]


   Last Admin: 07/09/18 08:23 Dose:  Not Given


Sodium Chloride (Flush - Normal Saline)  10 ml IVF PRN PRN


   PRN Reason: Saline Flush


   Last Admin: 06/11/18 05:41 Dose:  10 ml


Sodium Chloride (Ocean Nasal Spray 0.65%)  0 ml EA NARE QIDPRN PRN


   PRN Reason: Nasal Congestion

## 2018-07-10 RX ADMIN — NYSTATIN SCH APPLIC: 100000 POWDER TOPICAL at 20:32

## 2018-07-10 RX ADMIN — NYSTATIN SCH APPLIC: 100000 POWDER TOPICAL at 08:42

## 2018-07-10 RX ADMIN — GUAIFENESIN AND DEXTROMETHORPHAN SCH ML: 100; 10 SYRUP ORAL at 20:31

## 2018-07-10 RX ADMIN — Medication SCH: at 20:34

## 2018-07-10 RX ADMIN — Medication SCH: at 08:43

## 2018-07-10 NOTE — PRG
DATE OF SERVICE: 07/10/2018

 

She is doing surprisingly well.  She is very motivated about possible discharge to home to be with he
r son in [place] tomorrow.

 

PHYSICAL EXAMINATION: 

VITAL SIGNS:  Temperature is 98.3, pulse 81, respirations 20, O2 saturation 94%, blood pressure 116/8
8.  Last recorded weight was 336 pounds, that is very profound given that her admission weight was pr
obably about 450 pounds.

HEENT:  Unremarkable.

NECK:  Trach in good position.

LUNGS:  Clear.

CARDIOVASCULAR:  S1, S2 regular.

ABDOMEN:  Soft.

EXTREMITIES:  No edema.

 

ASSESSMENT:

1.  Chronic respiratory failure secondary to obesity hypoventilation syndrome.  

2.  The patient is anticoagulated for presumed possible pulmonary embolism at the time of admission -
 she had elevated pulmonary artery pressures and was felt to be in need of anticoagulation per Cardio
logy:

3.  Status post prolonged mechanical ventilation.

 

PLAN:  She is stable for discharge.  I met with  yesterday regarding the supplies she nee
ds at home.  I would feel more comfortable with her being on an oxygenated trach collar.  I told the 
patient this tracheostomy will need to be changed out about every 3 months.  She needs to go home wit
h cannula supplies.  Her family probably needs to be taught trach care before she is discharged.  I w
ould be happy to change out her trach if transportation could be arranged from [place] back to here.  
Otherwise, she will need to get follow up in [place].

## 2018-07-10 NOTE — PDOC.PN
- Subjective


Encounter Start Date: 07/10/18


Encounter Start Time: 11:20


Subjective: awake, feels better





- Objective


Resuscitation Status: 


 











Resuscitation Status           FULL:Full Resuscitation














MAR Reviewed: Yes


Vital Signs & Weight: 


 Vital Signs (12 hours)











  Temp Pulse Resp BP Pulse Ox


 


 07/10/18 08:40  98.3 F  81  22 H   94 L


 


 07/10/18 07:21  98.3 F  81  22 H  116/68  94 L


 


 07/10/18 06:37   90  16  








 Weight











Admit Weight                   600 lb


 


Weight                         336 lb 6.806 oz











 Most Recent Monitor Data











Heart Rate from ECG            73


 


NIBP                           109/61


 


NIBP BP-Mean                   72


 


Respiration from ECG           29


 


SpO2                           94














I&O: 


 











 07/09/18 07/10/18 07/11/18





 06:59 06:59 06:59


 


Intake Total 1680 1200 


 


Output Total  1 


 


Balance 1680 1199 











Result Diagrams: 


 07/08/18 04:23





 07/08/18 04:23





Phys Exam





- Physical Examination


HEENT: PERRLA, moist MMs


Neck: no JVD, supple


trach+


Respiratory: no wheezing, no rales


Cardiovascular: RRR, no significant murmur


Gastrointestinal: soft, non-tender, positive bowel sounds


Musculoskeletal: pulses present, edema present


Neurological: non-focal, moves all 4 limbs


Psychiatric: A&O x 3





Dx/Plan


(1) Acute and chronic respiratory failure


Code(s): J96.20 - ACUTE AND CHR RESP FAILURE, UNSP W HYPOXIA OR HYPERCAPNIA   

Status: Chronic   


Qualifiers: 


   Respiratory failure complication: hypoxia and hypercapnia   Qualified Code(s)

: J96.21 - Acute and chronic respiratory failure with hypoxia; J96.22 - Acute 

and chronic respiratory failure with hypercapnia; J96.22 - Acute and chronic 

respiratory failure with hypercapnia; J96.22 - Acute and chronic respiratory 

failure with hypercapnia   


Comment: Stable on trach collar.    





(2) MARY (obstructive sleep apnea)


Code(s): G47.33 - OBSTRUCTIVE SLEEP APNEA (ADULT) (PEDIATRIC)   Status: Chronic

   





(3) Afib


Code(s): I48.91 - UNSPECIFIED ATRIAL FIBRILLATION   Status: Chronic   


Qualifiers: 


   Atrial fibrillation type: paroxysmal   Qualified Code(s): I48.0 - Paroxysmal 

atrial fibrillation   


Comment: in sinus,  Continue ASA and Amiodarone.  ELIQUIS   





(4) Acute on chronic diastolic (congestive) heart failure


Code(s): I50.33 - ACUTE ON CHRONIC DIASTOLIC (CONGESTIVE) HEART FAILURE   Status

: Chronic   





(5) Pickwickian syndrome


Code(s): E66.2 - MORBID (SEVERE) OBESITY WITH ALVEOLAR HYPOVENTILATION   Status

: Chronic   Comment: s/p trach collar.    





(6) Status post tracheostomy


Code(s): Z93.0 - TRACHEOSTOMY STATUS   Status: Chronic   Comment: Had procedure 

on 5/7/18. Stable.    





(7) S/P percutaneous endoscopic gastrostomy (PEG) tube placement


Code(s): Z93.1 - GASTROSTOMY STATUS   Status: Chronic   Comment: Had procedure 

on 5/7/18. NO LONGER BEING USED.  TAKING PO   





(8) Severe muscle deconditioning


Code(s): R29.898 - OTH SYMPTOMS AND SIGNS INVOLVING THE MUSCULOSKELETAL SYSTEM 

  Status: Acute   Comment: slowly improving   





- Plan


arrangements are being made including hosp bed/trach supplies etc for disch


-: -arge plan to home.


-: Trach care is being taught to patient and family needs to learn


-: on amio, eliquis, cozaar, nebs


-: may dc anytime if arrangements for dc are ready





* .








Review of Systems





- Medications/Allergies


Allergies/Adverse Reactions: 


 Allergies











Allergy/AdvReac Type Severity Reaction Status Date / Time


 


No Known Drug Allergies Allergy   Verified 05/06/18 14:16











Medications: 


 Current Medications





Acetaminophen (Tylenol Elixir)  1,000 mg PO Q6H PRN


   PRN Reason: Headache/Fever or Pain


   Last Admin: 07/10/18 08:39 Dose:  1,000 mg


Al Hydroxide/Mg Hydroxide (Maalox)  15 ml PER TUBE Q4H PRN


   PRN Reason: Heartburn  or Indigestion


Albuterol/Ipratropium (Duoneb)  3 ml NEB [id number] [facility]


   Last Admin: 07/10/18 06:37 Dose:  3 ml


Amiodarone HCl (Cordarone)  200 mg PO DAILY [facility]


   Last Admin: 07/10/18 08:42 Dose:  200 mg


Lipase/Protease/Amylase ([place] [id number])  1 cap FS .PER PROTOCOL PRN


   PRN Reason: TUBE OCCLUSION PROTOCOL


Apixaban (Eliquis)  5 mg PO BID [facility]


   Last Admin: 07/10/18 08:41 Dose:  5 mg


Artificial Tears (Tears Renewed 15ml Bottle)  0 drop EA EYE PRN PRN


   PRN Reason: Dry Eyes


Aspirin (Aspirin Chewable)  81 mg PO DAILY [facility]


   Last Admin: 07/10/18 08:41 Dose:  81 mg


Bupropion HCl (Wellbutrin)  150 mg PO BID [facility]


   Last Admin: 07/10/18 08:41 Dose:  150 mg


Clonidine (Catapres)  0.1 mg PO Q4H PRN


   PRN Reason: Systolic BP > 180


   Last Admin: 05/18/18 21:05 Dose:  0.1 mg


Furosemide (Lasix)  40 mg PO DAILY-AC [facility]


   Last Admin: 07/10/18 08:41 Dose:  40 mg


Guaifenesin/Dextromethorphan (Robitussin Dm)  10 ml PO 2100 [facility]


   Last Admin: 07/09/18 20:39 Dose:  10 ml


Hydralazine HCl (Apresoline)  10 mg SLOW IVP Q4H PRN


   PRN Reason: Systolic BP > 180


   Last Admin: 05/12/18 17:06 Dose:  10 mg


Loperamide HCl (Imodium)  2 mg PER TUBE PRN PRN


   PRN Reason: Diarrhea/Loose Stools


Losartan Potassium (Cozaar)  12.5 mg PO DAILY [facility]


   Last Admin: 07/10/18 08:41 Dose:  12.5 mg


Magnesium Hydroxide (Milk Of Magnesium)  30 ml PER TUBE DAILYPRN PRN


   PRN Reason: Constipation


Mineral Oil/White Petrolatum (Eucerin Cream)  0 gm TOP BIDPRN PRN


   PRN Reason: Dry Skin


Nystatin (Mycostatin Powder)  30 gm TOP BID [facility]


   Last Admin: 07/10/18 08:42 Dose:  1 applic


Ondansetron HCl (Zofran Odt)  4 mg PO Q6H PRN


   PRN Reason: Nausea/Vomiting


   Last Admin: 06/01/18 09:09 Dose:  4 mg


Ondansetron HCl (Zofran)  4 mg IVP Q6H PRN


   PRN Reason: Nausea/Vomiting


Pantoprazole Sodium (Protonix)  40 mg PO DAILY [facility]


   Last Admin: 07/10/18 08:41 Dose:  40 mg


Phenol (Chloraseptic Spray 180 Ml Bot)  0 ml PO PRN PRN


   PRN Reason: Sore Throat


Potassium Chloride (Klor-Con)  20 meq PO QAM-WM [facility]


   Last Admin: 07/10/18 08:42 Dose:  20 meq


Senna (Senokot)  2 tab PO HSPRN PRN


   PRN Reason: Constipation


   Last Admin: 07/05/18 22:11 Dose:  2 tab


Sodium Bicarbonate (Bicarbonate, Sodium)  650 mg PER TUBE .PER PROTOCOL PRN


   PRN Reason: ENTERAL TUBE OCCLUSION


Sodium Chloride (Flush - Normal Saline)  10 ml IVF Q12HR [facility]


   Last Admin: 07/10/18 08:43 Dose:  Not Given


Sodium Chloride (Flush - Normal Saline)  10 ml IVF PRN PRN


   PRN Reason: Saline Flush


   Last Admin: 06/11/18 05:41 Dose:  10 ml


Sodium Chloride (Ocean Nasal Spray 0.65%)  0 ml EA NARE QIDPRN PRN


   PRN Reason: Nasal Congestion

## 2018-07-11 VITALS — TEMPERATURE: 99.2 F | DIASTOLIC BLOOD PRESSURE: 76 MMHG | SYSTOLIC BLOOD PRESSURE: 142 MMHG

## 2018-07-11 LAB
CREAT CL PREDICTED SERPL C-G-VRATE: 162 ML/MIN (ref 70–130)
HGB BLD-MCNC: 11.4 G/DL (ref 12–16)
PLATELET # BLD AUTO: 123 THOU/UL (ref 130–400)

## 2018-07-11 RX ADMIN — Medication SCH: at 10:03

## 2018-07-11 RX ADMIN — NYSTATIN SCH APPLIC: 100000 POWDER TOPICAL at 10:02

## 2018-07-11 NOTE — PRG
DATE OF SERVICE:  07/11/2018  

 

The patient is scheduled to go home later today.

 

PHYSICAL EXAMINATION:

VITAL SIGNS:  Stable.  

NECK:  Trach site is clear.

LUNGS:  Clear.

CARDIAC:  S1 and S2 regular.

ABDOMEN:  Soft.

EXTREMITIES:  No edema.

 

ASSESSMENT:

1.  Status post prolonged hospitalization related to obesity hypoventilation syndrome.

2.  Morbid obesity.

 

PLAN:  

1.  As per my note yesterday, she will need trach change about every 3 months.  I do not know if this
 will be accomplished here or in [place].  If it is here, I will be happy to help.  

2.  No further recommendations.  

 

I am available as needed.

## 2018-07-12 NOTE — DIS
DATE OF ADMISSION:  04/26/2018

 

DATE OF DISCHARGE:  07/11/2018

 

PRIMARY CARE PROVIDER:  None.

 

DISCHARGE DIAGNOSES:

1.  Acute on chronic hypercapnic respiratory failure.

2.  Acute on chronic diastolic heart failure.

3.  Pickwickian syndrome.

4.  Morbid obesity.

5.  Physical deconditioning.

6.  Paroxysmal atrial fibrillation.

 

CONDITION OF PATIENT ON THE DAY OF DISCHARGE:  Stable.  I assessed Ms. Hedrick on the day of discharge
.  She denies any chest pain or shortness of breath.  Vital signs are stable.  S1 and S2 are heard, r
egular.  Lungs are clear to auscultation bilaterally.

 

DISCHARGE MEDICATIONS:  DuoNeb q.6 hours, Cozaar 12.5 mg daily, K-Dur 20 mEq daily, amiodarone 200 mg
 daily, Eliquis 5 mg 2 times a day, aspirin 81 mg daily, Lasix 40 mg daily, and Wellbutrin 150 mg 2 t
imes a day.

 

CONSULTATIONS DURING THIS HOSPITALIZATION:  Pulmonary and Critical Care Medicine, Dr. Walsh; Cardio
logy, Dr. Mauro; Hematology/Oncology, Dr. Mayorga; Nephrology, Dr. Acharya; and General Surgery, Dr. Arana.

 

HOSPITAL COURSE:  Ms. Hedrick is a pleasant 62-year-old lady who was admitted to [facility] on 04/26/2018 following transfer from [facility].  Please refer
 to history and physical note by Dr. Ritter dated 04/26/2018 for further information.  She was in acute
 on chronic hypercapnic respiratory failure.  She was initially treated with BiPAP, subsequently intu
bated and admitted to Critical Care Unit.  BNP was elevated at 911.  A 2D echocardiogram on 04/26/201
8 showed left ventricular ejection fraction of 65%-70% with right ventricle volume and pressure overl
oad findings.  Repeat echocardiogram on 04/30/2018 showed left ventricular ejection fraction of 65%-7
0%, severely enlarged right ventricle cavity and PA systolic pressure elevated at 52 mmHg.  We were u
nable to image for pulmonary embolism due to patient's morbid obesity.  On 05/07/2018, she underwent 
tracheostomy and PEG placement.  She was also treated with Lasix drip for volume overload initially.

 

She was started on PEG feeds.  On 06/19/2018, she had a modified barium swallow which did not show an
y penetration or aspiration.  Oral feeds were started.  Case management was actively involved in disc
harge planning.  On 07/11/2018, patient is being discharged to her son's home.

 

On 07/11/2018, she has hemoglobin 11.4, hematocrit 36.2, and platelet count 123,000.

 

She will need tracheostomy change every 3 months, according to Pulmonary Critical Care Medicine Cj stark.

 

DISCHARGE DESTINATION:  Home.

 

TOTAL AMOUNT OF TIME SPENT COORDINATING THIS DISCHARGE:  33 minutes.

## 2018-07-12 NOTE — PQF
JUANA ZAMORA                                            TRISH GUERRA

[id number]                                                             T4-[id number]-
[id number]

[id number]                             

                                   

CLINICAL DOCUMENTATION CLARIFICATION FORM:  POST DISCHARGE



Addendum to original discharge summary date:  __________________________________
____



Late entry note date:  _________________________________________________________
__











Your assistance is needed to clarify the diagnosis of sepsis as documentated in 
the progress notes on 6/5.  d/c summary does not mentin the diagnosis of sepsis 
and clarification is needed.



Please exercise your independent, professional judgment in responding to the 
clarification form. 

Clinical indicators are provided on the bottom of this form for your review



Please check appropriate box(es):



 [  ] Sepsis due to: (Pna, UTI, gangrenous gall bladder, etc.) _________________
__________



                 Due : to:   ( ) paiz cath

                                 [  ] Device (please specify) __________________
____________________  





[  ] Localized infection without sepsis



[  ] Other diagnosis ___________



[ X ] Unable to determine



In addition, please specify:

Present on Admission (POA):  [  ] Yes             [  ] No             [  ] 
Unable to determine



For continuity of documentation, please document condition throughout progress 
notes and discharge summary.  Thank You.



CLINICAL INDICATORS - SIGNS / SYMPTOMS / LABS

Altered mental status



Metabolic acidosis  Lactic Acid >2mmol/L, 

Increase BUN/Crt, decrease GFR,

thrombocytopenia-plts <100k

pre-renal azotemia





RISK FACTORS



UTI

debility

obesity



TREATMENTS:

Initiation Sepsis Protocol

Daily CBC

Blood/sputum/wound cultures















(This form is maintained as a part of the permanent medical record)

2015 [employer].  All Rights Reserved

[email or web address]    [phone]

                                                              

JOSE MARTIN

## 2018-07-12 NOTE — PQF
JUANA ZAMORA                                            TR[place]

[id number]                                                             T4-[id number]-
[id number]

[id number]                             

                                   

CLINICAL DOCUMENTATION CLARIFICATION FORM:  POST DISCHARGE



Addendum to original discharge summary date:  __________________________________
____



Late entry note date:  _________________________________________________________
__











Your assistance is needed to assign the appropriate codes regarding UTI 
documented in progress note starting 6/5..  UTI is not mentioned in your d/c 
summary and clarification is needed.



Please exercise your independent, professional judgment in responding to the 
clarification form. 

Clinical indicators are provided on the bottom of this form for your review



Please check appropriate box(s):

[  ] UTI  please specify if due to or related to (as applicable):         

   

              [  ] Indwelling catheter        [  ] Self-catheterization        
   

    [  ] Suprapubic catheter      [  ] Unable to determine etiology            
       

 

       UTI Site:             

              [  ] Kidney       [  ] Ureter           [  ] Bladder        [  ] 
Urethra      [  ] Unable to determine

              Specify Organism (if known):_______________________    [  ] 
Unknown organism



[  ] Contaminated urine specimen without UTI

[  ] Other diagnosis ___________

[ X ] Unable to determine



In addition, please specify:

Present on Admission (POA):  [  ] Yes             [  ] No             [  ] 
Unable to determine



For continuity of documentation, please document condition throughout progress 
notes and discharge summary.  Thank You.



CLINICAL INDICATORS - SIGNS / SYMPTOMS / LABS

Positive urinalysis

Hematuria

Documentation:   UTI



RISK FACTORS



indwelling catheter 



Debility / nursing home resident



TREATMENT:

Antibiotics

IVF

Lozano cath removed / changed













(This form is maintained as a part of the permanent medical record)

2015 [employer].  All Rights Reserved

[email or web address]    [phone]

                                                              

[email or web address]